# Patient Record
Sex: FEMALE | Race: WHITE | NOT HISPANIC OR LATINO | Employment: FULL TIME | URBAN - METROPOLITAN AREA
[De-identification: names, ages, dates, MRNs, and addresses within clinical notes are randomized per-mention and may not be internally consistent; named-entity substitution may affect disease eponyms.]

---

## 2017-02-10 ENCOUNTER — TRANSCRIBE ORDERS (OUTPATIENT)
Dept: ADMINISTRATIVE | Facility: HOSPITAL | Age: 57
End: 2017-02-10

## 2017-02-10 ENCOUNTER — LAB REQUISITION (OUTPATIENT)
Dept: LAB | Facility: HOSPITAL | Age: 57
End: 2017-02-10
Payer: COMMERCIAL

## 2017-02-10 DIAGNOSIS — K57.92 DIVERTICULITIS OF INTESTINE WITHOUT PERFORATION OR ABSCESS WITHOUT BLEEDING: ICD-10-CM

## 2017-02-10 DIAGNOSIS — IMO0002 DIVERTICULITIS OF GASTROINTESTINAL TRACT: Primary | ICD-10-CM

## 2017-02-10 LAB
ALBUMIN SERPL BCP-MCNC: 3.9 G/DL (ref 3.5–5)
ALP SERPL-CCNC: 80 U/L (ref 46–116)
ALT SERPL W P-5'-P-CCNC: 86 U/L (ref 12–78)
AMYLASE SERPL-CCNC: 89 IU/L (ref 25–115)
ANION GAP SERPL CALCULATED.3IONS-SCNC: 11 MMOL/L (ref 4–13)
AST SERPL W P-5'-P-CCNC: 36 U/L (ref 5–45)
BASOPHILS # BLD AUTO: 0.02 THOUSANDS/ΜL (ref 0–0.1)
BASOPHILS NFR BLD AUTO: 0 % (ref 0–1)
BILIRUB SERPL-MCNC: 0.34 MG/DL (ref 0.2–1)
BUN SERPL-MCNC: 19 MG/DL (ref 5–25)
CALCIUM SERPL-MCNC: 9 MG/DL (ref 8.3–10.1)
CHLORIDE SERPL-SCNC: 105 MMOL/L (ref 100–108)
CO2 SERPL-SCNC: 26 MMOL/L (ref 21–32)
CREAT SERPL-MCNC: 0.76 MG/DL (ref 0.6–1.3)
EOSINOPHIL # BLD AUTO: 0.22 THOUSAND/ΜL (ref 0–0.61)
EOSINOPHIL NFR BLD AUTO: 3 % (ref 0–6)
ERYTHROCYTE [DISTWIDTH] IN BLOOD BY AUTOMATED COUNT: 14 % (ref 11.6–15.1)
GFR SERPL CREATININE-BSD FRML MDRD: >60 ML/MIN/1.73SQ M
GLUCOSE SERPL-MCNC: 100 MG/DL (ref 65–140)
HCT VFR BLD AUTO: 40.7 % (ref 34.8–46.1)
HGB BLD-MCNC: 13.8 G/DL (ref 11.5–15.4)
LYMPHOCYTES # BLD AUTO: 1.82 THOUSANDS/ΜL (ref 0.6–4.47)
LYMPHOCYTES NFR BLD AUTO: 25 % (ref 14–44)
MCH RBC QN AUTO: 29.5 PG (ref 26.8–34.3)
MCHC RBC AUTO-ENTMCNC: 33.9 G/DL (ref 31.4–37.4)
MCV RBC AUTO: 87 FL (ref 82–98)
MONOCYTES # BLD AUTO: 0.52 THOUSAND/ΜL (ref 0.17–1.22)
MONOCYTES NFR BLD AUTO: 7 % (ref 4–12)
NEUTROPHILS # BLD AUTO: 4.8 THOUSANDS/ΜL (ref 1.85–7.62)
NEUTS SEG NFR BLD AUTO: 65 % (ref 43–75)
NRBC BLD AUTO-RTO: 0 /100 WBCS
PLATELET # BLD AUTO: 216 THOUSANDS/UL (ref 149–390)
PMV BLD AUTO: 11.5 FL (ref 8.9–12.7)
POTASSIUM SERPL-SCNC: 3.4 MMOL/L (ref 3.5–5.3)
PROT SERPL-MCNC: 7.8 G/DL (ref 6.4–8.2)
RBC # BLD AUTO: 4.68 MILLION/UL (ref 3.81–5.12)
SODIUM SERPL-SCNC: 142 MMOL/L (ref 136–145)
WBC # BLD AUTO: 7.4 THOUSAND/UL (ref 4.31–10.16)

## 2017-02-10 PROCEDURE — 80053 COMPREHEN METABOLIC PANEL: CPT | Performed by: INTERNAL MEDICINE

## 2017-02-10 PROCEDURE — 85025 COMPLETE CBC W/AUTO DIFF WBC: CPT | Performed by: INTERNAL MEDICINE

## 2017-02-10 PROCEDURE — 82150 ASSAY OF AMYLASE: CPT | Performed by: INTERNAL MEDICINE

## 2017-02-14 ENCOUNTER — HOSPITAL ENCOUNTER (OUTPATIENT)
Dept: RADIOLOGY | Facility: HOSPITAL | Age: 57
Discharge: HOME | End: 2017-02-14
Payer: COMMERCIAL

## 2017-02-14 DIAGNOSIS — IMO0002 DIVERTICULITIS OF GASTROINTESTINAL TRACT: ICD-10-CM

## 2017-02-14 PROCEDURE — 74176 CT ABD & PELVIS W/O CONTRAST: CPT

## 2017-02-23 ENCOUNTER — APPOINTMENT (OUTPATIENT)
Dept: LAB | Facility: HOSPITAL | Age: 57
End: 2017-02-23
Payer: COMMERCIAL

## 2017-02-23 ENCOUNTER — TRANSCRIBE ORDERS (OUTPATIENT)
Dept: LAB | Facility: HOSPITAL | Age: 57
End: 2017-02-23

## 2017-02-23 DIAGNOSIS — R19.7 DIARRHEA, UNSPECIFIED TYPE: ICD-10-CM

## 2017-02-23 DIAGNOSIS — R19.7 DIARRHEA, UNSPECIFIED TYPE: Primary | ICD-10-CM

## 2017-02-23 PROCEDURE — 87899 AGENT NOS ASSAY W/OPTIC: CPT

## 2017-02-23 PROCEDURE — 87015 SPECIMEN INFECT AGNT CONCNTJ: CPT

## 2017-02-23 PROCEDURE — 87177 OVA AND PARASITES SMEARS: CPT

## 2017-02-23 PROCEDURE — 87045 FECES CULTURE AEROBIC BACT: CPT

## 2017-02-23 PROCEDURE — 87046 STOOL CULTR AEROBIC BACT EA: CPT

## 2017-02-23 PROCEDURE — 87493 C DIFF AMPLIFIED PROBE: CPT

## 2017-02-23 PROCEDURE — 87209 SMEAR COMPLEX STAIN: CPT

## 2017-02-24 LAB — C DIFF TOX GENS STL QL NAA+PROBE: NORMAL

## 2017-02-25 LAB
BACTERIA STL CULT: NORMAL
BACTERIA STL CULT: NORMAL
O+P STL CONC: NORMAL

## 2017-03-07 RX ORDER — ACETAMINOPHEN 500 MG
500 TABLET ORAL EVERY 6 HOURS PRN
COMMUNITY

## 2017-03-07 RX ORDER — ESOMEPRAZOLE MAGNESIUM 40 MG/1
40 CAPSULE, DELAYED RELEASE ORAL DAILY
COMMUNITY

## 2017-03-07 RX ORDER — MULTIVITAMIN WITH IRON
1 TABLET ORAL DAILY
COMMUNITY

## 2017-03-07 RX ORDER — UBIDECARENONE/VIT E/VIT E MIX 100-20-15
1 CAPSULE ORAL DAILY
COMMUNITY

## 2017-03-07 RX ORDER — ASPIRIN 81 MG/1
81 TABLET, CHEWABLE ORAL DAILY
COMMUNITY

## 2017-03-07 RX ORDER — FOLIC ACID/MULTIVIT,IRON,MINER .4-18-35
1 TABLET,CHEWABLE ORAL DAILY
COMMUNITY

## 2017-03-07 RX ORDER — CLONAZEPAM 0.5 MG/1
0.5 TABLET ORAL
COMMUNITY
End: 2018-03-26

## 2017-03-07 RX ORDER — ECHINACEA PURPUREA EXTRACT 125 MG
1 TABLET ORAL AS NEEDED
COMMUNITY
End: 2018-03-28 | Stop reason: SDUPTHER

## 2017-03-07 RX ORDER — CHOLECALCIFEROL (VITAMIN D3) 125 MCG
1 CAPSULE ORAL DAILY
COMMUNITY

## 2017-03-07 RX ORDER — PROMETHAZINE HYDROCHLORIDE 25 MG/1
25 TABLET ORAL EVERY 8 HOURS PRN
COMMUNITY

## 2017-03-07 RX ORDER — ALBUTEROL SULFATE 90 UG/1
2 AEROSOL, METERED RESPIRATORY (INHALATION) EVERY 6 HOURS PRN
COMMUNITY

## 2017-03-07 RX ORDER — DICYCLOMINE HCL 20 MG
20 TABLET ORAL AS NEEDED
COMMUNITY

## 2017-03-08 ENCOUNTER — ANESTHESIA (OUTPATIENT)
Dept: GASTROENTEROLOGY | Facility: HOSPITAL | Age: 57
End: 2017-03-08
Payer: COMMERCIAL

## 2017-03-08 ENCOUNTER — HOSPITAL ENCOUNTER (OUTPATIENT)
Facility: HOSPITAL | Age: 57
Setting detail: OUTPATIENT SURGERY
Discharge: HOME | End: 2017-03-08
Attending: INTERNAL MEDICINE | Admitting: INTERNAL MEDICINE
Payer: COMMERCIAL

## 2017-03-08 ENCOUNTER — ANESTHESIA EVENT (OUTPATIENT)
Dept: GASTROENTEROLOGY | Facility: HOSPITAL | Age: 57
End: 2017-03-08
Payer: COMMERCIAL

## 2017-03-08 VITALS
OXYGEN SATURATION: 99 % | TEMPERATURE: 97.8 F | RESPIRATION RATE: 18 BRPM | HEIGHT: 62 IN | HEART RATE: 73 BPM | WEIGHT: 198 LBS | SYSTOLIC BLOOD PRESSURE: 112 MMHG | DIASTOLIC BLOOD PRESSURE: 68 MMHG | BODY MASS INDEX: 36.44 KG/M2

## 2017-03-08 DIAGNOSIS — R10.9 ABDOMINAL PAIN IN FEMALE: ICD-10-CM

## 2017-03-08 PROCEDURE — 88305 TISSUE EXAM BY PATHOLOGIST: CPT | Performed by: INTERNAL MEDICINE

## 2017-03-08 RX ORDER — PROPOFOL 10 MG/ML
INJECTION, EMULSION INTRAVENOUS AS NEEDED
Status: DISCONTINUED | OUTPATIENT
Start: 2017-03-08 | End: 2017-03-08 | Stop reason: SURG

## 2017-03-08 RX ORDER — SODIUM CHLORIDE 9 MG/ML
125 INJECTION, SOLUTION INTRAVENOUS CONTINUOUS
Status: DISCONTINUED | OUTPATIENT
Start: 2017-03-08 | End: 2017-03-08 | Stop reason: HOSPADM

## 2017-03-08 RX ORDER — PROPOFOL 10 MG/ML
INJECTION, EMULSION INTRAVENOUS CONTINUOUS PRN
Status: DISCONTINUED | OUTPATIENT
Start: 2017-03-08 | End: 2017-03-08 | Stop reason: SURG

## 2017-03-08 RX ADMIN — SODIUM CHLORIDE 125 ML/HR: 0.9 INJECTION, SOLUTION INTRAVENOUS at 15:14

## 2017-03-08 RX ADMIN — PROPOFOL 100 MG: 10 INJECTION, EMULSION INTRAVENOUS at 15:45

## 2017-03-08 RX ADMIN — PROPOFOL 150 MCG/KG/MIN: 10 INJECTION, EMULSION INTRAVENOUS at 15:46

## 2017-03-08 RX ADMIN — PROPOFOL 100 MG: 10 INJECTION, EMULSION INTRAVENOUS at 15:48

## 2017-03-10 ENCOUNTER — HOSPITAL ENCOUNTER (EMERGENCY)
Facility: HOSPITAL | Age: 57
Discharge: HOME | End: 2017-03-10
Attending: EMERGENCY MEDICINE | Admitting: EMERGENCY MEDICINE
Payer: COMMERCIAL

## 2017-03-10 ENCOUNTER — APPOINTMENT (EMERGENCY)
Dept: RADIOLOGY | Facility: HOSPITAL | Age: 57
End: 2017-03-10
Payer: COMMERCIAL

## 2017-03-10 VITALS
SYSTOLIC BLOOD PRESSURE: 134 MMHG | TEMPERATURE: 98 F | HEART RATE: 74 BPM | WEIGHT: 198 LBS | OXYGEN SATURATION: 97 % | RESPIRATION RATE: 16 BRPM | BODY MASS INDEX: 36.21 KG/M2 | DIASTOLIC BLOOD PRESSURE: 72 MMHG

## 2017-03-10 DIAGNOSIS — K58.9 IBS (IRRITABLE BOWEL SYNDROME): ICD-10-CM

## 2017-03-10 DIAGNOSIS — R10.12 LEFT UPPER QUADRANT PAIN: Primary | ICD-10-CM

## 2017-03-10 LAB
ALBUMIN SERPL BCP-MCNC: 3.6 G/DL (ref 3.5–5)
ALP SERPL-CCNC: 77 U/L (ref 46–116)
ALT SERPL W P-5'-P-CCNC: 81 U/L (ref 12–78)
ANION GAP SERPL CALCULATED.3IONS-SCNC: 10 MMOL/L (ref 4–13)
AST SERPL W P-5'-P-CCNC: 35 U/L (ref 5–45)
ATRIAL RATE: 78 BPM
BASOPHILS # BLD AUTO: 0.01 THOUSANDS/ΜL (ref 0–0.1)
BASOPHILS NFR BLD AUTO: 0 % (ref 0–1)
BILIRUB SERPL-MCNC: 0.35 MG/DL (ref 0.2–1)
BUN SERPL-MCNC: 14 MG/DL (ref 5–25)
CALCIUM SERPL-MCNC: 8.7 MG/DL (ref 8.3–10.1)
CHLORIDE SERPL-SCNC: 111 MMOL/L (ref 100–108)
CO2 SERPL-SCNC: 22 MMOL/L (ref 21–32)
CREAT SERPL-MCNC: 0.73 MG/DL (ref 0.6–1.3)
EOSINOPHIL # BLD AUTO: 0.18 THOUSAND/ΜL (ref 0–0.61)
EOSINOPHIL NFR BLD AUTO: 4 % (ref 0–6)
ERYTHROCYTE [DISTWIDTH] IN BLOOD BY AUTOMATED COUNT: 14 % (ref 11.6–15.1)
GFR SERPL CREATININE-BSD FRML MDRD: >60 ML/MIN/1.73SQ M
GLUCOSE SERPL-MCNC: 116 MG/DL (ref 65–140)
HCT VFR BLD AUTO: 37.9 % (ref 34.8–46.1)
HGB BLD-MCNC: 12.9 G/DL (ref 11.5–15.4)
LIPASE SERPL-CCNC: 141 U/L (ref 73–393)
LYMPHOCYTES # BLD AUTO: 1.38 THOUSANDS/ΜL (ref 0.6–4.47)
LYMPHOCYTES NFR BLD AUTO: 28 % (ref 14–44)
MCH RBC QN AUTO: 30.3 PG (ref 26.8–34.3)
MCHC RBC AUTO-ENTMCNC: 34 G/DL (ref 31.4–37.4)
MCV RBC AUTO: 89 FL (ref 82–98)
MONOCYTES # BLD AUTO: 0.33 THOUSAND/ΜL (ref 0.17–1.22)
MONOCYTES NFR BLD AUTO: 7 % (ref 4–12)
NEUTROPHILS # BLD AUTO: 3.04 THOUSANDS/ΜL (ref 1.85–7.62)
NEUTS SEG NFR BLD AUTO: 61 % (ref 43–75)
NRBC BLD AUTO-RTO: 0 /100 WBCS
P AXIS: 67 DEGREES
PLATELET # BLD AUTO: 168 THOUSANDS/UL (ref 149–390)
PMV BLD AUTO: 11.3 FL (ref 8.9–12.7)
POTASSIUM SERPL-SCNC: 3.1 MMOL/L (ref 3.5–5.3)
PR INTERVAL: 144 MS
PROT SERPL-MCNC: 7.5 G/DL (ref 6.4–8.2)
QRS AXIS: 30 DEGREES
QRSD INTERVAL: 86 MS
QT INTERVAL: 364 MS
QTC INTERVAL: 414 MS
RBC # BLD AUTO: 4.26 MILLION/UL (ref 3.81–5.12)
SODIUM SERPL-SCNC: 143 MMOL/L (ref 136–145)
T WAVE AXIS: 49 DEGREES
VENTRICULAR RATE: 78 BPM
WBC # BLD AUTO: 4.95 THOUSAND/UL (ref 4.31–10.16)

## 2017-03-10 PROCEDURE — 71020 HB CHEST X-RAY 2VW FRONTAL&LATL: CPT

## 2017-03-10 PROCEDURE — 99285 EMERGENCY DEPT VISIT HI MDM: CPT

## 2017-03-10 PROCEDURE — 83690 ASSAY OF LIPASE: CPT | Performed by: EMERGENCY MEDICINE

## 2017-03-10 PROCEDURE — 85025 COMPLETE CBC W/AUTO DIFF WBC: CPT | Performed by: EMERGENCY MEDICINE

## 2017-03-10 PROCEDURE — 74177 CT ABD & PELVIS W/CONTRAST: CPT

## 2017-03-10 PROCEDURE — 93005 ELECTROCARDIOGRAM TRACING: CPT | Performed by: EMERGENCY MEDICINE

## 2017-03-10 PROCEDURE — 36415 COLL VENOUS BLD VENIPUNCTURE: CPT | Performed by: EMERGENCY MEDICINE

## 2017-03-10 PROCEDURE — 80053 COMPREHEN METABOLIC PANEL: CPT | Performed by: EMERGENCY MEDICINE

## 2017-03-10 RX ORDER — MAGNESIUM HYDROXIDE/ALUMINUM HYDROXICE/SIMETHICONE 120; 1200; 1200 MG/30ML; MG/30ML; MG/30ML
30 SUSPENSION ORAL ONCE
Status: COMPLETED | OUTPATIENT
Start: 2017-03-10 | End: 2017-03-10

## 2017-03-10 RX ORDER — DICYCLOMINE HCL 20 MG
20 TABLET ORAL ONCE
Status: COMPLETED | OUTPATIENT
Start: 2017-03-10 | End: 2017-03-10

## 2017-03-10 RX ORDER — FAMOTIDINE 20 MG/1
20 TABLET, FILM COATED ORAL ONCE
Status: COMPLETED | OUTPATIENT
Start: 2017-03-10 | End: 2017-03-10

## 2017-03-10 RX ADMIN — DICYCLOMINE HYDROCHLORIDE 20 MG: 20 TABLET ORAL at 08:53

## 2017-03-10 RX ADMIN — IOHEXOL 100 ML: 350 INJECTION, SOLUTION INTRAVENOUS at 09:28

## 2017-03-10 RX ADMIN — ALUMINUM HYDROXIDE, MAGNESIUM HYDROXIDE, AND SIMETHICONE 30 ML: 200; 200; 20 SUSPENSION ORAL at 08:53

## 2017-03-10 RX ADMIN — FAMOTIDINE 20 MG: 20 TABLET ORAL at 08:53

## 2017-03-15 ENCOUNTER — APPOINTMENT (OUTPATIENT)
Dept: LAB | Facility: HOSPITAL | Age: 57
End: 2017-03-15
Payer: COMMERCIAL

## 2017-03-15 ENCOUNTER — TRANSCRIBE ORDERS (OUTPATIENT)
Dept: LAB | Facility: HOSPITAL | Age: 57
End: 2017-03-15

## 2017-03-15 DIAGNOSIS — E87.6 HYPOPOTASSEMIA: ICD-10-CM

## 2017-03-15 DIAGNOSIS — E87.6 HYPOPOTASSEMIA: Primary | ICD-10-CM

## 2017-03-15 LAB
ANION GAP SERPL CALCULATED.3IONS-SCNC: 8 MMOL/L (ref 4–13)
BUN SERPL-MCNC: 13 MG/DL (ref 5–25)
CALCIUM SERPL-MCNC: 9.2 MG/DL (ref 8.3–10.1)
CHLORIDE SERPL-SCNC: 109 MMOL/L (ref 100–108)
CO2 SERPL-SCNC: 26 MMOL/L (ref 21–32)
CREAT SERPL-MCNC: 0.78 MG/DL (ref 0.6–1.3)
GFR SERPL CREATININE-BSD FRML MDRD: >60 ML/MIN/1.73SQ M
GLUCOSE P FAST SERPL-MCNC: 106 MG/DL (ref 65–99)
POTASSIUM SERPL-SCNC: 3.9 MMOL/L (ref 3.5–5.3)
SODIUM SERPL-SCNC: 143 MMOL/L (ref 136–145)

## 2017-03-15 PROCEDURE — 80048 BASIC METABOLIC PNL TOTAL CA: CPT

## 2017-03-15 PROCEDURE — 36415 COLL VENOUS BLD VENIPUNCTURE: CPT

## 2017-04-03 ENCOUNTER — TRANSCRIBE ORDERS (OUTPATIENT)
Dept: RADIOLOGY | Facility: HOSPITAL | Age: 57
End: 2017-04-03

## 2017-04-03 ENCOUNTER — HOSPITAL ENCOUNTER (OUTPATIENT)
Dept: RADIOLOGY | Facility: HOSPITAL | Age: 57
Discharge: HOME | End: 2017-04-03
Payer: COMMERCIAL

## 2017-04-03 DIAGNOSIS — R07.1 CHEST PAIN ON BREATHING: ICD-10-CM

## 2017-04-03 DIAGNOSIS — R07.1 CHEST PAIN ON BREATHING: Primary | ICD-10-CM

## 2017-04-03 PROCEDURE — 71100 X-RAY EXAM RIBS UNI 2 VIEWS: CPT

## 2017-07-16 ENCOUNTER — APPOINTMENT (OUTPATIENT)
Dept: LAB | Age: 57
End: 2017-07-16
Payer: COMMERCIAL

## 2017-07-16 ENCOUNTER — TRANSCRIBE ORDERS (OUTPATIENT)
Dept: ADMINISTRATIVE | Age: 57
End: 2017-07-16

## 2017-07-16 DIAGNOSIS — Z00.8 HEALTH EXAMINATION IN POPULATION SURVEY: Primary | ICD-10-CM

## 2017-07-16 DIAGNOSIS — Z00.8 HEALTH EXAMINATION IN POPULATION SURVEY: ICD-10-CM

## 2017-07-16 LAB
CHOLEST SERPL-MCNC: 241 MG/DL (ref 50–200)
EST. AVERAGE GLUCOSE BLD GHB EST-MCNC: 137 MG/DL
HBA1C MFR BLD: 6.4 % (ref 4.2–6.3)
HDLC SERPL-MCNC: 61 MG/DL (ref 40–60)
LDLC SERPL CALC-MCNC: 147 MG/DL (ref 0–100)
TRIGL SERPL-MCNC: 166 MG/DL

## 2017-07-16 PROCEDURE — 36415 COLL VENOUS BLD VENIPUNCTURE: CPT

## 2017-07-16 PROCEDURE — 80061 LIPID PANEL: CPT

## 2017-07-16 PROCEDURE — 83036 HEMOGLOBIN GLYCOSYLATED A1C: CPT

## 2017-12-23 ENCOUNTER — APPOINTMENT (OUTPATIENT)
Dept: LAB | Facility: HOSPITAL | Age: 57
End: 2017-12-23
Payer: COMMERCIAL

## 2017-12-23 ENCOUNTER — TRANSCRIBE ORDERS (OUTPATIENT)
Dept: LAB | Facility: HOSPITAL | Age: 57
End: 2017-12-23

## 2017-12-23 DIAGNOSIS — E78.5 HYPERLIPIDEMIA, UNSPECIFIED HYPERLIPIDEMIA TYPE: ICD-10-CM

## 2017-12-23 DIAGNOSIS — E11.9 TYPE 2 DIABETES MELLITUS WITHOUT COMPLICATION, UNSPECIFIED LONG TERM INSULIN USE STATUS: ICD-10-CM

## 2017-12-23 DIAGNOSIS — E11.9 TYPE 2 DIABETES MELLITUS WITHOUT COMPLICATION, UNSPECIFIED LONG TERM INSULIN USE STATUS: Primary | ICD-10-CM

## 2017-12-23 LAB
ALBUMIN SERPL BCP-MCNC: 3.9 G/DL (ref 3.5–5)
ALP SERPL-CCNC: 81 U/L (ref 46–116)
ALT SERPL W P-5'-P-CCNC: 49 U/L (ref 12–78)
ANION GAP SERPL CALCULATED.3IONS-SCNC: 7 MMOL/L (ref 4–13)
AST SERPL W P-5'-P-CCNC: 26 U/L (ref 5–45)
BILIRUB SERPL-MCNC: 0.42 MG/DL (ref 0.2–1)
BUN SERPL-MCNC: 17 MG/DL (ref 5–25)
CALCIUM SERPL-MCNC: 8.9 MG/DL (ref 8.3–10.1)
CHLORIDE SERPL-SCNC: 109 MMOL/L (ref 100–108)
CO2 SERPL-SCNC: 26 MMOL/L (ref 21–32)
CREAT SERPL-MCNC: 0.78 MG/DL (ref 0.6–1.3)
EST. AVERAGE GLUCOSE BLD GHB EST-MCNC: 123 MG/DL
GFR SERPL CREATININE-BSD FRML MDRD: 85 ML/MIN/1.73SQ M
GLUCOSE P FAST SERPL-MCNC: 111 MG/DL (ref 65–99)
HBA1C MFR BLD: 5.9 % (ref 4.2–6.3)
LDLC SERPL DIRECT ASSAY-MCNC: 137 MG/DL (ref 0–100)
POTASSIUM SERPL-SCNC: 3.5 MMOL/L (ref 3.5–5.3)
PROT SERPL-MCNC: 8.1 G/DL (ref 6.4–8.2)
SODIUM SERPL-SCNC: 142 MMOL/L (ref 136–145)

## 2017-12-23 PROCEDURE — 83036 HEMOGLOBIN GLYCOSYLATED A1C: CPT

## 2017-12-23 PROCEDURE — 80053 COMPREHEN METABOLIC PANEL: CPT

## 2017-12-23 PROCEDURE — 36415 COLL VENOUS BLD VENIPUNCTURE: CPT

## 2017-12-23 PROCEDURE — 83721 ASSAY OF BLOOD LIPOPROTEIN: CPT

## 2018-01-12 NOTE — PROCEDURES
Procedures by Bambi Mendez MD  at 2016  6:37 PM      Author:  Bambi Mendez MD Service:  (none) Author Type:  Physician     Filed:  2016  6:38 PM Date of Service:  2016  6:37 PM Status:  Signed     :  Bambi Mendez MD (Physician)            [address] [place] [address]  [place]  Phone [phone]  Fax [phone]                                            Patient Name: Shavonne Alcazar       Date performed:  2016 Medical Record #: [id number]   Report date: 16 File #: [id number]   Referring physician: Paige Carreno ACCT#: -   : 1960 Study type: Routine, awake and drowsy   Age: 48 y Technologist: Berenice GIBBS EEG  T    Location: Outpatient ICD diagnosis: -           ELECTROENCEPHALOGRAM      Patient History:  The patient is a 48year old female with a history of migraine, sarcoidosis, and partial complex epilepsy  The latter was diagnosed in May 2011;  she has remained clinically well-controlled on her current medication regimen  The patient's brother and uncle both have a history of seizures  Medications:  Topiramate, Nexium, Verapamil, Asmanex Twist Inhaler, Centrum Advanced, Folic Acid, Ambien, ASA, Normal Saline Nasal Mist, Vit D 3, Coenzyme Q-10, Ventolin, prn Phenergan, Vicodin, Tylenol  Description of Procedure: This EEG was performed with simultaneous video recording  Electrodes were applied using the International 10-20 System, with additional electrodes used including EOG, ECG and T1/T2  The EEG was recorded from 7:57:31 AM  until 09:14:29 AM for  a total of 33 1 minutes  The recording was technically satisfactory for interpretation  Skull Defect (if any):  None  Findings:     State: This sleep-deprived EEG was obtained during wakefulness, drowsiness, and un-sedated sleep  Activity:    The background during wakefulness consisted of moderate amplitude, well-modulated 8 - 9 Hz alpha activity located symmetrically over the posterior head regions with good reactivity and attenuation to eye opening  Photic stimulation  produced a symmetric occipital driving response to a broad range of flash frequencies, and was non-activating for abnormalities  Drowsiness and light sleep were reflected by slowing and attenuation of background rhythms, accompanied by well-formed and  symmetrically placed sleep transients  Portions of delta sleep were recorded as well  Abnormal findings: The main feature of the tracing was the fairly frequent paroxysmal occurence of moderate-to-high amplitude, dysrhythmic, sharply-contoured theta-frequency activity arising from both left and right fronto-temporal  derivations independently  Several discrete sharp transients were suspected in these regions as well, though there were no sustained ictal rhythmic discharges  Hyperventilation and drowsiness markedly accentuated the above findings  Other findings: The single lead ECG revealed a normal sinus rhythm ranging from 66 - 72 bpm without ectopy  Interpretation: This is an abnormal sleep-deprived EEG due to bifrontotemporal independent sharp dysrhythmic activity, accentuated by hyperventilation and drowsiness  Similar to that seen on this patients previous EEG tracings, these findings suggest  underlying focal cortical hyper-irritability  In addition to enhanced epileptogenic potential and localization-related (partial-onset) epilepsy, similar findings may be seen in patients with chronic migraine  Sayra CUNNINGHAM  [facility]               Received for:Provider  EPIC   May 23 2016  6:38PM [facility] Standard Time

## 2018-02-14 ENCOUNTER — TRANSCRIBE ORDERS (OUTPATIENT)
Dept: ADMINISTRATIVE | Facility: HOSPITAL | Age: 58
End: 2018-02-14

## 2018-02-14 DIAGNOSIS — G40.219 PARTIAL SYMPTOMATIC EPILEPSY WITH COMPLEX PARTIAL SEIZURES, INTRACTABLE, WITHOUT STATUS EPILEPTICUS (HCC): Primary | ICD-10-CM

## 2018-03-26 ENCOUNTER — APPOINTMENT (EMERGENCY)
Dept: RADIOLOGY | Facility: HOSPITAL | Age: 58
End: 2018-03-26
Payer: COMMERCIAL

## 2018-03-26 ENCOUNTER — HOSPITAL ENCOUNTER (OUTPATIENT)
Facility: HOSPITAL | Age: 58
Setting detail: OBSERVATION
Discharge: HOME | End: 2018-03-27
Attending: EMERGENCY MEDICINE | Admitting: INTERNAL MEDICINE
Payer: COMMERCIAL

## 2018-03-26 DIAGNOSIS — R07.9 CHEST PAIN: Primary | ICD-10-CM

## 2018-03-26 DIAGNOSIS — M79.602 LEFT ARM PAIN: ICD-10-CM

## 2018-03-26 PROBLEM — M19.012 ARTHROPATHY OF LEFT SHOULDER: Status: ACTIVE | Noted: 2018-03-26

## 2018-03-26 PROBLEM — I10 HYPERTENSION, ESSENTIAL: Status: ACTIVE | Noted: 2018-03-26

## 2018-03-26 LAB
ALBUMIN SERPL BCP-MCNC: 3.8 G/DL (ref 3.5–5)
ALP SERPL-CCNC: 74 U/L (ref 46–116)
ALT SERPL W P-5'-P-CCNC: 46 U/L (ref 12–78)
ANION GAP SERPL CALCULATED.3IONS-SCNC: 7 MMOL/L (ref 4–13)
APTT PPP: 30 SECONDS (ref 23–35)
AST SERPL W P-5'-P-CCNC: 22 U/L (ref 5–45)
BASOPHILS # BLD AUTO: 0.02 THOUSANDS/ΜL (ref 0–0.1)
BASOPHILS NFR BLD AUTO: 0 % (ref 0–1)
BILIRUB SERPL-MCNC: 0.19 MG/DL (ref 0.2–1)
BUN SERPL-MCNC: 22 MG/DL (ref 5–25)
CALCIUM SERPL-MCNC: 8.7 MG/DL (ref 8.3–10.1)
CHLORIDE SERPL-SCNC: 107 MMOL/L (ref 100–108)
CO2 SERPL-SCNC: 25 MMOL/L (ref 21–32)
CREAT SERPL-MCNC: 0.76 MG/DL (ref 0.6–1.3)
DEPRECATED D DIMER PPP: 782 NG/ML (FEU) (ref 0–424)
EOSINOPHIL # BLD AUTO: 0.19 THOUSAND/ΜL (ref 0–0.61)
EOSINOPHIL NFR BLD AUTO: 2 % (ref 0–6)
ERYTHROCYTE [DISTWIDTH] IN BLOOD BY AUTOMATED COUNT: 13.4 % (ref 11.6–15.1)
GFR SERPL CREATININE-BSD FRML MDRD: 88 ML/MIN/1.73SQ M
GLUCOSE SERPL-MCNC: 113 MG/DL (ref 65–140)
HCT VFR BLD AUTO: 38 % (ref 34.8–46.1)
HGB BLD-MCNC: 13.1 G/DL (ref 11.5–15.4)
INR PPP: 0.92 (ref 0.86–1.16)
LYMPHOCYTES # BLD AUTO: 1.89 THOUSANDS/ΜL (ref 0.6–4.47)
LYMPHOCYTES NFR BLD AUTO: 21 % (ref 14–44)
MCH RBC QN AUTO: 30.4 PG (ref 26.8–34.3)
MCHC RBC AUTO-ENTMCNC: 34.5 G/DL (ref 31.4–37.4)
MCV RBC AUTO: 88 FL (ref 82–98)
MONOCYTES # BLD AUTO: 0.53 THOUSAND/ΜL (ref 0.17–1.22)
MONOCYTES NFR BLD AUTO: 6 % (ref 4–12)
NEUTROPHILS # BLD AUTO: 6.49 THOUSANDS/ΜL (ref 1.85–7.62)
NEUTS SEG NFR BLD AUTO: 71 % (ref 43–75)
NRBC BLD AUTO-RTO: 0 /100 WBCS
PLATELET # BLD AUTO: 209 THOUSANDS/UL (ref 149–390)
PMV BLD AUTO: 10.6 FL (ref 8.9–12.7)
POTASSIUM SERPL-SCNC: 3.2 MMOL/L (ref 3.5–5.3)
PROT SERPL-MCNC: 7.9 G/DL (ref 6.4–8.2)
PROTHROMBIN TIME: 12.4 SECONDS (ref 12.1–14.4)
RBC # BLD AUTO: 4.31 MILLION/UL (ref 3.81–5.12)
SODIUM SERPL-SCNC: 139 MMOL/L (ref 136–145)
TROPONIN I SERPL-MCNC: <0.02 NG/ML
TROPONIN I SERPL-MCNC: <0.02 NG/ML
WBC # BLD AUTO: 9.14 THOUSAND/UL (ref 4.31–10.16)

## 2018-03-26 PROCEDURE — 36415 COLL VENOUS BLD VENIPUNCTURE: CPT

## 2018-03-26 PROCEDURE — 85379 FIBRIN DEGRADATION QUANT: CPT | Performed by: EMERGENCY MEDICINE

## 2018-03-26 PROCEDURE — 85730 THROMBOPLASTIN TIME PARTIAL: CPT | Performed by: EMERGENCY MEDICINE

## 2018-03-26 PROCEDURE — 93005 ELECTROCARDIOGRAM TRACING: CPT

## 2018-03-26 PROCEDURE — 85610 PROTHROMBIN TIME: CPT | Performed by: EMERGENCY MEDICINE

## 2018-03-26 PROCEDURE — 96376 TX/PRO/DX INJ SAME DRUG ADON: CPT

## 2018-03-26 PROCEDURE — 71046 X-RAY EXAM CHEST 2 VIEWS: CPT

## 2018-03-26 PROCEDURE — 80053 COMPREHEN METABOLIC PANEL: CPT | Performed by: EMERGENCY MEDICINE

## 2018-03-26 PROCEDURE — 96375 TX/PRO/DX INJ NEW DRUG ADDON: CPT

## 2018-03-26 PROCEDURE — 96374 THER/PROPH/DIAG INJ IV PUSH: CPT

## 2018-03-26 PROCEDURE — 85025 COMPLETE CBC W/AUTO DIFF WBC: CPT | Performed by: EMERGENCY MEDICINE

## 2018-03-26 PROCEDURE — 84484 ASSAY OF TROPONIN QUANT: CPT | Performed by: EMERGENCY MEDICINE

## 2018-03-26 PROCEDURE — 71275 CT ANGIOGRAPHY CHEST: CPT

## 2018-03-26 RX ORDER — ZOLMITRIPTAN 5 MG/1
5 TABLET, FILM COATED ORAL ONCE AS NEEDED
COMMUNITY
End: 2018-04-14

## 2018-03-26 RX ORDER — ASPIRIN 81 MG/1
324 TABLET, CHEWABLE ORAL ONCE
Status: COMPLETED | OUTPATIENT
Start: 2018-03-26 | End: 2018-03-26

## 2018-03-26 RX ORDER — ONDANSETRON 2 MG/ML
4 INJECTION INTRAMUSCULAR; INTRAVENOUS ONCE
Status: COMPLETED | OUTPATIENT
Start: 2018-03-26 | End: 2018-03-26

## 2018-03-26 RX ORDER — MAGNESIUM HYDROXIDE/ALUMINUM HYDROXICE/SIMETHICONE 120; 1200; 1200 MG/30ML; MG/30ML; MG/30ML
30 SUSPENSION ORAL ONCE
Status: COMPLETED | OUTPATIENT
Start: 2018-03-26 | End: 2018-03-26

## 2018-03-26 RX ORDER — MORPHINE SULFATE 4 MG/ML
4 INJECTION, SOLUTION INTRAMUSCULAR; INTRAVENOUS ONCE
Status: COMPLETED | OUTPATIENT
Start: 2018-03-26 | End: 2018-03-26

## 2018-03-26 RX ORDER — KETOROLAC TROMETHAMINE 30 MG/ML
15 INJECTION, SOLUTION INTRAMUSCULAR; INTRAVENOUS ONCE
Status: COMPLETED | OUTPATIENT
Start: 2018-03-26 | End: 2018-03-26

## 2018-03-26 RX ADMIN — MORPHINE SULFATE 4 MG: 4 INJECTION INTRAVENOUS at 17:02

## 2018-03-26 RX ADMIN — ASPIRIN 81 MG 324 MG: 81 TABLET ORAL at 17:02

## 2018-03-26 RX ADMIN — LIDOCAINE HYDROCHLORIDE 15 ML: 20 SOLUTION ORAL; TOPICAL at 20:37

## 2018-03-26 RX ADMIN — KETOROLAC TROMETHAMINE 15 MG: 30 INJECTION, SOLUTION INTRAMUSCULAR at 17:02

## 2018-03-26 RX ADMIN — ALUMINUM HYDROXIDE, MAGNESIUM HYDROXIDE, AND SIMETHICONE 30 ML: 200; 200; 20 SUSPENSION ORAL at 20:37

## 2018-03-26 RX ADMIN — IOHEXOL 70 ML: 350 INJECTION, SOLUTION INTRAVENOUS at 18:13

## 2018-03-26 RX ADMIN — ONDANSETRON 4 MG: 2 INJECTION INTRAMUSCULAR; INTRAVENOUS at 19:43

## 2018-03-26 RX ADMIN — MORPHINE SULFATE 4 MG: 4 INJECTION INTRAVENOUS at 18:39

## 2018-03-27 VITALS
BODY MASS INDEX: 36.58 KG/M2 | HEART RATE: 82 BPM | OXYGEN SATURATION: 97 % | TEMPERATURE: 98.9 F | RESPIRATION RATE: 18 BRPM | SYSTOLIC BLOOD PRESSURE: 112 MMHG | WEIGHT: 200 LBS | DIASTOLIC BLOOD PRESSURE: 58 MMHG

## 2018-03-27 PROBLEM — K44.9 HIATAL HERNIA: Status: ACTIVE | Noted: 2018-03-27

## 2018-03-27 PROBLEM — M79.602 LEFT ARM PAIN: Status: ACTIVE | Noted: 2018-03-26

## 2018-03-27 PROBLEM — E87.6 HYPOKALEMIA: Status: ACTIVE | Noted: 2018-03-27

## 2018-03-27 LAB
ANION GAP SERPL CALCULATED.3IONS-SCNC: 11 MMOL/L (ref 4–13)
ATRIAL RATE: 136 BPM
ATRIAL RATE: 76 BPM
ATRIAL RATE: 76 BPM
ATRIAL RATE: 78 BPM
BUN SERPL-MCNC: 23 MG/DL (ref 5–25)
CALCIUM SERPL-MCNC: 8.2 MG/DL (ref 8.3–10.1)
CHLORIDE SERPL-SCNC: 108 MMOL/L (ref 100–108)
CHOLEST SERPL-MCNC: 218 MG/DL (ref 50–200)
CO2 SERPL-SCNC: 25 MMOL/L (ref 21–32)
CREAT SERPL-MCNC: 0.8 MG/DL (ref 0.6–1.3)
ERYTHROCYTE [DISTWIDTH] IN BLOOD BY AUTOMATED COUNT: 13.6 % (ref 11.6–15.1)
GFR SERPL CREATININE-BSD FRML MDRD: 83 ML/MIN/1.73SQ M
GLUCOSE SERPL-MCNC: 108 MG/DL (ref 65–140)
HCT VFR BLD AUTO: 36.4 % (ref 34.8–46.1)
HDLC SERPL-MCNC: 54 MG/DL (ref 40–60)
HGB BLD-MCNC: 12.3 G/DL (ref 11.5–15.4)
LDLC SERPL CALC-MCNC: 135 MG/DL (ref 0–100)
MAGNESIUM SERPL-MCNC: 1.9 MG/DL (ref 1.6–2.6)
MCH RBC QN AUTO: 30.1 PG (ref 26.8–34.3)
MCHC RBC AUTO-ENTMCNC: 33.8 G/DL (ref 31.4–37.4)
MCV RBC AUTO: 89 FL (ref 82–98)
P AXIS: 36 DEGREES
P AXIS: 45 DEGREES
P AXIS: 47 DEGREES
P AXIS: 62 DEGREES
PHOSPHATE SERPL-MCNC: 3.8 MG/DL (ref 2.7–4.5)
PLATELET # BLD AUTO: 188 THOUSANDS/UL (ref 149–390)
PMV BLD AUTO: 10.5 FL (ref 8.9–12.7)
POTASSIUM SERPL-SCNC: 3.3 MMOL/L (ref 3.5–5.3)
PR INTERVAL: 124 MS
PR INTERVAL: 126 MS
PR INTERVAL: 126 MS
QRS AXIS: 14 DEGREES
QRS AXIS: 15 DEGREES
QRS AXIS: 16 DEGREES
QRS AXIS: 28 DEGREES
QRSD INTERVAL: 78 MS
QRSD INTERVAL: 84 MS
QRSD INTERVAL: 84 MS
QRSD INTERVAL: 88 MS
QT INTERVAL: 360 MS
QT INTERVAL: 366 MS
QT INTERVAL: 370 MS
QT INTERVAL: 378 MS
QTC INTERVAL: 405 MS
QTC INTERVAL: 408 MS
QTC INTERVAL: 411 MS
QTC INTERVAL: 421 MS
RBC # BLD AUTO: 4.09 MILLION/UL (ref 3.81–5.12)
SODIUM SERPL-SCNC: 144 MMOL/L (ref 136–145)
T WAVE AXIS: 47 DEGREES
T WAVE AXIS: 52 DEGREES
T WAVE AXIS: 56 DEGREES
T WAVE AXIS: 57 DEGREES
TRIGL SERPL-MCNC: 146 MG/DL
TROPONIN I SERPL-MCNC: <0.02 NG/ML
TSH SERPL DL<=0.05 MIU/L-ACNC: 2.03 UIU/ML (ref 0.36–3.74)
VENTRICULAR RATE: 70 BPM
VENTRICULAR RATE: 76 BPM
VENTRICULAR RATE: 76 BPM
VENTRICULAR RATE: 78 BPM
WBC # BLD AUTO: 6.98 THOUSAND/UL (ref 4.31–10.16)

## 2018-03-27 PROCEDURE — 36415 COLL VENOUS BLD VENIPUNCTURE: CPT | Performed by: INTERNAL MEDICINE

## 2018-03-27 PROCEDURE — 93005 ELECTROCARDIOGRAM TRACING: CPT

## 2018-03-27 PROCEDURE — 80061 LIPID PANEL: CPT | Performed by: INTERNAL MEDICINE

## 2018-03-27 PROCEDURE — 84484 ASSAY OF TROPONIN QUANT: CPT | Performed by: INTERNAL MEDICINE

## 2018-03-27 PROCEDURE — 83735 ASSAY OF MAGNESIUM: CPT | Performed by: INTERNAL MEDICINE

## 2018-03-27 PROCEDURE — 99285 EMERGENCY DEPT VISIT HI MDM: CPT

## 2018-03-27 PROCEDURE — 84100 ASSAY OF PHOSPHORUS: CPT | Performed by: INTERNAL MEDICINE

## 2018-03-27 PROCEDURE — 84443 ASSAY THYROID STIM HORMONE: CPT | Performed by: INTERNAL MEDICINE

## 2018-03-27 PROCEDURE — 80048 BASIC METABOLIC PNL TOTAL CA: CPT | Performed by: INTERNAL MEDICINE

## 2018-03-27 PROCEDURE — 85027 COMPLETE CBC AUTOMATED: CPT | Performed by: INTERNAL MEDICINE

## 2018-03-27 PROCEDURE — 99235 HOSP IP/OBS SAME DATE MOD 70: CPT | Performed by: INTERNAL MEDICINE

## 2018-03-27 RX ORDER — ZOLPIDEM TARTRATE 5 MG/1
5 TABLET ORAL
Status: DISCONTINUED | OUTPATIENT
Start: 2018-03-27 | End: 2018-03-27 | Stop reason: HOSPADM

## 2018-03-27 RX ORDER — TOPIRAMATE 100 MG/1
200 TABLET, FILM COATED ORAL 2 TIMES DAILY
Status: DISCONTINUED | OUTPATIENT
Start: 2018-03-27 | End: 2018-03-27 | Stop reason: HOSPADM

## 2018-03-27 RX ORDER — ONDANSETRON 2 MG/ML
4 INJECTION INTRAMUSCULAR; INTRAVENOUS EVERY 6 HOURS PRN
Status: DISCONTINUED | OUTPATIENT
Start: 2018-03-27 | End: 2018-03-27 | Stop reason: HOSPADM

## 2018-03-27 RX ORDER — DOCUSATE SODIUM 100 MG/1
100 CAPSULE, LIQUID FILLED ORAL 2 TIMES DAILY PRN
Status: DISCONTINUED | OUTPATIENT
Start: 2018-03-27 | End: 2018-03-27 | Stop reason: HOSPADM

## 2018-03-27 RX ORDER — ASPIRIN 81 MG/1
81 TABLET, CHEWABLE ORAL DAILY
Status: DISCONTINUED | OUTPATIENT
Start: 2018-03-27 | End: 2018-03-27 | Stop reason: HOSPADM

## 2018-03-27 RX ORDER — SUMATRIPTAN 50 MG/1
25 TABLET, FILM COATED ORAL ONCE AS NEEDED
Status: DISCONTINUED | OUTPATIENT
Start: 2018-03-27 | End: 2018-03-27 | Stop reason: HOSPADM

## 2018-03-27 RX ORDER — POTASSIUM CHLORIDE 20 MEQ/1
40 TABLET, EXTENDED RELEASE ORAL ONCE
Status: COMPLETED | OUTPATIENT
Start: 2018-03-27 | End: 2018-03-27

## 2018-03-27 RX ORDER — CHOLECALCIFEROL (VITAMIN D3) 125 MCG
100 CAPSULE ORAL DAILY
Status: DISCONTINUED | OUTPATIENT
Start: 2018-03-27 | End: 2018-03-27 | Stop reason: HOSPADM

## 2018-03-27 RX ORDER — PROMETHAZINE HYDROCHLORIDE 25 MG/1
25 TABLET ORAL EVERY 8 HOURS PRN
Status: DISCONTINUED | OUTPATIENT
Start: 2018-03-27 | End: 2018-03-27 | Stop reason: HOSPADM

## 2018-03-27 RX ORDER — DICYCLOMINE HCL 20 MG
20 TABLET ORAL EVERY 6 HOURS
Status: DISCONTINUED | OUTPATIENT
Start: 2018-03-27 | End: 2018-03-27 | Stop reason: HOSPADM

## 2018-03-27 RX ORDER — PANTOPRAZOLE SODIUM 40 MG/1
40 TABLET, DELAYED RELEASE ORAL
Status: DISCONTINUED | OUTPATIENT
Start: 2018-03-27 | End: 2018-03-27 | Stop reason: HOSPADM

## 2018-03-27 RX ORDER — FLUTICASONE PROPIONATE 220 UG/1
2 AEROSOL, METERED RESPIRATORY (INHALATION)
Status: DISCONTINUED | OUTPATIENT
Start: 2018-03-27 | End: 2018-03-27 | Stop reason: HOSPADM

## 2018-03-27 RX ORDER — MELATONIN
2000 DAILY
Status: DISCONTINUED | OUTPATIENT
Start: 2018-03-27 | End: 2018-03-27 | Stop reason: HOSPADM

## 2018-03-27 RX ORDER — CHOLECALCIFEROL (VITAMIN D3) 10 MCG
1 TABLET ORAL DAILY
Status: DISCONTINUED | OUTPATIENT
Start: 2018-03-27 | End: 2018-03-27 | Stop reason: HOSPADM

## 2018-03-27 RX ORDER — MAGNESIUM HYDROXIDE/ALUMINUM HYDROXICE/SIMETHICONE 120; 1200; 1200 MG/30ML; MG/30ML; MG/30ML
15 SUSPENSION ORAL EVERY 6 HOURS PRN
Status: DISCONTINUED | OUTPATIENT
Start: 2018-03-27 | End: 2018-03-27 | Stop reason: HOSPADM

## 2018-03-27 RX ORDER — ACETAMINOPHEN 325 MG/1
650 TABLET ORAL EVERY 6 HOURS PRN
Status: DISCONTINUED | OUTPATIENT
Start: 2018-03-27 | End: 2018-03-27 | Stop reason: HOSPADM

## 2018-03-27 RX ORDER — ACETAMINOPHEN 325 MG/1
500 TABLET ORAL EVERY 6 HOURS PRN
Status: DISCONTINUED | OUTPATIENT
Start: 2018-03-27 | End: 2018-03-27 | Stop reason: HOSPADM

## 2018-03-27 RX ORDER — ALBUTEROL SULFATE 90 UG/1
2 AEROSOL, METERED RESPIRATORY (INHALATION) EVERY 6 HOURS PRN
Status: DISCONTINUED | OUTPATIENT
Start: 2018-03-27 | End: 2018-03-27 | Stop reason: HOSPADM

## 2018-03-27 RX ORDER — ECHINACEA PURPUREA EXTRACT 125 MG
1 TABLET ORAL AS NEEDED
Status: DISCONTINUED | OUTPATIENT
Start: 2018-03-27 | End: 2018-03-27 | Stop reason: HOSPADM

## 2018-03-27 RX ORDER — TRAMADOL HYDROCHLORIDE 50 MG/1
50 TABLET ORAL EVERY 6 HOURS PRN
Qty: 20 TABLET | Refills: 0 | Status: SHIPPED | OUTPATIENT
Start: 2018-03-27 | End: 2018-04-06

## 2018-03-27 RX ADMIN — POTASSIUM CHLORIDE 40 MEQ: 1500 TABLET, EXTENDED RELEASE ORAL at 09:09

## 2018-03-27 RX ADMIN — ONDANSETRON 4 MG: 2 INJECTION INTRAMUSCULAR; INTRAVENOUS at 01:10

## 2018-03-27 RX ADMIN — DICYCLOMINE HYDROCHLORIDE 20 MG: 20 TABLET ORAL at 08:45

## 2018-03-27 RX ADMIN — ACETAMINOPHEN 650 MG: 325 TABLET, FILM COATED ORAL at 07:12

## 2018-03-27 RX ADMIN — TOPIRAMATE 200 MG: 100 TABLET, FILM COATED ORAL at 08:46

## 2018-03-27 RX ADMIN — METOPROLOL TARTRATE 25 MG: 25 TABLET ORAL at 08:46

## 2018-03-27 RX ADMIN — FLUTICASONE PROPIONATE 2 PUFF: 220 AEROSOL, METERED RESPIRATORY (INHALATION) at 02:40

## 2018-03-27 RX ADMIN — Medication 100 MG: at 08:47

## 2018-03-27 RX ADMIN — Medication 1 TABLET: at 08:47

## 2018-03-27 RX ADMIN — PANTOPRAZOLE SODIUM 40 MG: 40 TABLET, DELAYED RELEASE ORAL at 06:38

## 2018-03-27 RX ADMIN — VITAMIN D, TAB 1000IU (100/BT) 2000 UNITS: 25 TAB at 08:47

## 2018-03-27 RX ADMIN — DICYCLOMINE HYDROCHLORIDE 20 MG: 20 TABLET ORAL at 02:41

## 2018-03-27 RX ADMIN — NEPHROCAP 1 CAPSULE: 1 CAP ORAL at 08:47

## 2018-03-27 RX ADMIN — ACETAMINOPHEN 650 MG: 325 TABLET, FILM COATED ORAL at 01:09

## 2018-03-27 RX ADMIN — ASPIRIN 81 MG 81 MG: 81 TABLET ORAL at 08:46

## 2018-03-27 RX ADMIN — ENOXAPARIN SODIUM 40 MG: 40 INJECTION SUBCUTANEOUS at 08:41

## 2018-03-28 ENCOUNTER — OFFICE VISIT (OUTPATIENT)
Dept: OBGYN CLINIC | Facility: OTHER | Age: 58
End: 2018-03-28
Payer: COMMERCIAL

## 2018-03-28 ENCOUNTER — APPOINTMENT (OUTPATIENT)
Dept: RADIOLOGY | Facility: OTHER | Age: 58
End: 2018-03-28
Payer: COMMERCIAL

## 2018-03-28 VITALS
HEART RATE: 73 BPM | BODY MASS INDEX: 36.44 KG/M2 | SYSTOLIC BLOOD PRESSURE: 125 MMHG | WEIGHT: 198 LBS | DIASTOLIC BLOOD PRESSURE: 80 MMHG | HEIGHT: 62 IN

## 2018-03-28 DIAGNOSIS — M25.512 ACUTE PAIN OF LEFT SHOULDER: Primary | ICD-10-CM

## 2018-03-28 DIAGNOSIS — M25.512 ACUTE PAIN OF LEFT SHOULDER: ICD-10-CM

## 2018-03-28 LAB
ATRIAL RATE: 71 BPM
ATRIAL RATE: 91 BPM
P AXIS: 67 DEGREES
P AXIS: 86 DEGREES
PR INTERVAL: 148 MS
PR INTERVAL: 156 MS
QRS AXIS: 34 DEGREES
QRS AXIS: 84 DEGREES
QRSD INTERVAL: 86 MS
QRSD INTERVAL: 86 MS
QT INTERVAL: 374 MS
QT INTERVAL: 388 MS
QTC INTERVAL: 421 MS
QTC INTERVAL: 460 MS
T WAVE AXIS: 61 DEGREES
T WAVE AXIS: 81 DEGREES
VENTRICULAR RATE: 71 BPM
VENTRICULAR RATE: 91 BPM

## 2018-03-28 PROCEDURE — 99203 OFFICE O/P NEW LOW 30 MIN: CPT | Performed by: ORTHOPAEDIC SURGERY

## 2018-03-28 PROCEDURE — 73030 X-RAY EXAM OF SHOULDER: CPT

## 2018-03-28 RX ORDER — ECHINACEA PURPUREA EXTRACT 125 MG
2 TABLET ORAL 2 TIMES DAILY
COMMUNITY

## 2018-03-28 RX ORDER — MULTIVITAMIN/IRON/FOLIC ACID 18MG-0.4MG
TABLET ORAL
COMMUNITY
End: 2018-03-28 | Stop reason: SDUPTHER

## 2018-03-28 NOTE — PROGRESS NOTES
Orthopaedic Surgery - Office Note  Love Ramirez (27 y o  female)   : 1960   MRN: [id number]  Encounter Date: 3/28/2018    Chief Complaint   Patient presents with    Left Shoulder - Pain       Assessment / Plan  L shoulder strain     · Begin outpatient PT for shoulder ROM and strengthening exercises with modalities   Return in about 6 weeks (around 2018) for Recheck  History of Present Illness  Love Ramirez is a 65 y o  female who presents today with L shoulder and arm pain started on 3/23/18  Pt denies numbness and tingling but c/o a sharp shooting pain that starts at her shoulder and goes down into her forarm  Pt has been taking tylenol and NSAIDs prn without relief  Pt denies neck pain  Pt denies any trauma to this shoulder  Review of Systems  Pertinent items are noted in HPI  All other systems were reviewed and are negative  Physical Exam  /80   Pulse 73   Ht 5' 2" (1 575 m)   Wt 89 8 kg (198 lb)   BMI 36 21 kg/m²   Cons: Appears well  No apparent distress  Psych: Alert  Oriented x3  Mood and affect normal   Eyes: PERRLA, EOMI  Resp: Normal effort  No audible wheezing or stridor  CV: Palpable pulse  No discernable arrhythmia  No LE edema  Lymph:  No palpable cervical, axillary, or inguinal lymphadenopathy  Skin: Warm  No palpable masses  No visible lesions  Neuro: Normal muscle tone  Normal and symmetric DTR's  Left Shoulder Exam  Alignment / Posture:  Normal shoulder posture  mild scapular protraction  Inspection:  No swelling  No deformity  Palpation:  moderate tenderness at subacromial bursa and deltoid  ROM:  Shoulder   Shoulder ER 70  Shoulder IR T8  Strength:  Supraspinatus 4/5  Infraspinatus 5-/5  Subscapularis 5/5  Stability:  No objective shoulder instability  Tests: (+) Neer  (-) Irl Rumps  (-) Spurling  Neurovascular:  Sensation intact in Ax/R/M/U nerve distributions  2+ radial pulse   Brisk capillary refill in all fingertips  Studies Reviewed  XR of left shoulder - no fractures or degenerative changes     Procedures  No procedures today  Medical, Surgical, Family, and Social History  The patient's medical history, family history, and social history, were reviewed and updated as appropriate  Past Medical History:   Diagnosis Date    Asthma     Diverticulitis     EEG abnormality without seizure     Gastroparesis     GERD (gastroesophageal reflux disease)     Headache, migraine     Hiatal hernia     IBS (irritable bowel syndrome)     Sarcoidosis        Past Surgical History:   Procedure Laterality Date    CHOLECYSTECTOMY      VA COLONOSCOPY FLX DX W/COLLJ SPEC WHEN PFRMD N/A 3/8/2017    Procedure: EGD AND COLONOSCOPY;  Surgeon: Travis Han MD;  [facility]; Service: Gastroenterology    REPLACEMENT TOTAL KNEE Right     SIGMOIDECTOMY         Family History   Problem Relation Age of Onset    Diabetes Mother     Heart disease Mother     Cancer Father     Liver cancer Father     Brain cancer Father        Social History     Occupational History    Not on file       Social History Main Topics    Smoking status: Never Smoker    Smokeless tobacco: Never Used    Alcohol use Yes      Comment: socially    Drug use: No    Sexual activity: Not on file       Allergies   Allergen Reactions    Other Shortness Of Breath     Nuts, coconut    Talwin [Pentazocine] Shortness Of Breath and Other (See Comments)     dizziness         Current Outpatient Prescriptions:     acetaminophen (TYLENOL) 500 mg tablet, Take 500 mg by mouth every 6 (six) hours as needed for mild pain, Disp: , Rfl:     albuterol (PROVENTIL HFA,VENTOLIN HFA) 90 mcg/act inhaler, Inhale 2 puffs every 6 (six) hours as needed for wheezing, Disp: , Rfl:     aspirin 81 mg chewable tablet, Chew 81 mg daily, Disp: , Rfl:     B Complex-C (B-COMPLEX WITH VITAMIN C) tablet, Take 1 tablet by mouth daily, Disp: , Rfl:     Cholecalciferol (VITAMIN D3) 2000 units TABS, Take 1 tablet by mouth daily, Disp: , Rfl:     Co-Enzyme Q10 100 MG CAPS, Take 1 capsule by mouth daily, Disp: , Rfl:     dicyclomine (BENTYL) 20 mg tablet, Take 20 mg by mouth every 6 (six) hours, Disp: , Rfl:     Hyoscyamine Sulfate (HYOSCYAMINE PO), Take 0 25 mg by mouth, Disp: , Rfl:     mometasone (ASMANEX) 220 MCG/INH inhaler, Inhale 2 puffs daily, Disp: , Rfl:     multivitamin-iron-minerals-folic acid (CENTRUM) chewable tablet, Chew 1 tablet daily, Disp: , Rfl:     promethazine (PHENERGAN) 25 mg tablet, Take 25 mg by mouth every 8 (eight) hours as needed for nausea or vomiting, Disp: , Rfl:     sodium chloride (CVS SALINE NOSE SPRAY) 0 65 % nasal spray, 2 sprays into each nostril 2 (two) times a day, Disp: , Rfl:     topiramate (TOPAMAX) 200 MG tablet, Take 200 mg by mouth 2 (two) times a day, Disp: , Rfl:     traMADol (ULTRAM) 50 mg tablet, Take 1 tablet (50 mg total) by mouth every 6 (six) hours as needed for moderate pain for up to 10 days, Disp: 20 tablet, Rfl: 0    verapamil (CALAN-SR) 120 mg CR tablet, Take 120 mg by mouth daily at bedtime, Disp: , Rfl:     ZOLMitriptan (ZOMIG) 5 MG tablet, Take 5 mg by mouth once as needed for migraine, Disp: , Rfl:     Zolpidem Tartrate (AMBIEN PO), Take 0 5 mg by mouth, Disp: , Rfl:     esomeprazole (NexIUM) 40 MG capsule, Take 40 mg by mouth daily, Disp: , Rfl:     HYOSCYAMINE SULFATE PO, Place 1 tablet under the tongue, Disp: , Rfl:       Sharonda Gunn    Scribe Attestation    I,:   Sharonda Gunn am acting as a scribe while in the presence of the attending physician :        I,:   Jonny Pruett MD personally performed the services described in this documentation    as scribed in my presence :

## 2018-04-09 ENCOUNTER — HOSPITAL ENCOUNTER (OUTPATIENT)
Dept: NEUROLOGY | Facility: AMBULATORY SURGERY CENTER | Age: 58
Discharge: HOME | End: 2018-04-09
Payer: COMMERCIAL

## 2018-04-09 DIAGNOSIS — G40.219 PARTIAL SYMPTOMATIC EPILEPSY WITH COMPLEX PARTIAL SEIZURES, INTRACTABLE, WITHOUT STATUS EPILEPTICUS (HCC): ICD-10-CM

## 2018-04-09 PROCEDURE — 95819 EEG AWAKE AND ASLEEP: CPT

## 2018-04-14 ENCOUNTER — HOSPITAL ENCOUNTER (OUTPATIENT)
Facility: HOSPITAL | Age: 58
Setting detail: OBSERVATION
Discharge: HOME | End: 2018-04-16
Attending: EMERGENCY MEDICINE | Admitting: HOSPITALIST
Payer: COMMERCIAL

## 2018-04-14 DIAGNOSIS — G43.909 MIGRAINE: Primary | ICD-10-CM

## 2018-04-14 PROBLEM — G43.919 INTRACTABLE MIGRAINE: Status: ACTIVE | Noted: 2018-04-14

## 2018-04-14 LAB
ANION GAP SERPL CALCULATED.3IONS-SCNC: 8 MMOL/L (ref 4–13)
BASOPHILS # BLD AUTO: 0.02 THOUSANDS/ΜL (ref 0–0.1)
BASOPHILS NFR BLD AUTO: 0 % (ref 0–1)
BUN SERPL-MCNC: 17 MG/DL (ref 5–25)
CALCIUM SERPL-MCNC: 8.1 MG/DL
CHLORIDE SERPL-SCNC: 111 MMOL/L (ref 100–108)
CO2 SERPL-SCNC: 23 MMOL/L (ref 21–32)
CREAT SERPL-MCNC: 0.83 MG/DL (ref 0.6–1.3)
EOSINOPHIL # BLD AUTO: 0.22 THOUSAND/ΜL (ref 0–0.61)
EOSINOPHIL NFR BLD AUTO: 3 % (ref 0–6)
ERYTHROCYTE [DISTWIDTH] IN BLOOD BY AUTOMATED COUNT: 13 % (ref 11.6–15.1)
GFR SERPL CREATININE-BSD FRML MDRD: 79 ML/MIN/1.73SQ M
GLUCOSE P FAST SERPL-MCNC: 106 MG/DL (ref 65–99)
GLUCOSE SERPL-MCNC: 106 MG/DL (ref 65–140)
HCT VFR BLD AUTO: 38.2 % (ref 34.8–46.1)
HGB BLD-MCNC: 12.6 G/DL (ref 11.5–15.4)
LYMPHOCYTES # BLD AUTO: 1.62 THOUSANDS/ΜL (ref 0.6–4.47)
LYMPHOCYTES NFR BLD AUTO: 21 % (ref 14–44)
MCH RBC QN AUTO: 30.2 PG (ref 26.8–34.3)
MCHC RBC AUTO-ENTMCNC: 33 G/DL (ref 31.4–37.4)
MCV RBC AUTO: 92 FL (ref 82–98)
MONOCYTES # BLD AUTO: 0.37 THOUSAND/ΜL (ref 0.17–1.22)
MONOCYTES NFR BLD AUTO: 5 % (ref 4–12)
NEUTROPHILS # BLD AUTO: 5.35 THOUSANDS/ΜL (ref 1.85–7.62)
NEUTS SEG NFR BLD AUTO: 71 % (ref 43–75)
NRBC BLD AUTO-RTO: 0 /100 WBCS
PLATELET # BLD AUTO: 189 THOUSANDS/UL (ref 149–390)
PMV BLD AUTO: 10.8 FL (ref 8.9–12.7)
POTASSIUM SERPL-SCNC: 3.4 MMOL/L (ref 3.5–5.3)
RBC # BLD AUTO: 4.17 MILLION/UL (ref 3.81–5.12)
SODIUM SERPL-SCNC: 142 MMOL/L (ref 136–145)
WBC # BLD AUTO: 7.59 THOUSAND/UL (ref 4.31–10.16)

## 2018-04-14 PROCEDURE — 80048 BASIC METABOLIC PNL TOTAL CA: CPT | Performed by: STUDENT IN AN ORGANIZED HEALTH CARE EDUCATION/TRAINING PROGRAM

## 2018-04-14 PROCEDURE — 85025 COMPLETE CBC W/AUTO DIFF WBC: CPT | Performed by: STUDENT IN AN ORGANIZED HEALTH CARE EDUCATION/TRAINING PROGRAM

## 2018-04-14 PROCEDURE — 36415 COLL VENOUS BLD VENIPUNCTURE: CPT | Performed by: STUDENT IN AN ORGANIZED HEALTH CARE EDUCATION/TRAINING PROGRAM

## 2018-04-14 PROCEDURE — 99284 EMERGENCY DEPT VISIT MOD MDM: CPT

## 2018-04-14 PROCEDURE — 96374 THER/PROPH/DIAG INJ IV PUSH: CPT

## 2018-04-14 PROCEDURE — 99220 PR INITIAL OBSERVATION CARE/DAY 70 MINUTES: CPT | Performed by: PHYSICIAN ASSISTANT

## 2018-04-14 PROCEDURE — 96375 TX/PRO/DX INJ NEW DRUG ADDON: CPT

## 2018-04-14 PROCEDURE — 96361 HYDRATE IV INFUSION ADD-ON: CPT

## 2018-04-14 RX ORDER — POTASSIUM CHLORIDE 20 MEQ/1
40 TABLET, EXTENDED RELEASE ORAL ONCE
Status: COMPLETED | OUTPATIENT
Start: 2018-04-14 | End: 2018-04-14

## 2018-04-14 RX ORDER — KETOROLAC TROMETHAMINE 30 MG/ML
15 INJECTION, SOLUTION INTRAMUSCULAR; INTRAVENOUS ONCE
Status: COMPLETED | OUTPATIENT
Start: 2018-04-14 | End: 2018-04-14

## 2018-04-14 RX ORDER — ALBUTEROL SULFATE 90 UG/1
2 AEROSOL, METERED RESPIRATORY (INHALATION) EVERY 6 HOURS PRN
Status: DISCONTINUED | OUTPATIENT
Start: 2018-04-14 | End: 2018-04-16 | Stop reason: HOSPADM

## 2018-04-14 RX ORDER — DIPHENHYDRAMINE HYDROCHLORIDE 50 MG/ML
25 INJECTION INTRAMUSCULAR; INTRAVENOUS EVERY 8 HOURS SCHEDULED
Status: DISCONTINUED | OUTPATIENT
Start: 2018-04-14 | End: 2018-04-16 | Stop reason: HOSPADM

## 2018-04-14 RX ORDER — ASPIRIN 81 MG/1
81 TABLET, CHEWABLE ORAL DAILY
Status: DISCONTINUED | OUTPATIENT
Start: 2018-04-15 | End: 2018-04-16 | Stop reason: HOSPADM

## 2018-04-14 RX ORDER — POLYETHYLENE GLYCOL 3350 17 G/17G
17 POWDER, FOR SOLUTION ORAL DAILY PRN
Status: DISCONTINUED | OUTPATIENT
Start: 2018-04-14 | End: 2018-04-16 | Stop reason: HOSPADM

## 2018-04-14 RX ORDER — TOPIRAMATE 100 MG/1
200 TABLET, FILM COATED ORAL 2 TIMES DAILY
Status: DISCONTINUED | OUTPATIENT
Start: 2018-04-14 | End: 2018-04-16 | Stop reason: HOSPADM

## 2018-04-14 RX ORDER — METOCLOPRAMIDE HYDROCHLORIDE 5 MG/ML
10 INJECTION INTRAMUSCULAR; INTRAVENOUS ONCE
Status: COMPLETED | OUTPATIENT
Start: 2018-04-14 | End: 2018-04-14

## 2018-04-14 RX ORDER — ASCORBIC ACID 500 MG
500 TABLET ORAL DAILY
COMMUNITY

## 2018-04-14 RX ORDER — CHOLECALCIFEROL (VITAMIN D3) 125 MCG
100 CAPSULE ORAL DAILY
Status: DISCONTINUED | OUTPATIENT
Start: 2018-04-15 | End: 2018-04-16 | Stop reason: HOSPADM

## 2018-04-14 RX ORDER — SODIUM CHLORIDE 9 MG/ML
100 INJECTION, SOLUTION INTRAVENOUS CONTINUOUS
Status: DISCONTINUED | OUTPATIENT
Start: 2018-04-14 | End: 2018-04-16 | Stop reason: HOSPADM

## 2018-04-14 RX ORDER — MAGNESIUM SULFATE HEPTAHYDRATE 40 MG/ML
2 INJECTION, SOLUTION INTRAVENOUS
Status: DISCONTINUED | OUTPATIENT
Start: 2018-04-15 | End: 2018-04-16 | Stop reason: HOSPADM

## 2018-04-14 RX ORDER — PROMETHAZINE HYDROCHLORIDE 25 MG/ML
25 INJECTION, SOLUTION INTRAMUSCULAR; INTRAVENOUS EVERY 8 HOURS SCHEDULED
Status: DISCONTINUED | OUTPATIENT
Start: 2018-04-14 | End: 2018-04-14

## 2018-04-14 RX ORDER — CALCIUM CARBONATE 200(500)MG
1000 TABLET,CHEWABLE ORAL DAILY PRN
Status: DISCONTINUED | OUTPATIENT
Start: 2018-04-14 | End: 2018-04-16 | Stop reason: HOSPADM

## 2018-04-14 RX ORDER — ECHINACEA PURPUREA EXTRACT 125 MG
2 TABLET ORAL 2 TIMES DAILY
Status: DISCONTINUED | OUTPATIENT
Start: 2018-04-14 | End: 2018-04-16 | Stop reason: HOSPADM

## 2018-04-14 RX ORDER — METHYLPREDNISOLONE SODIUM SUCCINATE 40 MG/ML
40 INJECTION, POWDER, LYOPHILIZED, FOR SOLUTION INTRAMUSCULAR; INTRAVENOUS ONCE
Status: COMPLETED | OUTPATIENT
Start: 2018-04-14 | End: 2018-04-14

## 2018-04-14 RX ORDER — KETOROLAC TROMETHAMINE 30 MG/ML
30 INJECTION, SOLUTION INTRAMUSCULAR; INTRAVENOUS EVERY 12 HOURS SCHEDULED
Status: DISCONTINUED | OUTPATIENT
Start: 2018-04-14 | End: 2018-04-16 | Stop reason: HOSPADM

## 2018-04-14 RX ORDER — ZOLPIDEM TARTRATE 5 MG/1
5 TABLET ORAL
Status: DISCONTINUED | OUTPATIENT
Start: 2018-04-14 | End: 2018-04-16 | Stop reason: HOSPADM

## 2018-04-14 RX ORDER — PROMETHAZINE HYDROCHLORIDE 25 MG/ML
25 INJECTION, SOLUTION INTRAMUSCULAR; INTRAVENOUS EVERY 8 HOURS
Status: DISCONTINUED | OUTPATIENT
Start: 2018-04-14 | End: 2018-04-15

## 2018-04-14 RX ORDER — PANTOPRAZOLE SODIUM 40 MG/1
40 TABLET, DELAYED RELEASE ORAL
Status: DISCONTINUED | OUTPATIENT
Start: 2018-04-15 | End: 2018-04-16 | Stop reason: HOSPADM

## 2018-04-14 RX ORDER — ZOLPIDEM TARTRATE 6.25 MG/1
6.25 TABLET, FILM COATED, EXTENDED RELEASE ORAL
COMMUNITY

## 2018-04-14 RX ORDER — DIPHENHYDRAMINE HYDROCHLORIDE 50 MG/ML
50 INJECTION INTRAMUSCULAR; INTRAVENOUS ONCE
Status: COMPLETED | OUTPATIENT
Start: 2018-04-14 | End: 2018-04-14

## 2018-04-14 RX ORDER — DICYCLOMINE HCL 20 MG
20 TABLET ORAL 3 TIMES DAILY PRN
Status: DISCONTINUED | OUTPATIENT
Start: 2018-04-15 | End: 2018-04-16 | Stop reason: HOSPADM

## 2018-04-14 RX ORDER — ASCORBIC ACID 500 MG
500 TABLET ORAL DAILY
Status: DISCONTINUED | OUTPATIENT
Start: 2018-04-15 | End: 2018-04-16 | Stop reason: HOSPADM

## 2018-04-14 RX ORDER — FLUTICASONE PROPIONATE 110 UG/1
1 AEROSOL, METERED RESPIRATORY (INHALATION)
Status: DISCONTINUED | OUTPATIENT
Start: 2018-04-14 | End: 2018-04-16 | Stop reason: HOSPADM

## 2018-04-14 RX ORDER — MELATONIN
2000 DAILY
Status: DISCONTINUED | OUTPATIENT
Start: 2018-04-15 | End: 2018-04-16 | Stop reason: HOSPADM

## 2018-04-14 RX ADMIN — KETOROLAC TROMETHAMINE 15 MG: 30 INJECTION, SOLUTION INTRAMUSCULAR at 14:06

## 2018-04-14 RX ADMIN — HYDROMORPHONE HYDROCHLORIDE 0.5 MG: 1 INJECTION, SOLUTION INTRAMUSCULAR; INTRAVENOUS; SUBCUTANEOUS at 16:53

## 2018-04-14 RX ADMIN — Medication 2 SPRAY: at 22:07

## 2018-04-14 RX ADMIN — METHYLPREDNISOLONE SODIUM SUCCINATE 40 MG: 40 INJECTION, POWDER, FOR SOLUTION INTRAMUSCULAR; INTRAVENOUS at 16:53

## 2018-04-14 RX ADMIN — DIPHENHYDRAMINE HYDROCHLORIDE 25 MG: 50 INJECTION, SOLUTION INTRAMUSCULAR; INTRAVENOUS at 22:07

## 2018-04-14 RX ADMIN — POTASSIUM CHLORIDE 40 MEQ: 1500 TABLET, EXTENDED RELEASE ORAL at 20:50

## 2018-04-14 RX ADMIN — DIPHENHYDRAMINE HYDROCHLORIDE 50 MG: 50 INJECTION, SOLUTION INTRAMUSCULAR; INTRAVENOUS at 14:06

## 2018-04-14 RX ADMIN — FLUTICASONE PROPIONATE 1 PUFF: 110 AEROSOL, METERED RESPIRATORY (INHALATION) at 22:07

## 2018-04-14 RX ADMIN — KETOROLAC TROMETHAMINE 30 MG: 30 INJECTION, SOLUTION INTRAMUSCULAR at 20:50

## 2018-04-14 RX ADMIN — PROMETHAZINE HYDROCHLORIDE 25 MG: 25 INJECTION INTRAMUSCULAR; INTRAVENOUS at 22:19

## 2018-04-14 RX ADMIN — SODIUM CHLORIDE 100 ML/HR: 0.9 INJECTION, SOLUTION INTRAVENOUS at 20:51

## 2018-04-14 RX ADMIN — METOCLOPRAMIDE 10 MG: 5 INJECTION, SOLUTION INTRAMUSCULAR; INTRAVENOUS at 14:06

## 2018-04-14 RX ADMIN — SODIUM CHLORIDE 1000 ML: 0.9 INJECTION, SOLUTION INTRAVENOUS at 14:06

## 2018-04-14 RX ADMIN — TOPIRAMATE 200 MG: 100 TABLET, FILM COATED ORAL at 22:07

## 2018-04-14 NOTE — ED NOTES
Spoke with Dr Razia Krishnan about this pt and her on going symptoms without relief  I had noted that I noticed this pt has not had any lab work, EKG or scans- wasn't sure if any other work up was needed since pt reported falling out of her shower/tub yesterday, Shaye LOC or hitting head and is not on thinners        Refugia Fossa, RN  04/14/18 5582

## 2018-04-14 NOTE — ED NOTES
Pt stated during bedside reporting that she has not had any relief from her HA  I informed her that I would speak with the Dr      I have since informed Dr Arlene Young of the pts c/o of no relief        India Lynn RN  04/14/18 3193

## 2018-04-14 NOTE — ASSESSMENT & PLAN NOTE
· Migraine cocktail  · Will re-assess tomorrow   If no improvement, will consider neuro eval  · Await basic labs  · Continue prophylactic meds Topamax and Verapamil  · Follows with Dr Milan Castillo of neurology

## 2018-04-14 NOTE — LETTER
[address] [place] [address]  [address] [address]  Dept: [phone]    April 16, 2018     Patient: Katherine Chicas   YOB: 1960   Date of Visit: 4/14/2018       To Whom it May Concern:    Grace Fisher is under my professional care  She was seen in the hospital from 4/14/2018   to 04/16/18  She may return to work on April 23 with no restrictions  Please excuse her from work from 4/14-4/22  If you have any questions or concerns, please don't hesitate to call           Sincerely,          Naheed Aponte PA-C   [facility]  [phone]

## 2018-04-14 NOTE — ED PROVIDER NOTES
History  Chief Complaint   Patient presents with    Headache - Recurrent or Known Dx Migraines     Patient reports having hx of migraines, reports that this is the worst migraine she has ever had  Reports nausea and dizziness  Denies sensitivity to sound or light  This is a 15-year-old female with a past medical history of migraines who presents to the emergency department this afternoon with a headache that has been bothering her the past five days  Patient states that she had a sleep-deprived EEG done on Monday where she slept for 3 hr night before and woke up with a mild headache that got worse after the EEG  Patient states that she was not able to fall sleep after the EEG state at the wrist the day  The headache continued for the next four days and she has tried multiple home remedies as well as tramadol, Phenergan, Tylenol, and NSAIDs with no relief  Patient takes Topamax b i d  to help prevent her migraines as well as verapamil and she has not missed any doses  Patient denies any new medications, supplements, or herbal remedies  Patient endorses having photophobia and some mild nausea  Patient states that she was in the shower yesterday when she lost her balance because of the pain and fell out of the shower, landing on a pile of towels and not striking her head or losing consciousness  Further review of systems is as below  Prior to Admission Medications   Prescriptions Last Dose Informant Patient Reported? Taking?    B Complex-C (B-COMPLEX WITH VITAMIN C) tablet   Yes Yes   Sig: Take 1 tablet by mouth daily   Cholecalciferol (VITAMIN D3) 2000 units TABS   Yes Yes   Sig: Take 1 tablet by mouth daily   Co-Enzyme Q10 100 MG CAPS   Yes Yes   Sig: Take 1 capsule by mouth daily   HYOSCYAMINE SULFATE PO   Yes Yes   Sig: Place 1 tablet under the tongue every 6 (six) hours as needed     Mometasone Furoate 110 MCG/INH AEPB   Yes Yes   Sig: Inhale 1 puff daily     acetaminophen (TYLENOL) 500 mg tablet   Yes Yes   Sig: Take 500 mg by mouth every 6 (six) hours as needed for mild pain   albuterol (PROVENTIL HFA,VENTOLIN HFA) 90 mcg/act inhaler   Yes Yes   Sig: Inhale 2 puffs every 6 (six) hours as needed for wheezing   ascorbic acid (VITAMIN C) 500 mg tablet   Yes Yes   Sig: Take 500 mg by mouth daily   aspirin 81 mg chewable tablet   Yes Yes   Sig: Chew 81 mg daily   dicyclomine (BENTYL) 20 mg tablet   Yes Yes   Sig: Take 20 mg by mouth every 6 (six) hours   esomeprazole (NexIUM) 40 MG capsule   Yes Yes   Sig: Take 40 mg by mouth daily   multivitamin-iron-minerals-folic acid (CENTRUM) chewable tablet   Yes Yes   Sig: Chew 1 tablet daily   promethazine (PHENERGAN) 25 mg tablet   Yes Yes   Sig: Take 25 mg by mouth every 8 (eight) hours as needed for nausea or vomiting   sodium chloride (CVS SALINE NOSE SPRAY) 0 65 % nasal spray   Yes Yes   Si sprays into each nostril 2 (two) times a day   topiramate (TOPAMAX) 200 MG tablet   Yes Yes   Sig: Take 200 mg by mouth 2 (two) times a day   verapamil (CALAN-SR) 120 mg CR tablet   Yes Yes   Sig: Take 120 mg by mouth daily at bedtime   zolpidem (AMBIEN CR) 6 25 MG CR tablet   Yes Yes   Sig: Take 6 25 mg by mouth daily at bedtime as needed for sleep      Facility-Administered Medications: None       Past Medical History:   Diagnosis Date    Asthma     Diverticulitis     EEG abnormality without seizure     Gastroparesis     GERD (gastroesophageal reflux disease)     Headache, migraine     Hiatal hernia     IBS (irritable bowel syndrome)     Sarcoidosis        Past Surgical History:   Procedure Laterality Date    CHOLECYSTECTOMY      UT COLONOSCOPY FLX DX W/COLLJ SPEC WHEN PFRMD N/A 3/8/2017    Procedure: EGD AND COLONOSCOPY;  Surgeon: Sb Pires MD;  [facility];   Service: Gastroenterology    REPLACEMENT TOTAL KNEE Right     SIGMOIDECTOMY         Family History   Problem Relation Age of Onset    Diabetes Mother     Heart disease Mother  Cancer Father     Liver cancer Father     Brain cancer Father      I have reviewed and agree with the history as documented  Social History   Substance Use Topics    Smoking status: Never Smoker    Smokeless tobacco: Never Used    Alcohol use Yes      Comment: socially        Review of Systems   Constitutional: Negative for chills, fatigue and fever  HENT: Negative for congestion, rhinorrhea, sinus pressure and sore throat  Eyes: Negative for visual disturbance  Respiratory: Negative for cough and shortness of breath  Cardiovascular: Negative for chest pain  Gastrointestinal: Positive for nausea  Negative for abdominal pain, constipation, diarrhea and vomiting  Genitourinary: Negative for dysuria, frequency, hematuria and urgency  Musculoskeletal: Negative for arthralgias and myalgias  Skin: Negative for color change and rash  Neurological: Positive for headaches  Negative for dizziness, syncope, weakness, light-headedness and numbness  Physical Exam  ED Triage Vitals [04/14/18 1241]   Temperature Pulse Respirations Blood Pressure SpO2   97 8 °F (36 6 °C) 91 18 150/77 97 %      Temp Source Heart Rate Source Patient Position - Orthostatic VS BP Location FiO2 (%)   Oral Monitor Sitting Right arm --      Pain Score       Worst Possible Pain           Orthostatic Vital Signs  Vitals:    04/14/18 2317 04/15/18 0727 04/15/18 1539 04/15/18 1600   BP: 103/54 100/55 (!) 89/50 100/60   Pulse: 79 68 87    Patient Position - Orthostatic VS: Lying Lying Lying Lying       Physical Exam   Constitutional: She is oriented to person, place, and time  She appears well-developed and well-nourished  No distress  HENT:   Head: Normocephalic and atraumatic  Eyes: Conjunctivae are normal  Pupils are equal, round, and reactive to light  Neck: No JVD present  Cardiovascular: Normal rate, regular rhythm and normal heart sounds  Exam reveals no gallop and no friction rub      No murmur heard   Pulmonary/Chest: Effort normal and breath sounds normal  No stridor  No respiratory distress  She has no wheezes  She has no rales  Abdominal: Soft  Bowel sounds are normal  She exhibits no distension  There is no tenderness  There is no guarding  Musculoskeletal: Normal range of motion  Neurological: She is alert and oriented to person, place, and time  No cranial nerve deficit  Skin: Skin is warm and dry  She is not diaphoretic  No erythema  No pallor  Psychiatric: She has a normal mood and affect  Her behavior is normal    Nursing note and vitals reviewed        ED Medications  Medications   albuterol (PROVENTIL HFA,VENTOLIN HFA) inhaler 2 puff (not administered)   ascorbic acid (VITAMIN C) tablet 500 mg (500 mg Oral Given 4/15/18 0931)   aspirin chewable tablet 81 mg (81 mg Oral Given 4/15/18 0931)   cholecalciferol (VITAMIN D3) tablet 2,000 Units (2,000 Units Oral Given 4/15/18 0931)   co-enzyme Q-10 capsule 100 mg (100 mg Oral Given 4/15/18 0931)   dicyclomine (BENTYL) tablet 20 mg (not administered)   pantoprazole (PROTONIX) EC tablet 40 mg (40 mg Oral Given 4/15/18 0616)   fluticasone (FLOVENT HFA) 110 MCG/ACT inhaler 1 puff (1 puff Inhalation Given 4/15/18 2115)   multivitamin-minerals (CENTRUM) tablet 1 tablet (1 tablet Oral Given 4/15/18 0929)   sodium chloride (OCEAN) 0 65 % nasal spray 2 spray (2 sprays Each Nare Given 4/15/18 1734)   topiramate (TOPAMAX) tablet 200 mg (200 mg Oral Given 4/15/18 1734)   zolpidem (AMBIEN) tablet 5 mg (5 mg Oral Given 4/15/18 0033)   polyethylene glycol (MIRALAX) packet 17 g (not administered)   calcium carbonate (TUMS) chewable tablet 1,000 mg (not administered)   ketorolac (TORADOL) injection 30 mg (30 mg Intravenous Given 4/15/18 2113)   diphenhydrAMINE (BENADRYL) injection 25 mg (25 mg Intravenous Given 4/15/18 2113)   magnesium sulfate 2 g/50 mL IVPB (premix) 2 g (2 g Intravenous New Bag 4/15/18 0941)   sodium chloride 0 9 % infusion (100 mL/hr Intravenous New Bag 4/15/18 1732)   verapamil (CALAN-SR) CR tablet 120 mg (120 mg Oral Not Given 4/15/18 0930)   cyclobenzaprine (FLEXERIL) tablet 10 mg (10 mg Oral Given 4/15/18 1224)   dihydroergotamine (DHE) injection 1 mg (1 mg Intravenous Given 4/15/18 1756)   metoclopramide (REGLAN) injection 10 mg (10 mg Intravenous Given 4/15/18 1735)   sodium chloride 0 9 % bolus 1,000 mL (0 mL Intravenous Stopped 4/14/18 1654)   metoclopramide (REGLAN) injection 10 mg (10 mg Intravenous Given 4/14/18 1406)   ketorolac (TORADOL) injection 15 mg (15 mg Intravenous Given 4/14/18 1406)   diphenhydrAMINE (BENADRYL) injection 50 mg (50 mg Intravenous Given 4/14/18 1406)   methylPREDNISolone sodium succinate (Solu-MEDROL) injection 40 mg (40 mg Intravenous Given 4/14/18 1653)   HYDROmorphone (DILAUDID) injection 0 5 mg (0 5 mg Intravenous Given 4/14/18 1653)   potassium chloride (K-DUR,KLOR-CON) CR tablet 40 mEq (40 mEq Oral Given 4/14/18 2050)       Diagnostic Studies  Results Reviewed     Procedure Component Value Units Date/Time    Basic metabolic panel [[id number]]  (Abnormal) Collected:  04/14/18 1759    Lab Status:  Final result Specimen:  Blood from Arm, Left Updated:  04/14/18 1825     Sodium 142 mmol/L      Potassium 3 4 (L) mmol/L      Chloride 111 (H) mmol/L      CO2 23 mmol/L      Anion Gap 8 mmol/L      BUN 17 mg/dL      Creatinine 0 83 mg/dL      Glucose 106 mg/dL      Glucose, Fasting 106 (H) mg/dL      Calcium 8 1 mg/dL      eGFR 79 ml/min/1 73sq m     Narrative:         National Kidney Disease Education Program recommendations are as follows:  GFR calculation is accurate only with a steady state creatinine  Chronic Kidney disease less than 60 ml/min/1 73 sq  meters  Kidney failure less than 15 ml/min/1 73 sq  meters      CBC and differential [[id number]]  (Normal) Collected:  04/14/18 1759    Lab Status:  Final result Specimen:  Blood from Arm, Left Updated:  04/14/18 1821     WBC 7 59 Thousand/uL      RBC 4 17 Million/uL      Hemoglobin 12 6 g/dL      Hematocrit 38 2 %      MCV 92 fL      MCH 30 2 pg      MCHC 33 0 g/dL      RDW 13 0 %      MPV 10 8 fL      Platelets 337 Thousands/uL      nRBC 0 /100 WBCs      Neutrophils Relative 71 %      Lymphocytes Relative 21 %      Monocytes Relative 5 %      Eosinophils Relative 3 %      Basophils Relative 0 %      Neutrophils Absolute 5 35 Thousands/µL      Lymphocytes Absolute 1 62 Thousands/µL      Monocytes Absolute 0 37 Thousand/µL      Eosinophils Absolute 0 22 Thousand/µL      Basophils Absolute 0 02 Thousands/µL                  No orders to display         Procedures  Procedures      Phone Consults  ED Phone Contact    ED Course  ED Course                                MDM  Number of Diagnoses or Management Options  Migraine:   Diagnosis management comments: Patient given a migraine cocktail of reglan, benadryl, toradol, and NS with little relief  Patient then given solumedrol 40mg IV and 0 5mg dilaudid, which she noted some relief of symptoms but still intolerable pain in her head  Patient was admitted to [facility] for intractable migraine as an observation  CritCare Time    Disposition  Final diagnoses:   Migraine     Time reflects when diagnosis was documented in both MDM as applicable and the Disposition within this note     Time User Action Codes Description Comment    4/14/2018  5:37 PM Adriane Marcos Add [G43 909] Migraine       ED Disposition     ED Disposition Condition Comment    Admit  Case was discussed with SUSAN and the patient's admission status was agreed to be Admission Status: observation status to the service of Dr Mary Rhoades           Follow-up Information    None       Current Discharge Medication List      CONTINUE these medications which have NOT CHANGED    Details   acetaminophen (TYLENOL) 500 mg tablet Take 500 mg by mouth every 6 (six) hours as needed for mild pain      albuterol (PROVENTIL HFA,VENTOLIN HFA) 90 mcg/act inhaler Inhale 2 puffs every 6 (six) hours as needed for wheezing      ascorbic acid (VITAMIN C) 500 mg tablet Take 500 mg by mouth daily      aspirin 81 mg chewable tablet Chew 81 mg daily      B Complex-C (B-COMPLEX WITH VITAMIN C) tablet Take 1 tablet by mouth daily      Cholecalciferol (VITAMIN D3) 2000 units TABS Take 1 tablet by mouth daily      Co-Enzyme Q10 100 MG CAPS Take 1 capsule by mouth daily      dicyclomine (BENTYL) 20 mg tablet Take 20 mg by mouth every 6 (six) hours      esomeprazole (NexIUM) 40 MG capsule Take 40 mg by mouth daily      HYOSCYAMINE SULFATE PO Place 1 tablet under the tongue every 6 (six) hours as needed        Mometasone Furoate 110 MCG/INH AEPB Inhale 1 puff daily        multivitamin-iron-minerals-folic acid (CENTRUM) chewable tablet Chew 1 tablet daily      promethazine (PHENERGAN) 25 mg tablet Take 25 mg by mouth every 8 (eight) hours as needed for nausea or vomiting      sodium chloride (CVS SALINE NOSE SPRAY) 0 65 % nasal spray 2 sprays into each nostril 2 (two) times a day      topiramate (TOPAMAX) 200 MG tablet Take 200 mg by mouth 2 (two) times a day      verapamil (CALAN-SR) 120 mg CR tablet Take 120 mg by mouth daily at bedtime      zolpidem (AMBIEN CR) 6 25 MG CR tablet Take 6 25 mg by mouth daily at bedtime as needed for sleep           No discharge procedures on file  ED Provider  Attending physically available and evaluated Ralph Pereira I managed the patient along with the ED Attending      Electronically Signed by         Bob Yung MD  04/15/18 6848

## 2018-04-14 NOTE — H&P
H&P- Gema Ricks 1960, 65 y o  female MRN: [id number]    Unit/Bed#: ED 06 Encounter: [id number]    Primary Care Provider: Dariel Silva MD   Date and time admitted to hospital: 4/14/2018  1:07 PM    * Intractable migraine   Assessment & Plan    · Migraine cocktail  · Will re-assess tomorrow  If no improvement, will consider neuro eval  · Await basic labs  · Continue prophylactic meds Topamax and Verapamil  · Follows with Dr Ridge Mcclain of neurology          VTE Prophylaxis: None  Low risk  Ambulate patient  Code Status: Full code  POLST: There is no POLST form on file for this patient (pre-hospital)  Discussion with family: None    Anticipated Length of Stay:  Patient will be admitted on an Observation basis with an anticipated length of stay of  < 2 midnights  Justification for Hospital Stay: Intractable migraine    Total Time for Visit, including Counseling / Coordination of Care: 45 minutes  Greater than 50% of this total time spent on direct patient counseling and coordination of care  Chief Complaint:   Headache    History of Present Illness:    Gema Ricks is a 65 y o  female with a history of migraines who presents with 5 day history of migraine  Patient had sleep deprived EEG on Monday  She states she gets these every two years per her neurologist, Dr Ridge Mcclain, due to abnormal EEG in the past  Patient is unsure what prompted first EEG but notes from Dr Ridge Mcclain document a history of partial complex epilepsy  Patient denies ever having seizures  She had a mild headache prior to EEG, which she states was not a migraine, just a regular headache  She did not sleep Sunday night for EEG and then after EEG on Monday she went home and felt a headache coming on  By Tuesday, she woke up with a very severe migraine  Location of the migraine has been moving and currently is in right temporal area  Described as a tight, squeezing sensation  Very severe, 10/10  She had to miss work  +Photophobia and nausea  No vomiting  She is having dizzy spells which she describes as feeling "off-balance" which occurs with certain head movements  She actually fell out of the shower because she was off-balance  No syncope, LOC, or head trauma  This off-balance sensation has happened to her before with migraines  She has tried multiple medications/remedies at home including Phenergan, Tramadol, Advil, Tylenol, Gatorade, and caffeine without relief  She did have some relief with migraine cocktail in the ED but headache is now returning and almost a 10/10 again  She takes Verapamil and Topamax for prophylactic migraine treatment  Review of Systems:    Review of Systems   Constitutional: Negative  HENT:        +Few episodes of mild epistaxis this week   Eyes: Positive for photophobia  Negative for visual disturbance  Respiratory: Negative  Cardiovascular: Negative  Has some chest pain with deep breaths   Gastrointestinal: Negative  Endocrine: Negative  Genitourinary: Negative  Musculoskeletal: Negative  Skin: Negative  Allergic/Immunologic: Negative  Neurological: Positive for dizziness and headaches  Negative for syncope, speech difficulty and weakness  Hematological: Negative  Psychiatric/Behavioral: Negative  Past Medical and Surgical History:     Past Medical History:   Diagnosis Date    Asthma     Diverticulitis     EEG abnormality without seizure     Gastroparesis     GERD (gastroesophageal reflux disease)     Headache, migraine     Hiatal hernia     IBS (irritable bowel syndrome)     Sarcoidosis        Past Surgical History:   Procedure Laterality Date    CHOLECYSTECTOMY      TX COLONOSCOPY FLX DX W/COLLJ SPEC WHEN PFRMD N/A 3/8/2017    Procedure: EGD AND COLONOSCOPY;  Surgeon: Jesus Spivey MD;  Location: [facility];   Service: Gastroenterology    REPLACEMENT TOTAL KNEE Right     SIGMOIDECTOMY         Meds/Allergies:    Prior to Admission medications    Medication Sig Start Date End Date Taking?  Authorizing Provider   acetaminophen (TYLENOL) 500 mg tablet Take 500 mg by mouth every 6 (six) hours as needed for mild pain   Yes Historical Provider, MD   albuterol (PROVENTIL HFA,VENTOLIN HFA) 90 mcg/act inhaler Inhale 2 puffs every 6 (six) hours as needed for wheezing   Yes Historical Provider, MD   ascorbic acid (VITAMIN C) 500 mg tablet Take 500 mg by mouth daily   Yes Historical Provider, MD   aspirin 81 mg chewable tablet Chew 81 mg daily   Yes Historical Provider, MD   B Complex-C (B-COMPLEX WITH VITAMIN C) tablet Take 1 tablet by mouth daily   Yes Historical Provider, MD   Cholecalciferol (VITAMIN D3) 2000 units TABS Take 1 tablet by mouth daily   Yes Historical Provider, MD   Co-Enzyme Q10 100 MG CAPS Take 1 capsule by mouth daily   Yes Historical Provider, MD   dicyclomine (BENTYL) 20 mg tablet Take 20 mg by mouth every 6 (six) hours   Yes Historical Provider, MD   esomeprazole (NexIUM) 40 MG capsule Take 40 mg by mouth daily   Yes Historical Provider, MD   HYOSCYAMINE SULFATE PO Place 1 tablet under the tongue every 6 (six) hours as needed     Yes Historical Provider, MD   mometasone (ASMANEX) 220 MCG/INH inhaler Inhale 2 puffs daily   Yes Historical Provider, MD   multivitamin-iron-minerals-folic acid (CENTRUM) chewable tablet Chew 1 tablet daily   Yes Historical Provider, MD   promethazine (PHENERGAN) 25 mg tablet Take 25 mg by mouth every 8 (eight) hours as needed for nausea or vomiting   Yes Historical Provider, MD   sodium chloride ([employer] SALINE NOSE SPRAY) 0 65 % nasal spray 2 sprays into each nostril 2 (two) times a day   Yes Historical Provider, MD   topiramate (TOPAMAX) 200 MG tablet Take 200 mg by mouth 2 (two) times a day   Yes Historical Provider, MD   verapamil (CALAN-SR) 120 mg CR tablet Take 120 mg by mouth daily at bedtime   Yes Historical Provider, MD   Hyoscyamine Sulfate (HYOSCYAMINE PO) Take 0 25 mg by mouth  4/14/18 Yes Historical Provider, MD   ZOLMitriptan (ZOMIG) 5 MG tablet Take 5 mg by mouth once as needed for migraine  4/14/18  Historical Provider, MD   Zolpidem Tartrate (AMBIEN PO) Take 0 5 mg by mouth  4/14/18  Historical Provider, MD     I have reviewed home medications with patient personally  Allergies: Allergies   Allergen Reactions    Other Shortness Of Breath     Nuts, coconut    Talwin [Pentazocine] Shortness Of Breath and Other (See Comments)     dizziness       Social History:     Marital Status: /Civil Union   Occupation: Works at [employer] here at [employer]  Patient Pre-hospital Living Situation: Lives at home  Patient Pre-hospital Level of Mobility: Ambulates independently  Patient Pre-hospital Diet Restrictions: None  Substance Use History:   History   Alcohol Use    Yes     Comment: socially     History   Smoking Status    Never Smoker   Smokeless Tobacco    Never Used     History   Drug Use No       Family History:    non-contributory    Physical Exam:     Vitals:   Blood Pressure: 114/55 (04/14/18 1730)  Pulse: 72 (04/14/18 1730)  Temperature: 97 8 °F (36 6 °C) (04/14/18 1241)  Temp Source: Oral (04/14/18 1241)  Respirations: 20 (04/14/18 1707)  Weight - Scale: 90 3 kg (199 lb) (04/14/18 1241)  SpO2: 94 % (04/14/18 1730)    Physical Exam   Constitutional: She is oriented to person, place, and time  She appears well-developed and well-nourished  No distress  HENT:   Head: Normocephalic and atraumatic  Eyes: EOM are normal  Pupils are equal, round, and reactive to light  No scleral icterus  Neck: Normal range of motion  Neck supple  Cardiovascular: Normal rate, regular rhythm and normal heart sounds  Pulmonary/Chest: Effort normal and breath sounds normal    +Point tenderness to palpation in sternal area   Abdominal: Soft  Bowel sounds are normal  She exhibits no distension  There is no tenderness  There is no rebound  Musculoskeletal: She exhibits no edema  Neurological: She is alert and oriented to person, place, and time  No cranial nerve deficit  She exhibits normal muscle tone  Coordination normal    Strength 5/5 in all extremities  Normal finger-to-nose, heel-to-shin, rapid alternating movement  No pronator drift  Skin: Skin is warm and dry  She is not diaphoretic  Psychiatric: She has a normal mood and affect  Her behavior is normal  Thought content normal        Additional Data:     Lab Results: I have personally reviewed pertinent reports  Results from last 7 days  Lab Units 04/14/18  1759   WBC Thousand/uL 7 59   HEMOGLOBIN g/dL 12 6   HEMATOCRIT % 38 2   PLATELETS Thousands/uL 189   NEUTROS PCT % 71   LYMPHS PCT % 21   MONOS PCT % 5   EOS PCT % 3           Invalid input(s): LABALBU        Imaging: I have personally reviewed pertinent reports  No orders to display       EKG, Pathology, and Other Studies Reviewed on Admission:   · EKG: None    Allscripts / Epic Records Reviewed: Yes     ** Please Note: This note has been constructed using a voice recognition system   **

## 2018-04-14 NOTE — ED ATTENDING ATTESTATION
Abdoul Manuel DO, saw and evaluated the patient  I have discussed the patient with the resident/non-physician practitioner and agree with the resident's/non-physician practitioner's findings, Plan of Care, and MDM as documented in the resident's/non-physician practitioner's note, except where noted  All available labs and Radiology studies were reviewed  At this point I agree with the current assessment done in the Emergency Department  I have conducted an independent evaluation of this patient a history and physical is as follows: Pt with history of migraines, presents with headache that won't go away  Recent sleep-deprived EEG for monitoring  Has not had any new neuro symptoms or any other warning signs  Currently takes topomax daily  Exam is totally non-focal  Will plan to give IV medication, migraine cocktail      Critical Care Time  CritCare Time    Procedures

## 2018-04-15 LAB
ANION GAP SERPL CALCULATED.3IONS-SCNC: 5 MMOL/L (ref 4–13)
BUN SERPL-MCNC: 17 MG/DL (ref 5–25)
CALCIUM SERPL-MCNC: 8.1 MG/DL
CHLORIDE SERPL-SCNC: 113 MMOL/L (ref 100–108)
CO2 SERPL-SCNC: 23 MMOL/L (ref 21–32)
CREAT SERPL-MCNC: 0.65 MG/DL (ref 0.6–1.3)
ERYTHROCYTE [DISTWIDTH] IN BLOOD BY AUTOMATED COUNT: 13 % (ref 11.6–15.1)
GFR SERPL CREATININE-BSD FRML MDRD: 100 ML/MIN/1.73SQ M
GLUCOSE SERPL-MCNC: 122 MG/DL (ref 65–140)
HCT VFR BLD AUTO: 37.4 % (ref 34.8–46.1)
HGB BLD-MCNC: 12.2 G/DL (ref 11.5–15.4)
MCH RBC QN AUTO: 30 PG (ref 26.8–34.3)
MCHC RBC AUTO-ENTMCNC: 32.6 G/DL (ref 31.4–37.4)
MCV RBC AUTO: 92 FL (ref 82–98)
PLATELET # BLD AUTO: 189 THOUSANDS/UL (ref 149–390)
PMV BLD AUTO: 10.7 FL (ref 8.9–12.7)
POTASSIUM SERPL-SCNC: 4.2 MMOL/L (ref 3.5–5.3)
RBC # BLD AUTO: 4.07 MILLION/UL (ref 3.81–5.12)
SODIUM SERPL-SCNC: 141 MMOL/L (ref 136–145)
WBC # BLD AUTO: 8.28 THOUSAND/UL (ref 4.31–10.16)

## 2018-04-15 PROCEDURE — 80048 BASIC METABOLIC PNL TOTAL CA: CPT | Performed by: PHYSICIAN ASSISTANT

## 2018-04-15 PROCEDURE — 85027 COMPLETE CBC AUTOMATED: CPT | Performed by: PHYSICIAN ASSISTANT

## 2018-04-15 PROCEDURE — 99225 PR SBSQ OBSERVATION CARE/DAY 25 MINUTES: CPT | Performed by: PHYSICIAN ASSISTANT

## 2018-04-15 RX ORDER — DIHYDROERGOTAMINE MESYLATE 1 MG/ML
1 INJECTION, SOLUTION INTRAMUSCULAR; INTRAVENOUS; SUBCUTANEOUS EVERY 8 HOURS
Status: DISCONTINUED | OUTPATIENT
Start: 2018-04-15 | End: 2018-04-16 | Stop reason: HOSPADM

## 2018-04-15 RX ORDER — METOCLOPRAMIDE HYDROCHLORIDE 5 MG/ML
10 INJECTION INTRAMUSCULAR; INTRAVENOUS EVERY 8 HOURS
Status: DISCONTINUED | OUTPATIENT
Start: 2018-04-15 | End: 2018-04-16 | Stop reason: HOSPADM

## 2018-04-15 RX ORDER — CYCLOBENZAPRINE HCL 10 MG
10 TABLET ORAL EVERY MORNING
Status: DISCONTINUED | OUTPATIENT
Start: 2018-04-15 | End: 2018-04-16 | Stop reason: HOSPADM

## 2018-04-15 RX ADMIN — TOPIRAMATE 200 MG: 100 TABLET, FILM COATED ORAL at 09:31

## 2018-04-15 RX ADMIN — VITAMIN D, TAB 1000IU (100/BT) 2000 UNITS: 25 TAB at 09:31

## 2018-04-15 RX ADMIN — TOPIRAMATE 200 MG: 100 TABLET, FILM COATED ORAL at 17:34

## 2018-04-15 RX ADMIN — PROMETHAZINE HYDROCHLORIDE 25 MG: 25 INJECTION INTRAMUSCULAR; INTRAVENOUS at 13:39

## 2018-04-15 RX ADMIN — SODIUM CHLORIDE 100 ML/HR: 0.9 INJECTION, SOLUTION INTRAVENOUS at 17:32

## 2018-04-15 RX ADMIN — Medication 2 SPRAY: at 17:34

## 2018-04-15 RX ADMIN — KETOROLAC TROMETHAMINE 30 MG: 30 INJECTION, SOLUTION INTRAMUSCULAR at 09:31

## 2018-04-15 RX ADMIN — METOCLOPRAMIDE 10 MG: 5 INJECTION, SOLUTION INTRAMUSCULAR; INTRAVENOUS at 17:35

## 2018-04-15 RX ADMIN — Medication 1 TABLET: at 09:29

## 2018-04-15 RX ADMIN — PROMETHAZINE HYDROCHLORIDE 25 MG: 25 INJECTION INTRAMUSCULAR; INTRAVENOUS at 06:16

## 2018-04-15 RX ADMIN — FLUTICASONE PROPIONATE 1 PUFF: 110 AEROSOL, METERED RESPIRATORY (INHALATION) at 21:15

## 2018-04-15 RX ADMIN — ASPIRIN 81 MG 81 MG: 81 TABLET ORAL at 09:31

## 2018-04-15 RX ADMIN — DIPHENHYDRAMINE HYDROCHLORIDE 25 MG: 50 INJECTION, SOLUTION INTRAMUSCULAR; INTRAVENOUS at 06:16

## 2018-04-15 RX ADMIN — Medication 100 MG: at 09:31

## 2018-04-15 RX ADMIN — ZOLPIDEM TARTRATE 5 MG: 5 TABLET ORAL at 00:33

## 2018-04-15 RX ADMIN — MAGNESIUM SULFATE HEPTAHYDRATE 2 G: 40 INJECTION, SOLUTION INTRAVENOUS at 09:41

## 2018-04-15 RX ADMIN — DIHYDROERGOTAMINE MESYLATE 1 MG: 1 INJECTION INTRAMUSCULAR; INTRAVENOUS; SUBCUTANEOUS at 17:56

## 2018-04-15 RX ADMIN — DIPHENHYDRAMINE HYDROCHLORIDE 25 MG: 50 INJECTION, SOLUTION INTRAMUSCULAR; INTRAVENOUS at 13:09

## 2018-04-15 RX ADMIN — DIPHENHYDRAMINE HYDROCHLORIDE 25 MG: 50 INJECTION, SOLUTION INTRAMUSCULAR; INTRAVENOUS at 21:13

## 2018-04-15 RX ADMIN — OXYCODONE HYDROCHLORIDE AND ACETAMINOPHEN 500 MG: 500 TABLET ORAL at 09:31

## 2018-04-15 RX ADMIN — PANTOPRAZOLE SODIUM 40 MG: 40 TABLET, DELAYED RELEASE ORAL at 06:16

## 2018-04-15 RX ADMIN — KETOROLAC TROMETHAMINE 30 MG: 30 INJECTION, SOLUTION INTRAMUSCULAR at 21:13

## 2018-04-15 RX ADMIN — CYCLOBENZAPRINE HYDROCHLORIDE 10 MG: 10 TABLET, FILM COATED ORAL at 12:24

## 2018-04-15 NOTE — ASSESSMENT & PLAN NOTE
· Improving with migraine cocktail- will continue   Anticipate discharge home tomorrow  · Continue prophylactic meds Topamax and Verapamil  · Follows with Dr Shala Marin of neurology

## 2018-04-15 NOTE — PROGRESS NOTES
Progress Note - Jimmie Wisdom 1960, 65 y o  female MRN: [id number]    Unit/Bed#: CW3 344-01 Encounter: [id number]    Primary Care Provider: Alec Orozco MD   Date and time admitted to hospital: 2018  1:07 PM    * Intractable migraine   Assessment & Plan    · Improving with migraine cocktail- will continue  Anticipate discharge home tomorrow  · Continue prophylactic meds Topamax and Verapamil  · Follows with Dr Cipriano Roldan of neurology          VTE Pharmacologic Prophylaxis:   Pharmacologic: None-low risk  Mechanical VTE Prophylaxis in Place: No    Patient Centered Rounds:      Discussions with Specialists or Other Care Team Provider:      Education and Discussions with Family / Patient: Patient    Time Spent for Care: 20 minutes  More than 50% of total time spent on counseling and coordination of care as described above  Current Length of Stay: 0 day(s)    Current Patient Status: Observation   Certification Statement: Continue observation status for intractable migraine  Discussed with utilization review    Discharge Plan: Discharge tomorrow    Code Status: Level 1 - Full Code      Subjective:   Patient is feeling better today although she still has headache  Headache is 5/10  No dizzy spells  Has been ambulating okay  Worried about going home too soon and having to come back due to pain  Objective:     Vitals:   Temp (24hrs), Av 7 °F (36 5 °C), Min:97 4 °F (36 3 °C), Max:98 °F (36 7 °C)    HR:  [68-91] 68  Resp:  [16-20] 16  BP: ()/(54-77) 100/55  SpO2:  [93 %-99 %] 99 %  Body mass index is 36 4 kg/m²  Input and Output Summary (last 24 hours): Intake/Output Summary (Last 24 hours) at 04/15/18 1027  Last data filed at 04/15/18 0900   Gross per 24 hour   Intake             1400 ml   Output                0 ml   Net             1400 ml       Physical Exam:     Physical Exam   Constitutional: She is oriented to person, place, and time   She appears well-developed and well-nourished  No distress  HENT:   Head: Normocephalic and atraumatic  Eyes: No scleral icterus  Neck: Normal range of motion  Neck supple  Cardiovascular: Normal rate, regular rhythm and normal heart sounds  Pulmonary/Chest: Effort normal and breath sounds normal  No respiratory distress  She has no wheezes  She has no rales  Musculoskeletal: She exhibits no edema  Neurological: She is alert and oriented to person, place, and time  Skin: Skin is warm and dry  She is not diaphoretic  Psychiatric: She has a normal mood and affect  Her behavior is normal  Thought content normal        Additional Data:     Labs:      Results from last 7 days  Lab Units 04/15/18  0513 04/14/18  1759   WBC Thousand/uL 8 28 7 59   HEMOGLOBIN g/dL 12 2 12 6   HEMATOCRIT % 37 4 38 2   PLATELETS Thousands/uL 189 189   NEUTROS PCT %  --  71   LYMPHS PCT %  --  21   MONOS PCT %  --  5   EOS PCT %  --  3       Results from last 7 days  Lab Units 04/15/18  0513   SODIUM mmol/L 141   POTASSIUM mmol/L 4 2   CHLORIDE mmol/L 113*   CO2 mmol/L 23   BUN mg/dL 17   CREATININE mg/dL 0 65   CALCIUM mg/dL 8 1   GLUCOSE RANDOM mg/dL 122           * I Have Reviewed All Lab Data Listed Above  * Additional Pertinent Lab Tests Reviewed:  All Labs Within Last 24 Hours Reviewed    Imaging:    Imaging Reports Reviewed Today Include:  None  Imaging Personally Reviewed by Myself Includes:  None    Recent Cultures (last 7 days):           Last 24 Hours Medication List:     Current Facility-Administered Medications:  albuterol 2 puff Inhalation Q6H PRN Blima Drop, PA-C    ascorbic acid 500 mg Oral Daily Blima Drop, PA-C    aspirin 81 mg Oral Daily Blima Drop, PA-C    calcium carbonate 1,000 mg Oral Daily PRN Blima Drop, PA-C    cholecalciferol 2,000 Units Oral Daily Blima Drop, PA-C    co-enzyme Q-10 100 mg Oral Daily Blima Drop, PA-C    dicyclomine 20 mg Oral TID PRN Blima Drop, PA-C diphenhydrAMINE 25 mg Intravenous [facility] Karen Spencer PA-C    fluticasone 1 puff Inhalation BID Karen Spencer PA-C    ketorolac 30 mg Intravenous Q12H [address] Karen Spencer PA-C    magnesium sulfate 2 g Intravenous Q24H [address] Karen Spencer PA-C    multivitamin-minerals 1 tablet Oral Daily Karen Spencer PA-C    pantoprazole 40 mg Oral Early Morning Karen Spencer PA-C    polyethylene glycol 17 g Oral Daily PRN Karen Spencer PA-C    promethazine 25 mg Intravenous Q8H Torrie Khan PA-C    sodium chloride 2 spray Each Nare BID Karen Spencer PA-C    sodium chloride 100 mL/hr Intravenous Continuous Karen Spencer PA-C Last Rate: 100 mL/hr (04/14/18 2051)   topiramate 200 mg Oral BID Karen Spencer PA-C    verapamil 120 mg Oral QAM Karen Spencer PA-C    zolpidem 5 mg Oral HS PRN Karen Spencer PA-C         Today, Patient Was Seen By: Karen Spencer PA-C    ** Please Note: Dragon 360 Dictation voice to text software may have been used in the creation of this document   **

## 2018-04-15 NOTE — CASE MANAGEMENT
Initial Clinical Review    Admission: Date/Time/Statement: 04/14/2018 @ 1739 -- OBS    Orders Placed This Encounter   Procedures    Place in Observation (expected length of stay for this patient is less than two midnights)     Standing Status:   Standing     Number of Occurrences:   1     Order Specific Question:   Admitting Physician     Answer:   Jim Starks     Order Specific Question:   Level of Care     Answer:   Med Surg [16]       ED: Date/Time/Mode of Arrival:   ED Arrival Information     Expected Arrival Acuity Means of Arrival Escorted By Service Admission Type    - 4/14/2018 12:38 Urgent Walk-In Self General Medicine Urgent    Arrival Complaint    Migraine/Dizziness          Chief Complaint:   Chief Complaint   Patient presents with    Headache - Recurrent or Known Dx Migraines     Patient reports having hx of migraines, reports that this is the worst migraine she has ever had  Reports nausea and dizziness  Denies sensitivity to sound or light  History of Illness: 65 y o  female with a history of migraines who presents with 5 day history of migraine  Patient had sleep deprived EEG on Monday  She states she gets these every two years per her neurologist, Dr Chelsea Briseno, due to abnormal EEG in the past  Patient is unsure what prompted first EEG but notes from Dr Chelsea Briseno document a history of partial complex epilepsy  Patient denies ever having seizures      She had a mild headache prior to EEG, which she states was not a migraine, just a regular headache  She did not sleep Sunday night for EEG and then after EEG on Monday she went home and felt a headache coming on  By Tuesday, she woke up with a very severe migraine  Location of the migraine has been moving and currently is in right temporal area  Described as a tight, squeezing sensation  Very severe, 10/10  She had to miss work  +Photophobia and nausea  No vomiting   She is having dizzy spells which she describes as feeling "off-balance" which occurs with certain head movements  She actually fell out of the shower because she was off-balance  This off-balance sensation has happened to her before with migraines  She has tried multiple medications/remedies at home including Phenergan, Tramadol, Advil, Tylenol, Gatorade, and caffeine without relief  She did have some relief with migraine cocktail in the ED but headache is now returning and almost a 10/10 again  She takes Verapamil and Topamax for prophylactic migraine treatment    ED Vital Signs:   ED Triage Vitals [04/14/18 1241]   Temperature Pulse Respirations Blood Pressure SpO2   97 8 °F (36 6 °C) 91 18 150/77 97 %      Temp Source Heart Rate Source Patient Position - Orthostatic VS BP Location FiO2 (%)   Oral Monitor Sitting Right arm --      Pain Score       Worst Possible Pain        Wt Readings from Last 1 Encounters:   04/14/18 90 3 kg (199 lb)       Vital Signs (abnormal):  WNL    Abnormal Labs/Diagnostic Test Results: K 3 4,     ED Treatment:   Medication Administration from 04/14/2018 1238 to 04/14/2018 1943       Date/Time Order Dose Route Action Action by Comments     04/14/2018 1406 sodium chloride 0 9 % bolus 1,000 mL 1,000 mL Intravenous New Bag Eli Pineda RN      04/14/2018 1406 metoclopramide (REGLAN) injection 10 mg 10 mg Intravenous Given Eli Pineda RN      04/14/2018 1406 ketorolac (TORADOL) injection 15 mg 15 mg Intravenous Given Eli Pineda RN      04/14/2018 1406 diphenhydrAMINE (BENADRYL) injection 50 mg 50 mg Intravenous Given Eli Pineda RN      04/14/2018 1653 methylPREDNISolone sodium succinate (Solu-MEDROL) injection 40 mg 40 mg Intravenous Given Chelsea Padilla RN      04/14/2018 1653 HYDROmorphone (DILAUDID) injection 0 5 mg 0 5 mg Intravenous Given Chelsea Padilla RN           Past Medical/Surgical History:   Past Medical History:   Diagnosis Date    Asthma     Diverticulitis     EEG abnormality without seizure  Gastroparesis     GERD (gastroesophageal reflux disease)     Headache, migraine     Hiatal hernia     IBS (irritable bowel syndrome)     Sarcoidosis        Admitting Diagnosis: Migraine [G43 909]    Age/Sex: 65 y o  female    Assessment/Plan:   * Intractable migraine   Assessment & Plan     · Migraine cocktail  · Will re-assess tomorrow  If no improvement, will consider neuro eval  · Await basic labs  · Continue prophylactic meds Topamax and Verapamil  · Follows with Dr Mary Kay Laura of neurology             VTE Prophylaxis: None  Low risk  Ambulate patient  Code Status: Full code  POLST: There is no POLST form on file for this patient (pre-hospital)  Discussion with family: None     Anticipated Length of Stay:  Patient will be admitted on an Observation basis with an anticipated length of stay of  < 2 midnights     Justification for Hospital Stay: Intractable migraine      Admission Orders:  M/S unit  Aqua K pad prn to neck  Ambulate pt q shift  Up with assistance as needed  Regular diet      Scheduled Meds:   Current Facility-Administered Medications:  albuterol 2 puff Inhalation Q6H PRN Jeyson Tejada, PA-C    ascorbic acid 500 mg Oral Daily Jeyson Tejada PA-C    aspirin 81 mg Oral Daily Jeyson Tejada PA-C    calcium carbonate 1,000 mg Oral Daily PRN Jeyson Tejada, PA-C    cholecalciferol 2,000 Units Oral Daily Jeyson Tejada, PA-C    co-enzyme Q-10 100 mg Oral Daily Isisana Terrell PA-C    dicyclomine 20 mg Oral TID PRN Isisana Terrell, PA-C    diphenhydrAMINE 25 mg Intravenous [facility] Jeyson Tejada, PA-C    fluticasone 1 puff Inhalation BID DENEEN Storm-C    ketorolac 30 mg Intravenous Q12H [facility] Jeyson Tejada, PA-C    magnesium sulfate 2 g Intravenous Q24H [facility] Jeyson Tejada PA-C    multivitamin-minerals 1 tablet Oral Daily Jeyson Tejada PA-C    pantoprazole 40 mg Oral Early Morning Jeyson Tejada PA-C    polyethylene glycol 17 g Oral Daily PRN Uzair Erazo PA-C    promethazine 25 mg Intravenous Q8H Torrie Khan PA-C    sodium chloride 2 spray Each Nare BID Uzair Erazo PA-C    sodium chloride 100 mL/hr Intravenous Continuous Uzair Erazo PA-C Last Rate: 100 mL/hr (04/14/18 2051)   topiramate 200 mg Oral BID Uzair Erazo PA-C    verapamil 120 mg Oral QAM Uzair Erazo PA-C    zolpidem 5 mg Oral HS PRN Uzair Erazo PA-C      Continuous Infusions:   sodium chloride 100 mL/hr Last Rate: 100 mL/hr (04/14/18 2051)     PRN Meds:   albuterol    calcium carbonate    dicyclomine    polyethylene glycol    zolpidem

## 2018-04-16 VITALS
WEIGHT: 199 LBS | OXYGEN SATURATION: 98 % | DIASTOLIC BLOOD PRESSURE: 58 MMHG | RESPIRATION RATE: 18 BRPM | BODY MASS INDEX: 36.62 KG/M2 | HEART RATE: 88 BPM | HEIGHT: 62 IN | SYSTOLIC BLOOD PRESSURE: 123 MMHG | TEMPERATURE: 97.7 F

## 2018-04-16 PROCEDURE — 99217 PR OBSERVATION CARE DISCHARGE MANAGEMENT: CPT | Performed by: PHYSICIAN ASSISTANT

## 2018-04-16 RX ADMIN — MAGNESIUM SULFATE HEPTAHYDRATE 2 G: 40 INJECTION, SOLUTION INTRAVENOUS at 10:37

## 2018-04-16 RX ADMIN — VERAPAMIL HYDROCHLORIDE 120 MG: 120 TABLET, FILM COATED, EXTENDED RELEASE ORAL at 08:36

## 2018-04-16 RX ADMIN — KETOROLAC TROMETHAMINE 30 MG: 30 INJECTION, SOLUTION INTRAMUSCULAR at 08:35

## 2018-04-16 RX ADMIN — Medication 100 MG: at 08:35

## 2018-04-16 RX ADMIN — SODIUM CHLORIDE 100 ML/HR: 0.9 INJECTION, SOLUTION INTRAVENOUS at 03:34

## 2018-04-16 RX ADMIN — Medication 2 SPRAY: at 08:36

## 2018-04-16 RX ADMIN — DIHYDROERGOTAMINE MESYLATE 1 MG: 1 INJECTION INTRAMUSCULAR; INTRAVENOUS; SUBCUTANEOUS at 00:29

## 2018-04-16 RX ADMIN — METOCLOPRAMIDE 10 MG: 5 INJECTION, SOLUTION INTRAMUSCULAR; INTRAVENOUS at 00:02

## 2018-04-16 RX ADMIN — Medication 1 TABLET: at 08:35

## 2018-04-16 RX ADMIN — METOCLOPRAMIDE 10 MG: 5 INJECTION, SOLUTION INTRAMUSCULAR; INTRAVENOUS at 08:35

## 2018-04-16 RX ADMIN — VITAMIN D, TAB 1000IU (100/BT) 2000 UNITS: 25 TAB at 08:35

## 2018-04-16 RX ADMIN — PANTOPRAZOLE SODIUM 40 MG: 40 TABLET, DELAYED RELEASE ORAL at 06:10

## 2018-04-16 RX ADMIN — FLUTICASONE PROPIONATE 1 PUFF: 110 AEROSOL, METERED RESPIRATORY (INHALATION) at 08:36

## 2018-04-16 RX ADMIN — TOPIRAMATE 200 MG: 100 TABLET, FILM COATED ORAL at 08:37

## 2018-04-16 RX ADMIN — OXYCODONE HYDROCHLORIDE AND ACETAMINOPHEN 500 MG: 500 TABLET ORAL at 08:35

## 2018-04-16 RX ADMIN — CYCLOBENZAPRINE HYDROCHLORIDE 10 MG: 10 TABLET, FILM COATED ORAL at 08:35

## 2018-04-16 RX ADMIN — ASPIRIN 81 MG 81 MG: 81 TABLET ORAL at 08:35

## 2018-04-16 RX ADMIN — DIHYDROERGOTAMINE MESYLATE 1 MG: 1 INJECTION INTRAMUSCULAR; INTRAVENOUS; SUBCUTANEOUS at 09:18

## 2018-04-16 RX ADMIN — DIPHENHYDRAMINE HYDROCHLORIDE 25 MG: 50 INJECTION, SOLUTION INTRAMUSCULAR; INTRAVENOUS at 06:11

## 2018-04-16 NOTE — ASSESSMENT & PLAN NOTE
· Improved with migraine cocktail     · Likely 2/2 sleep deprivation for EEG study outpatient   · Continue prophylactic meds Topamax and Verapamil  · Follows with Dr Shyanne Umaña of neurology

## 2018-04-16 NOTE — SOCIAL WORK
CM met with patient and explained cm role  Pt alert and oriented  Pt reports she lives with home w/ w/2 perico  Pt denies DME, reports prev  VNA w/SL, prev  Rehab w/SL  Pt reports good support at home with family and friends, reports being independent PTA, she does not drive, and has transport home w/ for d/c  Pts pharmacy is [facility] [address] [facility] [place]  Pt denies hx/admission for drugs/etoh and psych/mental health  CM informed and explained meds to beds program and DASH, pt acknowledged understanding  CM reviewed d/c planning process including the following: identifying help at home, patient preference for d/c planning needs, Discharge Lounge, [facility] Meds to Bed program, availability of treatment team to discuss questions or concerns patient and/or family may have regarding understanding medications and recognizing signs and symptoms once discharged  CM also encouraged patient to follow up with all recommended appointments after discharge  Patient advised of importance for patient and family to participate in managing patients medical well being

## 2018-04-16 NOTE — DISCHARGE SUMMARY
Discharge- Deandra Finely 1960, 65 y o  female MRN: [id number]  Unit/Bed#: CW3 344-01 Encounter: [id number] DOS: 4/16/18  Primary Care Provider: Angeles Mckenzie MD   Date and time admitted to hospital: 4/14/2018  1:07 PM    * Intractable migraine   Assessment & Plan    · Improved with migraine cocktail  · Likely 2/2 sleep deprivation for EEG study outpatient   · Continue prophylactic meds Topamax and Verapamil  · Follows with Dr Cora De La Cruz of neurology          Discharging Physician / Practitioner: Anahy Yancey PA-C  PCP: Angeles Mckenzie MD  Admission Date:   Admission Orders     Ordered        04/14/18 1738  Place in Observation (expected length of stay for this patient is less than two midnights)  Once             Discharge Date: 04/16/18    Resolved Problems  Date Reviewed: 4/16/2018    None        Consultations During Hospital Stay:  · None     Procedures Performed:   · none    Significant Findings / Test Results:   · Intractable migraine    Incidental Findings:   ·  none    Test Results Pending at Discharge (will require follow up): · None     Outpatient Tests Requested:  · Follow up with neurologist     Complications:  none    Reason for Admission: intractable migraine     Hospital Course:     Deandra Murry is a 65 y o  female patient with past medical history significant for chronic migraines, hypertension, epilepsy who originally presented to the hospital on 4/14/2018 due to  Intractable migraine  Patient was admitted under migraine protocol pathway and headache improved prior to discharge  Pain improved with DHE, IV ketorolac, magnesium, Benadryl, Reglan and Solu-Medrol  Patient medically stable for discharge home with close outpatient follow-up with her neurologist and primary care provider  Patient expressed understanding and agrees with plan  Please see above list of diagnoses and related plan for additional information       Condition at Discharge: stable     Discharge Day Visit / Exam:     Subjective:  Patient seen and examined at bedside  Headache 5/10 and feels at baseline  Ready for d/c home  Vitals: Blood Pressure: 123/58 (04/16/18 0641)  Pulse: 88 (04/16/18 0641)  Temperature: 97 7 °F (36 5 °C) (04/16/18 0641)  Temp Source: Oral (04/16/18 0641)  Respirations: 18 (04/16/18 0641)  Height: 5' 2" (157 5 cm) (04/14/18 1952)  Weight - Scale: 90 3 kg (199 lb) (04/14/18 1952)  SpO2: 98 % (04/16/18 0641)    Exam:   Physical Exam   Constitutional: She is oriented to person, place, and time  She appears well-developed and well-nourished  No distress  HENT:   Head: Normocephalic and atraumatic  Mouth/Throat: Oropharynx is clear and moist    Eyes: EOM are normal  No scleral icterus  Neck: Normal range of motion  Neck supple  Cardiovascular: Normal rate, regular rhythm and normal heart sounds  No murmur heard  Pulmonary/Chest: Effort normal and breath sounds normal    Abdominal: Soft  Bowel sounds are normal    Musculoskeletal: Normal range of motion  She exhibits no edema  Neurological: She is alert and oriented to person, place, and time  Skin: Skin is warm and dry  Psychiatric: She has a normal mood and affect  Nursing note and vitals reviewed  Discharge instructions/Information to patient and family:   See after visit summary for information provided to patient and family  Provisions for Follow-Up Care:  See after visit summary for information related to follow-up care and any pertinent home health orders  Disposition:     Home    For Discharges to [facility]F:   · Not Applicable to this Patient - Not Applicable to this Patient    Planned Readmission: none      Discharge Statement:  I spent 35 minutes discharging the patient  This time was spent on the day of discharge  I had direct contact with the patient on the day of discharge   Greater than 50% of the total time was spent examining patient, answering all patient questions, arranging and discussing plan of care with patient as well as directly providing post-discharge instructions  Additional time then spent on discharge activities  Discharge Medications:  See after visit summary for reconciled discharge medications provided to patient and family        ** Please Note: This note has been constructed using a voice recognition system **

## 2018-04-16 NOTE — PLAN OF CARE
DISCHARGE PLANNING     Discharge to home or other facility with appropriate resources Adequate for Discharge        INFECTION - ADULT     Absence or prevention of progression during hospitalization Adequate for Discharge        Knowledge Deficit     Patient/family/caregiver demonstrates understanding of disease process, treatment plan, medications, and discharge instructions Adequate for Discharge        PAIN - ADULT     Verbalizes/displays adequate comfort level or baseline comfort level Adequate for Discharge        SAFETY ADULT     Patient will remain free of falls Adequate for Discharge     Maintain or return to baseline ADL function Adequate for Discharge     Maintain or return mobility status to optimal level Adequate for Discharge

## 2018-04-16 NOTE — DISCHARGE INSTRUCTIONS
Migraine Headache   WHAT YOU SHOULD KNOW:   A migraine is a severe headache  The pain can be so severe that it interferes with your daily activities  A migraine can last a few hours up to several days  The exact cause of migraines is not known  It may be caused by changes in your body chemicals and extra sensitive nerves in your brain  AFTER YOU LEAVE:   Medicines:  Take medicine as soon as you feel a migraine begin  · Pain medicine: You may need medicine to take away or decrease pain  You may need a doctor's order for this medicine  Do not wait until the pain is severe before you take your medicine  · Migraine medicines: These are used to help prevent a migraine or stop it once it starts  · Antinausea medicine: This medicine may be given to calm your stomach and to help prevent vomiting  They can also help relieve pain  · Take your medicine as directed  Call your healthcare provider if you think your medicine is not helping or if you have side effects  Tell him if you are allergic to any medicine  Keep a list of the medicines, vitamins, and herbs you take  Include the amounts, and when and why you take them  Bring the list or the pill bottles to follow-up visits  Carry your medicine list with you in case of an emergency  Manage your symptoms:   · Rest:  Rest in a dark, quiet room  This will help decrease your pain  · Ice:  Ice helps decrease pain  Use an ice pack or put crushed ice in a plastic bag  Cover the ice pack with a towel and place it on your head where it hurts for 15 to 20 minutes every hour  · Heat:  Heat helps decrease pain and muscle spasms  Use a small towel dampened with warm water or a heating pad, or sit in a warm bath  Apply heat on the area for 20 to 30 minutes every 2 hours  You may alternate heat and ice  Keep a headache diary:  Write down when your migraines start and stop  Include your symptoms and what you were doing when a migraine began   Record what you ate or drank for 24 hours before the migraine started  Describe the pain and where it hurts  Keep track of what you did to treat your migraine and whether it worked  Follow up with your primary healthcare provider or neurologist as directed:  Bring your headache diary with you when you see your primary healthcare provider  Write down your questions so you remember to ask them during your visits  Prevent another migraine:   · Do not smoke: If you smoke, it is never too late to quit  Tobacco smoke can trigger a migraine  It can also cause heart disease, lung disease, cancer, and other health problems  Quitting smoking will improve your health and the health of those around you  If you smoke, ask for information about how to stop  · Do not drink alcohol:  Alcohol can trigger a migraine  It can also interfere with the medicines used to treat your migraine  · Get regular exercise:  Exercise may help prevent migraines  Talk to your primary healthcare provider about the best exercise plan for you  · Manage stress:  Stress may trigger a migraine  Learn new ways to relax, such as deep breathing  · Stick to a sleep schedule:  Go to bed and get up at the same time each day  · Eat regular meals:  Include healthy foods such as include fruit, vegetables, whole-grain breads, low-fat dairy products, beans, lean meat, and fish  Avoid trigger foods like chocolate, hard cheese, and red wine  Foods that contain gluten, nitrates, MSG, or artificial sweeteners may also trigger migraines  Caffeine, which is often used to treat migraines, can also trigger them  Contact your primary healthcare provider or neurologist if:   · You have a fever  · Your migraines interfere with your daily activities  · Your medicines or treatments stop working  · You have questions about your condition or care    Seek care immediately or call 911 if:   · You have a headache that seems different or much worse than your usual migraine headache  · You have a severe headache with a fever or a stiff neck  · You have new problems with speech, vision, balance, or movement  · You feel like you are going to faint, you become confused, or you have a seizure  © 2014 [address] is for End User's use only and may not be sold, redistributed or otherwise used for commercial purposes  All illustrations and images included in CareNotes® are the copyrighted property of A D [employer] M [employer]  or Kris Morris  The above information is an  only  It is not intended as medical advice for individual conditions or treatments  Talk to your doctor, nurse or pharmacist before following any medical regimen to see if it is safe and effective for you

## 2018-04-16 NOTE — CASE MANAGEMENT
Shira Christopher RN Registered Nurse Signed   Case Management Date of Service: 4/15/2018  8:40 AM         []Hide copied text  Initial Clinical Review     Admission: Date/Time/Statement: 04/14/2018 @ 1739 -- OBS           Orders Placed This Encounter   Procedures    Place in Observation (expected length of stay for this patient is less than two midnights)       Standing Status:   Standing       Number of Occurrences:   1       Order Specific Question:   Admitting Physician       Answer:   Pratibha Rich [[id number]]       Order Specific Question:   Level of Care       Answer:   Med Surg [16]         ED: Date/Time/Mode of Arrival:             ED Arrival Information      Expected Arrival Acuity Means of Arrival Escorted By Service Admission Type     - 4/14/2018 12:38 Urgent Walk-In Self General Medicine Urgent     Arrival Complaint     Migraine/Dizziness             Chief Complaint:        Chief Complaint   Patient presents with    Headache - Recurrent or Known Dx Migraines       Patient reports having hx of migraines, reports that this is the worst migraine she has ever had  Reports nausea and dizziness  Denies sensitivity to sound or light           History of Illness: 65 y o  female with a history of migraines who presents with 5 day history of migraine  Patient had sleep deprived EEG on Monday  She states she gets these every two years per her neurologist, Dr Maria Del Carmen Gaviria, due to abnormal EEG in the past  Patient is unsure what prompted first EEG but notes from Dr Maria Del Carmen Gaviria document a history of partial complex epilepsy  Patient denies ever having seizures      She had a mild headache prior to EEG, which she states was not a migraine, just a regular headache  She did not sleep Sunday night for EEG and then after EEG on Monday she went home and felt a headache coming on  By Tuesday, she woke up with a very severe migraine  Location of the migraine has been moving and currently is in right temporal area   Described as a tight, squeezing sensation  Very severe, 10/10  She had to miss work  +Photophobia and nausea  No vomiting  She is having dizzy spells which she describes as feeling "off-balance" which occurs with certain head movements  She actually fell out of the shower because she was off-balance  This off-balance sensation has happened to her before with migraines  She has tried multiple medications/remedies at home including Phenergan, Tramadol, Advil, Tylenol, Gatorade, and caffeine without relief  She did have some relief with migraine cocktail in the ED but headache is now returning and almost a 10/10 again  She takes Verapamil and Topamax for prophylactic migraine treatment     ED Vital Signs:            ED Triage Vitals [04/14/18 1241]   Temperature Pulse Respirations Blood Pressure SpO2   97 8 °F (36 6 °C) 91 18 150/77 97 %       Temp Source Heart Rate Source Patient Position - Orthostatic VS BP Location FiO2 (%)   Oral Monitor Sitting Right arm --       Pain Score           Worst Possible Pain                Wt Readings from Last 1 Encounters:   04/14/18 90 3 kg (199 lb)         Vital Signs (abnormal):  WNL     Abnormal Labs/Diagnostic Test Results: K 3 4,      ED Treatment:              Medication Administration from 04/14/2018 1238 to 04/14/2018 1943        Date/Time Order Dose Route Action Action by Comments       04/14/2018 1406 sodium chloride 0 9 % bolus 1,000 mL 1,000 mL Intravenous New Bag Sanjay Mendes RN         04/14/2018 1406 metoclopramide (REGLAN) injection 10 mg 10 mg Intravenous Given Sanjay Mendes RN         04/14/2018 1406 ketorolac (TORADOL) injection 15 mg 15 mg Intravenous Given Sanjay Mendes RN         04/14/2018 1406 diphenhydrAMINE (BENADRYL) injection 50 mg 50 mg Intravenous Given Sanjay Mendes RN         04/14/2018 1653 methylPREDNISolone sodium succinate (Solu-MEDROL) injection 40 mg 40 mg Intravenous Given Sonam Lopez RN         04/14/2018 1653 HYDROmorphone (DILAUDID) injection 0 5 mg 0 5 mg Intravenous Given Amadeo Green RN               Past Medical/Surgical History:        Past Medical History:   Diagnosis Date    Asthma      Diverticulitis      EEG abnormality without seizure      Gastroparesis      GERD (gastroesophageal reflux disease)      Headache, migraine      Hiatal hernia      IBS (irritable bowel syndrome)      Sarcoidosis           Admitting Diagnosis: Migraine [G43 909]     Age/Sex: 65 y o  female     Assessment/Plan:        * Intractable migraine   Assessment & Plan     · Migraine cocktail  · Will re-assess tomorrow  If no improvement, will consider neuro eval  · Await basic labs  · Continue prophylactic meds Topamax and Verapamil  · Follows with Dr Ridge Mcclain of neurology             VTE Prophylaxis: None  Low risk   Ambulate patient  Code Status: Full code  POLST: There is no POLST form on file for this patient (pre-hospital)  Discussion with family: None     Anticipated Length of Stay: Harpreet Mie will be admitted on an Observation basis with an anticipated length of stay of  < 2 midnights    Justification for Hospital Stay: Intractable migraine        Admission Orders:  M/S unit  Aqua K pad prn to neck  Ambulate pt q shift  Up with assistance as needed  Regular diet        Scheduled Meds:   Current Facility-Administered Medications:  albuterol 2 puff Inhalation Q6H PRN DENEEN Lopez-HAMIDA     ascorbic acid 500 mg Oral Daily DENEEN Lopez-HAMIDA     aspirin 81 mg Oral Daily DENEEN Lopez-HAMIDA     calcium carbonate 1,000 mg Oral Daily PRN DENEEN Lopez-HAMIDA     cholecalciferol 2,000 Units Oral Daily DENEEN Lopez-C     co-enzyme Q-10 100 mg Oral Daily DENEEN Lopez-HAMIDA     dicyclomine 20 mg Oral TID PRN DENEEN Lopez-HAMIDA     diphenhydrAMINE 25 mg Intravenous Q8H [address], PA-C     fluticasone 1 puff Inhalation BID DENEEN Lopez-HAMIDA     ketorolac 30 mg Intravenous Q12H [facility] Shana Arenas PA-C     magnesium sulfate 2 g Intravenous Q24H [address] [place] DENEEN Arenas-HAMIDA     multivitamin-minerals 1 tablet Oral Daily Midge DENEEN Arenas-HAMIDA     pantoprazole 40 mg Oral Early Morning Midge Dagoberto PA-C     polyethylene glycol 17 g Oral Daily PRN [place]idge DENEEN Arenas-C     promethazine 25 mg Intravenous Q8H Torrie Khan PA-C     sodium chloride 2 spray Each Nare BID Midge DENEEN Arenas-HAMIDA     sodium chloride 100 mL/hr Intravenous Continuous Midge DENEEN Arenas-C Last Rate: 100 mL/hr (04/14/18 2051)   topiramate 200 mg Oral BID Shana Arenas PA-C     verapamil 120 mg Oral QAM Midge RIAN Arenas     zolpidem 5 mg Oral HS PRN Midge DENEEN Arenas-C        Continuous Infusions:   sodium chloride 100 mL/hr Last Rate: 100 mL/hr (04/14/18 2051)      PRN Meds:   albuterol    calcium carbonate    dicyclomine    polyethylene glycol    zolpidem              Patient still in house as observation   Thank you,  [facility] in the [facility] by Kris Morris for 2017  Network Utilization Review Department  Phone: [phone]; Fax [phone]  ATTENTION: The Network Utilization Review Department is now centralized for our 7 Facilities  Make a note that we have a new phone and fax numbers for our Department  Please call with any questions or concerns to [phone] and carefully follow the prompts so that you are directed to the right person  All voicemails are confidential  Fax any determinations, approvals, denials, and requests for initial or continue stay review clinical to [phone]  Due to HIGH CALL volume, it would be easier if you could please send faxed requests to expedite your requests and in part, help us provide discharge notifications faster

## 2018-04-16 NOTE — SOCIAL WORK
5:20 PM  MCG Guide Used for Initial Round: RRg  Optimal GLOS: [facility] Day: 2  DC Readiness:Discharge readiness is indicated by patient meeting Recovery Milestones, including ALL of the following:   Symptoms absent or controlled   No etiology or treatment identified requiring inpatient treatment   Ambulatory[L]   Oral hydration, medications, and diet   Discharge plans and education understood    Identified Barriers: pain

## 2018-04-26 ENCOUNTER — EVALUATION (OUTPATIENT)
Dept: PHYSICAL THERAPY | Facility: REHABILITATION | Age: 58
End: 2018-04-26
Payer: COMMERCIAL

## 2018-04-26 DIAGNOSIS — M25.512 ACUTE PAIN OF LEFT SHOULDER: Primary | ICD-10-CM

## 2018-04-26 PROCEDURE — 97110 THERAPEUTIC EXERCISES: CPT | Performed by: PHYSICAL THERAPIST

## 2018-04-26 PROCEDURE — G8990 OTHER PT/OT CURRENT STATUS: HCPCS | Performed by: PHYSICAL THERAPIST

## 2018-04-26 PROCEDURE — G8991 OTHER PT/OT GOAL STATUS: HCPCS | Performed by: PHYSICAL THERAPIST

## 2018-04-26 PROCEDURE — 97162 PT EVAL MOD COMPLEX 30 MIN: CPT | Performed by: PHYSICAL THERAPIST

## 2018-04-26 NOTE — PROGRESS NOTES
PT Evaluation     Today's date: 2018  Patient name: Shasta Vanegas  : 1960  MRN: [id number]  Referring provider: Tc Quinones PA-C  Dx:   Encounter Diagnosis     ICD-10-CM    1  Acute pain of left shoulder M25 [id number] Ambulatory referral to Physical Therapy                  Assessment  Impairments: abnormal or restricted ROM, impaired physical strength, lacks appropriate home exercise program and pain with function    Assessment details: Patient presents with pain, decreased shoulder ROM, decreased shoulder strength, and decreased function secondary to L shoulder strain  Patient would benefit from skilled PT intervention to address these issues and to maximize function  Thank you for the referral   Understanding of Dx/Px/POC: good   Prognosis: good    Goals  Short Term:  Pt will report decreased levels of pain by at least 2 subjective ratings in 4 weeks  Pt will demonstrate improved ROM by at least 10 degrees in 4 weeks  Pt will demonstrate improved strength by 1/2 grade MMT in 4 weeks  Long Term:   Pt will be independent in their HEP in 8 weeks  Pt will demonstrate improved FOTO, > 64  Pt will be independent with all ADL's    Plan  Planned modality interventions: cryotherapy  Planned therapy interventions: manual therapy, joint mobilization, neuromuscular re-education, patient education, therapeutic exercise and home exercise program  Frequency: 2x week  Duration in weeks: 6  Treatment plan discussed with: patient        Subjective Evaluation    History of Present Illness  Mechanism of injury: Pt is a 65 y o female with a  c/o ongoing L shoulder pain since the end of March of insidious onset  Pt was taking tylenol and Advil for pain  Pt went to the ED 3/28/18 with c/o L UE pain, SOB, and chest pain  Pt had cardiac testing, which was all negative  Pt was referred to Dr Anderson Locus  Pt had radiographs, which were negative, as per pt  Pt was diagnosed with a shoulder strain   Pt is now referred for therapy  Pt is R hand dominant  Pt reports pain/difficulty with lifting things, carrying things, moving heavier things around house, reaching behind back, sleep (intermittent disturbance)  Pain  At best pain ratin  At worst pain rating: 10  Location: L shoulder  Relieving factors: rest          Objective     Special Questions      Additional Special Questions  Patient denies loss of bowel/bladder control, unexplained weight loss, history of cancer  Patient denies diplopia, dysarthria, dysphagia, dizziness, drop attacks, nausea, numbness  Pt does have history of migraines and notes at times she has diplopia, dizziness, and nausea when she has a migraine  Cervical screen negative for reproduction of symptoms  AROM wfl without pain  (-)compression, (-)spurlings  (-)VBI    Tenderness     Additional Tenderness Details  Mild TTP diffusely t/o L shoulder  Neurological Testing     Reflexes   Left   Biceps (C5/C6): normal (2+)  Brachioradialis (C6): normal (2+)  Triceps (C7): normal (2+)    Right   Biceps (C5/C6): normal (2+)  Brachioradialis (C6): normal (2+)  Triceps (C7): normal (2+)    Additional Neurological Details  Denies N/T    Active Range of Motion   Left Shoulder   Flexion: 127 degrees   Abduction: 138 degrees   External rotation 0°: 64 degrees   External rotation BTH: WFL  Internal rotation BTB: WFL    Passive Range of Motion   Left Shoulder   Flexion: 150 degrees   Abduction: 140 degrees   External rotation 90°: WFL  Internal rotation 90°: WFL    Strength/Myotome Testing     Left Shoulder     Planes of Motion   Flexion: 5   Abduction: 4+   External rotation at 0°: 4   Internal rotation at 0°: 5     Isolated Muscles   Biceps: 5   Triceps: 5     Additional Strength Details  Wrist flex/ext=5/5  Tests     Additional Tests Details  (+)Neers, (-)laguerre, (-)cross arm, (-)ER lag, (-)IR lag, (-)drop arm, (-)Obriens, (-)empty can        General Comments     Shoulder Comments   Pt denies crepitus or instability  Flowsheet Rows      Most Recent Value   PT/OT G-Codes   Current Score  51   Projected Score  64   FOTO information reviewed  Yes   Assessment Type  Evaluation   G code set  Other PT/OT Primary   Other PT Primary Current Status ()  CK   Other PT Primary Goal Status ()  CJ          Precautions: OA, asthma, IBS, BMI>30, R TKA, migraines    Daily Treatment Diary     Manual              Stretching L shoulder flex/abd; post/inf GH JM's  Exercise Diary              UBE             pulleys             Wall climbs             Cane flexion             Cane scaption             scap punches             scap abcs             S/L ER             TB LPD             TB rows             TB IR/ER             Scaption             Towel circles against wall cw/ccw                                                                                                            Modalities              CP PRN                                         Pt was instructed in HEP

## 2018-05-09 ENCOUNTER — OFFICE VISIT (OUTPATIENT)
Dept: OBGYN CLINIC | Facility: OTHER | Age: 58
End: 2018-05-09
Payer: COMMERCIAL

## 2018-05-09 VITALS
WEIGHT: 196 LBS | HEART RATE: 80 BPM | BODY MASS INDEX: 36.07 KG/M2 | HEIGHT: 62 IN | SYSTOLIC BLOOD PRESSURE: 128 MMHG | DIASTOLIC BLOOD PRESSURE: 82 MMHG

## 2018-05-09 DIAGNOSIS — M19.012 ARTHROPATHY OF LEFT SHOULDER: Primary | ICD-10-CM

## 2018-05-09 PROCEDURE — 99213 OFFICE O/P EST LOW 20 MIN: CPT | Performed by: ORTHOPAEDIC SURGERY

## 2018-05-09 RX ORDER — INDOMETHACIN 25 MG/1
CAPSULE ORAL
Refills: 1 | COMMUNITY
Start: 2018-04-09

## 2018-05-09 NOTE — PROGRESS NOTES
Orthopaedic Surgery - Office Note  Gema Ricks (56 y o  female)   : 1960   MRN: [id number]  Encounter Date: 2018    Chief Complaint   Patient presents with    Left Shoulder - Follow-up       Assessment / Plan    Left shoulder strain    -start PT for 6-8 weeks   -continue with OTC pain medication as needed  Return if symptoms worsen or fail to improve  History of Present Illness  Clotilde Mendoza is a 65 y o  female who presents for  Follow-up of left shoulder today  She was to start physical therapy since her last visit  She states she was hospitalized for migraines so she has not yet started physical therapy  She states she had her physical therapy eval and will be beginning physical therapy tomorrow  She states that her  shoulder was feeling better but today she is having some mild pain in the posterior shoulder  The pain does radiate down to her elbow  She has no numbness or tingling  She occasionally takes Advil and Tylenol as needed for pain control  She has not had any injections in her shoulder  States her pain comes and goes and is worse with activity  Review of Systems  Pertinent items are noted in HPI  All other systems were reviewed and are negative  A comprehensive review of systems was negative  Physical Exam  /82   Pulse 80   Ht 5' 2" (1 575 m)   Wt 88 9 kg (196 lb)   BMI 35 85 kg/m²   Cons: Appears well  No apparent distress  Psych: Alert  Oriented x3  Mood and affect normal   Eyes: PERRLA, EOMI  Resp: Normal effort  No audible wheezing or stridor  CV: Palpable pulse  No discernable arrhythmia  No LE edema  Lymph:  No palpable cervical, axillary, or inguinal lymphadenopathy  Skin: Warm  No palpable masses  No visible lesions  Neuro: Normal muscle tone  Normal and symmetric DTR's  Left Shoulder Exam  Alignment / Posture:  Normal shoulder posture  Inspection:  No swelling  No ecchymosis  No deformity    Palpation:  No tenderness  ROM:  Shoulder   Shoulder ER 70  Shoulder IR T8  Strength:  5/5 supraspinatus, infraspinatus, and subscapularis  Stability:  No objective shoulder instability  Tests: (+) Neer  (-) Yvette Lyme  (-) Belly press  (-) Speed  Neurovascular:  Sensation intact in Ax/R/M/U nerve distributions  2+ radial pulse  Studies Reviewed  No studies to review    Procedures  No procedures today  Medical, Surgical, Family, and Social History  The patient's medical history, family history, and social history, were reviewed and updated as appropriate  Past Medical History:   Diagnosis Date    Asthma     Diverticulitis     EEG abnormality without seizure     Gastroparesis     GERD (gastroesophageal reflux disease)     Headache, migraine     Hiatal hernia     IBS (irritable bowel syndrome)     Sarcoidosis        Past Surgical History:   Procedure Laterality Date    CHOLECYSTECTOMY      [place] COLONOSCOPY FLX DX W/COLLJ SPEC WHEN PFRMD N/A 3/8/2017    Procedure: EGD AND COLONOSCOPY;  Surgeon: Jesus Spivey MD;  Location: [facility]; Service: Gastroenterology    REPLACEMENT TOTAL KNEE Right     SIGMOIDECTOMY         Family History   Problem Relation Age of Onset    Diabetes Mother     Heart disease Mother     Cancer Father     Liver cancer Father     Brain cancer Father        Social History     Occupational History    Not on file       Social History Main Topics    Smoking status: Never Smoker    Smokeless tobacco: Never Used    Alcohol use Yes      Comment: socially    Drug use: No    Sexual activity: Not on file       Allergies   Allergen Reactions    Other Shortness Of Breath     Nuts, coconut    Talwin [Pentazocine] Shortness Of Breath and Other (See Comments)     dizziness         Current Outpatient Prescriptions:     acetaminophen (TYLENOL) 500 mg tablet, Take 500 mg by mouth every 6 (six) hours as needed for mild pain, Disp: , Rfl:     albuterol (PROVENTIL HFA,VENTOLIN HFA) 90 mcg/act inhaler, Inhale 2 puffs every 6 (six) hours as needed for wheezing, Disp: , Rfl:     ascorbic acid (VITAMIN C) 500 mg tablet, Take 500 mg by mouth daily, Disp: , Rfl:     aspirin 81 mg chewable tablet, Chew 81 mg daily, Disp: , Rfl:     B Complex-C (B-COMPLEX WITH VITAMIN C) tablet, Take 1 tablet by mouth daily, Disp: , Rfl:     Cholecalciferol (VITAMIN D3) 2000 units TABS, Take 1 tablet by mouth daily, Disp: , Rfl:     Co-Enzyme Q10 100 MG CAPS, Take 1 capsule by mouth daily, Disp: , Rfl:     dicyclomine (BENTYL) 20 mg tablet, Take 20 mg by mouth as needed  , Disp: , Rfl:     esomeprazole (NexIUM) 40 MG capsule, Take 40 mg by mouth daily, Disp: , Rfl:     HYOSCYAMINE SULFATE PO, Place 1 tablet under the tongue every 6 (six) hours as needed  , Disp: , Rfl:     Mometasone Furoate 110 MCG/INH AEPB, Inhale 1 puff daily  , Disp: , Rfl:     multivitamin-iron-minerals-folic acid (CENTRUM) chewable tablet, Chew 1 tablet daily, Disp: , Rfl:     promethazine (PHENERGAN) 25 mg tablet, Take 25 mg by mouth every 8 (eight) hours as needed for nausea or vomiting, Disp: , Rfl:     sodium chloride (CVS SALINE NOSE SPRAY) 0 65 % nasal spray, 2 sprays into each nostril 2 (two) times a day, Disp: , Rfl:     topiramate (TOPAMAX) 200 MG tablet, Take 200 mg by mouth 2 (two) times a day, Disp: , Rfl:     verapamil (CALAN-SR) 120 mg CR tablet, Take 120 mg by mouth daily at bedtime, Disp: , Rfl:     zolpidem (AMBIEN CR) 6 25 MG CR tablet, Take 6 25 mg by mouth daily at bedtime as needed for sleep, Disp: , Rfl:     indomethacin (INDOCIN) 25 mg capsule, TAKE ONE CAPSULE AT ONSET OF MIGRAINE, Disp: , Rfl: 1      Yazmin Kaiser PA-C    Scribe Attestation    I,:    am acting as a scribe while in the presence of the attending physician :        I,:    personally performed the services described in this documentation    as scribed in my presence :

## 2018-05-10 ENCOUNTER — OFFICE VISIT (OUTPATIENT)
Dept: PHYSICAL THERAPY | Facility: REHABILITATION | Age: 58
End: 2018-05-10
Payer: COMMERCIAL

## 2018-05-10 DIAGNOSIS — M25.512 ACUTE PAIN OF LEFT SHOULDER: Primary | ICD-10-CM

## 2018-05-10 PROCEDURE — 97112 NEUROMUSCULAR REEDUCATION: CPT | Performed by: PHYSICAL THERAPIST

## 2018-05-10 PROCEDURE — 97140 MANUAL THERAPY 1/> REGIONS: CPT | Performed by: PHYSICAL THERAPIST

## 2018-05-10 PROCEDURE — 97110 THERAPEUTIC EXERCISES: CPT | Performed by: PHYSICAL THERAPIST

## 2018-05-10 NOTE — PROGRESS NOTES
Daily Note     Today's date: 5/10/2018  Patient name: Kaykay Rayo  : 1960  MRN: [id number]  Referring provider: Alexis Reed PA-C  Dx:   Encounter Diagnosis     ICD-10-CM    1  Acute pain of left shoulder M25 512            1on1 [phone], -510  Subjective: Pt reports she was feeling less pain in her shoulder and then it started to bother her again yesterday for unknown reason  Pt saw MD yesterday and was advised to continue PT since she only came for her eval   Pt states HEP is going OK, but is not performing as often as instructed  Objective: See treatment diary below  Manual   5/10                     Stretching L shoulder flex/abd; [facility]   8'                                                                                                                           Exercise Diary   5/10                     UBE 90 rpm  3'/3'                     Pulleys flex/scap  10", 3'/3'                     Wall climbs  10"x10                     Cane flexion  10"x10                     Cane scaption  10"x5                     scap punches  5"x10                     scap abcs  x1                     S/L ER  x15                     TB LPD  otb 15                     TB rows  otb 15                     TB IR/ER  otb 15 ea                     Scaption                       Towel circles against wall cw/ccw                                                                                                                                                                                                     Modalities   5/10                     CP PRN  10'                                                                           Assessment: Tolerated treatment well  Improved ROM noted this session  Multiple VC's required for correct technique with TE's  Patient would benefit from continued PT      Plan: Continue per plan of care

## 2018-05-14 ENCOUNTER — OFFICE VISIT (OUTPATIENT)
Dept: PHYSICAL THERAPY | Facility: REHABILITATION | Age: 58
End: 2018-05-14
Payer: COMMERCIAL

## 2018-05-14 DIAGNOSIS — M25.512 ACUTE PAIN OF LEFT SHOULDER: Primary | ICD-10-CM

## 2018-05-14 PROCEDURE — 97140 MANUAL THERAPY 1/> REGIONS: CPT | Performed by: PHYSICAL THERAPIST

## 2018-05-14 PROCEDURE — 97110 THERAPEUTIC EXERCISES: CPT | Performed by: PHYSICAL THERAPIST

## 2018-05-14 PROCEDURE — 97112 NEUROMUSCULAR REEDUCATION: CPT | Performed by: PHYSICAL THERAPIST

## 2018-05-14 NOTE — PROGRESS NOTES
Daily Note     Today's date: 2018  Patient name: Brain Riedel  : 1960  MRN: [id number]  Referring provider: Catie Boggs PA-C  Dx:   Encounter Diagnosis     ICD-10-CM    1  Acute pain of left shoulder M25 512               1on1 with PT/PTA from 400-500 and performed remaining TE's as part of Fitness program      Subjective: Pt reports mild soreness after last visit  Objective: See treatment diary below  Manual   5/10  5/14                   Stretching L shoulder flex/abd; post/inf [facility]   8'  8'                                                                                                                         Exercise Diary   5/10  5/14                   UBE 90 rpm  3'/3'  3'/3'                   Pulleys flex/scap  10", 3'/3'  10", 3'/3'                   Wall climbs  10"x10  10"x10                   Cane flexion  10"x10  10"x10                   Cane scaption  10"x5                     scap punches  5"x10  5"x10                   scap abcs  x1  x1                   S/L ER  x15 x15                   TB LPD  otb 15  otb x15                   TB rows  otb 15  otb 15                   TB IR/ER  otb 15 ea  otb 15ea                   Scaption                       Towel circles against wall cw/ccw                                                                                                                                                                                                     Modalities   5/10  5/14                   CP PRN  10'  10'                                                                         Assessment: Tolerated treatment well  Pt continues to require VC's for correct technique with TE'sPatient would benefit from continued PT      Plan: Continue per plan of care

## 2018-05-17 ENCOUNTER — OFFICE VISIT (OUTPATIENT)
Dept: PHYSICAL THERAPY | Facility: REHABILITATION | Age: 58
End: 2018-05-17
Payer: COMMERCIAL

## 2018-05-17 DIAGNOSIS — M25.512 ACUTE PAIN OF LEFT SHOULDER: Primary | ICD-10-CM

## 2018-05-17 PROCEDURE — 97140 MANUAL THERAPY 1/> REGIONS: CPT

## 2018-05-17 PROCEDURE — 97110 THERAPEUTIC EXERCISES: CPT

## 2018-05-17 PROCEDURE — 97112 NEUROMUSCULAR REEDUCATION: CPT

## 2018-05-17 NOTE — PROGRESS NOTES
Daily Note     Today's date: 2018  Patient name: John Alvarez  : 1960  MRN: [id number]  Referring provider: Racheal Estrada PA-C  Dx:   Encounter Diagnosis     ICD-10-CM    1  Acute pain of left shoulder M25 512                   Subjective: Pt reported intermittent soreness in shoulder due to weather  Objective: See treatment diary below  Manual   5/10  5/14  5/17                 Stretching L shoulder flex/abd; post/inf [facility]   8'  8'  8'                                                                                                                       Exercise Diary   5/10  5/14  5/17                 UBE 90 rpm  3'/3'  3'/3'  3'/3'                 Pulleys flex/scap  10", 3'/3'  10", 3'/3'  10";3'/3'                 Wall climbs  10"x10  10"x10  10"x10                 Cane flexion  10"x10  10"x10  10"x10                 Cane scaption  10"x5    10"x5                 scap punches  5"x10  5"x10  5"x10                 scap abcs  x1  x1  x1                 S/L ER  x15 x15  x15                 TB LPD  otb 15  otb x15  gtb x15                 TB rows  otb 15  otb 15  gtb x15                 TB IR/ER  otb 15 ea  otb 15ea  otb x15 ea                  Scaption                       Towel circles against wall cw/ccw                                                                                                                                                                                                     Modalities   5/10  5/14  5/17                 CP PRN  10'  10'  10'                                                                        Assessment: Tolerated treatment well  Patient demonstrated fatigue post treatment and exhibited good technique with therapeutic exercises  VC's needed for correct technique throughout session  Able to tolerance increased resistance with TB exercises  Relief with cp  Plan: Continue per plan of care

## 2018-05-21 ENCOUNTER — OFFICE VISIT (OUTPATIENT)
Dept: PHYSICAL THERAPY | Facility: REHABILITATION | Age: 58
End: 2018-05-21
Payer: COMMERCIAL

## 2018-05-21 DIAGNOSIS — M25.512 ACUTE PAIN OF LEFT SHOULDER: Primary | ICD-10-CM

## 2018-05-21 PROCEDURE — G8990 OTHER PT/OT CURRENT STATUS: HCPCS

## 2018-05-21 PROCEDURE — 97140 MANUAL THERAPY 1/> REGIONS: CPT

## 2018-05-21 PROCEDURE — 97112 NEUROMUSCULAR REEDUCATION: CPT

## 2018-05-21 PROCEDURE — G8991 OTHER PT/OT GOAL STATUS: HCPCS

## 2018-05-21 PROCEDURE — 97110 THERAPEUTIC EXERCISES: CPT

## 2018-05-21 NOTE — PROGRESS NOTES
Daily Note     Today's date: 2018  Patient name: Jimmie Wisdom  : 1960  MRN: [id number]  Referring provider: Hardy Agudelo PA-C  Dx:   Encounter Diagnosis     ICD-10-CM    1  Acute pain of left shoulder M25 512                   Subjective: Pt reported soreness today  Objective: See treatment diary below  FOTO 54  Manual   5/10  5/14  5/17  5/21               Stretching L shoulder flex/abd; post/inf [facility]   8'  8'  8'  10'                                                                                                                     Exercise Diary   5/10  5/14  5/17  5/21               UBE 90 rpm  3'/3'  3'/3'  3'/3'  3'/3'               Pulleys flex/scap  10", 3'/3'  10", 3'/3'  10";3'/3'  10"x3'/3'               Wall climbs  10"x10  10"x10  10"x10  10"x10               Cane flexion  10"x10  10"x10  10"x10  10"x10               Cane scaption  10"x5    10"x5  10"x10               scap punches  5"x10  5"x10  5"x10  5"x10               scap abcs  x1  x1  x1  x1               S/L ER  x15 x15  x15  x15               TB LPD  otb 15  otb x15  gtb x15  gtb x15               TB rows  otb 15  otb 15  gtb x15  gtb x15               TB IR/ER  otb 15 ea  otb 15ea  otb x15 ea   otb x15 ea                Scaption                       Towel circles against wall cw/ccw        10 ea                                                                                                                                                                                              Modalities   5/10  5/14  5/17  5/21               CP PRN  10'  10'  10'  10'                                                                      Assessment: Tolerated treatment well  Patient demonstrated fatigue post treatment and exhibited good technique with therapeutic exercises  VC"s needed for correct technique throughout session  Relief with cp  Plan: Continue per plan of care

## 2018-05-23 ENCOUNTER — OFFICE VISIT (OUTPATIENT)
Dept: PHYSICAL THERAPY | Facility: REHABILITATION | Age: 58
End: 2018-05-23
Payer: COMMERCIAL

## 2018-05-23 DIAGNOSIS — M25.512 ACUTE PAIN OF LEFT SHOULDER: Primary | ICD-10-CM

## 2018-05-23 PROCEDURE — 97140 MANUAL THERAPY 1/> REGIONS: CPT

## 2018-05-23 PROCEDURE — 97112 NEUROMUSCULAR REEDUCATION: CPT

## 2018-05-23 PROCEDURE — 97110 THERAPEUTIC EXERCISES: CPT

## 2018-05-23 NOTE — PROGRESS NOTES
Daily Note     Today's date: 2018  Patient name: Chanel Montero  : 1960  MRN: [id number]  Referring provider: Mercedes Lemus PA-C  Dx:   Encounter Diagnosis     ICD-10-CM    1  Acute pain of left shoulder M25 512                   Subjective: Pt reported intermittent discomfort today  Objective: See treatment diary below  Manual   5/10  5/14  5/17  5/21  5/23             Stretching L shoulder flex/abd; post/inf [facility]   8'  8'  8'  10'  10'                                                                                                                   Exercise Diary   5/10  5/14  5/17  5/21  5/23             UBE 90 rpm  3'/3'  3'/3'  3'/3'  3'/3'  3'/3'             Pulleys flex/scap  10", 3'/3'  10", 3'/3'  10";3'/3'  10"x3'/3'  10" x3'/3'             Wall climbs  10"x10  10"x10  10"x10  10"x10  10"x10             Cane flexion  10"x10  10"x10  10"x10  10"x10  10"x10             Cane scaption  10"x5    10"x5  10"x10  10"x10             scap punches  5"x10  5"x10  5"x10  5"x10  5"x10             scap abcs  x1  x1  x1  x1  x1             S/L ER  x15 x15  x15  x15  15             TB LPD  otb 15  otb x15  gtb x15  gtb x15  gtb x20             TB rows  otb 15  otb 15  gtb x15  gtb x15  gtb x20             TB IR/ER  otb 15 ea  otb 15ea  otb x15 ea   otb x15 ea   otb x15 ea              Scaption                       Towel circles against wall cw/ccw        10 ea   15 ea                                                                                                                                                                                            Modalities   5/10  5/14  5/17  5/21  5/23             CP PRN  10'  10'  10'  10'  10'                                                                    Assessment: Tolerated treatment well  Patient demonstrated fatigue post treatment and exhibited good technique with therapeutic exercises  VC's needed for correct technique throughout session  Relief with cp  Plan: Continue per plan of care

## 2018-06-06 ENCOUNTER — OFFICE VISIT (OUTPATIENT)
Dept: PHYSICAL THERAPY | Facility: REHABILITATION | Age: 58
End: 2018-06-06
Payer: COMMERCIAL

## 2018-06-06 DIAGNOSIS — M25.512 ACUTE PAIN OF LEFT SHOULDER: Primary | ICD-10-CM

## 2018-06-06 PROCEDURE — 97112 NEUROMUSCULAR REEDUCATION: CPT

## 2018-06-06 PROCEDURE — 97110 THERAPEUTIC EXERCISES: CPT

## 2018-06-06 PROCEDURE — 97140 MANUAL THERAPY 1/> REGIONS: CPT

## 2018-06-06 NOTE — PROGRESS NOTES
Daily Note     Today's date: 2018  Patient name: Ella Vasquez  : 1960  MRN: [id number]  Referring provider: Jhoana Paniagua PA-C  Dx:   Encounter Diagnosis     ICD-10-CM    1  Acute pain of left shoulder M25 512                   Subjective: Pt reported soreness in shoulder due to getting allergy injections yesterday  Objective: See treatment diary below  Manual   5/10  5/14  5/17  5/21  5/23  6           Stretching L shoulder flex/abd; post/inf [facility]  [place]'  8'  8'  10'  10'  10'                                                                                                                 Exercise Diary   5/10  5/14  5/17  5/21  5/23  66           UBE 90 rpm  3'/3'  3'/3'  3'/3'  3'/3'  3'/3'  3'/3'           Pulleys flex/scap  10", 3'/3'  10", 3'/3'  10";3'/3'  10"x3'/3'  10" x3'/3'  10", 3'/3'           Wall climbs  10"x10  10"x10  10"x10  10"x10  10"x10  10"x10           Cane flexion  10"x10  10"x10  10"x10  10"x10  10"x10  10"x10           Cane scaption  10"x5    10"x5  10"x10  10"x10  10"x10           scap punches  5"x10  5"x10  5"x10  5"x10  5"x10  5"x10           scap abcs  x1  x1  x1  x1  x1  x1           S/L ER  x15 x15  x15  x15  15  15           TB LPD  otb 15  otb x15  gtb x15  gtb x15  gtb x20  gtb x20           TB rows  otb 15  otb 15  gtb x15  gtb x15  gtb x20  gtb x20           TB IR/ER  otb 15 ea  otb 15ea  otb x15 ea   otb x15 ea   otb x15 ea   otb x20 ea            Scaption                       Towel circles against wall cw/ccw        10 ea   15 ea   15 ea                                                                                                                                                                                          Modalities   5/10  5/14  5/17  5/21  5/23  6/6           CP PRN  10'  10'  10'  10'  10'  10'                                                                 Assessment: Tolerated treatment well   Patient demonstrated fatigue post treatment and exhibited good technique with therapeutic exercises  VC's needed for correct technique throughout session  Relief with cp  Plan: Continue per plan of care

## 2018-06-13 ENCOUNTER — EVALUATION (OUTPATIENT)
Dept: PHYSICAL THERAPY | Facility: REHABILITATION | Age: 58
End: 2018-06-13
Payer: COMMERCIAL

## 2018-06-13 DIAGNOSIS — M25.512 ACUTE PAIN OF LEFT SHOULDER: Primary | ICD-10-CM

## 2018-06-13 PROCEDURE — 97110 THERAPEUTIC EXERCISES: CPT | Performed by: PHYSICAL THERAPIST

## 2018-06-13 PROCEDURE — 97140 MANUAL THERAPY 1/> REGIONS: CPT | Performed by: PHYSICAL THERAPIST

## 2018-06-13 PROCEDURE — G8990 OTHER PT/OT CURRENT STATUS: HCPCS | Performed by: PHYSICAL THERAPIST

## 2018-06-13 PROCEDURE — G8991 OTHER PT/OT GOAL STATUS: HCPCS | Performed by: PHYSICAL THERAPIST

## 2018-06-13 PROCEDURE — 97112 NEUROMUSCULAR REEDUCATION: CPT | Performed by: PHYSICAL THERAPIST

## 2018-06-13 NOTE — PROGRESS NOTES
PT Re-Evaluation     Today's date: 2018  Patient name: Lizz Ugarte  : 1960  MRN: [id number]  Referring provider: Germán Xiong PA-C  Dx:   Encounter Diagnosis     ICD-10-CM    1  Acute pain of left shoulder M25 512               1on1 from 400-445 and performed remaining TE's as part of IEP  Assessment  Impairments: abnormal or restricted ROM, activity intolerance, impaired physical strength and pain with function    Assessment details: Pt has demonstrated improvement in shoulder ROM, strength, and function with a decrease in pain since starting PT  Pt continues to present with intermittent pain, decreased shoulder ROM, decreased shoulder strength, and decreased function  Pt would benefit from continued skilled PT intervention to address these issues and to maximize function  Understanding of Dx/Px/POC: good   Prognosis: good    Goals  Short Term:  Pt will report decreased levels of pain by at least 2 subjective ratings in 4 weeks-met  Pt will demonstrate improved ROM by at least 10 degrees in 4 weeks-partially met  Pt will demonstrate improved strength by 1/2 grade MMT in 4 weeks-met  Long Term:   Pt will be independent in their HEP in 8 weeks-partially met  Pt will demonstrate improved FOTO, > 64-partially met  Pt will be independent with all ADL's-partially met    Plan  Planned modality interventions: cryotherapy  Planned therapy interventions: manual therapy, joint mobilization, neuromuscular re-education, patient education, therapeutic exercise, home exercise program and functional ROM exercises  Frequency: 2x week  Duration in weeks: 4  Treatment plan discussed with: patient        Subjective Evaluation    History of Present Illness  Mechanism of injury: Pt reports 80% improvement since starting therapy  Pt reports pain/difficulty with lifting things overhead with L UE, carrying things with L UE, reaching behind back  Pt notes improvement with sleep, laying on L shoulder  Pain levels have decreased  Pain  At best pain ratin  At worst pain ratin  Location: L shoulder          Objective     Active Range of Motion   Left Shoulder   Flexion: 142 degrees   Abduction: 138 degrees   External rotation 0°: 70 degrees   External rotation BTH: WFL  Internal rotation BTB: WFL and with pain    Passive Range of Motion   Left Shoulder   Flexion: 160 degrees   Abduction: 165 degrees   External rotation 90°: WFL  Internal rotation 90°: 55 degrees     Strength/Myotome Testing     Left Shoulder     Planes of Motion   Flexion: 5   Abduction: 5 (mild pain)   External rotation at 0°: 4+   Internal rotation at 0°: 5           Precautions: OA, asthma, IBS, BMI>30, R TKA, migraines    Daily Treatment Diary     Manual   5/10  5/14  5/17  5/21  5/23  6/6  6/13         Stretching L shoulder flex/abd; post/inf [facility] JM's  Els[facility] [place]ras[facility]'  8'  8'  10'  10'  10'  8'                                                                                                               Exercise Diary   5/10  5/14  5/17  5/21  5/23  6/6  6/13         UBE 90 rpm  3'/3'  3'/3'  3'/3'  3'/3'  3'/3'  3'/3'  3'/3'         Pulleys flex/scap  10", 3'/3'  10", 3'/3'  10";3'/3'  10"x3'/3'  10" x3'/3'  10", 3'/3'  10", 3'/3'         Wall climbs  10"x10  10"x10  10"x10  10"x10  10"x10  10"x10  10"x10         Cane flexion  10"x10  10"x10  10"x10  10"x10  10"x10  10"x10  10"x10         Cane scaption  10"x5    10"x5  10"x10  10"x10  10"x10  10"x10         scap punches  5"x10  5"x10  5"x10  5"x10  5"x10  5"x10  5"x15         scap abcs  x1  x1  x1  x1  x1  x1  x1         S/L ER  x15 x15  x15  x15  15  15 2#x15         TB LPD  otb 15  otb x15  gtb x15  gtb x15  gtb x20  gtb x20  gtb x20         TB rows  otb 15  otb 15  gtb x15  gtb x15  gtb x20  gtb x20  gtb x20         TB IR/ER  otb 15 ea  otb 15ea  otb x15 ea   otb x15 ea   otb x15 ea   otb x20 ea   gtb x20         Scaption              15         Towel circles against wall cw/ccw        10 ea   15 [employer] Wisdom          wall slides w/towel              5"x10                                                                                                                                                               Modalities   5/10  5/14  5/17  5/21  5/23  6/6  6/13         CP PRN  10'  10'  10'  10'  10'  10'  10'                                                           Updated measurements and functional status taken this session

## 2018-06-21 ENCOUNTER — OFFICE VISIT (OUTPATIENT)
Dept: PHYSICAL THERAPY | Facility: REHABILITATION | Age: 58
End: 2018-06-21
Payer: COMMERCIAL

## 2018-06-21 DIAGNOSIS — M25.512 ACUTE PAIN OF LEFT SHOULDER: Primary | ICD-10-CM

## 2018-06-21 PROCEDURE — 97110 THERAPEUTIC EXERCISES: CPT | Performed by: PHYSICAL THERAPIST

## 2018-06-21 PROCEDURE — 97140 MANUAL THERAPY 1/> REGIONS: CPT | Performed by: PHYSICAL THERAPIST

## 2018-06-21 PROCEDURE — 97112 NEUROMUSCULAR REEDUCATION: CPT | Performed by: PHYSICAL THERAPIST

## 2018-06-21 NOTE — PROGRESS NOTES
Daily Note     Today's date: 2018  Patient name: Shasta Vanegas  : 1960  MRN: [id number]  Referring provider: Tc Quinones PA-C  Dx:   Encounter Diagnosis     ICD-10-CM    1  Acute pain of left shoulder M25 512               1on1 entire session  Subjective: Pt reports minimal soreness upon arrival today         Objective: See treatment diary below  Manual   5/10  5/14  5/17  5/21  5/23  6/6  6/13  6/21       Stretching L shoulder flex/abd; post/inf GH JM's  Arnav Sandy'  8'  8'  10'  10'  10'  8'  8'                                                                                                             Exercise Diary   5/10  5/14  5/17  5/21  5/23  6/6  6/13  6/21       UBE 90 rpm  3'/3'  3'/3'  3'/3'  3'/3'  3'/3'  3'/3'  3'/3'  3'/3'       Pulleys flex/scap  10", 3'/3'  10", 3'/3'  10";3'/3'  10"x3'/3'  10" x3'/3'  10", 3'/3'  10", 3'/3'  10", 3'/3'       Wall climbs  10"x10  10"x10  10"x10  10"x10  10"x10  10"x10  10"x10  10"x10       Cane flexion  10"x10  10"x10  10"x10  10"x10  10"x10  10"x10  10"x10  10"x10       Cane scaption  10"x5    10"x5  10"x10  10"x10  10"x10  10"x10  10"x10       scap punches  5"x10  5"x10  5"x10  5"x10  5"x10  5"x10  5"x15  5"x15       scap abcs  x1  x1  x1  x1  x1  x1  x1  x1       S/L ER  x15 x15  x15  x15  15  15 2#x15  2#x15       TB LPD  otb 15  otb x15  gtb x15  gtb x15  gtb x20  gtb x20  gtb x20  gtb x20       TB rows  otb 15  otb 15  gtb x15  gtb x15  gtb x20  gtb x20  gtb x20  gtb x20       TB IR/ER  otb 15 ea  otb 15ea  otb x15 ea   otb x15 ea   otb x15 ea   otb x20 ea   gtb x20  gtb x20       Scaption              15         Towel circles against wall cw/ccw        10 ea   15 ea   15 ea   43VL  30NU        wall slides w/towel              5"x10  5"x10                                                                                                                                                             Modalities   5/10  5/14  5/17  5/21  5/23  6/6  6/13 6/21       CP PRN  10'  10'  10'  10'  10'  10'  10'  10'                                     Assessment: Tolerated treatment well  Pt continues to require VC's and TC's for correct technique with TE performance  Improved mobility with manuals this session  Patient would benefit from continued PT      Plan: Continue per plan of care  Progress treatment as tolerated

## 2018-06-27 ENCOUNTER — OFFICE VISIT (OUTPATIENT)
Dept: PHYSICAL THERAPY | Facility: REHABILITATION | Age: 58
End: 2018-06-27
Payer: COMMERCIAL

## 2018-06-27 DIAGNOSIS — M25.512 ACUTE PAIN OF LEFT SHOULDER: Primary | ICD-10-CM

## 2018-06-27 PROCEDURE — 97110 THERAPEUTIC EXERCISES: CPT

## 2018-06-27 PROCEDURE — 97112 NEUROMUSCULAR REEDUCATION: CPT

## 2018-06-27 PROCEDURE — 97140 MANUAL THERAPY 1/> REGIONS: CPT

## 2018-06-27 NOTE — PROGRESS NOTES
Daily Note     Today's date: 2018  Patient name: Hina Stout  : 1960  MRN: [id number]  Referring provider: Vanessa Rizzo PA-C  Dx:   Encounter Diagnosis     ICD-10-CM    1  Acute pain of left shoulder M25 512                   Subjective: Pt reported intermittent discomfort but overall starting to see improvement in symptoms         Objective: See treatment diary below  Manual   5/10  5/14  5/17  5/21  5/23  6/6  6/13  6/21  6/27     Stretching L shoulder flex/abd; post/inf GH JM's  Nicki Boot'  8'  8'  10'  10'  10'  8'  8'  TK                                                                                                           Exercise Diary   5/10  5/14  5/17  5/21  5/23  6/6  6/13  6/21  6/27     UBE 90 rpm  3'/3'  3'/3'  3'/3'  3'/3'  3'/3'  3'/3'  3'/3'  3'/3'  3'/3'     Pulleys flex/scap  10", 3'/3'  10", 3'/3'  10";3'/3'  10"x3'/3'  10" x3'/3'  10", 3'/3'  10", 3'/3'  10", 3'/3'  10"x3' ea      Wall climbs  10"x10  10"x10  10"x10  10"x10  10"x10  10"x10  10"x10  10"x10  10"x10     Cane flexion  10"x10  10"x10  10"x10  10"x10  10"x10  10"x10  10"x10  10"x10  10"x10     Cane scaption  10"x5    10"x5  10"x10  10"x10  10"x10  10"x10  10"x10  10"x10     scap punches  5"x10  5"x10  5"x10  5"x10  5"x10  5"x10  5"x15  5"x15  5"x15     scap abcs  x1  x1  x1  x1  x1  x1  x1  x1  x1     S/L ER  x15 x15  x15  x15  15  15 2#x15  2#x15  2#x15     TB LPD  otb 15  otb x15  gtb x15  gtb x15  gtb x20  gtb x20  gtb x20  gtb x20 gtb x20     TB rows  otb 15  otb 15  gtb x15  gtb x15  gtb x20  gtb x20  gtb x20  gtb x20 gtb x20     TB IR/ER  otb 15 ea  otb 15ea  otb x15 ea   otb x15 ea   otb x15 ea   otb x20 ea   gtb x20  gtb x20 gtbx20     Scaption              15         Towel circles against wall cw/ccw        10 ea   15 ea   15 ea   83PC  70QF  15 ea       wall slides w/towel              5"x10  5"x10  5"x10                                                                                                   Reason for Call:  Medication or medication refill:    Do you use a [facility]?  Name of the pharmacy and phone number for the current request:     [facility] [address] [place], [place] - [address] & [facility] [place] - [place], [place] [address] [facility] [address] - [place] [address]  [facility]  [facility] [address] [facility]    Name of the medication requested:harshalmentin  Other request: pt isnt improving on present med was told to call if that happened    Can we leave a detailed message on this number? YES    Phone number patient can be reached at: Home number on file [phone] (home)    Best Time: any    Call taken on 3/15/2018 at 8:57 AM by Viviana Ramsay                                                           Modalities   5/10  5/14  5/17  5/21  5/23  6/6  6/13  6/21  6/27     CP PRN  10'  10'  10'  10'  10'  10'  10'  10'  10'                                    Assessment: Tolerated treatment well  Patient demonstrated fatigue post treatment and exhibited good technique with therapeutic exercises  Constant VC's needed throughout session for correct technique  Relief with cp  Plan: Continue per plan of care

## 2018-07-05 ENCOUNTER — OFFICE VISIT (OUTPATIENT)
Dept: PHYSICAL THERAPY | Facility: REHABILITATION | Age: 58
End: 2018-07-05
Payer: COMMERCIAL

## 2018-07-05 DIAGNOSIS — M25.512 ACUTE PAIN OF LEFT SHOULDER: Primary | ICD-10-CM

## 2018-07-05 PROCEDURE — 97112 NEUROMUSCULAR REEDUCATION: CPT

## 2018-07-05 PROCEDURE — 97140 MANUAL THERAPY 1/> REGIONS: CPT

## 2018-07-05 PROCEDURE — 97110 THERAPEUTIC EXERCISES: CPT

## 2018-07-05 NOTE — PROGRESS NOTES
Daily Note     Today's date: 2018  Patient name: Kaykay Rayo  : 1960  MRN: [id number]  Referring provider: Alexis Reed PA-C  Dx:   Encounter Diagnosis     ICD-10-CM    1  Acute pain of left shoulder M25 512                   Subjective: Pt reported intermittent discomfort today  Objective: See treatment diary below  Manual   5/10  5/14  5/17  5/21  5/23  6/6  6/13  6/21  6/27  7/   Stretching L shoulder flex/abd; post/inf GH JM's  Terrall Rodriguez'  8'  8'  10'  10'  10'  8'  8'  TK  TK                                                                                                         Exercise Diary   5/10  5/14  5/17  5/21  5/23  6/6  6/13  6/21  6/27  7/   UBE 90 rpm  3'/3'  3'/3'  3'/3'  3'/3'  3'/3'  3'/3'  3'/3'  3'/3'  3'/3'  3'/3'   Pulleys flex/scap  10", 3'/3'  10", 3'/3'  10";3'/3'  10"x3'/3'  10" x3'/3'  10", 3'/3'  10", 3'/3'  10", 3'/3'  10"x3' ea   10" x3' ea     Wall climbs  10"x10  10"x10  10"x10  10"x10  10"x10  10"x10  10"x10  10"x10  10"x10  10"x10   Cane flexion  10"x10  10"x10  10"x10  10"x10  10"x10  10"x10  10"x10  10"x10  10"x10  10"x10   Cane scaption  10"x5    10"x5  10"x10  10"x10  10"x10  10"x10  10"x10  10"x10  10"x10   scap punches  5"x10  5"x10  5"x10  5"x10  5"x10  5"x10  5"x15  5"x15  5"x15  5"x15   scap abcs  x1  x1  x1  x1  x1  x1  x1  x1  x1  x1   S/L ER  x15 x15  x15  x15  15  15 2#x15  2#x15  2#x15  2#x15   TB LPD  otb 15  otb x15  gtb x15  gtb x15  gtb x20  gtb x20  gtb x20  gtb x20 gtb x20  gtbx20   TB rows  otb 15  otb 15  gtb x15  gtb x15  gtb x20  gtb x20  gtb x20  gtb x20 gtb x20  gtbx20   TB IR/ER  otb 15 ea  otb 15ea  otb x15 ea   otb x15 ea   otb x15 ea   otb x20 ea   gtb x20  gtb x20 gtbx20  gtb x20   Scaption              15         Towel circles against wall cw/ccw        10 ea   15 ea   15 ea   25PW  65YV  15 ea   15 ea     wall slides w/towel              5"x10  5"x10  5"x10  10"x10                                                                                                                                                         Modalities   5/10  5/14  5/17  5/21  5/23  6/6  6/13  6/21  6/27  7/5   CP PRN  10'  10'  10'  10'  10'  10'  10'  10'  10'  10'                                  Assessment: Tolerated treatment well  Patient demonstrated fatigue post treatment and exhibited good technique with therapeutic exercises  VC's needed throughout session for correct technique  Cracking noted with PROM  Relief with cp  Plan: Continue per plan of care   1 on 1 with PTA for 45 mins; performed rest of session under fitness program

## 2018-07-11 ENCOUNTER — EVALUATION (OUTPATIENT)
Dept: PHYSICAL THERAPY | Facility: REHABILITATION | Age: 58
End: 2018-07-11
Payer: COMMERCIAL

## 2018-07-11 DIAGNOSIS — M25.512 ACUTE PAIN OF LEFT SHOULDER: Primary | ICD-10-CM

## 2018-07-11 PROCEDURE — 97140 MANUAL THERAPY 1/> REGIONS: CPT | Performed by: PHYSICAL THERAPIST

## 2018-07-11 PROCEDURE — 97110 THERAPEUTIC EXERCISES: CPT | Performed by: PHYSICAL THERAPIST

## 2018-07-11 PROCEDURE — G8990 OTHER PT/OT CURRENT STATUS: HCPCS

## 2018-07-11 PROCEDURE — G8991 OTHER PT/OT GOAL STATUS: HCPCS

## 2018-07-11 NOTE — PROGRESS NOTES
PT Discharge    Today's date: 2018  Patient name: Laverne Medrano  : 1960  MRN: [id number]  Referring provider: Danita Rojo PA-C  Dx:   Encounter Diagnosis     ICD-10-CM    1  Acute pain of left shoulder M25 512                   Assessment  Impairments: abnormal or restricted ROM    Assessment details: Pt presents with increased UE ROM, increased UE strength, decreased pain, and increased tolerance to functional ability  Pt would benefit from continuing exercises independently at home and will be d/c at this time  Thank you for this referral    Understanding of Dx/Px/POC: good   Prognosis: good    Goals  Short Term:  Pt will report decreased levels of pain by at least 2 subjective ratings in 4 weeks-met  Pt will demonstrate improved ROM by at least 10 degrees in 4 weeks- met  Pt will demonstrate improved strength by 1/2 grade MMT in 4 weeks-met  Long Term:   Pt will be independent in their HEP in 8 weeks- met  Pt will demonstrate improved FOTO, > 64- met  Pt will be independent with all ADL's-partially met    Plan  Treatment plan discussed with: patient  Plan details: Pt to be d/c from PT  Subjective Evaluation    History of Present Illness  Mechanism of injury: Pt presents with having no pain over the last few days and increased ability to reach behind her back  Pt states she does not use her L arm to carry items due to being R hand dominant, but would be able to if required  Pt states improvement with sleeping on L arm  Pt 's self reported improvement is 90%     Pain  At best pain ratin  At worst pain ratin    Hand dominance: right          Objective     Active Range of Motion   Left Shoulder   Flexion: 147 degrees   Abduction: 140 degrees   External rotation 0°: [facility]  Internal rotation BTB: [facility]    Passive Range of Motion   Left Shoulder   Flexion: 165 degrees   Abduction: 167 degrees   External rotation 90°: WFL  Internal rotation 90°: 58 degrees     Strength/Myotome Testing Left Shoulder     Planes of Motion   External rotation at 0°: 5   Internal rotation at 0°: 5           Precautions: OA, asthma, IBS, BMI>30, R TKA, migraines    Daily Treatment Diary   Manual  7/11            Stretching L shoulder flex/abd; post/inf GH JM's  perf                                                                                                                  Exercise Diary  7/11             UBE 90 rpm 3'/3'             Pulleys flex/scap np            Wall climbs np            Cane flexion np            Cane scaption np            scap punches np            scap abcs np            S/L ER np            TB LPD np            TB rows np            TB IR/ER np            Scaption np            Towel circles against wall cw/ccw np             wall slides w/towel  np                                                                                                                                                                       Modalities  7/11            CP PRN np                                          Updated measurements and functional status taken this session

## 2018-07-18 ENCOUNTER — APPOINTMENT (OUTPATIENT)
Dept: PHYSICAL THERAPY | Facility: REHABILITATION | Age: 58
End: 2018-07-18
Payer: COMMERCIAL

## 2018-07-25 ENCOUNTER — APPOINTMENT (OUTPATIENT)
Dept: PHYSICAL THERAPY | Facility: REHABILITATION | Age: 58
End: 2018-07-25
Payer: COMMERCIAL

## 2018-08-03 ENCOUNTER — APPOINTMENT (OUTPATIENT)
Dept: LAB | Facility: CLINIC | Age: 58
End: 2018-08-03

## 2018-08-03 ENCOUNTER — TRANSCRIBE ORDERS (OUTPATIENT)
Dept: RADIOLOGY | Facility: CLINIC | Age: 58
End: 2018-08-03

## 2018-08-03 DIAGNOSIS — Z00.8 HEALTH EXAMINATION IN POPULATION SURVEY: Primary | ICD-10-CM

## 2018-08-03 DIAGNOSIS — Z00.8 HEALTH EXAMINATION IN POPULATION SURVEY: ICD-10-CM

## 2018-08-03 LAB
CHOLEST SERPL-MCNC: 223 MG/DL (ref 50–200)
EST. AVERAGE GLUCOSE BLD GHB EST-MCNC: 128 MG/DL
HBA1C MFR BLD: 6.1 % (ref 4.2–6.3)
HDLC SERPL-MCNC: 59 MG/DL (ref 40–60)
LDLC SERPL CALC-MCNC: 140 MG/DL (ref 0–100)
NONHDLC SERPL-MCNC: 164 MG/DL
TRIGL SERPL-MCNC: 118 MG/DL

## 2018-08-03 PROCEDURE — 36415 COLL VENOUS BLD VENIPUNCTURE: CPT

## 2018-08-03 PROCEDURE — 83036 HEMOGLOBIN GLYCOSYLATED A1C: CPT

## 2018-08-03 PROCEDURE — 80061 LIPID PANEL: CPT

## 2018-11-04 ENCOUNTER — TRANSCRIBE ORDERS (OUTPATIENT)
Dept: LAB | Facility: HOSPITAL | Age: 58
End: 2018-11-04

## 2018-11-04 ENCOUNTER — APPOINTMENT (OUTPATIENT)
Dept: LAB | Facility: HOSPITAL | Age: 58
End: 2018-11-04
Payer: COMMERCIAL

## 2018-11-04 DIAGNOSIS — E87.6 HYPOPOTASSEMIA: ICD-10-CM

## 2018-11-04 DIAGNOSIS — E87.6 HYPOPOTASSEMIA: Primary | ICD-10-CM

## 2018-11-04 LAB
ANION GAP SERPL CALCULATED.3IONS-SCNC: 6 MMOL/L (ref 4–13)
BUN SERPL-MCNC: 15 MG/DL (ref 5–25)
CALCIUM SERPL-MCNC: 9.1 MG/DL (ref 8.3–10.1)
CHLORIDE SERPL-SCNC: 106 MMOL/L (ref 100–108)
CO2 SERPL-SCNC: 25 MMOL/L (ref 21–32)
CREAT SERPL-MCNC: 0.87 MG/DL (ref 0.6–1.3)
GFR SERPL CREATININE-BSD FRML MDRD: 74 ML/MIN/1.73SQ M
GLUCOSE P FAST SERPL-MCNC: 133 MG/DL (ref 65–99)
POTASSIUM SERPL-SCNC: 3.8 MMOL/L (ref 3.5–5.3)
SODIUM SERPL-SCNC: 137 MMOL/L (ref 136–145)

## 2018-11-04 PROCEDURE — 80048 BASIC METABOLIC PNL TOTAL CA: CPT

## 2018-11-04 PROCEDURE — 36415 COLL VENOUS BLD VENIPUNCTURE: CPT

## 2018-11-14 ENCOUNTER — TRANSCRIBE ORDERS (OUTPATIENT)
Dept: ADMINISTRATIVE | Facility: HOSPITAL | Age: 58
End: 2018-11-14

## 2018-11-14 DIAGNOSIS — Z12.39 SCREENING BREAST EXAMINATION: Primary | ICD-10-CM

## 2018-12-17 ENCOUNTER — HOSPITAL ENCOUNTER (OUTPATIENT)
Dept: MAMMOGRAPHY | Facility: CLINIC | Age: 58
Discharge: HOME | End: 2018-12-17
Payer: COMMERCIAL

## 2018-12-17 VITALS — HEIGHT: 62 IN | WEIGHT: 204 LBS | BODY MASS INDEX: 37.54 KG/M2

## 2018-12-17 DIAGNOSIS — Z12.39 SCREENING BREAST EXAMINATION: ICD-10-CM

## 2018-12-17 PROCEDURE — 77063 BREAST TOMOSYNTHESIS BI: CPT

## 2018-12-17 PROCEDURE — 77067 SCR MAMMO BI INCL CAD: CPT

## 2019-02-19 ENCOUNTER — TRANSCRIBE ORDERS (OUTPATIENT)
Dept: LAB | Facility: HOSPITAL | Age: 59
End: 2019-02-19

## 2019-02-19 ENCOUNTER — APPOINTMENT (OUTPATIENT)
Dept: LAB | Facility: HOSPITAL | Age: 59
End: 2019-02-19
Payer: COMMERCIAL

## 2019-02-19 DIAGNOSIS — K57.92 DIVERTICULITIS: ICD-10-CM

## 2019-02-19 DIAGNOSIS — G43.909 MIGRAINE WITHOUT STATUS MIGRAINOSUS, NOT INTRACTABLE, UNSPECIFIED MIGRAINE TYPE: ICD-10-CM

## 2019-02-19 DIAGNOSIS — J45.909 ASTHMATIC BRONCHITIS WITHOUT COMPLICATION, UNSPECIFIED ASTHMA SEVERITY, UNSPECIFIED WHETHER PERSISTENT: ICD-10-CM

## 2019-02-19 DIAGNOSIS — E13.8 DIABETES MELLITUS OF OTHER TYPE WITH COMPLICATION, UNSPECIFIED WHETHER LONG TERM INSULIN USE: ICD-10-CM

## 2019-02-19 DIAGNOSIS — K21.9 GASTROESOPHAGEAL REFLUX DISEASE WITHOUT ESOPHAGITIS: ICD-10-CM

## 2019-02-19 DIAGNOSIS — K57.92 DIVERTICULITIS: Primary | ICD-10-CM

## 2019-02-19 LAB
ALBUMIN SERPL BCP-MCNC: 4 G/DL (ref 3.5–5)
ALP SERPL-CCNC: 80 U/L (ref 46–116)
ALT SERPL W P-5'-P-CCNC: 64 U/L (ref 12–78)
ANION GAP SERPL CALCULATED.3IONS-SCNC: 7 MMOL/L (ref 4–13)
AST SERPL W P-5'-P-CCNC: 35 U/L (ref 5–45)
BILIRUB SERPL-MCNC: 0.23 MG/DL (ref 0.2–1)
BUN SERPL-MCNC: 21 MG/DL (ref 5–25)
CALCIUM SERPL-MCNC: 9 MG/DL (ref 8.3–10.1)
CHLORIDE SERPL-SCNC: 109 MMOL/L (ref 100–108)
CO2 SERPL-SCNC: 24 MMOL/L (ref 21–32)
CREAT SERPL-MCNC: 0.82 MG/DL (ref 0.6–1.3)
ERYTHROCYTE [DISTWIDTH] IN BLOOD BY AUTOMATED COUNT: 13.4 % (ref 11.6–15.1)
GFR SERPL CREATININE-BSD FRML MDRD: 80 ML/MIN/1.73SQ M
GLUCOSE P FAST SERPL-MCNC: 108 MG/DL (ref 65–99)
HCT VFR BLD AUTO: 40.6 % (ref 34.8–46.1)
HGB BLD-MCNC: 13.2 G/DL (ref 11.5–15.4)
MCH RBC QN AUTO: 29.8 PG (ref 26.8–34.3)
MCHC RBC AUTO-ENTMCNC: 32.5 G/DL (ref 31.4–37.4)
MCV RBC AUTO: 92 FL (ref 82–98)
PLATELET # BLD AUTO: 210 THOUSANDS/UL (ref 149–390)
PMV BLD AUTO: 11 FL (ref 8.9–12.7)
POTASSIUM SERPL-SCNC: 3.8 MMOL/L (ref 3.5–5.3)
PROT SERPL-MCNC: 8.1 G/DL (ref 6.4–8.2)
RBC # BLD AUTO: 4.43 MILLION/UL (ref 3.81–5.12)
SODIUM SERPL-SCNC: 140 MMOL/L (ref 136–145)
WBC # BLD AUTO: 9.54 THOUSAND/UL (ref 4.31–10.16)

## 2019-02-19 PROCEDURE — 85027 COMPLETE CBC AUTOMATED: CPT

## 2019-02-19 PROCEDURE — 80053 COMPREHEN METABOLIC PANEL: CPT

## 2019-02-19 PROCEDURE — 36415 COLL VENOUS BLD VENIPUNCTURE: CPT

## 2019-03-09 ENCOUNTER — APPOINTMENT (OUTPATIENT)
Dept: LAB | Facility: HOSPITAL | Age: 59
End: 2019-03-09
Payer: COMMERCIAL

## 2019-03-09 DIAGNOSIS — G43.909 MIGRAINE WITHOUT STATUS MIGRAINOSUS, NOT INTRACTABLE, UNSPECIFIED MIGRAINE TYPE: ICD-10-CM

## 2019-03-09 DIAGNOSIS — J45.909 ASTHMATIC BRONCHITIS WITHOUT COMPLICATION, UNSPECIFIED ASTHMA SEVERITY, UNSPECIFIED WHETHER PERSISTENT: ICD-10-CM

## 2019-03-09 DIAGNOSIS — E13.8 DIABETES MELLITUS OF OTHER TYPE WITH COMPLICATION, UNSPECIFIED WHETHER LONG TERM INSULIN USE: ICD-10-CM

## 2019-03-09 DIAGNOSIS — K21.9 GASTROESOPHAGEAL REFLUX DISEASE WITHOUT ESOPHAGITIS: ICD-10-CM

## 2019-03-09 DIAGNOSIS — K57.92 DIVERTICULITIS: ICD-10-CM

## 2019-03-09 LAB
ALBUMIN SERPL BCP-MCNC: 3.8 G/DL (ref 3.5–5)
ALP SERPL-CCNC: 78 U/L (ref 46–116)
ALT SERPL W P-5'-P-CCNC: 71 U/L (ref 12–78)
ANION GAP SERPL CALCULATED.3IONS-SCNC: 7 MMOL/L (ref 4–13)
AST SERPL W P-5'-P-CCNC: 46 U/L (ref 5–45)
BILIRUB SERPL-MCNC: 0.46 MG/DL (ref 0.2–1)
BUN SERPL-MCNC: 15 MG/DL (ref 5–25)
CALCIUM SERPL-MCNC: 9.2 MG/DL (ref 8.3–10.1)
CHLORIDE SERPL-SCNC: 106 MMOL/L (ref 100–108)
CHOLEST SERPL-MCNC: 226 MG/DL (ref 50–200)
CO2 SERPL-SCNC: 26 MMOL/L (ref 21–32)
CREAT SERPL-MCNC: 0.77 MG/DL (ref 0.6–1.3)
ERYTHROCYTE [DISTWIDTH] IN BLOOD BY AUTOMATED COUNT: 13.3 % (ref 11.6–15.1)
EST. AVERAGE GLUCOSE BLD GHB EST-MCNC: 137 MG/DL
GFR SERPL CREATININE-BSD FRML MDRD: 86 ML/MIN/1.73SQ M
GLUCOSE P FAST SERPL-MCNC: 119 MG/DL (ref 65–99)
HBA1C MFR BLD: 6.4 % (ref 4.2–6.3)
HCT VFR BLD AUTO: 42.5 % (ref 34.8–46.1)
HDLC SERPL-MCNC: 57 MG/DL (ref 40–60)
HGB BLD-MCNC: 14.1 G/DL (ref 11.5–15.4)
LDLC SERPL CALC-MCNC: 135 MG/DL (ref 0–100)
MCH RBC QN AUTO: 30.3 PG (ref 26.8–34.3)
MCHC RBC AUTO-ENTMCNC: 33.2 G/DL (ref 31.4–37.4)
MCV RBC AUTO: 91 FL (ref 82–98)
NONHDLC SERPL-MCNC: 169 MG/DL
PLATELET # BLD AUTO: 211 THOUSANDS/UL (ref 149–390)
PMV BLD AUTO: 11.3 FL (ref 8.9–12.7)
POTASSIUM SERPL-SCNC: 3.9 MMOL/L (ref 3.5–5.3)
PROT SERPL-MCNC: 7.9 G/DL (ref 6.4–8.2)
RBC # BLD AUTO: 4.66 MILLION/UL (ref 3.81–5.12)
SODIUM SERPL-SCNC: 139 MMOL/L (ref 136–145)
TRIGL SERPL-MCNC: 168 MG/DL
WBC # BLD AUTO: 7.82 THOUSAND/UL (ref 4.31–10.16)

## 2019-03-09 PROCEDURE — 36415 COLL VENOUS BLD VENIPUNCTURE: CPT

## 2019-03-09 PROCEDURE — 83036 HEMOGLOBIN GLYCOSYLATED A1C: CPT

## 2019-03-09 PROCEDURE — 80061 LIPID PANEL: CPT

## 2019-03-09 PROCEDURE — 80053 COMPREHEN METABOLIC PANEL: CPT

## 2019-03-09 PROCEDURE — 85027 COMPLETE CBC AUTOMATED: CPT

## 2019-04-14 ENCOUNTER — HOSPITAL ENCOUNTER (OUTPATIENT)
Facility: HOSPITAL | Age: 59
Setting detail: OBSERVATION
Discharge: HOME | End: 2019-04-15
Attending: EMERGENCY MEDICINE | Admitting: EMERGENCY MEDICINE
Payer: COMMERCIAL

## 2019-04-14 DIAGNOSIS — R07.89 CHEST PRESSURE: Primary | ICD-10-CM

## 2019-04-14 DIAGNOSIS — G43.909 MIGRAINE: ICD-10-CM

## 2019-04-14 DIAGNOSIS — R11.0 NAUSEA: ICD-10-CM

## 2019-04-14 LAB
BASOPHILS # BLD AUTO: 0.04 THOUSANDS/ΜL (ref 0–0.1)
BASOPHILS NFR BLD AUTO: 1 % (ref 0–1)
EOSINOPHIL # BLD AUTO: 0.34 THOUSAND/ΜL (ref 0–0.61)
EOSINOPHIL NFR BLD AUTO: 4 % (ref 0–6)
ERYTHROCYTE [DISTWIDTH] IN BLOOD BY AUTOMATED COUNT: 13.2 % (ref 11.6–15.1)
HCT VFR BLD AUTO: 40.1 % (ref 34.8–46.1)
HGB BLD-MCNC: 13.3 G/DL (ref 11.5–15.4)
IMM GRANULOCYTES # BLD AUTO: 0.02 THOUSAND/UL (ref 0–0.2)
IMM GRANULOCYTES NFR BLD AUTO: 0 % (ref 0–2)
LYMPHOCYTES # BLD AUTO: 2.53 THOUSANDS/ΜL (ref 0.6–4.47)
LYMPHOCYTES NFR BLD AUTO: 30 % (ref 14–44)
MCH RBC QN AUTO: 29.8 PG (ref 26.8–34.3)
MCHC RBC AUTO-ENTMCNC: 33.2 G/DL (ref 31.4–37.4)
MCV RBC AUTO: 90 FL (ref 82–98)
MONOCYTES # BLD AUTO: 0.64 THOUSAND/ΜL (ref 0.17–1.22)
MONOCYTES NFR BLD AUTO: 8 % (ref 4–12)
NEUTROPHILS # BLD AUTO: 4.82 THOUSANDS/ΜL (ref 1.85–7.62)
NEUTS SEG NFR BLD AUTO: 57 % (ref 43–75)
NRBC BLD AUTO-RTO: 0 /100 WBCS
PLATELET # BLD AUTO: 188 THOUSANDS/UL (ref 149–390)
PMV BLD AUTO: 10.6 FL (ref 8.9–12.7)
RBC # BLD AUTO: 4.47 MILLION/UL (ref 3.81–5.12)
WBC # BLD AUTO: 8.39 THOUSAND/UL (ref 4.31–10.16)

## 2019-04-14 PROCEDURE — 36415 COLL VENOUS BLD VENIPUNCTURE: CPT

## 2019-04-14 PROCEDURE — 99285 EMERGENCY DEPT VISIT HI MDM: CPT

## 2019-04-14 PROCEDURE — 93005 ELECTROCARDIOGRAM TRACING: CPT

## 2019-04-14 PROCEDURE — 80053 COMPREHEN METABOLIC PANEL: CPT | Performed by: EMERGENCY MEDICINE

## 2019-04-14 PROCEDURE — 85025 COMPLETE CBC W/AUTO DIFF WBC: CPT | Performed by: EMERGENCY MEDICINE

## 2019-04-14 PROCEDURE — 84484 ASSAY OF TROPONIN QUANT: CPT | Performed by: EMERGENCY MEDICINE

## 2019-04-14 RX ORDER — RIZATRIPTAN BENZOATE 10 MG/1
10 TABLET ORAL ONCE AS NEEDED
COMMUNITY

## 2019-04-15 ENCOUNTER — APPOINTMENT (EMERGENCY)
Dept: RADIOLOGY | Facility: HOSPITAL | Age: 59
End: 2019-04-15
Payer: COMMERCIAL

## 2019-04-15 VITALS
DIASTOLIC BLOOD PRESSURE: 61 MMHG | RESPIRATION RATE: 18 BRPM | TEMPERATURE: 97.7 F | OXYGEN SATURATION: 96 % | SYSTOLIC BLOOD PRESSURE: 127 MMHG | HEART RATE: 74 BPM | BODY MASS INDEX: 37.49 KG/M2 | WEIGHT: 205 LBS

## 2019-04-15 PROBLEM — R07.89 CHEST PRESSURE: Status: ACTIVE | Noted: 2019-04-15

## 2019-04-15 LAB
ALBUMIN SERPL BCP-MCNC: 3.5 G/DL (ref 3.5–5)
ALBUMIN SERPL BCP-MCNC: 3.8 G/DL (ref 3.5–5)
ALP SERPL-CCNC: 73 U/L (ref 46–116)
ALP SERPL-CCNC: 83 U/L (ref 46–116)
ALT SERPL W P-5'-P-CCNC: 53 U/L (ref 12–78)
ALT SERPL W P-5'-P-CCNC: 56 U/L (ref 12–78)
ANION GAP SERPL CALCULATED.3IONS-SCNC: 7 MMOL/L (ref 4–13)
ANION GAP SERPL CALCULATED.3IONS-SCNC: 8 MMOL/L (ref 4–13)
AST SERPL W P-5'-P-CCNC: 28 U/L (ref 5–45)
AST SERPL W P-5'-P-CCNC: 31 U/L (ref 5–45)
ATRIAL RATE: 65 BPM
ATRIAL RATE: 71 BPM
ATRIAL RATE: 71 BPM
BILIRUB DIRECT SERPL-MCNC: 0.08 MG/DL (ref 0–0.2)
BILIRUB SERPL-MCNC: 0.23 MG/DL (ref 0.2–1)
BILIRUB SERPL-MCNC: 0.28 MG/DL (ref 0.2–1)
BUN SERPL-MCNC: 19 MG/DL (ref 5–25)
BUN SERPL-MCNC: 19 MG/DL (ref 5–25)
CALCIUM SERPL-MCNC: 8.6 MG/DL (ref 8.3–10.1)
CALCIUM SERPL-MCNC: 9 MG/DL (ref 8.3–10.1)
CHLORIDE SERPL-SCNC: 111 MMOL/L (ref 100–108)
CHLORIDE SERPL-SCNC: 114 MMOL/L (ref 100–108)
CHOLEST SERPL-MCNC: 204 MG/DL (ref 50–200)
CO2 SERPL-SCNC: 21 MMOL/L (ref 21–32)
CO2 SERPL-SCNC: 23 MMOL/L (ref 21–32)
CREAT SERPL-MCNC: 0.77 MG/DL (ref 0.6–1.3)
CREAT SERPL-MCNC: 0.91 MG/DL (ref 0.6–1.3)
ERYTHROCYTE [DISTWIDTH] IN BLOOD BY AUTOMATED COUNT: 13.2 % (ref 11.6–15.1)
GFR SERPL CREATININE-BSD FRML MDRD: 70 ML/MIN/1.73SQ M
GFR SERPL CREATININE-BSD FRML MDRD: 86 ML/MIN/1.73SQ M
GLUCOSE P FAST SERPL-MCNC: 120 MG/DL (ref 65–99)
GLUCOSE SERPL-MCNC: 120 MG/DL (ref 65–140)
GLUCOSE SERPL-MCNC: 130 MG/DL (ref 65–140)
HCT VFR BLD AUTO: 38.1 % (ref 34.8–46.1)
HDLC SERPL-MCNC: 53 MG/DL (ref 40–60)
HGB BLD-MCNC: 12.5 G/DL (ref 11.5–15.4)
LDLC SERPL CALC-MCNC: 124 MG/DL (ref 0–100)
MAGNESIUM SERPL-MCNC: 1.9 MG/DL (ref 1.6–2.6)
MCH RBC QN AUTO: 29.8 PG (ref 26.8–34.3)
MCHC RBC AUTO-ENTMCNC: 32.8 G/DL (ref 31.4–37.4)
MCV RBC AUTO: 91 FL (ref 82–98)
NONHDLC SERPL-MCNC: 151 MG/DL
P AXIS: 45 DEGREES
P AXIS: 54 DEGREES
P AXIS: 70 DEGREES
PHOSPHATE SERPL-MCNC: 4.1 MG/DL (ref 2.7–4.5)
PLATELET # BLD AUTO: 171 THOUSANDS/UL (ref 149–390)
PMV BLD AUTO: 10.9 FL (ref 8.9–12.7)
POTASSIUM SERPL-SCNC: 3.3 MMOL/L (ref 3.5–5.3)
POTASSIUM SERPL-SCNC: 3.5 MMOL/L (ref 3.5–5.3)
PR INTERVAL: 128 MS
PR INTERVAL: 134 MS
PR INTERVAL: 136 MS
PROT SERPL-MCNC: 7 G/DL (ref 6.4–8.2)
PROT SERPL-MCNC: 7.4 G/DL (ref 6.4–8.2)
QRS AXIS: 19 DEGREES
QRS AXIS: 24 DEGREES
QRS AXIS: 31 DEGREES
QRSD INTERVAL: 84 MS
QRSD INTERVAL: 88 MS
QRSD INTERVAL: 90 MS
QT INTERVAL: 374 MS
QT INTERVAL: 404 MS
QT INTERVAL: 412 MS
QTC INTERVAL: 406 MS
QTC INTERVAL: 428 MS
QTC INTERVAL: 439 MS
RBC # BLD AUTO: 4.2 MILLION/UL (ref 3.81–5.12)
SODIUM SERPL-SCNC: 141 MMOL/L (ref 136–145)
SODIUM SERPL-SCNC: 143 MMOL/L (ref 136–145)
T WAVE AXIS: 34 DEGREES
T WAVE AXIS: 37 DEGREES
T WAVE AXIS: 59 DEGREES
TRIGL SERPL-MCNC: 133 MG/DL
TROPONIN I SERPL-MCNC: <0.02 NG/ML
VENTRICULAR RATE: 65 BPM
VENTRICULAR RATE: 71 BPM
VENTRICULAR RATE: 71 BPM
WBC # BLD AUTO: 7.25 THOUSAND/UL (ref 4.31–10.16)

## 2019-04-15 PROCEDURE — 70450 CT HEAD/BRAIN W/O DYE: CPT

## 2019-04-15 PROCEDURE — 83735 ASSAY OF MAGNESIUM: CPT | Performed by: INTERNAL MEDICINE

## 2019-04-15 PROCEDURE — 99284 EMERGENCY DEPT VISIT MOD MDM: CPT | Performed by: EMERGENCY MEDICINE

## 2019-04-15 PROCEDURE — 96374 THER/PROPH/DIAG INJ IV PUSH: CPT

## 2019-04-15 PROCEDURE — 99217 PR OBSERVATION CARE DISCHARGE MANAGEMENT: CPT | Performed by: HOSPITALIST

## 2019-04-15 PROCEDURE — 93010 ELECTROCARDIOGRAM REPORT: CPT | Performed by: INTERNAL MEDICINE

## 2019-04-15 PROCEDURE — 71046 X-RAY EXAM CHEST 2 VIEWS: CPT

## 2019-04-15 PROCEDURE — 93005 ELECTROCARDIOGRAM TRACING: CPT

## 2019-04-15 PROCEDURE — 80076 HEPATIC FUNCTION PANEL: CPT | Performed by: HOSPITALIST

## 2019-04-15 PROCEDURE — 80048 BASIC METABOLIC PNL TOTAL CA: CPT | Performed by: INTERNAL MEDICINE

## 2019-04-15 PROCEDURE — 85027 COMPLETE CBC AUTOMATED: CPT | Performed by: INTERNAL MEDICINE

## 2019-04-15 PROCEDURE — 84100 ASSAY OF PHOSPHORUS: CPT | Performed by: INTERNAL MEDICINE

## 2019-04-15 PROCEDURE — 84484 ASSAY OF TROPONIN QUANT: CPT | Performed by: INTERNAL MEDICINE

## 2019-04-15 PROCEDURE — 99220 PR INITIAL OBSERVATION CARE/DAY 70 MINUTES: CPT | Performed by: INTERNAL MEDICINE

## 2019-04-15 PROCEDURE — 80061 LIPID PANEL: CPT | Performed by: INTERNAL MEDICINE

## 2019-04-15 RX ORDER — OLANZAPINE 5 MG/1
5 TABLET, ORALLY DISINTEGRATING ORAL ONCE
Status: COMPLETED | OUTPATIENT
Start: 2019-04-15 | End: 2019-04-15

## 2019-04-15 RX ORDER — ACETAMINOPHEN 325 MG/1
500 TABLET ORAL EVERY 6 HOURS PRN
Status: DISCONTINUED | OUTPATIENT
Start: 2019-04-15 | End: 2019-04-15

## 2019-04-15 RX ORDER — HYOSCYAMINE SULFATE 0.12 MG/ML
0.12 LIQUID ORAL EVERY 4 HOURS PRN
Status: DISCONTINUED | OUTPATIENT
Start: 2019-04-15 | End: 2019-04-15

## 2019-04-15 RX ORDER — PROMETHAZINE HYDROCHLORIDE 25 MG/1
25 TABLET ORAL EVERY 8 HOURS PRN
Status: DISCONTINUED | OUTPATIENT
Start: 2019-04-15 | End: 2019-04-15

## 2019-04-15 RX ORDER — MAGNESIUM HYDROXIDE/ALUMINUM HYDROXICE/SIMETHICONE 120; 1200; 1200 MG/30ML; MG/30ML; MG/30ML
15 SUSPENSION ORAL ONCE
Status: COMPLETED | OUTPATIENT
Start: 2019-04-15 | End: 2019-04-15

## 2019-04-15 RX ORDER — MAGNESIUM HYDROXIDE/ALUMINUM HYDROXICE/SIMETHICONE 120; 1200; 1200 MG/30ML; MG/30ML; MG/30ML
15 SUSPENSION ORAL EVERY 6 HOURS PRN
Status: DISCONTINUED | OUTPATIENT
Start: 2019-04-15 | End: 2019-04-15 | Stop reason: HOSPADM

## 2019-04-15 RX ORDER — DIPHENHYDRAMINE HYDROCHLORIDE 50 MG/ML
25 INJECTION INTRAMUSCULAR; INTRAVENOUS EVERY 6 HOURS PRN
Status: DISCONTINUED | OUTPATIENT
Start: 2019-04-15 | End: 2019-04-15 | Stop reason: HOSPADM

## 2019-04-15 RX ORDER — TOPIRAMATE 100 MG/1
200 TABLET, FILM COATED ORAL 2 TIMES DAILY
Status: DISCONTINUED | OUTPATIENT
Start: 2019-04-15 | End: 2019-04-15 | Stop reason: HOSPADM

## 2019-04-15 RX ORDER — DOCUSATE SODIUM 100 MG/1
100 CAPSULE, LIQUID FILLED ORAL 2 TIMES DAILY PRN
Status: DISCONTINUED | OUTPATIENT
Start: 2019-04-15 | End: 2019-04-15 | Stop reason: HOSPADM

## 2019-04-15 RX ORDER — MELATONIN
2000 DAILY
Status: DISCONTINUED | OUTPATIENT
Start: 2019-04-15 | End: 2019-04-15 | Stop reason: HOSPADM

## 2019-04-15 RX ORDER — CHOLECALCIFEROL (VITAMIN D3) 10 MCG
1 TABLET ORAL DAILY
Status: DISCONTINUED | OUTPATIENT
Start: 2019-04-15 | End: 2019-04-15 | Stop reason: HOSPADM

## 2019-04-15 RX ORDER — HYOSCYAMINE SULFATE 0.125 MG
125 TABLET ORAL EVERY 4 HOURS PRN
Status: DISCONTINUED | OUTPATIENT
Start: 2019-04-15 | End: 2019-04-15 | Stop reason: HOSPADM

## 2019-04-15 RX ORDER — ASCORBIC ACID 500 MG
500 TABLET ORAL DAILY
Status: DISCONTINUED | OUTPATIENT
Start: 2019-04-15 | End: 2019-04-15 | Stop reason: HOSPADM

## 2019-04-15 RX ORDER — PANTOPRAZOLE SODIUM 40 MG/1
40 INJECTION, POWDER, FOR SOLUTION INTRAVENOUS
Status: DISCONTINUED | OUTPATIENT
Start: 2019-04-15 | End: 2019-04-15 | Stop reason: SDUPTHER

## 2019-04-15 RX ORDER — ALBUTEROL SULFATE 90 UG/1
2 AEROSOL, METERED RESPIRATORY (INHALATION) EVERY 6 HOURS PRN
Status: DISCONTINUED | OUTPATIENT
Start: 2019-04-15 | End: 2019-04-15 | Stop reason: HOSPADM

## 2019-04-15 RX ORDER — METOCLOPRAMIDE HYDROCHLORIDE 5 MG/ML
10 INJECTION INTRAMUSCULAR; INTRAVENOUS ONCE
Status: COMPLETED | OUTPATIENT
Start: 2019-04-15 | End: 2019-04-15

## 2019-04-15 RX ORDER — PROMETHAZINE HYDROCHLORIDE 25 MG/1
25 TABLET ORAL EVERY 8 HOURS PRN
Status: DISCONTINUED | OUTPATIENT
Start: 2019-04-15 | End: 2019-04-15 | Stop reason: HOSPADM

## 2019-04-15 RX ORDER — ASPIRIN 81 MG/1
81 TABLET, CHEWABLE ORAL DAILY
Status: DISCONTINUED | OUTPATIENT
Start: 2019-04-15 | End: 2019-04-15 | Stop reason: HOSPADM

## 2019-04-15 RX ORDER — NITROGLYCERIN 0.4 MG/1
0.4 TABLET SUBLINGUAL ONCE
Status: COMPLETED | OUTPATIENT
Start: 2019-04-15 | End: 2019-04-15

## 2019-04-15 RX ORDER — ACETAMINOPHEN 325 MG/1
500 TABLET ORAL EVERY 6 HOURS PRN
Status: DISCONTINUED | OUTPATIENT
Start: 2019-04-15 | End: 2019-04-15 | Stop reason: HOSPADM

## 2019-04-15 RX ORDER — ZOLPIDEM TARTRATE 5 MG/1
5 TABLET ORAL
Status: DISCONTINUED | OUTPATIENT
Start: 2019-04-15 | End: 2019-04-15 | Stop reason: HOSPADM

## 2019-04-15 RX ORDER — FLUTICASONE PROPIONATE 110 UG/1
1 AEROSOL, METERED RESPIRATORY (INHALATION) EVERY 12 HOURS SCHEDULED
Status: DISCONTINUED | OUTPATIENT
Start: 2019-04-15 | End: 2019-04-15 | Stop reason: HOSPADM

## 2019-04-15 RX ORDER — ONDANSETRON 2 MG/ML
4 INJECTION INTRAMUSCULAR; INTRAVENOUS EVERY 6 HOURS PRN
Status: DISCONTINUED | OUTPATIENT
Start: 2019-04-15 | End: 2019-04-15 | Stop reason: HOSPADM

## 2019-04-15 RX ORDER — DICYCLOMINE HCL 20 MG
20 TABLET ORAL
Status: DISCONTINUED | OUTPATIENT
Start: 2019-04-15 | End: 2019-04-15 | Stop reason: HOSPADM

## 2019-04-15 RX ORDER — PANTOPRAZOLE SODIUM 40 MG/1
40 TABLET, DELAYED RELEASE ORAL
Status: DISCONTINUED | OUTPATIENT
Start: 2019-04-15 | End: 2019-04-15 | Stop reason: HOSPADM

## 2019-04-15 RX ORDER — CHOLECALCIFEROL (VITAMIN D3) 125 MCG
100 CAPSULE ORAL DAILY
Status: DISCONTINUED | OUTPATIENT
Start: 2019-04-15 | End: 2019-04-15 | Stop reason: HOSPADM

## 2019-04-15 RX ORDER — SUMATRIPTAN 25 MG/1
25 TABLET, FILM COATED ORAL ONCE
Status: DISCONTINUED | OUTPATIENT
Start: 2019-04-15 | End: 2019-04-15 | Stop reason: HOSPADM

## 2019-04-15 RX ORDER — ECHINACEA PURPUREA EXTRACT 125 MG
2 TABLET ORAL 2 TIMES DAILY
Status: DISCONTINUED | OUTPATIENT
Start: 2019-04-15 | End: 2019-04-15 | Stop reason: HOSPADM

## 2019-04-15 RX ORDER — INDOMETHACIN 25 MG/1
25 CAPSULE ORAL 3 TIMES DAILY PRN
Status: DISCONTINUED | OUTPATIENT
Start: 2019-04-15 | End: 2019-04-15 | Stop reason: HOSPADM

## 2019-04-15 RX ORDER — KETOROLAC TROMETHAMINE 30 MG/ML
15 INJECTION, SOLUTION INTRAMUSCULAR; INTRAVENOUS EVERY 6 HOURS PRN
Status: DISCONTINUED | OUTPATIENT
Start: 2019-04-15 | End: 2019-04-15 | Stop reason: HOSPADM

## 2019-04-15 RX ADMIN — DICYCLOMINE HYDROCHLORIDE 20 MG: 20 TABLET ORAL at 06:17

## 2019-04-15 RX ADMIN — LIDOCAINE HYDROCHLORIDE 15 ML: 20 SOLUTION ORAL; TOPICAL at 02:34

## 2019-04-15 RX ADMIN — Medication 100 MG: at 08:12

## 2019-04-15 RX ADMIN — OXYCODONE HYDROCHLORIDE AND ACETAMINOPHEN 500 MG: 500 TABLET ORAL at 08:13

## 2019-04-15 RX ADMIN — OLANZAPINE 5 MG: 5 TABLET, ORALLY DISINTEGRATING ORAL at 02:36

## 2019-04-15 RX ADMIN — Medication 1 CAPSULE: at 08:12

## 2019-04-15 RX ADMIN — METOCLOPRAMIDE 10 MG: 5 INJECTION, SOLUTION INTRAMUSCULAR; INTRAVENOUS at 01:16

## 2019-04-15 RX ADMIN — FLUTICASONE PROPIONATE 1 PUFF: 110 AEROSOL, METERED RESPIRATORY (INHALATION) at 08:14

## 2019-04-15 RX ADMIN — ENOXAPARIN SODIUM 40 MG: 40 INJECTION SUBCUTANEOUS at 08:13

## 2019-04-15 RX ADMIN — NITROGLYCERIN 0.4 MG: 0.4 TABLET SUBLINGUAL at 01:08

## 2019-04-15 RX ADMIN — VITAMIN D, TAB 1000IU (100/BT) 2000 UNITS: 25 TAB at 08:12

## 2019-04-15 RX ADMIN — INDOMETHACIN 25 MG: 25 CAPSULE ORAL at 11:17

## 2019-04-15 RX ADMIN — Medication 1 TABLET: at 08:12

## 2019-04-15 RX ADMIN — PANTOPRAZOLE SODIUM 40 MG: 40 TABLET, DELAYED RELEASE ORAL at 06:17

## 2019-04-15 RX ADMIN — ASPIRIN 81 MG 81 MG: 81 TABLET ORAL at 08:12

## 2019-04-15 RX ADMIN — TOPIRAMATE 200 MG: 100 TABLET, FILM COATED ORAL at 08:12

## 2019-04-15 RX ADMIN — ALUMINUM HYDROXIDE, MAGNESIUM HYDROXIDE, AND SIMETHICONE 15 ML: 200; 200; 20 SUSPENSION ORAL at 02:34

## 2019-04-15 RX ADMIN — Medication 2 SPRAY: at 08:13

## 2019-04-15 RX ADMIN — DICYCLOMINE HYDROCHLORIDE 20 MG: 20 TABLET ORAL at 11:15

## 2019-04-23 ENCOUNTER — TRANSCRIBE ORDERS (OUTPATIENT)
Dept: ADMINISTRATIVE | Facility: HOSPITAL | Age: 59
End: 2019-04-23

## 2019-04-23 DIAGNOSIS — I20.9 ANGINAL SYNDROME (HCC): Primary | ICD-10-CM

## 2019-05-16 ENCOUNTER — HOSPITAL ENCOUNTER (OUTPATIENT)
Dept: NON INVASIVE DIAGNOSTICS | Facility: CLINIC | Age: 59
Discharge: HOME | End: 2019-05-16
Payer: COMMERCIAL

## 2019-05-16 DIAGNOSIS — I20.9 ANGINAL SYNDROME (HCC): ICD-10-CM

## 2019-05-16 PROCEDURE — 93016 CV STRESS TEST SUPVJ ONLY: CPT | Performed by: INTERNAL MEDICINE

## 2019-05-16 PROCEDURE — 93018 CV STRESS TEST I&R ONLY: CPT | Performed by: INTERNAL MEDICINE

## 2019-05-16 PROCEDURE — 93017 CV STRESS TEST TRACING ONLY: CPT

## 2019-05-17 LAB
CHEST PAIN STATEMENT: NORMAL
MAX DIASTOLIC BP: 88 MMHG
MAX HEART RATE: 151 BPM
MAX PREDICTED HEART RATE: 163 BPM
MAX. SYSTOLIC BP: 192 MMHG
PROTOCOL NAME: NORMAL
REASON FOR TERMINATION: NORMAL
TARGET HR FORMULA: NORMAL
TEST INDICATION: NORMAL
TIME IN EXERCISE PHASE: NORMAL

## 2019-07-06 ENCOUNTER — TRANSCRIBE ORDERS (OUTPATIENT)
Dept: LAB | Facility: CLINIC | Age: 59
End: 2019-07-06

## 2019-07-06 ENCOUNTER — APPOINTMENT (OUTPATIENT)
Dept: LAB | Facility: CLINIC | Age: 59
End: 2019-07-06
Payer: COMMERCIAL

## 2019-07-06 DIAGNOSIS — E13.9 DIABETES MELLITUS OF OTHER TYPE WITHOUT COMPLICATION, UNSPECIFIED WHETHER LONG TERM INSULIN USE (HCC): ICD-10-CM

## 2019-07-06 DIAGNOSIS — J45.909 ASTHMATIC BRONCHITIS WITHOUT COMPLICATION, UNSPECIFIED ASTHMA SEVERITY, UNSPECIFIED WHETHER PERSISTENT: ICD-10-CM

## 2019-07-06 DIAGNOSIS — G43.809 OTHER MIGRAINE WITHOUT STATUS MIGRAINOSUS, NOT INTRACTABLE: ICD-10-CM

## 2019-07-06 DIAGNOSIS — E78.5 HYPERLIPIDEMIA, UNSPECIFIED HYPERLIPIDEMIA TYPE: Primary | ICD-10-CM

## 2019-07-06 DIAGNOSIS — E78.5 HYPERLIPIDEMIA, UNSPECIFIED HYPERLIPIDEMIA TYPE: ICD-10-CM

## 2019-07-06 LAB
ALBUMIN SERPL BCP-MCNC: 3.6 G/DL (ref 3.5–5)
ALP SERPL-CCNC: 73 U/L (ref 46–116)
ALT SERPL W P-5'-P-CCNC: 55 U/L (ref 12–78)
ANION GAP SERPL CALCULATED.3IONS-SCNC: 4 MMOL/L (ref 4–13)
AST SERPL W P-5'-P-CCNC: 26 U/L (ref 5–45)
BILIRUB SERPL-MCNC: 0.23 MG/DL (ref 0.2–1)
BUN SERPL-MCNC: 21 MG/DL (ref 5–25)
CALCIUM SERPL-MCNC: 9.1 MG/DL (ref 8.3–10.1)
CHLORIDE SERPL-SCNC: 111 MMOL/L (ref 100–108)
CO2 SERPL-SCNC: 25 MMOL/L (ref 21–32)
CREAT SERPL-MCNC: 0.77 MG/DL (ref 0.6–1.3)
EST. AVERAGE GLUCOSE BLD GHB EST-MCNC: 151 MG/DL
GFR SERPL CREATININE-BSD FRML MDRD: 85 ML/MIN/1.73SQ M
GLUCOSE P FAST SERPL-MCNC: 119 MG/DL (ref 65–99)
HBA1C MFR BLD: 6.9 % (ref 4.2–6.3)
LDLC SERPL DIRECT ASSAY-MCNC: 132 MG/DL (ref 0–100)
POTASSIUM SERPL-SCNC: 3.7 MMOL/L (ref 3.5–5.3)
PROT SERPL-MCNC: 7.4 G/DL (ref 6.4–8.2)
SODIUM SERPL-SCNC: 140 MMOL/L (ref 136–145)

## 2019-07-06 PROCEDURE — 80053 COMPREHEN METABOLIC PANEL: CPT

## 2019-07-06 PROCEDURE — 36415 COLL VENOUS BLD VENIPUNCTURE: CPT

## 2019-07-06 PROCEDURE — 83036 HEMOGLOBIN GLYCOSYLATED A1C: CPT

## 2019-07-06 PROCEDURE — 83721 ASSAY OF BLOOD LIPOPROTEIN: CPT

## 2019-11-03 ENCOUNTER — APPOINTMENT (OUTPATIENT)
Dept: LAB | Facility: HOSPITAL | Age: 59
End: 2019-11-03
Payer: COMMERCIAL

## 2019-11-03 ENCOUNTER — TRANSCRIBE ORDERS (OUTPATIENT)
Dept: LAB | Facility: HOSPITAL | Age: 59
End: 2019-11-03

## 2019-11-03 DIAGNOSIS — E11.9 DIABETES MELLITUS WITHOUT COMPLICATION (HCC): Primary | ICD-10-CM

## 2019-11-03 DIAGNOSIS — E87.6 HYPOPOTASSEMIA: ICD-10-CM

## 2019-11-03 DIAGNOSIS — E78.5 HYPERLIPIDEMIA, UNSPECIFIED HYPERLIPIDEMIA TYPE: ICD-10-CM

## 2019-11-03 DIAGNOSIS — E11.9 DIABETES MELLITUS WITHOUT COMPLICATION (HCC): ICD-10-CM

## 2019-11-03 LAB
ALBUMIN SERPL BCP-MCNC: 3.9 G/DL (ref 3.5–5)
ALP SERPL-CCNC: 83 U/L (ref 46–116)
ALT SERPL W P-5'-P-CCNC: 78 U/L (ref 12–78)
ANION GAP SERPL CALCULATED.3IONS-SCNC: 8 MMOL/L (ref 4–13)
AST SERPL W P-5'-P-CCNC: 41 U/L (ref 5–45)
BILIRUB SERPL-MCNC: 0.32 MG/DL (ref 0.2–1)
BUN SERPL-MCNC: 17 MG/DL (ref 5–25)
CALCIUM SERPL-MCNC: 9 MG/DL (ref 8.3–10.1)
CHLORIDE SERPL-SCNC: 109 MMOL/L (ref 100–108)
CO2 SERPL-SCNC: 24 MMOL/L (ref 21–32)
CREAT SERPL-MCNC: 0.78 MG/DL (ref 0.6–1.3)
EST. AVERAGE GLUCOSE BLD GHB EST-MCNC: 146 MG/DL
GFR SERPL CREATININE-BSD FRML MDRD: 84 ML/MIN/1.73SQ M
GLUCOSE P FAST SERPL-MCNC: 132 MG/DL (ref 65–99)
HBA1C MFR BLD: 6.7 % (ref 4.2–6.3)
LDLC SERPL DIRECT ASSAY-MCNC: 145 MG/DL (ref 0–100)
POTASSIUM SERPL-SCNC: 3.3 MMOL/L (ref 3.5–5.3)
PROT SERPL-MCNC: 7.9 G/DL (ref 6.4–8.2)
SODIUM SERPL-SCNC: 141 MMOL/L (ref 136–145)

## 2019-11-03 PROCEDURE — 36415 COLL VENOUS BLD VENIPUNCTURE: CPT

## 2019-11-03 PROCEDURE — 83721 ASSAY OF BLOOD LIPOPROTEIN: CPT

## 2019-11-03 PROCEDURE — 83036 HEMOGLOBIN GLYCOSYLATED A1C: CPT

## 2019-11-03 PROCEDURE — 80053 COMPREHEN METABOLIC PANEL: CPT

## 2019-12-01 ENCOUNTER — APPOINTMENT (OUTPATIENT)
Dept: LAB | Facility: HOSPITAL | Age: 59
End: 2019-12-01
Payer: COMMERCIAL

## 2019-12-01 DIAGNOSIS — E87.6 HYPOPOTASSEMIA: ICD-10-CM

## 2019-12-01 LAB
ANION GAP SERPL CALCULATED.3IONS-SCNC: 5 MMOL/L (ref 4–13)
BUN SERPL-MCNC: 20 MG/DL (ref 5–25)
CALCIUM SERPL-MCNC: 9.3 MG/DL (ref 8.3–10.1)
CHLORIDE SERPL-SCNC: 113 MMOL/L (ref 100–108)
CO2 SERPL-SCNC: 25 MMOL/L (ref 21–32)
CREAT SERPL-MCNC: 0.93 MG/DL (ref 0.6–1.3)
GFR SERPL CREATININE-BSD FRML MDRD: 68 ML/MIN/1.73SQ M
GLUCOSE P FAST SERPL-MCNC: 153 MG/DL (ref 65–99)
POTASSIUM SERPL-SCNC: 3.5 MMOL/L (ref 3.5–5.3)
SODIUM SERPL-SCNC: 143 MMOL/L (ref 136–145)

## 2019-12-01 PROCEDURE — 80048 BASIC METABOLIC PNL TOTAL CA: CPT

## 2019-12-01 PROCEDURE — 36415 COLL VENOUS BLD VENIPUNCTURE: CPT

## 2020-01-28 ENCOUNTER — TRANSCRIBE ORDERS (OUTPATIENT)
Dept: ADMINISTRATIVE | Facility: HOSPITAL | Age: 60
End: 2020-01-28

## 2020-01-28 DIAGNOSIS — R51.9 INTRACTABLE HEADACHE, UNSPECIFIED CHRONICITY PATTERN, UNSPECIFIED HEADACHE TYPE: Primary | ICD-10-CM

## 2020-01-29 ENCOUNTER — HOSPITAL ENCOUNTER (OUTPATIENT)
Dept: RADIOLOGY | Facility: HOSPITAL | Age: 60
Discharge: HOME | End: 2020-01-29
Payer: COMMERCIAL

## 2020-01-29 DIAGNOSIS — R51.9 INTRACTABLE HEADACHE, UNSPECIFIED CHRONICITY PATTERN, UNSPECIFIED HEADACHE TYPE: ICD-10-CM

## 2020-01-29 PROCEDURE — 70450 CT HEAD/BRAIN W/O DYE: CPT

## 2020-02-11 ENCOUNTER — APPOINTMENT (OUTPATIENT)
Dept: LAB | Facility: HOSPITAL | Age: 60
End: 2020-02-11
Payer: COMMERCIAL

## 2020-02-11 ENCOUNTER — TRANSCRIBE ORDERS (OUTPATIENT)
Dept: LAB | Facility: HOSPITAL | Age: 60
End: 2020-02-11

## 2020-02-11 DIAGNOSIS — R51.9 FACIAL PAIN: Primary | ICD-10-CM

## 2020-02-11 DIAGNOSIS — E78.00 PURE HYPERCHOLESTEROLEMIA: ICD-10-CM

## 2020-02-11 DIAGNOSIS — Z79.899 ENCOUNTER FOR LONG-TERM (CURRENT) USE OF OTHER MEDICATIONS: ICD-10-CM

## 2020-02-11 DIAGNOSIS — E08.00 DIABETES MELLITUS DUE TO UNDERLYING CONDITION WITH HYPEROSMOLARITY WITHOUT COMA, WITHOUT LONG-TERM CURRENT USE OF INSULIN (HCC): ICD-10-CM

## 2020-02-11 DIAGNOSIS — R51.9 FACIAL PAIN: ICD-10-CM

## 2020-02-11 LAB
ALBUMIN SERPL BCP-MCNC: 3.9 G/DL (ref 3.5–5)
ALP SERPL-CCNC: 68 U/L (ref 46–116)
ALT SERPL W P-5'-P-CCNC: 55 U/L (ref 12–78)
ANION GAP SERPL CALCULATED.3IONS-SCNC: 5 MMOL/L (ref 4–13)
AST SERPL W P-5'-P-CCNC: 36 U/L (ref 5–45)
BASOPHILS # BLD AUTO: 0.03 THOUSANDS/ΜL (ref 0–0.1)
BASOPHILS NFR BLD AUTO: 0 % (ref 0–1)
BILIRUB SERPL-MCNC: 0.41 MG/DL (ref 0.2–1)
BUN SERPL-MCNC: 21 MG/DL (ref 5–25)
CALCIUM SERPL-MCNC: 9.4 MG/DL (ref 8.3–10.1)
CHLORIDE SERPL-SCNC: 107 MMOL/L (ref 100–108)
CO2 SERPL-SCNC: 26 MMOL/L (ref 21–32)
CREAT SERPL-MCNC: 0.8 MG/DL (ref 0.6–1.3)
CRP SERPL QL: 4.1 MG/L
EOSINOPHIL # BLD AUTO: 0.29 THOUSAND/ΜL (ref 0–0.61)
EOSINOPHIL NFR BLD AUTO: 4 % (ref 0–6)
ERYTHROCYTE [DISTWIDTH] IN BLOOD BY AUTOMATED COUNT: 13.2 % (ref 11.6–15.1)
ERYTHROCYTE [SEDIMENTATION RATE] IN BLOOD: 23 MM/HOUR (ref 0–20)
EST. AVERAGE GLUCOSE BLD GHB EST-MCNC: 160 MG/DL
GFR SERPL CREATININE-BSD FRML MDRD: 82 ML/MIN/1.73SQ M
GLUCOSE P FAST SERPL-MCNC: 142 MG/DL (ref 65–99)
HBA1C MFR BLD: 7.2 %
HCT VFR BLD AUTO: 40.4 % (ref 34.8–46.1)
HGB BLD-MCNC: 13.3 G/DL (ref 11.5–15.4)
IMM GRANULOCYTES # BLD AUTO: 0.03 THOUSAND/UL (ref 0–0.2)
IMM GRANULOCYTES NFR BLD AUTO: 0 % (ref 0–2)
LDLC SERPL DIRECT ASSAY-MCNC: 182 MG/DL (ref 0–100)
LYMPHOCYTES # BLD AUTO: 1.73 THOUSANDS/ΜL (ref 0.6–4.47)
LYMPHOCYTES NFR BLD AUTO: 26 % (ref 14–44)
MCH RBC QN AUTO: 30 PG (ref 26.8–34.3)
MCHC RBC AUTO-ENTMCNC: 32.9 G/DL (ref 31.4–37.4)
MCV RBC AUTO: 91 FL (ref 82–98)
MONOCYTES # BLD AUTO: 0.46 THOUSAND/ΜL (ref 0.17–1.22)
MONOCYTES NFR BLD AUTO: 7 % (ref 4–12)
NEUTROPHILS # BLD AUTO: 4.14 THOUSANDS/ΜL (ref 1.85–7.62)
NEUTS SEG NFR BLD AUTO: 63 % (ref 43–75)
NRBC BLD AUTO-RTO: 0 /100 WBCS
PLATELET # BLD AUTO: 194 THOUSANDS/UL (ref 149–390)
PMV BLD AUTO: 10.8 FL (ref 8.9–12.7)
POTASSIUM SERPL-SCNC: 3.3 MMOL/L (ref 3.5–5.3)
PROT SERPL-MCNC: 7.8 G/DL (ref 6.4–8.2)
RBC # BLD AUTO: 4.44 MILLION/UL (ref 3.81–5.12)
SODIUM SERPL-SCNC: 138 MMOL/L (ref 136–145)
WBC # BLD AUTO: 6.68 THOUSAND/UL (ref 4.31–10.16)

## 2020-02-11 PROCEDURE — 80053 COMPREHEN METABOLIC PANEL: CPT

## 2020-02-11 PROCEDURE — 85652 RBC SED RATE AUTOMATED: CPT

## 2020-02-11 PROCEDURE — 86140 C-REACTIVE PROTEIN: CPT

## 2020-02-11 PROCEDURE — 85025 COMPLETE CBC W/AUTO DIFF WBC: CPT

## 2020-02-11 PROCEDURE — 83036 HEMOGLOBIN GLYCOSYLATED A1C: CPT

## 2020-02-11 PROCEDURE — 83721 ASSAY OF BLOOD LIPOPROTEIN: CPT

## 2020-02-11 PROCEDURE — 36415 COLL VENOUS BLD VENIPUNCTURE: CPT

## 2020-02-14 ENCOUNTER — EVALUATION (OUTPATIENT)
Dept: PHYSICAL THERAPY | Facility: CLINIC | Age: 60
End: 2020-02-14
Payer: COMMERCIAL

## 2020-02-14 VITALS — DIASTOLIC BLOOD PRESSURE: 76 MMHG | HEART RATE: 86 BPM | SYSTOLIC BLOOD PRESSURE: 125 MMHG

## 2020-02-14 DIAGNOSIS — M53.0 CERVICOCRANIAL SYNDROME: Primary | ICD-10-CM

## 2020-02-14 PROCEDURE — 97110 THERAPEUTIC EXERCISES: CPT | Performed by: PHYSICAL THERAPIST

## 2020-02-14 PROCEDURE — 97161 PT EVAL LOW COMPLEX 20 MIN: CPT | Performed by: PHYSICAL THERAPIST

## 2020-02-14 NOTE — PROGRESS NOTES
PT Evaluation     Today's date: 2020  Patient name: Travis Bailey  : 1960  MRN: [id number]  Referring provider: Purnima Yusuf MD  Dx:   Encounter Diagnosis     ICD-10-CM    1  Intractable persistent migraine aura without cerebral infarction and with status migrainosus G43 511                   Assessment  Assessment details: Pt is a 64yo female who presents to PT with decreased cervical mobility, balance and stair confidence, headache, poor posture, and hypertonic cervical musculature b/l  Pt symptoms most consistent with cervicogenic headache  Pt would benefit from PT 2x/week for 4 weeks to improve posture, improve balance confidence, and improve cervical mobility  Pt in agreement with POC  Prescibed HEP, pt demo good understanding    Impairments: abnormal or restricted ROM, lacks appropriate home exercise program and poor posture   Other impairment: headache, dizziness,   Understanding of Dx/Px/POC: good   Prognosis: good    Goals  STG:  Pt will report decreased intensity of h/a to 3/10 or less within 2 weeks  Pt cervical ROM will improve 25% side bend and rotation to allow for proper head movement without compensatory torso movement within 2 weeks  Pt will report feeling more balanced within 2 weeks  Pt will be independent with HEP within 2 weeeks  Pt will report decreased frequency of h/a to 4 or less per week    LTG:  Pt will report decreased frequency of h/a to 2 or less per week within 4 weeks  Pt cervical ROM will be WNL throughout to allow for pts daily work activities within 4 weeks   Pt will report no neck pain within 4 weeks   Pt will be independent with progressive HEP within 4 weeks    Plan  Patient would benefit from: skilled physical therapy  Planned therapy interventions: massage, manual therapy, joint mobilization, balance, canalith repositioning, home exercise program, graded exercise, graded activity, therapeutic exercise, therapeutic activities, stretching, strengthening, therapeutic training and neuromuscular re-education  Frequency: 2x week  Duration in visits: 8  Duration in weeks: 4  Treatment plan discussed with: patient        Subjective Evaluation    History of Present Illness  Mechanism of injury: Pt is a 62yo female who presents to PT complaining of headaches with neck pain that have started since 2020 with complaints of blurred/double vision, feeling off-balanced, difficulty with stairs, nausea, and transient dizziness  Since  pt has been out of work due to difficulty reading, and other complaints  Pt also reports having R sided facial numbness currently with reporting having bells palsy years ago with unspecified laterality              Recurrent probem    Quality of life: fair    Pain  Current pain ratin  At best pain ratin  At worst pain rating: 10  Location: Right side of head, b/l forehead to neck    Social Support  Steps to enter house: yes  Stairs in house: yes   Lives in: multiple-level home    Employment status: not working (works as  for [employer] and has been out since [employer])  Hand dominance: right      Diagnostic Tests  CT scan: normal  Treatments  Previous treatment: physical therapy  Current treatment: physical therapy  Patient Goals  Patient goals for therapy: return to work and improved balance  Patient goal: reduce headache        Objective      Dysequilibrium: Yes  Lightheadedness: Yes  Vertigo: Yes  Rocking or Swaying: No         Oscillopsia: No-   Diplopia: Yes  Motion sickness: No  Floating, Swimming, Disconnected: Yes    Exacerbation Factors:  Bending over: Yes  Turning Head: Yes  Rolling in bed: Yes  Walking: No  Looking up: No  Supine to/from sitting: No  Optokinetic movement: No  Walking in busy environment: No     Concurrent Complaints:  Tinnitus:No  Aural Fullness:No  Known hearing loss:No  Nausea, Vomiting: Yes-no vomitting  Altered Vision: Yes  Poor Concentration: Yes  Memory Loss: Yes  Peripheral Neuropathy:Yes- feet and hands pt numbness in right side of face-- pt reports history of bells palsy but does not remember what side      Pain Assessment      Headache Frequency: non stop  Duration:non stop  Intensity: coming around head from back of neck         Best:5        Worst:10        Average: 5  Location:  Exacerbating Factors:  Relieving Factors:   Cervical  3     Cervical Range of Motion     Flexion WNL    Extension WNL     Left Right   Lateral Flexion 50% limited 50% limited   Rotation 25% limited 25% limited       Ligament Laxity Testing:    Alar Ligament: negatvie  Transverse Ligament: unable to assess due to guarding    Modified VBI: negative b/l    Cervical Palpation: TTP hypertonicity  B/l trap, scallenes, scm  Cervical Posture: forward head rounded shoulders    Computerized Posturography:         PHYSICAL FINDINGS:  Oculomotor ROM : WNL  Resting nystagmus: No  Gaze holding nystagmus No   Smooth pursuit Normal    Vertical Saccades:Normal but slowed  Horizontal Saccades:Normal but slowed  Convergence: Normal 5cm    Cover/Uncover/Crosscover Test: Normal    Head thrust (room light):  Unable to test due to pt guarding    Dynamic Visual Acuity: Unable to test due to pt guarding  Dynamic Head: 20/  Static Head: 20/      MCTSIB  30 eyes open firm surface   30 eyes closed form surface  30 eyes open foam surface  30 eyes closed foam surface    DGI:    DHI:   0-30 mild , 30-60  Positional testing  Natalya-Hallpike:   Roll Test:                         Precautions: EEG found seizure activity       Manual                                                                                   Exercise Diary  2/14            UT stretch 30"x4            TB HABD 2x10 peach            Cervical retraction    2x10            SCM stretch 30"x4 Modalities

## 2020-02-17 ENCOUNTER — OFFICE VISIT (OUTPATIENT)
Dept: PHYSICAL THERAPY | Facility: CLINIC | Age: 60
End: 2020-02-17
Payer: COMMERCIAL

## 2020-02-17 DIAGNOSIS — G43.511 INTRACTABLE PERSISTENT MIGRAINE AURA WITHOUT CEREBRAL INFARCTION AND WITH STATUS MIGRAINOSUS: Primary | ICD-10-CM

## 2020-02-17 PROCEDURE — 97140 MANUAL THERAPY 1/> REGIONS: CPT | Performed by: PHYSICAL THERAPIST

## 2020-02-17 PROCEDURE — 97110 THERAPEUTIC EXERCISES: CPT | Performed by: PHYSICAL THERAPIST

## 2020-02-17 NOTE — PROGRESS NOTES
Daily Note     Today's date: 2020  Patient name: Home Valente  : 1960  MRN: [id number]  Referring provider: Moi Valdez MD  Dx:   Encounter Diagnosis     ICD-10-CM    1  Intractable persistent migraine aura without cerebral infarction and with status migrainosus G43 511                   Subjective: Has 7/10 head pain today  States that he has been very bad last today like over 10/10 but took some tylenol which helped   wanted to take her to ER but they don't do anything so she didn't want to go  Also reports that ice helps and heat makes it worse  Objective: See treatment diary below      Assessment: Tolerated treatment well, no change in HA sxs with any of TE  Provided additional TE for Patient would benefit from continued PT       Plan: Progress treatment as tolerated         Short Term Goal Expiration Date:(3/14/20)  Long Term Goal Expiration Date: (3/31/20)  POC Expiration Date: (3/31/20)         Precautions: EEG found seizure activity        Manual         SOR 2 min        Upper thoracic mobs 3 min        Bilateral UT STM/cervical paraspinals 5 min                            Exercise Diary         UBE  3 min fw, 3 min bw       UT S  30" x 3      LS  30" x 3      Pec S  30" x 3      TB  Rows 2 x 10  ER 2 x 10  Ext 2 x 10     Peach TB      Chin tucks  Supine 10x      Thoracic ext over chair with towel roll  10x                                                                                                                   Modalities

## 2020-02-21 ENCOUNTER — OFFICE VISIT (OUTPATIENT)
Dept: PHYSICAL THERAPY | Facility: CLINIC | Age: 60
End: 2020-02-21
Payer: COMMERCIAL

## 2020-02-21 DIAGNOSIS — G43.511 INTRACTABLE PERSISTENT MIGRAINE AURA WITHOUT CEREBRAL INFARCTION AND WITH STATUS MIGRAINOSUS: Primary | ICD-10-CM

## 2020-02-21 DIAGNOSIS — M53.0 CERVICOCRANIAL SYNDROME: ICD-10-CM

## 2020-02-21 PROCEDURE — 97110 THERAPEUTIC EXERCISES: CPT | Performed by: PHYSICAL THERAPIST

## 2020-02-21 PROCEDURE — 97140 MANUAL THERAPY 1/> REGIONS: CPT | Performed by: PHYSICAL THERAPIST

## 2020-02-21 NOTE — PROGRESS NOTES
Daily Note     Today's date: 2020  Patient name: Abel Carrillo  : 1960  MRN: [id number]  Referring provider: John Hernandez MD  Dx:   Encounter Diagnosis     ICD-10-CM    1  Intractable persistent migraine aura without cerebral infarction and with status migrainosus G43 511    2  Cervicocranial syndrome M53 0                   Subjective: Still having R sided head pain and Neck feels stiff  Recently started with Remeron on Wed and woke up with R side numbness to hands and feet  Objective: See treatment diary below      Assessment: Tolerated treatment well  Demo hypomobile t-spine but responded well to thoracic mobs  Overall no change in head pain with any of the TE or manual therapy  Patient would benefit from continued PT      Plan: Progress treatment as tolerated         Short Term Goal Expiration Date:(3/14/20)  Long Term Goal Expiration Date: (3/31/20)  POC Expiration Date: (3/31/20)        Precautions: EEG found seizure activity         Manual           SOR 2 min   2 min          Upper thoracic mobs 3 min   5 min          Bilateral UT STM/cervical paraspinals 5 min   5 min                                            Exercise Diary            UBE   3 min fw, 3 min bw   3 min fw, 3 min bw        UT S   30" x 3  30" x 3       LS   30" x 3  30" x 3       Pec S   30" x 3  on  foam roll 1 min        TB   Rows 2 x 10  ER 2 x 10  Ext 2 x 10      [place] TB  Rows 2 x 10  ER 2 x 10  Ext 2 x 10      OTB       Chin tucks   Supine 10x  Supine 2 x 10       Thoracic ext over chair with towel roll   10x   2 x 10        SNAG     2 x 10                                                                                                                                                                                       Modalities

## 2020-02-24 ENCOUNTER — OFFICE VISIT (OUTPATIENT)
Dept: PHYSICAL THERAPY | Facility: CLINIC | Age: 60
End: 2020-02-24
Payer: COMMERCIAL

## 2020-02-24 DIAGNOSIS — G43.511 INTRACTABLE PERSISTENT MIGRAINE AURA WITHOUT CEREBRAL INFARCTION AND WITH STATUS MIGRAINOSUS: Primary | ICD-10-CM

## 2020-02-24 DIAGNOSIS — M53.0 CERVICOCRANIAL SYNDROME: ICD-10-CM

## 2020-02-24 PROCEDURE — 97140 MANUAL THERAPY 1/> REGIONS: CPT | Performed by: PHYSICAL THERAPIST

## 2020-02-24 PROCEDURE — 97110 THERAPEUTIC EXERCISES: CPT | Performed by: PHYSICAL THERAPIST

## 2020-02-24 NOTE — PROGRESS NOTES
Daily Note     Today's date: 2020  Patient name: Héctor Padilla  : 1960  MRN: [id number]  Referring provider: Benjamin Calles MD  Dx:   Encounter Diagnosis     ICD-10-CM    1  Intractable persistent migraine aura without cerebral infarction and with status migrainosus G43 511    2  Cervicocranial syndrome M53 0                   Subjective: Has 7/10 head pain and reports it has been going on since yesterday  Head pain just came on yesterday suddenly and also had some dizzy spells so took meclizine which did not help  No dizziness now, just head pain  Neck feels stiff  Objective: See treatment diary below    PHYSICAL FINDINGS:  Oculomotor ROM : WNL  Resting nystagmus: No  Gaze holding nystagmus No   Smooth pursuit Normal    Vertical Saccades: slow  Horizontal Saccades: slow    Head thrust (room light): Normal    Assessment: Reassessed oculomotor screen and vestibular screen today with no significant findings except for slowed saccades  Overall no change in c/o head pressure post manual therapy or TE and migraines do not appear to be cervicogenic  Plan to continue to trial PT for an additional few weeks per POC and then reassess progress  Patient would benefit from continued PT      Plan: Progress treatment as tolerated         Short Term Goal Expiration Date:(3/14/20)  Long Term Goal Expiration Date: (3/31/20)  POC Expiration Date: (3/31/20)        Precautions: EEG found seizure activity         Manual        SOR 2 min   2 min   2 min        Upper thoracic mobs 3 min   5 min   5 min        Bilateral UT STM/cervical paraspinals 5 min   5 min   5 min                                          Exercise Diary         UBE   3 min fw, 3 min bw   3 min fw, 3 min bw   3 min fw/bw ea      UT S   30" x 3  30" x 3  30" x 3     LS   30" x 3  30" x 3  30" x 3     Pec S   30" x 3  on  foam roll 1 min        TB   Rows 2 x 10  ER 2 x 10  Ext 2 x 10      [place] TB  Rows 2 x 10  ER 2 x 10  Ext 2 x 10      OTB  Rows 2 x 10  ER 2 x 10  Ext 2 x 10      OTB     Chin tucks   Supine 10x  Supine 2 x 10  Supine 2 x 10      Thoracic ext over chair with towel roll   10x   2 x 10  2 x 10       SNAG     2 x 10                                                                                                                                                                                       Modalities

## 2020-02-27 ENCOUNTER — OFFICE VISIT (OUTPATIENT)
Dept: PHYSICAL THERAPY | Facility: CLINIC | Age: 60
End: 2020-02-27
Payer: COMMERCIAL

## 2020-02-27 DIAGNOSIS — G43.511 INTRACTABLE PERSISTENT MIGRAINE AURA WITHOUT CEREBRAL INFARCTION AND WITH STATUS MIGRAINOSUS: Primary | ICD-10-CM

## 2020-02-27 DIAGNOSIS — M53.0 CERVICOCRANIAL SYNDROME: ICD-10-CM

## 2020-02-27 PROCEDURE — 97110 THERAPEUTIC EXERCISES: CPT | Performed by: PHYSICAL THERAPIST

## 2020-02-27 PROCEDURE — 97140 MANUAL THERAPY 1/> REGIONS: CPT | Performed by: PHYSICAL THERAPIST

## 2020-02-27 NOTE — PROGRESS NOTES
Daily Note     Today's date: 2020  Patient name: Trey Hillman  : 1960  MRN: [id number]  Referring provider: Lj Allen MD  Dx:   Encounter Diagnosis     ICD-10-CM    1  Intractable persistent migraine aura without cerebral infarction and with status migrainosus G43 511    2  Cervicocranial syndrome M53 0                   Subjective: Has still been getting more dizzy spells  No new change since last visit  Has mild headache today like 3/10  Objective: See treatment diary below      Assessment: Incorporated VOR exercises since pt continues to report dizzy spells  Pt had difficulty keeping her eyes on target during VOR cx and reported that she felt unbalanced  Patient would benefit from continued PT      Plan: Progress treatment as tolerated  Continue with VOR exercises and progress as able        Short Term Goal Expiration Date:(3/14/20)  Long Term Goal Expiration Date: (3/31/20)  POC Expiration Date: (3/31/20)        Precautions: EEG found seizure activity         Manual       SOR 2 min   2 min   2 min        Upper thoracic mobs 3 min   5 min   5 min        Bilateral UT STM/cervical paraspinals 5 min   5 min   5 min   10 min                                        Exercise Diary       UBE   3 min fw, 3 min bw   3 min fw, 3 min bw   3 min fw/bw ea   3 min fw/bw, lvl 1 5   UT S   30" x 3  30" x 3  30" x 3  30" x 2   LS   30" x 3  30" x 3  30" x 3  30"  x2   Pec S   30" x 3  on  foam roll 1 min        TB   Rows 2 x 10  ER 2 x 10  Ext 2 x 10      [place] TB  Rows 2 x 10  ER 2 x 10  Ext 2 x 10      OTB  Rows 2 x 10  ER 2 x 10  Ext 2 x 10      OTB  2 x 10 OTB   Chin tucks   Supine 10x  Supine 2 x 10  Supine 2 x 10      Thoracic ext over chair with towel roll   10x   2 x 10  2 x 10   20x     SNAG     2 x 10          VOR x 1         Standing plain 30" x 2    VOR cx         Standing plain 30" x 2                                                                                                                                                     Modalities

## 2020-03-02 ENCOUNTER — APPOINTMENT (OUTPATIENT)
Dept: PHYSICAL THERAPY | Facility: CLINIC | Age: 60
End: 2020-03-02
Payer: COMMERCIAL

## 2020-03-04 ENCOUNTER — APPOINTMENT (OUTPATIENT)
Dept: PHYSICAL THERAPY | Facility: CLINIC | Age: 60
End: 2020-03-04
Payer: COMMERCIAL

## 2020-03-09 ENCOUNTER — OFFICE VISIT (OUTPATIENT)
Dept: PHYSICAL THERAPY | Facility: CLINIC | Age: 60
End: 2020-03-09
Payer: COMMERCIAL

## 2020-03-09 DIAGNOSIS — G43.511 INTRACTABLE PERSISTENT MIGRAINE AURA WITHOUT CEREBRAL INFARCTION AND WITH STATUS MIGRAINOSUS: Primary | ICD-10-CM

## 2020-03-09 DIAGNOSIS — M53.0 CERVICOCRANIAL SYNDROME: ICD-10-CM

## 2020-03-09 PROCEDURE — 97110 THERAPEUTIC EXERCISES: CPT | Performed by: PHYSICAL THERAPIST

## 2020-03-09 PROCEDURE — 97140 MANUAL THERAPY 1/> REGIONS: CPT | Performed by: PHYSICAL THERAPIST

## 2020-03-09 NOTE — PROGRESS NOTES
Daily Note     Today's date: 3/9/2020  Patient name: Omi Centeno  : 1960  MRN: [id number]  Referring provider: Alice Ty MD  Dx:   Encounter Diagnosis     ICD-10-CM    1  Intractable persistent migraine aura without cerebral infarction and with status migrainosus G43 511    2  Cervicocranial syndrome M53 0        Start Time: 0900  Stop Time: 0945  Total time in clinic (min): 45 minutes    Subjective: Apologized for missing last week due to having stomach bug  HA has not gone away and has stayed the same  Thinks that PT has helped a little bit with neck stiffness but not so much the HA  Thinks HA is not as severe and has not gone away  Objective: See treatment diary below      Assessment: Pt continues to require some min VC to perform TE correctly  Overall pt did not have any changes in HA intensity with MT and TE but reported decreased cervical stiffness  At this time, plan to perform PN NV and likely potential d/c  HA does not appear to be cervicogenic in origin  Patient would benefit from continued PT     Plan: Progress treatment as tolerated  Progress note NV and potential d/c       Short Term Goal Expiration Date:(3/14/20)  Long Term Goal Expiration Date: (3/31/20)  POC Expiration Date: (3/31/20)        Precautions: EEG found seizure activity         Manual  2/17  2/21  2/24 2/27  3/9   SOR 2 min   2 min   2 min     2 min    Upper thoracic mobs 3 min   5 min   5 min    5 min    Bilateral UT STM/cervical paraspinals 5 min   5 min   5 min   10 min   + lateral glides, 8 min                                      Exercise Diary   3/9     2/27   UBE  3 min fw/bw     3 min fw/bw, lvl 1 5   UT S  30" x 2     30" x 2   LS  30" x 2     30"  x2   Pec S          TB rows, ext, ER  2 x 10 OTB     2 x 10 OTB   Chin tucks  2 x 10 supine        Thoracic ext over chair with towel roll       20x     SNAG  10x ea side         VOR x 1      Standing plain 30" x 2    VOR cx         Standing plain 30" x 2                                                                                                                                                   Modalities

## 2020-03-11 ENCOUNTER — EVALUATION (OUTPATIENT)
Dept: PHYSICAL THERAPY | Facility: CLINIC | Age: 60
End: 2020-03-11
Payer: COMMERCIAL

## 2020-03-11 DIAGNOSIS — G43.511 INTRACTABLE PERSISTENT MIGRAINE AURA WITHOUT CEREBRAL INFARCTION AND WITH STATUS MIGRAINOSUS: Primary | ICD-10-CM

## 2020-03-11 DIAGNOSIS — M53.0 CERVICOCRANIAL SYNDROME: ICD-10-CM

## 2020-03-11 PROCEDURE — 97110 THERAPEUTIC EXERCISES: CPT | Performed by: PHYSICAL THERAPIST

## 2020-03-11 NOTE — PROGRESS NOTES
Daily Note     Today's date: 3/11/2020  Patient name: Gabbi Johnson  : 1960  MRN: [id number]  Referring provider: Salomon Libman, MD  Dx:   Encounter Diagnosis     ICD-10-CM    1  Intractable persistent migraine aura without cerebral infarction and with status migrainosus G43 511    2  Cervicocranial syndrome M53 0                   Subjective: Pt has a minor HA like 2-3/10 and very little neck pain like /10 today  States that she is no longer having migraines, more HA but still having them daily  Pt feels like PT was helpful clare in decreasing HA, neck pain, and dizziness  Still having sharp severe migraines but not as much as before  Nausea has gone away  Also feels like balance is better  Ready to be d/c since she is independent with HEP  Objective: See treatment diary below      Assessment: Progress note performed this session and pt had report of significant improvement in HA and neck pain intensity compared to IE  However pt continues to have c/o daily HA but they are more tolerable than before  Pt also demo some improvement in cervical AROM as well  At this time, pt is independent with HEP and will be d/c at this time  Recommended pt to continue with HEP       Goals  STG:  Pt will report decreased intensity of h/a to 3/10 or less within 2 weeks - MET  Pt cervical ROM will improve 25% side bend and rotation to allow for proper head movement without compensatory torso movement within 2 weeks - partially MET  Pt will report feeling more balanced within 2 weeks - MET  Pt will be independent with HEP within 2 weeks - MET  Pt will report decreased frequency of h/a to 4 or less per week - NOT MET    LTG:  Pt will report decreased frequency of h/a to 2 or less per week within 4 weeks - NOT MET  Pt cervical ROM will be WNL throughout to allow for pts daily work activities within 4 weeks - NOT MET  Pt will report no neck pain within 4 weeks - NOT MET  Pt will be independent with progressive HEP within 4 weeks - MET    Plan - discharge  Treatment plan discussed with: patient       Pain Assessment        Headache Frequency: everyday, has not gone away non stop  Duration:non stop  Intensity: coming around head from back of neck         Best: 2, prev 5        Worst: 5-6/10, prev 10        Average: 2-3/10, prev 5  Location:  Exacerbating Factors:  Relieving Factors:   Cervical 1, prev 3      Cervical Range of Motion       Flexion WNL     Extension Limited 25%       Left Right   Lateral Flexion 25% limited, prev 50% 50% limited, prev 50%   Rotation 25% limited 25% limited        Short Term Goal Expiration Date:(3/14/20)  Long Term Goal Expiration Date: (3/31/20)  POC Expiration Date: (3/31/20)        Precautions: EEG found seizure activity         Manual  2/17  2/21  2/24 2/27  3/11   SOR 2 min   2 min   2 min        Upper thoracic mobs 3 min   5 min   5 min     5 min    Bilateral UT STM/cervical paraspinals 5 min   5 min   5 min   10 min                                        Exercise Diary   3/11 2/17 2/21   2/24  2/27   UBE  3 min fw/bw 3 min fw, 3 min bw   3 min fw, 3 min bw   3 min fw/bw ea   3 min fw/bw, lvl 1 5   UT S  30" x 3 30" x 3  30" x 3  30" x 3  30" x 2   LS  30" x 3 30" x 3  30" x 3  30" x 3  30"  x2   Pec S   30" x 3  on 1/2 foam roll 1 min        TB Rows 2 x 10  ER 2 x 10  Ext 2 x 10      OTB Rows 2 x 10  ER 2 x 10  Ext 2 x 10      [place] TB  Rows 2 x 10  ER 2 x 10  Ext 2 x 10      OTB  Rows 2 x 10  ER 2 x 10  Ext 2 x 10      OTB  2 x 10 OTB   Chin tucks  supine 2 x 10  Supine 10x  Supine 2 x 10  Supine 2 x 10      Thoracic ext over chair with towel roll   10x   2 x 10  2 x 10   20x     SNAG  10x   2 x 10          VOR x 1         Standing plain 30" x 2    VOR cx         Standing plain 30" x 2    DNF endurance 10" x 10                                                                                                                                               Modalities

## 2020-03-16 ENCOUNTER — APPOINTMENT (OUTPATIENT)
Dept: PHYSICAL THERAPY | Facility: CLINIC | Age: 60
End: 2020-03-16
Payer: COMMERCIAL

## 2020-03-18 ENCOUNTER — APPOINTMENT (OUTPATIENT)
Dept: PHYSICAL THERAPY | Facility: CLINIC | Age: 60
End: 2020-03-18
Payer: COMMERCIAL

## 2020-03-23 ENCOUNTER — APPOINTMENT (OUTPATIENT)
Dept: PHYSICAL THERAPY | Facility: CLINIC | Age: 60
End: 2020-03-23
Payer: COMMERCIAL

## 2020-03-25 ENCOUNTER — APPOINTMENT (OUTPATIENT)
Dept: PHYSICAL THERAPY | Facility: CLINIC | Age: 60
End: 2020-03-25
Payer: COMMERCIAL

## 2020-03-25 ENCOUNTER — LAB REQUISITION (OUTPATIENT)
Dept: LAB | Facility: HOSPITAL | Age: 60
End: 2020-03-25
Payer: COMMERCIAL

## 2020-03-25 DIAGNOSIS — J45.909 UNSPECIFIED ASTHMA, UNCOMPLICATED: ICD-10-CM

## 2020-03-25 DIAGNOSIS — G43.909 MIGRAINE, UNSPECIFIED, NOT INTRACTABLE, WITHOUT STATUS MIGRAINOSUS: ICD-10-CM

## 2020-03-25 DIAGNOSIS — E87.6 HYPOKALEMIA: ICD-10-CM

## 2020-03-25 LAB
ALBUMIN SERPL BCP-MCNC: 3.8 G/DL (ref 3.5–5)
ALP SERPL-CCNC: 83 U/L (ref 46–116)
ALT SERPL W P-5'-P-CCNC: 83 U/L (ref 12–78)
ANION GAP SERPL CALCULATED.3IONS-SCNC: 6 MMOL/L (ref 4–13)
AST SERPL W P-5'-P-CCNC: 41 U/L (ref 5–45)
BASOPHILS # BLD AUTO: 0.03 THOUSANDS/ΜL (ref 0–0.1)
BASOPHILS NFR BLD AUTO: 0 % (ref 0–1)
BILIRUB SERPL-MCNC: 0.29 MG/DL (ref 0.2–1)
BUN SERPL-MCNC: 21 MG/DL (ref 5–25)
CALCIUM SERPL-MCNC: 9.5 MG/DL (ref 8.3–10.1)
CHLORIDE SERPL-SCNC: 110 MMOL/L (ref 100–108)
CO2 SERPL-SCNC: 25 MMOL/L (ref 21–32)
CREAT SERPL-MCNC: 0.88 MG/DL (ref 0.6–1.3)
EOSINOPHIL # BLD AUTO: 0.25 THOUSAND/ΜL (ref 0–0.61)
EOSINOPHIL NFR BLD AUTO: 3 % (ref 0–6)
ERYTHROCYTE [DISTWIDTH] IN BLOOD BY AUTOMATED COUNT: 13.3 % (ref 11.6–15.1)
GFR SERPL CREATININE-BSD FRML MDRD: 73 ML/MIN/1.73SQ M
GLUCOSE SERPL-MCNC: 119 MG/DL (ref 65–140)
HCT VFR BLD AUTO: 41.7 % (ref 34.8–46.1)
HGB BLD-MCNC: 13.2 G/DL (ref 11.5–15.4)
IMM GRANULOCYTES # BLD AUTO: 0.02 THOUSAND/UL (ref 0–0.2)
IMM GRANULOCYTES NFR BLD AUTO: 0 % (ref 0–2)
LYMPHOCYTES # BLD AUTO: 1.85 THOUSANDS/ΜL (ref 0.6–4.47)
LYMPHOCYTES NFR BLD AUTO: 25 % (ref 14–44)
MCH RBC QN AUTO: 30.1 PG (ref 26.8–34.3)
MCHC RBC AUTO-ENTMCNC: 31.7 G/DL (ref 31.4–37.4)
MCV RBC AUTO: 95 FL (ref 82–98)
MONOCYTES # BLD AUTO: 0.49 THOUSAND/ΜL (ref 0.17–1.22)
MONOCYTES NFR BLD AUTO: 7 % (ref 4–12)
NEUTROPHILS # BLD AUTO: 4.81 THOUSANDS/ΜL (ref 1.85–7.62)
NEUTS SEG NFR BLD AUTO: 65 % (ref 43–75)
NRBC BLD AUTO-RTO: 0 /100 WBCS
PLATELET # BLD AUTO: 193 THOUSANDS/UL (ref 149–390)
PMV BLD AUTO: 11.9 FL (ref 8.9–12.7)
POTASSIUM SERPL-SCNC: 4.1 MMOL/L (ref 3.5–5.3)
PROT SERPL-MCNC: 7.6 G/DL (ref 6.4–8.2)
RBC # BLD AUTO: 4.38 MILLION/UL (ref 3.81–5.12)
SODIUM SERPL-SCNC: 141 MMOL/L (ref 136–145)
WBC # BLD AUTO: 7.45 THOUSAND/UL (ref 4.31–10.16)

## 2020-03-25 PROCEDURE — 85025 COMPLETE CBC W/AUTO DIFF WBC: CPT | Performed by: INTERNAL MEDICINE

## 2020-03-25 PROCEDURE — 80053 COMPREHEN METABOLIC PANEL: CPT | Performed by: INTERNAL MEDICINE

## 2020-05-29 ENCOUNTER — APPOINTMENT (OUTPATIENT)
Dept: LAB | Facility: CLINIC | Age: 60
End: 2020-05-29
Payer: COMMERCIAL

## 2020-05-29 ENCOUNTER — TRANSCRIBE ORDERS (OUTPATIENT)
Dept: ADMINISTRATIVE | Facility: HOSPITAL | Age: 60
End: 2020-05-29

## 2020-05-29 DIAGNOSIS — G40.802 CURSIVE EPILEPSY (HCC): ICD-10-CM

## 2020-05-29 DIAGNOSIS — E13.69 OTHER SPECIFIED DIABETES MELLITUS WITH OTHER SPECIFIED COMPLICATION, UNSPECIFIED WHETHER LONG TERM INSULIN USE (HCC): ICD-10-CM

## 2020-05-29 DIAGNOSIS — E13.69 OTHER SPECIFIED DIABETES MELLITUS WITH OTHER SPECIFIED COMPLICATION, UNSPECIFIED WHETHER LONG TERM INSULIN USE (HCC): Primary | ICD-10-CM

## 2020-05-29 LAB
ALBUMIN SERPL BCP-MCNC: 3.9 G/DL (ref 3.5–5)
ALP SERPL-CCNC: 73 U/L (ref 46–116)
ALT SERPL W P-5'-P-CCNC: 83 U/L (ref 12–78)
ANION GAP SERPL CALCULATED.3IONS-SCNC: 5 MMOL/L (ref 4–13)
AST SERPL W P-5'-P-CCNC: 44 U/L (ref 5–45)
BILIRUB SERPL-MCNC: 0.41 MG/DL (ref 0.2–1)
BUN SERPL-MCNC: 19 MG/DL (ref 5–25)
CALCIUM ALBUM COR SERPL-MCNC: 10.6 MG/DL (ref 8.3–10.1)
CALCIUM SERPL-MCNC: 10.5 MG/DL (ref 8.3–10.1)
CHLORIDE SERPL-SCNC: 108 MMOL/L (ref 100–108)
CO2 SERPL-SCNC: 26 MMOL/L (ref 21–32)
CREAT SERPL-MCNC: 0.85 MG/DL (ref 0.6–1.3)
EST. AVERAGE GLUCOSE BLD GHB EST-MCNC: 140 MG/DL
GFR SERPL CREATININE-BSD FRML MDRD: 76 ML/MIN/1.73SQ M
GLUCOSE P FAST SERPL-MCNC: 124 MG/DL (ref 65–99)
HBA1C MFR BLD: 6.5 %
POTASSIUM SERPL-SCNC: 4.2 MMOL/L (ref 3.5–5.3)
PROT SERPL-MCNC: 8.1 G/DL (ref 6.4–8.2)
SODIUM SERPL-SCNC: 139 MMOL/L (ref 136–145)

## 2020-05-29 PROCEDURE — 83036 HEMOGLOBIN GLYCOSYLATED A1C: CPT

## 2020-05-29 PROCEDURE — 36415 COLL VENOUS BLD VENIPUNCTURE: CPT

## 2020-05-29 PROCEDURE — 80053 COMPREHEN METABOLIC PANEL: CPT

## 2020-07-25 ENCOUNTER — TRANSCRIBE ORDERS (OUTPATIENT)
Dept: LAB | Facility: CLINIC | Age: 60
End: 2020-07-25

## 2020-07-25 ENCOUNTER — APPOINTMENT (OUTPATIENT)
Dept: LAB | Facility: CLINIC | Age: 60
End: 2020-07-25
Payer: COMMERCIAL

## 2020-07-25 DIAGNOSIS — E83.52 HYPERCALCEMIA: Primary | ICD-10-CM

## 2020-07-25 DIAGNOSIS — E83.52 HYPERCALCEMIA: ICD-10-CM

## 2020-07-25 LAB
ANION GAP SERPL CALCULATED.3IONS-SCNC: 6 MMOL/L (ref 4–13)
BUN SERPL-MCNC: 21 MG/DL (ref 5–25)
CALCIUM SERPL-MCNC: 9.3 MG/DL (ref 8.3–10.1)
CHLORIDE SERPL-SCNC: 110 MMOL/L (ref 100–108)
CO2 SERPL-SCNC: 26 MMOL/L (ref 21–32)
CREAT SERPL-MCNC: 0.83 MG/DL (ref 0.6–1.3)
GFR SERPL CREATININE-BSD FRML MDRD: 77 ML/MIN/1.73SQ M
GLUCOSE P FAST SERPL-MCNC: 143 MG/DL (ref 65–99)
POTASSIUM SERPL-SCNC: 3.8 MMOL/L (ref 3.5–5.3)
SODIUM SERPL-SCNC: 142 MMOL/L (ref 136–145)

## 2020-07-25 PROCEDURE — 36415 COLL VENOUS BLD VENIPUNCTURE: CPT

## 2020-07-25 PROCEDURE — 80048 BASIC METABOLIC PNL TOTAL CA: CPT

## 2020-11-09 ENCOUNTER — TRANSCRIBE ORDERS (OUTPATIENT)
Dept: ADMINISTRATIVE | Facility: HOSPITAL | Age: 60
End: 2020-11-09

## 2020-11-09 DIAGNOSIS — G40.209 COMPLEX PARTIAL SEIZURES WITH CONSCIOUSNESS IMPAIRED (HCC): Primary | ICD-10-CM

## 2020-12-12 ENCOUNTER — LAB (OUTPATIENT)
Dept: LAB | Facility: HOSPITAL | Age: 60
End: 2020-12-12
Payer: COMMERCIAL

## 2020-12-12 ENCOUNTER — TRANSCRIBE ORDERS (OUTPATIENT)
Dept: LAB | Facility: HOSPITAL | Age: 60
End: 2020-12-12

## 2020-12-12 DIAGNOSIS — I10 ESSENTIAL HYPERTENSION, MALIGNANT: ICD-10-CM

## 2020-12-12 DIAGNOSIS — E78.5 HYPERLIPIDEMIA, UNSPECIFIED HYPERLIPIDEMIA TYPE: ICD-10-CM

## 2020-12-12 DIAGNOSIS — E13.69 OTHER SPECIFIED DIABETES MELLITUS WITH OTHER SPECIFIED COMPLICATION, UNSPECIFIED WHETHER LONG TERM INSULIN USE (HCC): ICD-10-CM

## 2020-12-12 DIAGNOSIS — E13.69 OTHER SPECIFIED DIABETES MELLITUS WITH OTHER SPECIFIED COMPLICATION, UNSPECIFIED WHETHER LONG TERM INSULIN USE (HCC): Primary | ICD-10-CM

## 2020-12-12 LAB
ALBUMIN SERPL BCP-MCNC: 3.9 G/DL (ref 3.5–5)
ALP SERPL-CCNC: 76 U/L (ref 46–116)
ALT SERPL W P-5'-P-CCNC: 78 U/L (ref 12–78)
ANION GAP SERPL CALCULATED.3IONS-SCNC: 10 MMOL/L (ref 4–13)
AST SERPL W P-5'-P-CCNC: 43 U/L (ref 5–45)
BILIRUB SERPL-MCNC: 0.36 MG/DL (ref 0.2–1)
BUN SERPL-MCNC: 23 MG/DL (ref 5–25)
CALCIUM SERPL-MCNC: 9.4 MG/DL (ref 8.3–10.1)
CHLORIDE SERPL-SCNC: 110 MMOL/L (ref 100–108)
CO2 SERPL-SCNC: 24 MMOL/L (ref 21–32)
CREAT SERPL-MCNC: 0.88 MG/DL (ref 0.6–1.3)
EST. AVERAGE GLUCOSE BLD GHB EST-MCNC: 143 MG/DL
GFR SERPL CREATININE-BSD FRML MDRD: 72 ML/MIN/1.73SQ M
GLUCOSE P FAST SERPL-MCNC: 139 MG/DL (ref 65–99)
HBA1C MFR BLD: 6.6 %
LDLC SERPL DIRECT ASSAY-MCNC: 148 MG/DL (ref 0–100)
POTASSIUM SERPL-SCNC: 3.6 MMOL/L (ref 3.5–5.3)
PROT SERPL-MCNC: 7.8 G/DL (ref 6.4–8.2)
SODIUM SERPL-SCNC: 144 MMOL/L (ref 136–145)

## 2020-12-12 PROCEDURE — 83721 ASSAY OF BLOOD LIPOPROTEIN: CPT

## 2020-12-12 PROCEDURE — 36415 COLL VENOUS BLD VENIPUNCTURE: CPT

## 2020-12-12 PROCEDURE — 83036 HEMOGLOBIN GLYCOSYLATED A1C: CPT

## 2020-12-12 PROCEDURE — 80053 COMPREHEN METABOLIC PANEL: CPT

## 2020-12-21 ENCOUNTER — HOSPITAL ENCOUNTER (OUTPATIENT)
Dept: NEUROLOGY | Facility: CLINIC | Age: 60
Discharge: HOME | End: 2020-12-21
Payer: COMMERCIAL

## 2020-12-21 DIAGNOSIS — G40.209 COMPLEX PARTIAL SEIZURES WITH CONSCIOUSNESS IMPAIRED (HCC): ICD-10-CM

## 2020-12-21 PROCEDURE — 95816 EEG AWAKE AND DROWSY: CPT

## 2021-01-06 DIAGNOSIS — R51.0 ORTHOSTATIC HEADACHE: ICD-10-CM

## 2021-01-06 DIAGNOSIS — R43.9 PROBLEMS WITH SMELL AND TASTE: ICD-10-CM

## 2021-01-06 DIAGNOSIS — R07.0 THROAT PAIN: ICD-10-CM

## 2021-01-06 LAB — SARS-COV-2 RNA RESP QL NAA+PROBE: NEGATIVE

## 2021-01-06 PROCEDURE — 87635 SARS-COV-2 COVID-19 AMP PRB: CPT

## 2021-01-13 ENCOUNTER — TRANSCRIBE ORDERS (OUTPATIENT)
Dept: ADMINISTRATIVE | Facility: HOSPITAL | Age: 61
End: 2021-01-13

## 2021-01-13 DIAGNOSIS — G43.909 MIGRAINE WITHOUT STATUS MIGRAINOSUS, NOT INTRACTABLE, UNSPECIFIED MIGRAINE TYPE: Primary | ICD-10-CM

## 2021-01-14 ENCOUNTER — HOSPITAL ENCOUNTER (OUTPATIENT)
Dept: RADIOLOGY | Facility: HOSPITAL | Age: 61
Discharge: HOME | End: 2021-01-14
Payer: COMMERCIAL

## 2021-01-14 DIAGNOSIS — G43.909 MIGRAINE WITHOUT STATUS MIGRAINOSUS, NOT INTRACTABLE, UNSPECIFIED MIGRAINE TYPE: ICD-10-CM

## 2021-01-14 PROCEDURE — G1004 CDSM NDSC: HCPCS

## 2021-01-14 PROCEDURE — 70486 CT MAXILLOFACIAL W/O DYE: CPT

## 2021-01-14 PROCEDURE — 70450 CT HEAD/BRAIN W/O DYE: CPT

## 2021-01-19 ENCOUNTER — LAB REQUISITION (OUTPATIENT)
Dept: LAB | Facility: HOSPITAL | Age: 61
End: 2021-01-19
Payer: COMMERCIAL

## 2021-01-19 DIAGNOSIS — R42 DIZZINESS AND GIDDINESS: ICD-10-CM

## 2021-01-19 DIAGNOSIS — E11.9 TYPE 2 DIABETES MELLITUS WITHOUT COMPLICATIONS (HCC): ICD-10-CM

## 2021-01-19 DIAGNOSIS — G43.909 MIGRAINE, UNSPECIFIED, NOT INTRACTABLE, WITHOUT STATUS MIGRAINOSUS: ICD-10-CM

## 2021-01-19 LAB
ALBUMIN SERPL BCP-MCNC: 4.1 G/DL (ref 3.5–5)
ALP SERPL-CCNC: 84 U/L (ref 46–116)
ALT SERPL W P-5'-P-CCNC: 73 U/L (ref 12–78)
ANION GAP SERPL CALCULATED.3IONS-SCNC: 9 MMOL/L (ref 4–13)
AST SERPL W P-5'-P-CCNC: 36 U/L (ref 5–45)
BASOPHILS # BLD AUTO: 0.03 THOUSANDS/ΜL (ref 0–0.1)
BASOPHILS NFR BLD AUTO: 0 % (ref 0–1)
BILIRUB SERPL-MCNC: 0.34 MG/DL (ref 0.2–1)
BUN SERPL-MCNC: 15 MG/DL (ref 5–25)
CALCIUM SERPL-MCNC: 9.8 MG/DL (ref 8.3–10.1)
CHLORIDE SERPL-SCNC: 109 MMOL/L (ref 100–108)
CO2 SERPL-SCNC: 24 MMOL/L (ref 21–32)
CREAT SERPL-MCNC: 0.86 MG/DL (ref 0.6–1.3)
EOSINOPHIL # BLD AUTO: 0.22 THOUSAND/ΜL (ref 0–0.61)
EOSINOPHIL NFR BLD AUTO: 3 % (ref 0–6)
ERYTHROCYTE [DISTWIDTH] IN BLOOD BY AUTOMATED COUNT: 13.3 % (ref 11.6–15.1)
GFR SERPL CREATININE-BSD FRML MDRD: 74 ML/MIN/1.73SQ M
GLUCOSE SERPL-MCNC: 116 MG/DL (ref 65–140)
HCT VFR BLD AUTO: 43.7 % (ref 34.8–46.1)
HGB BLD-MCNC: 13.6 G/DL (ref 11.5–15.4)
IMM GRANULOCYTES # BLD AUTO: 0.03 THOUSAND/UL (ref 0–0.2)
IMM GRANULOCYTES NFR BLD AUTO: 0 % (ref 0–2)
LYMPHOCYTES # BLD AUTO: 1.61 THOUSANDS/ΜL (ref 0.6–4.47)
LYMPHOCYTES NFR BLD AUTO: 19 % (ref 14–44)
MCH RBC QN AUTO: 29.9 PG (ref 26.8–34.3)
MCHC RBC AUTO-ENTMCNC: 31.1 G/DL (ref 31.4–37.4)
MCV RBC AUTO: 96 FL (ref 82–98)
MONOCYTES # BLD AUTO: 0.53 THOUSAND/ΜL (ref 0.17–1.22)
MONOCYTES NFR BLD AUTO: 6 % (ref 4–12)
NEUTROPHILS # BLD AUTO: 6.01 THOUSANDS/ΜL (ref 1.85–7.62)
NEUTS SEG NFR BLD AUTO: 72 % (ref 43–75)
NRBC BLD AUTO-RTO: 0 /100 WBCS
PLATELET # BLD AUTO: 192 THOUSANDS/UL (ref 149–390)
PMV BLD AUTO: 11.9 FL (ref 8.9–12.7)
POTASSIUM SERPL-SCNC: 3.5 MMOL/L (ref 3.5–5.3)
PROT SERPL-MCNC: 7.5 G/DL (ref 6.4–8.2)
RBC # BLD AUTO: 4.55 MILLION/UL (ref 3.81–5.12)
SARS-COV-2 IGG+IGM SERPL QL IA: NORMAL
SODIUM SERPL-SCNC: 142 MMOL/L (ref 136–145)
WBC # BLD AUTO: 8.43 THOUSAND/UL (ref 4.31–10.16)

## 2021-01-19 PROCEDURE — 87635 SARS-COV-2 COVID-19 AMP PRB: CPT | Performed by: INTERNAL MEDICINE

## 2021-01-19 PROCEDURE — 85025 COMPLETE CBC W/AUTO DIFF WBC: CPT | Performed by: INTERNAL MEDICINE

## 2021-01-19 PROCEDURE — 80053 COMPREHEN METABOLIC PANEL: CPT | Performed by: INTERNAL MEDICINE

## 2021-01-19 PROCEDURE — 86769 SARS-COV-2 COVID-19 ANTIBODY: CPT | Performed by: INTERNAL MEDICINE

## 2021-01-20 LAB — SARS-COV-2 RNA RESP QL NAA+PROBE: NEGATIVE

## 2021-01-28 ENCOUNTER — TELEPHONE (OUTPATIENT)
Dept: INFECTIOUS DISEASES | Facility: CLINIC | Age: 61
End: 2021-01-28

## 2021-01-28 NOTE — TELEPHONE ENCOUNTER
Patient calls today regarding a rash  Patient states this appeared, went away, and appeared in a different spot  Dr Josette Cesar has been trying to treat but has not had any luck  Pt requests an appointment with us/Dr Roma Rocha but was made aware that Dr Roma Rocha does not see outpatient  Patient has a dermatologist, Dr Harshal Strong, who she has not seen for this  Advised patient to get evaluated by her dermatologist first, and then if she would still need to follow up with us we would just request treatment records and office notes, as well as a doctor to doctor referral     Patient thanks me for my time

## 2021-02-14 ENCOUNTER — LAB (OUTPATIENT)
Dept: LAB | Facility: HOSPITAL | Age: 61
End: 2021-02-14
Payer: COMMERCIAL

## 2021-02-14 ENCOUNTER — TRANSCRIBE ORDERS (OUTPATIENT)
Dept: LAB | Facility: HOSPITAL | Age: 61
End: 2021-02-14

## 2021-02-14 DIAGNOSIS — E78.5 HYPERLIPIDEMIA, UNSPECIFIED HYPERLIPIDEMIA TYPE: Primary | ICD-10-CM

## 2021-02-14 DIAGNOSIS — E78.5 HYPERLIPIDEMIA, UNSPECIFIED HYPERLIPIDEMIA TYPE: ICD-10-CM

## 2021-02-14 LAB
ALBUMIN SERPL BCP-MCNC: 3.9 G/DL (ref 3.5–5)
ALP SERPL-CCNC: 78 U/L (ref 46–116)
ALT SERPL W P-5'-P-CCNC: 74 U/L (ref 12–78)
ANION GAP SERPL CALCULATED.3IONS-SCNC: 8 MMOL/L (ref 4–13)
AST SERPL W P-5'-P-CCNC: 47 U/L (ref 5–45)
BILIRUB SERPL-MCNC: 0.41 MG/DL (ref 0.2–1)
BUN SERPL-MCNC: 17 MG/DL (ref 5–25)
CALCIUM SERPL-MCNC: 9.8 MG/DL (ref 8.3–10.1)
CHLORIDE SERPL-SCNC: 109 MMOL/L (ref 100–108)
CO2 SERPL-SCNC: 26 MMOL/L (ref 21–32)
CREAT SERPL-MCNC: 0.92 MG/DL (ref 0.6–1.3)
GFR SERPL CREATININE-BSD FRML MDRD: 68 ML/MIN/1.73SQ M
GLUCOSE P FAST SERPL-MCNC: 150 MG/DL (ref 65–99)
LDLC SERPL DIRECT ASSAY-MCNC: 106 MG/DL (ref 0–100)
POTASSIUM SERPL-SCNC: 3.5 MMOL/L (ref 3.5–5.3)
PROT SERPL-MCNC: 7.8 G/DL (ref 6.4–8.2)
SODIUM SERPL-SCNC: 143 MMOL/L (ref 136–145)

## 2021-02-14 PROCEDURE — 36415 COLL VENOUS BLD VENIPUNCTURE: CPT

## 2021-02-14 PROCEDURE — 80053 COMPREHEN METABOLIC PANEL: CPT

## 2021-02-14 PROCEDURE — 83721 ASSAY OF BLOOD LIPOPROTEIN: CPT

## 2021-03-16 PROCEDURE — 88305 TISSUE EXAM BY PATHOLOGIST: CPT | Performed by: STUDENT IN AN ORGANIZED HEALTH CARE EDUCATION/TRAINING PROGRAM

## 2021-03-16 PROCEDURE — 88313 SPECIAL STAINS GROUP 2: CPT | Performed by: STUDENT IN AN ORGANIZED HEALTH CARE EDUCATION/TRAINING PROGRAM

## 2021-03-17 ENCOUNTER — LAB REQUISITION (OUTPATIENT)
Dept: LAB | Facility: HOSPITAL | Age: 61
End: 2021-03-17
Payer: COMMERCIAL

## 2021-03-17 DIAGNOSIS — R21 RASH AND OTHER NONSPECIFIC SKIN ERUPTION: ICD-10-CM

## 2021-03-30 DIAGNOSIS — Z23 ENCOUNTER FOR IMMUNIZATION: ICD-10-CM

## 2021-06-05 ENCOUNTER — APPOINTMENT (OUTPATIENT)
Dept: LAB | Facility: CLINIC | Age: 61
End: 2021-06-05
Payer: COMMERCIAL

## 2021-06-05 ENCOUNTER — APPOINTMENT (OUTPATIENT)
Dept: LAB | Facility: CLINIC | Age: 61
End: 2021-06-05

## 2021-06-05 ENCOUNTER — TRANSCRIBE ORDERS (OUTPATIENT)
Dept: LAB | Facility: CLINIC | Age: 61
End: 2021-06-05

## 2021-06-05 DIAGNOSIS — Z00.8 HEALTH EXAMINATION IN POPULATION SURVEY: ICD-10-CM

## 2021-06-05 DIAGNOSIS — E13.9 DIABETES MELLITUS OF OTHER TYPE WITHOUT COMPLICATION, UNSPECIFIED WHETHER LONG TERM INSULIN USE (HCC): ICD-10-CM

## 2021-06-05 DIAGNOSIS — Z00.8 HEALTH EXAMINATION IN POPULATION SURVEY: Primary | ICD-10-CM

## 2021-06-05 DIAGNOSIS — E78.5 HYPERLIPIDEMIA, UNSPECIFIED HYPERLIPIDEMIA TYPE: ICD-10-CM

## 2021-06-05 DIAGNOSIS — E13.9 DIABETES MELLITUS OF OTHER TYPE WITHOUT COMPLICATION, UNSPECIFIED WHETHER LONG TERM INSULIN USE (HCC): Primary | ICD-10-CM

## 2021-06-05 LAB
ALBUMIN SERPL BCP-MCNC: 3.7 G/DL (ref 3.5–5)
ALP SERPL-CCNC: 79 U/L (ref 46–116)
ALT SERPL W P-5'-P-CCNC: 65 U/L (ref 12–78)
ANION GAP SERPL CALCULATED.3IONS-SCNC: 6 MMOL/L (ref 4–13)
AST SERPL W P-5'-P-CCNC: 37 U/L (ref 5–45)
BILIRUB SERPL-MCNC: 0.34 MG/DL (ref 0.2–1)
BUN SERPL-MCNC: 22 MG/DL (ref 5–25)
CALCIUM SERPL-MCNC: 9.2 MG/DL (ref 8.3–10.1)
CHLORIDE SERPL-SCNC: 106 MMOL/L (ref 100–108)
CHOLEST SERPL-MCNC: 273 MG/DL (ref 50–200)
CO2 SERPL-SCNC: 28 MMOL/L (ref 21–32)
CREAT SERPL-MCNC: 0.81 MG/DL (ref 0.6–1.3)
ERYTHROCYTE [DISTWIDTH] IN BLOOD BY AUTOMATED COUNT: 12.6 % (ref 11.6–15.1)
EST. AVERAGE GLUCOSE BLD GHB EST-MCNC: 166 MG/DL
GFR SERPL CREATININE-BSD FRML MDRD: 79 ML/MIN/1.73SQ M
GLUCOSE P FAST SERPL-MCNC: 220 MG/DL (ref 65–99)
HBA1C MFR BLD: 7.4 %
HCT VFR BLD AUTO: 40.5 % (ref 34.8–46.1)
HDLC SERPL-MCNC: 48 MG/DL
HGB BLD-MCNC: 13.3 G/DL (ref 11.5–15.4)
LDLC SERPL CALC-MCNC: 184 MG/DL (ref 0–100)
LDLC SERPL DIRECT ASSAY-MCNC: 183 MG/DL (ref 0–100)
MCH RBC QN AUTO: 30.3 PG (ref 26.8–34.3)
MCHC RBC AUTO-ENTMCNC: 32.8 G/DL (ref 31.4–37.4)
MCV RBC AUTO: 92 FL (ref 82–98)
NONHDLC SERPL-MCNC: 225 MG/DL
PLATELET # BLD AUTO: 196 THOUSANDS/UL (ref 149–390)
PMV BLD AUTO: 11.8 FL (ref 8.9–12.7)
POTASSIUM SERPL-SCNC: 3.9 MMOL/L (ref 3.5–5.3)
PROT SERPL-MCNC: 7.7 G/DL (ref 6.4–8.2)
RBC # BLD AUTO: 4.39 MILLION/UL (ref 3.81–5.12)
SODIUM SERPL-SCNC: 140 MMOL/L (ref 136–145)
TRIGL SERPL-MCNC: 204 MG/DL
WBC # BLD AUTO: 8.12 THOUSAND/UL (ref 4.31–10.16)

## 2021-06-05 PROCEDURE — 80061 LIPID PANEL: CPT

## 2021-06-05 PROCEDURE — 80053 COMPREHEN METABOLIC PANEL: CPT

## 2021-06-05 PROCEDURE — 83036 HEMOGLOBIN GLYCOSYLATED A1C: CPT

## 2021-06-05 PROCEDURE — 83721 ASSAY OF BLOOD LIPOPROTEIN: CPT

## 2021-06-05 PROCEDURE — 85027 COMPLETE CBC AUTOMATED: CPT

## 2021-06-05 PROCEDURE — 36415 COLL VENOUS BLD VENIPUNCTURE: CPT

## 2021-06-14 PROCEDURE — 88313 SPECIAL STAINS GROUP 2: CPT | Performed by: STUDENT IN AN ORGANIZED HEALTH CARE EDUCATION/TRAINING PROGRAM

## 2021-06-14 PROCEDURE — 88305 TISSUE EXAM BY PATHOLOGIST: CPT | Performed by: STUDENT IN AN ORGANIZED HEALTH CARE EDUCATION/TRAINING PROGRAM

## 2021-06-15 ENCOUNTER — LAB REQUISITION (OUTPATIENT)
Dept: LAB | Facility: HOSPITAL | Age: 61
End: 2021-06-15
Payer: COMMERCIAL

## 2021-06-15 DIAGNOSIS — R21 RASH AND OTHER NONSPECIFIC SKIN ERUPTION: ICD-10-CM

## 2021-07-03 ENCOUNTER — TRANSCRIBE ORDERS (OUTPATIENT)
Dept: LAB | Facility: CLINIC | Age: 61
End: 2021-07-03

## 2021-07-03 ENCOUNTER — APPOINTMENT (OUTPATIENT)
Dept: LAB | Facility: CLINIC | Age: 61
End: 2021-07-03
Payer: COMMERCIAL

## 2021-07-03 DIAGNOSIS — E78.5 HYPERLIPIDEMIA, UNSPECIFIED HYPERLIPIDEMIA TYPE: ICD-10-CM

## 2021-07-03 DIAGNOSIS — L95.8 NEUTROPHILIC VASCULITIS OF SKIN: ICD-10-CM

## 2021-07-03 DIAGNOSIS — L95.8 OTHER VASCULITIS LIMITED TO THE SKIN: ICD-10-CM

## 2021-07-03 DIAGNOSIS — Z91.018 ALLERGY TO OTHER FOODS: Primary | ICD-10-CM

## 2021-07-03 DIAGNOSIS — Z91.018 ALLERGY TO OTHER FOODS: ICD-10-CM

## 2021-07-03 LAB
ALBUMIN SERPL BCP-MCNC: 4 G/DL (ref 3.5–5)
ALP SERPL-CCNC: 81 U/L (ref 46–116)
ALT SERPL W P-5'-P-CCNC: 81 U/L (ref 12–78)
ANION GAP SERPL CALCULATED.3IONS-SCNC: 9 MMOL/L (ref 4–13)
AST SERPL W P-5'-P-CCNC: 47 U/L (ref 5–45)
BASOPHILS # BLD AUTO: 0.04 THOUSANDS/ΜL (ref 0–0.1)
BASOPHILS NFR BLD AUTO: 1 % (ref 0–1)
BILIRUB SERPL-MCNC: 0.37 MG/DL (ref 0.2–1)
BUN SERPL-MCNC: 18 MG/DL (ref 5–25)
CALCIUM SERPL-MCNC: 9.8 MG/DL (ref 8.3–10.1)
CHLORIDE SERPL-SCNC: 106 MMOL/L (ref 100–108)
CHOLEST SERPL-MCNC: 214 MG/DL (ref 50–200)
CO2 SERPL-SCNC: 24 MMOL/L (ref 21–32)
CREAT SERPL-MCNC: 0.87 MG/DL (ref 0.6–1.3)
CRP SERPL QL: 6.2 MG/L
EOSINOPHIL # BLD AUTO: 0.27 THOUSAND/ΜL (ref 0–0.61)
EOSINOPHIL NFR BLD AUTO: 3 % (ref 0–6)
ERYTHROCYTE [DISTWIDTH] IN BLOOD BY AUTOMATED COUNT: 13.3 % (ref 11.6–15.1)
GFR SERPL CREATININE-BSD FRML MDRD: 73 ML/MIN/1.73SQ M
GLUCOSE P FAST SERPL-MCNC: 185 MG/DL (ref 65–99)
HBV CORE AB SER QL: NORMAL
HBV CORE IGM SER QL: NORMAL
HBV SURFACE AB SER-ACNC: 342.71 MIU/ML
HBV SURFACE AG SER QL: NORMAL
HCT VFR BLD AUTO: 42.5 % (ref 34.8–46.1)
HCV AB SER QL: NORMAL
HGB BLD-MCNC: 14 G/DL (ref 11.5–15.4)
IMM GRANULOCYTES # BLD AUTO: 0.02 THOUSAND/UL (ref 0–0.2)
IMM GRANULOCYTES NFR BLD AUTO: 0 % (ref 0–2)
LYMPHOCYTES # BLD AUTO: 1.66 THOUSANDS/ΜL (ref 0.6–4.47)
LYMPHOCYTES NFR BLD AUTO: 20 % (ref 14–44)
MCH RBC QN AUTO: 30.2 PG (ref 26.8–34.3)
MCHC RBC AUTO-ENTMCNC: 32.9 G/DL (ref 31.4–37.4)
MCV RBC AUTO: 92 FL (ref 82–98)
MONOCYTES # BLD AUTO: 0.49 THOUSAND/ΜL (ref 0.17–1.22)
MONOCYTES NFR BLD AUTO: 6 % (ref 4–12)
NEUTROPHILS # BLD AUTO: 5.98 THOUSANDS/ΜL (ref 1.85–7.62)
NEUTS SEG NFR BLD AUTO: 70 % (ref 43–75)
NRBC BLD AUTO-RTO: 0 /100 WBCS
PLATELET # BLD AUTO: 204 THOUSANDS/UL (ref 149–390)
PMV BLD AUTO: 11.8 FL (ref 8.9–12.7)
POTASSIUM SERPL-SCNC: 3.4 MMOL/L (ref 3.5–5.3)
PROT SERPL-MCNC: 8.1 G/DL (ref 6.4–8.2)
RBC # BLD AUTO: 4.63 MILLION/UL (ref 3.81–5.12)
SODIUM SERPL-SCNC: 139 MMOL/L (ref 136–145)
WBC # BLD AUTO: 8.46 THOUSAND/UL (ref 4.31–10.16)

## 2021-07-03 PROCEDURE — 86003 ALLG SPEC IGE CRUDE XTRC EA: CPT

## 2021-07-03 PROCEDURE — 87340 HEPATITIS B SURFACE AG IA: CPT

## 2021-07-03 PROCEDURE — 83520 IMMUNOASSAY QUANT NOS NONAB: CPT

## 2021-07-03 PROCEDURE — 84165 PROTEIN E-PHORESIS SERUM: CPT | Performed by: PATHOLOGY

## 2021-07-03 PROCEDURE — 86704 HEP B CORE ANTIBODY TOTAL: CPT

## 2021-07-03 PROCEDURE — 86430 RHEUMATOID FACTOR TEST QUAL: CPT

## 2021-07-03 PROCEDURE — 86255 FLUORESCENT ANTIBODY SCREEN: CPT

## 2021-07-03 PROCEDURE — 86038 ANTINUCLEAR ANTIBODIES: CPT

## 2021-07-03 PROCEDURE — 84165 PROTEIN E-PHORESIS SERUM: CPT

## 2021-07-03 PROCEDURE — 86140 C-REACTIVE PROTEIN: CPT

## 2021-07-03 PROCEDURE — 86235 NUCLEAR ANTIGEN ANTIBODY: CPT

## 2021-07-03 PROCEDURE — 82465 ASSAY BLD/SERUM CHOLESTEROL: CPT

## 2021-07-03 PROCEDURE — 86706 HEP B SURFACE ANTIBODY: CPT

## 2021-07-03 PROCEDURE — 86063 ANTISTREPTOLYSIN O SCREEN: CPT

## 2021-07-03 PROCEDURE — 86803 HEPATITIS C AB TEST: CPT

## 2021-07-03 PROCEDURE — 86705 HEP B CORE ANTIBODY IGM: CPT

## 2021-07-03 PROCEDURE — 85025 COMPLETE CBC W/AUTO DIFF WBC: CPT

## 2021-07-03 PROCEDURE — 80053 COMPREHEN METABOLIC PANEL: CPT

## 2021-07-05 LAB
ALLERGEN COMMENT: NORMAL
ALMOND IGE QN: <0.1 KUA/I
BRAZIL NUT IGE QN: <0.1 KUA/I
CASHEW NUT IGE QN: <0.1 KUA/I
HAZELNUT IGE QN: <0.1 KUA/L
PEANUT IGE QN: <0.1 KUA/I
PECAN/HICK NUT IGE QN: <0.1 KUA/I
PISTACHIO IGE QN: <0.1 KUA/I
RYE IGE QN: NEGATIVE
WALNUT IGE QN: <0.1 KUA/I

## 2021-07-06 LAB
ALBUMIN SERPL ELPH-MCNC: 4.38 G/DL (ref 3.5–5)
ALBUMIN SERPL ELPH-MCNC: 56.9 % (ref 52–65)
ALPHA1 GLOB SERPL ELPH-MCNC: 0.28 G/DL (ref 0.1–0.4)
ALPHA1 GLOB SERPL ELPH-MCNC: 3.7 % (ref 2.5–5)
ALPHA2 GLOB SERPL ELPH-MCNC: 1.02 G/DL (ref 0.4–1.2)
ALPHA2 GLOB SERPL ELPH-MCNC: 13.2 % (ref 7–13)
ASO AB TITR SER LA: NORMAL {TITER}
BETA GLOB ABNORMAL SERPL ELPH-MCNC: 0.51 G/DL (ref 0.4–0.8)
BETA1 GLOB SERPL ELPH-MCNC: 6.6 % (ref 5–13)
BETA2 GLOB SERPL ELPH-MCNC: 5.7 % (ref 2–8)
BETA2+GAMMA GLOB SERPL ELPH-MCNC: 0.44 G/DL (ref 0.2–0.5)
ENA RNP AB SER-ACNC: 0.5 AI (ref 0–0.9)
ENA SM AB SER-ACNC: <0.2 AI (ref 0–0.9)
GAMMA GLOB ABNORMAL SERPL ELPH-MCNC: 1.07 G/DL (ref 0.5–1.6)
GAMMA GLOB SERPL ELPH-MCNC: 13.9 % (ref 12–22)
IGG/ALB SER: 1.32 {RATIO} (ref 1.1–1.8)
PROT PATTERN SERPL ELPH-IMP: ABNORMAL
PROT SERPL-MCNC: 7.7 G/DL (ref 6.4–8.2)
RHEUMATOID FACT SER QL LA: NEGATIVE

## 2021-07-07 LAB
C-ANCA TITR SER IF: NORMAL TITER
MYELOPEROXIDASE AB SER IA-ACNC: <9 U/ML (ref 0–9)
MYELOPEROXIDASE AB SER IA-ACNC: <9 U/ML (ref 0–9)
P-ANCA ATYPICAL TITR SER IF: NORMAL TITER
P-ANCA TITR SER IF: NORMAL TITER
PROTEINASE3 AB SER IA-ACNC: <3.5 U/ML (ref 0–3.5)

## 2021-07-09 LAB
CINNAMON IGE QN: <0.1 KU/L
COCONUT IGE QN: <0.1 KU/L
GREEN PEPPER IGE QN: <0.1 KU/L
ONION IGE QN: <0.1 KU/L

## 2021-07-20 ENCOUNTER — APPOINTMENT (OUTPATIENT)
Dept: LAB | Facility: HOSPITAL | Age: 61
End: 2021-07-20
Payer: COMMERCIAL

## 2021-07-20 DIAGNOSIS — L95.8 OTHER VASCULITIS LIMITED TO THE SKIN: ICD-10-CM

## 2021-07-20 PROCEDURE — 87086 URINE CULTURE/COLONY COUNT: CPT

## 2021-07-22 LAB — BACTERIA UR CULT: NORMAL

## 2021-07-31 ENCOUNTER — APPOINTMENT (OUTPATIENT)
Dept: LAB | Facility: HOSPITAL | Age: 61
End: 2021-07-31
Payer: COMMERCIAL

## 2021-07-31 DIAGNOSIS — E11.65 UNCONTROLLED TYPE 2 DIABETES MELLITUS WITH HYPERGLYCEMIA (HCC): ICD-10-CM

## 2021-07-31 LAB
ANION GAP SERPL CALCULATED.3IONS-SCNC: 7 MMOL/L (ref 4–13)
BUN SERPL-MCNC: 15 MG/DL (ref 5–25)
CALCIUM SERPL-MCNC: 9.2 MG/DL (ref 8.3–10.1)
CHLORIDE SERPL-SCNC: 109 MMOL/L (ref 100–108)
CO2 SERPL-SCNC: 23 MMOL/L (ref 21–32)
CREAT SERPL-MCNC: 0.89 MG/DL (ref 0.6–1.3)
GFR SERPL CREATININE-BSD FRML MDRD: 71 ML/MIN/1.73SQ M
GLUCOSE P FAST SERPL-MCNC: 197 MG/DL (ref 65–99)
POTASSIUM SERPL-SCNC: 3.3 MMOL/L (ref 3.5–5.3)
SODIUM SERPL-SCNC: 139 MMOL/L (ref 136–145)

## 2021-07-31 PROCEDURE — 36415 COLL VENOUS BLD VENIPUNCTURE: CPT

## 2021-07-31 PROCEDURE — 80048 BASIC METABOLIC PNL TOTAL CA: CPT

## 2021-08-24 PROCEDURE — U0005 INFEC AGEN DETEC AMPLI PROBE: HCPCS | Performed by: INTERNAL MEDICINE

## 2021-08-24 PROCEDURE — U0003 INFECTIOUS AGENT DETECTION BY NUCLEIC ACID (DNA OR RNA); SEVERE ACUTE RESPIRATORY SYNDROME CORONAVIRUS 2 (SARS-COV-2) (CORONAVIRUS DISEASE [COVID-19]), AMPLIFIED PROBE TECHNIQUE, MAKING USE OF HIGH THROUGHPUT TECHNOLOGIES AS DESCRIBED BY CMS-2020-01-R: HCPCS | Performed by: INTERNAL MEDICINE

## 2021-09-21 ENCOUNTER — HOSPITAL ENCOUNTER (OUTPATIENT)
Dept: RADIOLOGY | Facility: HOSPITAL | Age: 61
Discharge: HOME | End: 2021-09-21
Payer: COMMERCIAL

## 2021-09-21 DIAGNOSIS — R06.02 SHORTNESS OF BREATH: ICD-10-CM

## 2021-09-21 PROCEDURE — 71046 X-RAY EXAM CHEST 2 VIEWS: CPT

## 2021-10-16 ENCOUNTER — APPOINTMENT (OUTPATIENT)
Dept: LAB | Facility: HOSPITAL | Age: 61
End: 2021-10-16
Payer: COMMERCIAL

## 2021-10-16 DIAGNOSIS — I10 ESSENTIAL HYPERTENSION, MALIGNANT: ICD-10-CM

## 2021-10-16 DIAGNOSIS — E11.9 TYPE 2 DIABETES MELLITUS WITHOUT COMPLICATION, WITHOUT LONG-TERM CURRENT USE OF INSULIN (HCC): ICD-10-CM

## 2021-10-16 DIAGNOSIS — R06.02 SHORTNESS OF BREATH: ICD-10-CM

## 2021-10-16 DIAGNOSIS — U07.1 COVID-19: ICD-10-CM

## 2021-10-16 LAB
ALBUMIN SERPL BCP-MCNC: 3.6 G/DL (ref 3.5–5)
ALP SERPL-CCNC: 85 U/L (ref 46–116)
ALT SERPL W P-5'-P-CCNC: 56 U/L (ref 12–78)
ANION GAP SERPL CALCULATED.3IONS-SCNC: 5 MMOL/L (ref 4–13)
AST SERPL W P-5'-P-CCNC: 28 U/L (ref 5–45)
BILIRUB SERPL-MCNC: 0.29 MG/DL (ref 0.2–1)
BUN SERPL-MCNC: 23 MG/DL (ref 5–25)
CALCIUM SERPL-MCNC: 9.2 MG/DL (ref 8.3–10.1)
CHLORIDE SERPL-SCNC: 108 MMOL/L (ref 100–108)
CO2 SERPL-SCNC: 26 MMOL/L (ref 21–32)
CREAT SERPL-MCNC: 0.86 MG/DL (ref 0.6–1.3)
GFR SERPL CREATININE-BSD FRML MDRD: 74 ML/MIN/1.73SQ M
GLUCOSE P FAST SERPL-MCNC: 156 MG/DL (ref 65–99)
POTASSIUM SERPL-SCNC: 3.3 MMOL/L (ref 3.5–5.3)
PROT SERPL-MCNC: 8 G/DL (ref 6.4–8.2)
SODIUM SERPL-SCNC: 139 MMOL/L (ref 136–145)

## 2021-10-16 PROCEDURE — 36415 COLL VENOUS BLD VENIPUNCTURE: CPT

## 2021-10-16 PROCEDURE — 80053 COMPREHEN METABOLIC PANEL: CPT

## 2021-10-24 ENCOUNTER — APPOINTMENT (OUTPATIENT)
Dept: LAB | Facility: HOSPITAL | Age: 61
End: 2021-10-24
Payer: COMMERCIAL

## 2021-10-24 DIAGNOSIS — E87.6 HYPOKALEMIA: ICD-10-CM

## 2021-10-24 LAB
ANION GAP SERPL CALCULATED.3IONS-SCNC: 5 MMOL/L (ref 4–13)
BUN SERPL-MCNC: 18 MG/DL (ref 5–25)
CALCIUM SERPL-MCNC: 9.8 MG/DL (ref 8.3–10.1)
CHLORIDE SERPL-SCNC: 108 MMOL/L (ref 100–108)
CO2 SERPL-SCNC: 26 MMOL/L (ref 21–32)
CREAT SERPL-MCNC: 0.87 MG/DL (ref 0.6–1.3)
GFR SERPL CREATININE-BSD FRML MDRD: 73 ML/MIN/1.73SQ M
GLUCOSE P FAST SERPL-MCNC: 147 MG/DL (ref 65–99)
POTASSIUM SERPL-SCNC: 3.6 MMOL/L (ref 3.5–5.3)
SODIUM SERPL-SCNC: 139 MMOL/L (ref 136–145)

## 2021-10-24 PROCEDURE — 36415 COLL VENOUS BLD VENIPUNCTURE: CPT

## 2021-10-24 PROCEDURE — 80048 BASIC METABOLIC PNL TOTAL CA: CPT

## 2021-10-25 ENCOUNTER — APPOINTMENT (OUTPATIENT)
Dept: LAB | Facility: HOSPITAL | Age: 61
End: 2021-10-25
Payer: COMMERCIAL

## 2021-12-12 ENCOUNTER — APPOINTMENT (OUTPATIENT)
Dept: LAB | Facility: HOSPITAL | Age: 61
End: 2021-12-12
Payer: COMMERCIAL

## 2021-12-12 DIAGNOSIS — G43.019 INTRACTABLE MIGRAINE WITHOUT AURA AND WITHOUT STATUS MIGRAINOSUS: ICD-10-CM

## 2021-12-12 DIAGNOSIS — J45.20 MILD INTERMITTENT ASTHMATIC BRONCHITIS WITHOUT COMPLICATION: ICD-10-CM

## 2021-12-12 DIAGNOSIS — E11.9 TYPE 2 DIABETES MELLITUS WITHOUT COMPLICATION, WITHOUT LONG-TERM CURRENT USE OF INSULIN (HCC): ICD-10-CM

## 2021-12-12 DIAGNOSIS — Z86.16 POST-COVID SYNDROME RESOLVED: ICD-10-CM

## 2021-12-12 DIAGNOSIS — L95.9 CUTANEOUS VASCULITIS: ICD-10-CM

## 2021-12-12 LAB
ALBUMIN SERPL BCP-MCNC: 4 G/DL (ref 3.5–5)
ALP SERPL-CCNC: 86 U/L (ref 46–116)
ALT SERPL W P-5'-P-CCNC: 47 U/L (ref 12–78)
ANION GAP SERPL CALCULATED.3IONS-SCNC: 6 MMOL/L (ref 4–13)
AST SERPL W P-5'-P-CCNC: 23 U/L (ref 5–45)
BASOPHILS # BLD AUTO: 0.03 THOUSANDS/ΜL (ref 0–0.1)
BASOPHILS NFR BLD AUTO: 0 % (ref 0–1)
BILIRUB SERPL-MCNC: 0.46 MG/DL (ref 0.2–1)
BUN SERPL-MCNC: 25 MG/DL (ref 5–25)
C3 SERPL-MCNC: 164 MG/DL (ref 90–180)
C4 SERPL-MCNC: 44 MG/DL (ref 10–40)
CALCIUM SERPL-MCNC: 9.4 MG/DL (ref 8.3–10.1)
CHLORIDE SERPL-SCNC: 108 MMOL/L (ref 100–108)
CO2 SERPL-SCNC: 24 MMOL/L (ref 21–32)
CREAT SERPL-MCNC: 0.92 MG/DL (ref 0.6–1.3)
EOSINOPHIL # BLD AUTO: 0.23 THOUSAND/ΜL (ref 0–0.61)
EOSINOPHIL NFR BLD AUTO: 3 % (ref 0–6)
ERYTHROCYTE [DISTWIDTH] IN BLOOD BY AUTOMATED COUNT: 13.6 % (ref 11.6–15.1)
ERYTHROCYTE [SEDIMENTATION RATE] IN BLOOD: 37 MM/HOUR (ref 0–29)
EST. AVERAGE GLUCOSE BLD GHB EST-MCNC: 143 MG/DL
GFR SERPL CREATININE-BSD FRML MDRD: 68 ML/MIN/1.73SQ M
GLUCOSE P FAST SERPL-MCNC: 129 MG/DL (ref 65–99)
HBA1C MFR BLD: 6.6 %
HCT VFR BLD AUTO: 46.3 % (ref 34.8–46.1)
HGB BLD-MCNC: 14.8 G/DL (ref 11.5–15.4)
IGA SERPL-MCNC: 206 MG/DL (ref 70–400)
IGG SERPL-MCNC: 1090 MG/DL (ref 700–1600)
IGM SERPL-MCNC: 206 MG/DL (ref 40–230)
IMM GRANULOCYTES # BLD AUTO: 0.01 THOUSAND/UL (ref 0–0.2)
IMM GRANULOCYTES NFR BLD AUTO: 0 % (ref 0–2)
LYMPHOCYTES # BLD AUTO: 1.51 THOUSANDS/ΜL (ref 0.6–4.47)
LYMPHOCYTES NFR BLD AUTO: 22 % (ref 14–44)
MCH RBC QN AUTO: 28.1 PG (ref 26.8–34.3)
MCHC RBC AUTO-ENTMCNC: 32 G/DL (ref 31.4–37.4)
MCV RBC AUTO: 88 FL (ref 82–98)
MONOCYTES # BLD AUTO: 0.37 THOUSAND/ΜL (ref 0.17–1.22)
MONOCYTES NFR BLD AUTO: 5 % (ref 4–12)
NEUTROPHILS # BLD AUTO: 4.75 THOUSANDS/ΜL (ref 1.85–7.62)
NEUTS SEG NFR BLD AUTO: 70 % (ref 43–75)
NRBC BLD AUTO-RTO: 0 /100 WBCS
PLATELET # BLD AUTO: 218 THOUSANDS/UL (ref 149–390)
PMV BLD AUTO: 11.1 FL (ref 8.9–12.7)
POTASSIUM SERPL-SCNC: 3.2 MMOL/L (ref 3.5–5.3)
PROT SERPL-MCNC: 8.6 G/DL (ref 6.4–8.2)
RBC # BLD AUTO: 5.27 MILLION/UL (ref 3.81–5.12)
SODIUM SERPL-SCNC: 138 MMOL/L (ref 136–145)
T4 FREE SERPL-MCNC: 0.88 NG/DL (ref 0.76–1.46)
TSH SERPL DL<=0.05 MIU/L-ACNC: 1.57 UIU/ML (ref 0.36–3.74)
WBC # BLD AUTO: 6.9 THOUSAND/UL (ref 4.31–10.16)

## 2021-12-12 PROCEDURE — 86160 COMPLEMENT ANTIGEN: CPT

## 2021-12-12 PROCEDURE — 83036 HEMOGLOBIN GLYCOSYLATED A1C: CPT

## 2021-12-12 PROCEDURE — 82784 ASSAY IGA/IGD/IGG/IGM EACH: CPT

## 2021-12-12 PROCEDURE — 86800 THYROGLOBULIN ANTIBODY: CPT

## 2021-12-12 PROCEDURE — 85025 COMPLETE CBC W/AUTO DIFF WBC: CPT

## 2021-12-12 PROCEDURE — 86376 MICROSOMAL ANTIBODY EACH: CPT

## 2021-12-12 PROCEDURE — 84165 PROTEIN E-PHORESIS SERUM: CPT

## 2021-12-12 PROCEDURE — 36415 COLL VENOUS BLD VENIPUNCTURE: CPT

## 2021-12-12 PROCEDURE — 83883 ASSAY NEPHELOMETRY NOT SPEC: CPT

## 2021-12-12 PROCEDURE — 84165 PROTEIN E-PHORESIS SERUM: CPT | Performed by: PATHOLOGY

## 2021-12-12 PROCEDURE — 86038 ANTINUCLEAR ANTIBODIES: CPT

## 2021-12-12 PROCEDURE — 84443 ASSAY THYROID STIM HORMONE: CPT

## 2021-12-12 PROCEDURE — 86255 FLUORESCENT ANTIBODY SCREEN: CPT

## 2021-12-12 PROCEDURE — 82164 ANGIOTENSIN I ENZYME TEST: CPT

## 2021-12-12 PROCEDURE — 86225 DNA ANTIBODY NATIVE: CPT

## 2021-12-12 PROCEDURE — 86235 NUCLEAR ANTIGEN ANTIBODY: CPT

## 2021-12-12 PROCEDURE — 80053 COMPREHEN METABOLIC PANEL: CPT

## 2021-12-12 PROCEDURE — 86430 RHEUMATOID FACTOR TEST QUAL: CPT

## 2021-12-12 PROCEDURE — 82595 ASSAY OF CRYOGLOBULIN: CPT

## 2021-12-12 PROCEDURE — 86200 CCP ANTIBODY: CPT

## 2021-12-12 PROCEDURE — 86162 COMPLEMENT TOTAL (CH50): CPT

## 2021-12-12 PROCEDURE — 84439 ASSAY OF FREE THYROXINE: CPT

## 2021-12-12 PROCEDURE — 85652 RBC SED RATE AUTOMATED: CPT

## 2021-12-13 LAB
ACE SERPL-CCNC: 31 U/L (ref 14–82)
ALBUMIN SERPL ELPH-MCNC: 4.6 G/DL (ref 3.5–5)
ALBUMIN SERPL ELPH-MCNC: 57.5 % (ref 52–65)
ALPHA1 GLOB SERPL ELPH-MCNC: 0.3 G/DL (ref 0.1–0.4)
ALPHA1 GLOB SERPL ELPH-MCNC: 3.7 % (ref 2.5–5)
ALPHA2 GLOB SERPL ELPH-MCNC: 0.99 G/DL (ref 0.4–1.2)
ALPHA2 GLOB SERPL ELPH-MCNC: 12.4 % (ref 7–13)
BETA GLOB ABNORMAL SERPL ELPH-MCNC: 0.54 G/DL (ref 0.4–0.8)
BETA1 GLOB SERPL ELPH-MCNC: 6.7 % (ref 5–13)
BETA2 GLOB SERPL ELPH-MCNC: 5.7 % (ref 2–8)
BETA2+GAMMA GLOB SERPL ELPH-MCNC: 0.46 G/DL (ref 0.2–0.5)
CENTROMERE B AB SER-ACNC: <0.2 AI (ref 0–0.9)
CH50 SERPL-ACNC: >60 U/ML
DSDNA AB SER-ACNC: 9 IU/ML (ref 0–9)
ENA JO1 AB SER-ACNC: <0.2 AI (ref 0–0.9)
ENA RNP AB SER-ACNC: 0.5 AI (ref 0–0.9)
ENA SCL70 AB SER-ACNC: <0.2 AI (ref 0–0.9)
ENA SM AB SER-ACNC: <0.2 AI (ref 0–0.9)
ENA SS-A AB SER-ACNC: <0.2 AI (ref 0–0.9)
ENA SS-B AB SER-ACNC: <0.2 AI (ref 0–0.9)
GAMMA GLOB ABNORMAL SERPL ELPH-MCNC: 1.12 G/DL (ref 0.5–1.6)
GAMMA GLOB SERPL ELPH-MCNC: 14 % (ref 12–22)
IGG/ALB SER: 1.35 {RATIO} (ref 1.1–1.8)
PROT PATTERN SERPL ELPH-IMP: NORMAL
PROT SERPL-MCNC: 8 G/DL (ref 6.4–8.2)
RHEUMATOID FACT SER QL LA: NEGATIVE
RYE IGE QN: NEGATIVE
THYROPEROXIDASE AB SERPL-ACNC: 14 IU/ML (ref 0–34)

## 2021-12-14 LAB
CCP AB SER IA-ACNC: 1.5
KAPPA LC FREE SER-MCNC: 19.7 MG/L (ref 3.3–19.4)
KAPPA LC FREE/LAMBDA FREE SER: 1.41 {RATIO} (ref 0.26–1.65)
LAMBDA LC FREE SERPL-MCNC: 14 MG/L (ref 5.7–26.3)
THYROGLOB AB SERPL-ACNC: <1 IU/ML (ref 0–0.9)

## 2021-12-15 LAB
C-ANCA TITR SER IF: NORMAL TITER
MYELOPEROXIDASE AB SER IA-ACNC: <9 U/ML (ref 0–9)
P-ANCA ATYPICAL TITR SER IF: NORMAL TITER
P-ANCA TITR SER IF: NORMAL TITER
PROTEINASE3 AB SER IA-ACNC: <3.5 U/ML (ref 0–3.5)

## 2021-12-17 LAB — CRYOGLOB SER QL 1D COLD INC: NORMAL

## 2022-01-21 ENCOUNTER — TELEPHONE (OUTPATIENT)
Dept: DERMATOLOGY | Facility: CLINIC | Age: 62
End: 2022-01-21

## 2022-01-21 ENCOUNTER — OFFICE VISIT (OUTPATIENT)
Dept: DERMATOLOGY | Facility: CLINIC | Age: 62
End: 2022-01-21
Payer: COMMERCIAL

## 2022-01-21 VITALS — BODY MASS INDEX: 34.06 KG/M2 | HEIGHT: 62 IN | WEIGHT: 185.1 LBS | TEMPERATURE: 97.7 F

## 2022-01-21 DIAGNOSIS — R21 RASH: Primary | ICD-10-CM

## 2022-01-21 PROCEDURE — 99204 OFFICE O/P NEW MOD 45 MIN: CPT | Performed by: DERMATOLOGY

## 2022-01-21 NOTE — TELEPHONE ENCOUNTER
yasmine recd; Hi this is [employer]  I have a 3:45 appointment today  It's come January 21st just checking to see if there's any cancellations for anytime after one today I'm at [phone]  Thank you  Returned call; offered her the 1pm slot but she stated her daugher will just be getting out of school and will not make it in time but if anyone else cancel after to let her know     Informed her a Team msg was sent out to let girls know in [place]

## 2022-01-21 NOTE — PROGRESS NOTES
[facility] Note     Patient Name: Suly Hdz  Encounter Date: 01/21/2022     Have you been cared for by a [facility] Dermatologist in the last 3 years and, if so, which one? No    · Have you traveled outside of the [address] [place] [address] in the past 3 months or outside of the [facility] in the last 2 weeks? No     May we call your Preferred Phone number to discuss your specific medical information? Yes     May we leave a detailed message that includes your specific medical information? Yes      Today's Chief Concerns:   Concern #1:  Rash       Past Medical History:  Have you personally ever had or currently have any of the following? · Skin cancer (such as Melanoma, Basal Cell Carcinoma, Squamous Cell Carcinoma? (If Yes, please provide more detail)- No  · Eczema: No  · Psoriasis: No  · HIV/AIDS: No  · Hepatitis B or C: No  · Tuberculosis: No  · Systemic Immunosuppression such as Diabetes, Biologic or Immunotherapy, Chemotherapy, Organ Transplantation, Bone Marrow Transplantation (If YES, please provide more detail): YES, diabetes   · Radiation Treatment (If YES, please provide more detail): No  · Any other major medical conditions/concerns? (If Yes, which types)- No    Social History:     What is/was your primary occupation? Employed - work from home     What are your hobbies/past-times? N/a     Family History:  Have any of your "first degree relatives" (parent, brother, sister, or child) had any of the following       · Skin cancer such as Melanoma or Merkel Cell Carcinoma or Pancreatic Cancer? No  · Eczema, Asthma, Hay Fever or Seasonal Allergies: YES, brother and father have seasonal allergies   · Psoriasis or Psoriatic Arthritis: No  · Do any other medical conditions seem to run in your family? If Yes, what condition and which relatives?   YES, brain cancer, liver cancer and diabetes     Current Medications:   (please update all dermatological medications before printing patient's AVS!)      Current Outpatient Medications:     acetaminophen (TYLENOL) 500 mg tablet, Take 500 mg by mouth every 6 (six) hours as needed for mild pain, Disp: , Rfl:     albuterol (PROVENTIL HFA,VENTOLIN HFA) 90 mcg/act inhaler, Inhale 2 puffs every 6 (six) hours as needed for wheezing, Disp: , Rfl:     ascorbic acid (VITAMIN C) 500 mg tablet, Take 500 mg by mouth daily, Disp: , Rfl:     aspirin 81 mg chewable tablet, Chew 81 mg daily, Disp: , Rfl:     B Complex-C (B-COMPLEX WITH VITAMIN C) tablet, Take 1 tablet by mouth daily, Disp: , Rfl:     Cholecalciferol (VITAMIN D3) 2000 units TABS, Take 1 tablet by mouth daily, Disp: , Rfl:     Co-Enzyme Q10 100 MG CAPS, Take 1 capsule by mouth daily, Disp: , Rfl:     dicyclomine (BENTYL) 20 mg tablet, Take 20 mg by mouth as needed  , Disp: , Rfl:     esomeprazole (NexIUM) 40 MG capsule, Take 40 mg by mouth daily, Disp: , Rfl:     HYOSCYAMINE SULFATE PO, Place 1 tablet under the tongue every 6 (six) hours as needed  , Disp: , Rfl:     indomethacin (INDOCIN) 25 mg capsule, TAKE ONE CAPSULE AT ONSET OF MIGRAINE, Disp: , Rfl: 1    Mometasone Furoate 110 MCG/INH AEPB, Inhale 1 puff daily  , Disp: , Rfl:     multivitamin-iron-minerals-folic acid (CENTRUM) chewable tablet, Chew 1 tablet daily, Disp: , Rfl:     promethazine (PHENERGAN) 25 mg tablet, Take 25 mg by mouth every 8 (eight) hours as needed for nausea or vomiting, Disp: , Rfl:     rizatriptan (MAXALT) 10 MG tablet, Take 10 mg by mouth once as needed for migraine May repeat in 2 hours if needed, Disp: , Rfl:     sodium chloride ([employer] SALINE NOSE SPRAY) 0 65 % nasal spray, 2 sprays into each nostril 2 (two) times a day, Disp: , Rfl:     topiramate (TOPAMAX) 200 MG tablet, Take 200 mg by mouth 2 (two) times a day, Disp: , Rfl:     verapamil (CALAN-SR) 120 mg CR tablet, Take 120 mg by mouth daily at bedtime, Disp: , Rfl:     zolpidem (AMBIEN CR) 6 25 MG CR tablet, Take 6 25 mg by mouth daily at bedtime as needed for sleep, Disp: , Rfl:       Review of Systems:  Have you recently had or currently have any of the following? If YES, what are you doing for the problem? · Fever, chills or unintended weight loss: No, had Covid in august   · Sudden loss or change in your vision: No  · Nausea, vomiting or blood in your stool: No nausea in August with Covid   · Painful or swollen joints: No  · Wheezing or cough: YES, with history of asthma and weather   · Changing mole or non-healing wound: No  · Nosebleeds: YES,   · Excessive sweating: YES, mostly at night off and on  · Easy or prolonged bleeding? No  · Over the last 2 weeks, how often have you been bothered by the following problems? · Taking little interest or pleasure in doing things: 1 - Not at All  · Feeling down, depressed, or hopeless: 1 - Not at All  · Rapid heartbeat with epinephrine:  No    · FEMALES ONLY:    · Are you pregnant or planning to become pregnant? No  · Are you currently or planning to be nursing or breast feeding? No    · Any known allergies? Allergies   Allergen Reactions    Other Shortness Of Breath     Nuts, coconut    Talwin [Pentazocine] Shortness Of Breath and Other (See Comments)     dizziness         Physical Exam:     Was a chaperone (Derm Clinical Assistant) present throughout the entire Physical Exam? Yes     Did the Dermatology Team specifically  the patient on the importance of a Full Skin Exam to be sure that nothing is missed clinically?  Yes}  o Did the patient ultimately request or accept a Full Skin Exam?  Yes  o Did the patient specifically refuse to have the areas "under-the-bra" examined by the Dermatologist? No  o Did the patient specifically refuse to have the areas "under-the-underwear" examined by the Dermatologist? No    CONSTITUTIONAL:   Vitals:    01/21/22 1535   Temp: 97 7 °F (36 5 °C)   TempSrc: Temporal   Weight: 84 kg (185 lb 1 6 oz)   Height: 5' 2" (1 575 m) PSYCH: Normal mood and affect  EYES: Normal conjunctiva  CARDIOVASCULAR: No edema  RESPIRATORY: Normal respirations       Assessment and Plan by Diagnosis:    History of Present Condition:     Duration:  How long has this been an issue for you?    o  Started last year January 2021   Location Affected:  Where on the body is this affecting you?    o  Arms, legs, neck and right flank     Quality:  Is there any bleeding, pain, itch, burning/irritation, or redness associated with the skin lesion? o  burning, itchy, irritation and redness    Severity:  Describe any bleeding, pain, itch, burning/irritation, or redness on a scale of 1 to 10 (with 10 being the worst)  o  0   Timing:  Does this condition seem to be there pretty constantly or do you notice it more at specific times throughout the day? o  comes and goes    Context:  Have you ever noticed that this condition seems to be associated with specific activities you do?    o  no   Modifying Factors:    o Anything that seems to make the condition worse?    -  no  o What have you tried to do to make the condition better?    -  Prednisone, triamcinolone 0 1% cream    Associated Signs and Symptoms:  Does this skin lesion seem to be associated with any of the following:  o  SL AMB DERM SIGNS AND SYMPTOMS: Redness, Itching and Scratching and Skin color changes       1  RASH    Physical Exam:   (Anatomic Location); (Size and Morphological Description); (Differential Diagnosis):  o See photos   Pertinent Positives:   Pertinent Negatives: Additional History of Present Condition:  See above for additional history of condition  Patient has had multiple testing done and skin biopsies to further diagnose possible cause of rash  Patient is using triamcinolone 0 1% cream twice a day as needed with no improvements and has tried prednisone in the past with no improvement      Assessment and Plan:  Based on a thorough discussion of this condition and the management approach to it (including a comprehensive discussion of the known risks, side effects and potential benefits of treatment), the patient (family) agrees to implement the following specific plan:   Discussed with patient that we will need to review biopsy reports and labs in depth to check any possible reasons of outbreaks   Will follow up with patient with more information   Start using prescribe betamethasone 0 1% ointment  Apply topically to affceted areas of the skin once or twice a day as needed for itch   Scanned patient's current medication list under media     Scribe Attestation    I,:  Negin Escobar MA am acting as a scribe while in the presence of the attending physician :       I,:  Adamaris Allen MD personally performed the services described in this documentation    as scribed in my presence :

## 2022-01-21 NOTE — PATIENT INSTRUCTIONS
1  RASH    Assessment and Plan:  Based on a thorough discussion of this condition and the management approach to it (including a comprehensive discussion of the known risks, side effects and potential benefits of treatment), the patient (family) agrees to implement the following specific plan:   Discussed with patient that we will need to review biopsy reports and labs in depth to check any possible reasons of outbreaks   Will follow up with patient with more information   Start using prescribe betamethasone 0 1% ointment  Apply topically to affceted areas of the skin once or twice a day as needed for itch 
Full/Upper/Lower

## 2022-02-06 ENCOUNTER — APPOINTMENT (OUTPATIENT)
Dept: LAB | Facility: HOSPITAL | Age: 62
End: 2022-02-06
Payer: COMMERCIAL

## 2022-02-06 DIAGNOSIS — G43.801 OTHER MIGRAINE WITH STATUS MIGRAINOSUS, NOT INTRACTABLE: ICD-10-CM

## 2022-02-06 LAB
APTT PPP: 34 SECONDS (ref 23–37)
BASOPHILS # BLD AUTO: 0.04 THOUSANDS/ΜL (ref 0–0.1)
BASOPHILS NFR BLD AUTO: 1 % (ref 0–1)
EOSINOPHIL # BLD AUTO: 0.28 THOUSAND/ΜL (ref 0–0.61)
EOSINOPHIL NFR BLD AUTO: 4 % (ref 0–6)
ERYTHROCYTE [DISTWIDTH] IN BLOOD BY AUTOMATED COUNT: 14.2 % (ref 11.6–15.1)
HCT VFR BLD AUTO: 41.9 % (ref 34.8–46.1)
HGB BLD-MCNC: 13.7 G/DL (ref 11.5–15.4)
IMM GRANULOCYTES # BLD AUTO: 0.02 THOUSAND/UL (ref 0–0.2)
IMM GRANULOCYTES NFR BLD AUTO: 0 % (ref 0–2)
INR PPP: 1.03 (ref 0.84–1.19)
LYMPHOCYTES # BLD AUTO: 1.81 THOUSANDS/ΜL (ref 0.6–4.47)
LYMPHOCYTES NFR BLD AUTO: 24 % (ref 14–44)
MCH RBC QN AUTO: 28.7 PG (ref 26.8–34.3)
MCHC RBC AUTO-ENTMCNC: 32.7 G/DL (ref 31.4–37.4)
MCV RBC AUTO: 88 FL (ref 82–98)
MONOCYTES # BLD AUTO: 0.47 THOUSAND/ΜL (ref 0.17–1.22)
MONOCYTES NFR BLD AUTO: 6 % (ref 4–12)
NEUTROPHILS # BLD AUTO: 4.87 THOUSANDS/ΜL (ref 1.85–7.62)
NEUTS SEG NFR BLD AUTO: 65 % (ref 43–75)
NRBC BLD AUTO-RTO: 0 /100 WBCS
PLATELET # BLD AUTO: 218 THOUSANDS/UL (ref 149–390)
PMV BLD AUTO: 10.7 FL (ref 8.9–12.7)
PROTHROMBIN TIME: 13.1 SECONDS (ref 11.6–14.5)
RBC # BLD AUTO: 4.77 MILLION/UL (ref 3.81–5.12)
WBC # BLD AUTO: 7.49 THOUSAND/UL (ref 4.31–10.16)

## 2022-02-06 PROCEDURE — 85610 PROTHROMBIN TIME: CPT

## 2022-02-06 PROCEDURE — 36415 COLL VENOUS BLD VENIPUNCTURE: CPT

## 2022-02-06 PROCEDURE — 85730 THROMBOPLASTIN TIME PARTIAL: CPT

## 2022-02-06 PROCEDURE — 85025 COMPLETE CBC W/AUTO DIFF WBC: CPT

## 2022-02-19 ENCOUNTER — IMMUNIZATIONS (OUTPATIENT)
Dept: FAMILY MEDICINE CLINIC | Facility: HOSPITAL | Age: 62
End: 2022-02-19

## 2022-02-19 PROCEDURE — 0051A COVID-19 PFIZER VACC TRIS-SUCROSE GRAY CAP 0.3 ML: CPT

## 2022-02-19 PROCEDURE — 91305 COVID-19 PFIZER VACC TRIS-SUCROSE GRAY CAP 0.3 ML: CPT

## 2022-04-08 ENCOUNTER — TELEPHONE (OUTPATIENT)
Dept: DERMATOLOGY | Facility: CLINIC | Age: 62
End: 2022-04-08

## 2022-05-08 ENCOUNTER — APPOINTMENT (OUTPATIENT)
Dept: LAB | Facility: HOSPITAL | Age: 62
End: 2022-05-08
Payer: COMMERCIAL

## 2022-05-08 DIAGNOSIS — E11.9 DIABETES MELLITUS WITHOUT COMPLICATION (HCC): ICD-10-CM

## 2022-05-08 DIAGNOSIS — E78.5 HYPERLIPIDEMIA, UNSPECIFIED HYPERLIPIDEMIA TYPE: ICD-10-CM

## 2022-05-08 LAB
ALBUMIN SERPL BCP-MCNC: 3.6 G/DL (ref 3.5–5)
ALP SERPL-CCNC: 81 U/L (ref 46–116)
ALT SERPL W P-5'-P-CCNC: 42 U/L (ref 12–78)
ANION GAP SERPL CALCULATED.3IONS-SCNC: 4 MMOL/L (ref 4–13)
AST SERPL W P-5'-P-CCNC: 17 U/L (ref 5–45)
BILIRUB SERPL-MCNC: 0.26 MG/DL (ref 0.2–1)
BUN SERPL-MCNC: 27 MG/DL (ref 5–25)
CALCIUM SERPL-MCNC: 9.8 MG/DL (ref 8.3–10.1)
CHLORIDE SERPL-SCNC: 108 MMOL/L (ref 100–108)
CO2 SERPL-SCNC: 27 MMOL/L (ref 21–32)
CREAT SERPL-MCNC: 1.07 MG/DL (ref 0.6–1.3)
EST. AVERAGE GLUCOSE BLD GHB EST-MCNC: 148 MG/DL
GFR SERPL CREATININE-BSD FRML MDRD: 56 ML/MIN/1.73SQ M
GLUCOSE P FAST SERPL-MCNC: 157 MG/DL (ref 65–99)
HBA1C MFR BLD: 6.8 %
LDLC SERPL DIRECT ASSAY-MCNC: 190 MG/DL (ref 0–100)
POTASSIUM SERPL-SCNC: 3.8 MMOL/L (ref 3.5–5.3)
PROT SERPL-MCNC: 8 G/DL (ref 6.4–8.2)
SODIUM SERPL-SCNC: 139 MMOL/L (ref 136–145)

## 2022-05-08 PROCEDURE — 80053 COMPREHEN METABOLIC PANEL: CPT

## 2022-05-08 PROCEDURE — 83036 HEMOGLOBIN GLYCOSYLATED A1C: CPT

## 2022-05-08 PROCEDURE — 36415 COLL VENOUS BLD VENIPUNCTURE: CPT

## 2022-05-08 PROCEDURE — 83721 ASSAY OF BLOOD LIPOPROTEIN: CPT

## 2022-05-16 ENCOUNTER — OFFICE VISIT (OUTPATIENT)
Dept: DERMATOLOGY | Facility: CLINIC | Age: 62
End: 2022-05-16
Payer: COMMERCIAL

## 2022-05-16 VITALS — BODY MASS INDEX: 34.81 KG/M2 | HEIGHT: 62 IN | WEIGHT: 189.15 LBS | TEMPERATURE: 97.4 F

## 2022-05-16 DIAGNOSIS — R21 RASH: Primary | ICD-10-CM

## 2022-05-16 PROCEDURE — 99213 OFFICE O/P EST LOW 20 MIN: CPT | Performed by: DERMATOLOGY

## 2022-05-16 NOTE — PROGRESS NOTES
[facility] Follow Up Note    Patient Name: Moiz Neal  Encounter Date: 5/16/2022    Today's Chief Concerns:  Andrés Brown Concern #1:  Rash - follow up       Current Medications:    Current Outpatient Medications:     acetaminophen (TYLENOL) 500 mg tablet, Take 500 mg by mouth every 6 (six) hours as needed for mild pain, Disp: , Rfl:     albuterol (PROVENTIL HFA,VENTOLIN HFA) 90 mcg/act inhaler, Inhale 2 puffs every 6 (six) hours as needed for wheezing, Disp: , Rfl:     ascorbic acid (VITAMIN C) 500 mg tablet, Take 500 mg by mouth daily, Disp: , Rfl:     B Complex-C (B-COMPLEX WITH VITAMIN C) tablet, Take 1 tablet by mouth daily, Disp: , Rfl:     Cholecalciferol (VITAMIN D3) 2000 units TABS, Take 1 tablet by mouth daily, Disp: , Rfl:     Co-Enzyme Q10 100 MG CAPS, Take 1 capsule by mouth daily, Disp: , Rfl:     dicyclomine (BENTYL) 20 mg tablet, Take 20 mg by mouth as needed  , Disp: , Rfl:     esomeprazole (NexIUM) 40 MG capsule, Take 40 mg by mouth daily, Disp: , Rfl:     HYOSCYAMINE SULFATE PO, Place 1 tablet under the tongue every 6 (six) hours as needed  , Disp: , Rfl:     multivitamin-iron-minerals-folic acid (CENTRUM) chewable tablet, Chew 1 tablet daily, Disp: , Rfl:     promethazine (PHENERGAN) 25 mg tablet, Take 25 mg by mouth every 8 (eight) hours as needed for nausea or vomiting, Disp: , Rfl:     topiramate (TOPAMAX) 200 MG tablet, Take 200 mg by mouth 2 (two) times a day, Disp: , Rfl:     verapamil (CALAN-SR) 120 mg CR tablet, Take 120 mg by mouth daily at bedtime, Disp: , Rfl:     aspirin 81 mg chewable tablet, Chew 81 mg daily (Patient not taking: Reported on 1/21/2022 ), Disp: , Rfl:     betamethasone valerate (VALISONE) 0 1 % ointment, Apply topically once or twice a day to affected itchy areas of the skin for up to 2 weeks  Avoid face, eyes, body folds   (Patient not taking: Reported on 5/16/2022), Disp: 90 g, Rfl: 0    indomethacin (INDOCIN) 25 mg capsule, TAKE ONE CAPSULE AT ONSET OF MIGRAINE (Patient not taking: No sig reported), Disp: , Rfl: 1    Mometasone Furoate 110 MCG/INH AEPB, Inhale 1 puff daily   (Patient not taking: No sig reported), Disp: , Rfl:     rizatriptan (MAXALT) 10 MG tablet, Take 10 mg by mouth once as needed for migraine May repeat in 2 hours if needed (Patient not taking: No sig reported), Disp: , Rfl:     sodium chloride (OCEAN) 0 65 % nasal spray, 2 sprays into each nostril 2 (two) times a day (Patient not taking: Reported on 5/16/2022), Disp: , Rfl:     zolpidem (AMBIEN CR) 6 25 MG CR tablet, Take 6 25 mg by mouth daily at bedtime as needed for sleep (Patient not taking: No sig reported), Disp: , Rfl:     CONSTITUTIONAL:   Vitals:    05/16/22 1416   Temp: (!) 97 4 °F (36 3 °C)   TempSrc: Temporal   Weight: 85 8 kg (189 lb 2 5 oz)   Height: 5' 2" (1 575 m)          Specific Alerts:    Have you been seen by a [facility] Dermatologist in the last 3 years? YES    Are you pregnant or planning to become pregnant? No    Are you currently or planning to be nursing or breast feeding? No    Allergies   Allergen Reactions    Other Shortness Of Breath     Nuts, coconut    Talwin [Pentazocine] Shortness Of Breath and Other (See Comments)     dizziness       May we call your Preferred Phone number to discuss your specific medical information? YES    May we leave a detailed message that includes your specific medical information? YES    Have you traveled outside of the [facility] in the past 3 months? No    Do you currently have a pacemaker or defibrillator? No    Do you have any artificial heart valves, joints, plates, screws, rods, stents, pins, etc? YES, right knee replacement 5+ years ago    - If Yes, were any placed within the last 2 years? Do you require any medications prior to a surgical procedure? YES   - If Yes, for which procedure? Dental procedure    - If Yes, what medications to you require?  Cephalexin 500mg (4x)     Are you taking any medications that cause you to bleed more easily ("blood thinners") No    Have you ever experienced a rapid heartbeat with epinephrine? No    Have you ever been treated with "gold" (gold sodium thiomalate) therapy? No    [facility] can help with wrinkles, "laugh lines," facial volume loss, "double chin," "love handles," age spots, and more  Are you interested in learning today about some of the skin enhancement procedures that we offer? (If Yes, please provide more detail) No    Review of Systems:  Have you recently had or currently have any of the following? · Fever or chills: No  · Night Sweats: No  · Headaches: YES, migraines   · Weight Gain: No  · Weight Loss: No  · Blurry Vision: YES  · Nausea: YES  · Vomiting: No  · Diarrhea: YES  · Blood in Stool: No  · Abdominal Pain: No  · Itchy Skin: YES  · Painful Joints: No  · Swollen Joints: No  · Muscle Pain: No  · Irregular Mole: No  · Sun Burn: No  · Dry Skin: No  · Skin Color Changes: No  · Scar or Keloid: No  · Cold Sores/Fever Blisters: No  · Bacterial Infections/MRSA: No  · Anxiety: No  · Depression: No  · Suicidal or Homicidal Thoughts: No      PSYCH: Normal mood and affect  EYES: Normal conjunctiva  ENT: Normal lips and oral mucosa  CARDIOVASCULAR: No edema  RESPIRATORY: Normal respirations  HEME/LYMPH/IMMUNO:  No regional lymphadenopathy except as noted below in ASSESSMENT AND PLAN BY DIAGNOSIS    FULL ORGAN SYSTEM SKIN EXAM (SKIN)   Hair, Scalp, Ears, Face Normal except as noted below in Assessment   Neck Normal except as noted below in Assessment   Right Arm/Hand/Fingers Normal except as noted below in Assessment   Left Arm/Hand/Fingers Normal except as noted below in Assessment   Right Leg, Foot, Toes Normal except as noted below in Assessment   Left Leg, Foot, Toes Normal except as noted below in Assessment       1   RASH    Physical Exam:   (Anatomic Location); (Size and Morphological Description); (Differential Diagnosis):  o Arms and legs  See photos below   Pertinent Positives:   Pertinent Negatives: Additional History of Present Condition:  Present for over a year now and is currently using triamcinolone 0 1% cream  Patient was prescribed betamethasone 0 1% ointment and oral prednisone with no improvements  Assessment and Plan:  Based on a thorough discussion of this condition and the management approach to it (including a comprehensive discussion of the known risks, side effects and potential benefits of treatment), the patient (family) agrees to implement the following specific plan:   Today's presentation looks like a combination of sparse resolving inactive LCV of legs, with superimposed dermatophytosis of LLE (with onychomycosis)  To consider oral terbinafine   Biopsy findings reviewed and discussed   Start using prescribed ciclopirox 0 77% cream  Apply topically twice a day to affected areas of the skin for 4 weeks straight   Will upload patients record onto chart for medical history of blood work history etc    Follow up in one month for check up    Scribe Attestation    I,:  Uzair Giang MA am acting as a scribe while in the presence of the attending physician :       I,:  Ricarda Montiel MD personally performed the services described in this documentation    as scribed in my presence :

## 2022-05-16 NOTE — PATIENT INSTRUCTIONS
1  RASH      Assessment and Plan:  Based on a thorough discussion of this condition and the management approach to it (including a comprehensive discussion of the known risks, side effects and potential benefits of treatment), the patient (family) agrees to implement the following specific plan:  Start using prescribed ciclopirox 0 77% cream  Apply topically twice a day to affected areas of the skin for 4 weeks straight  Will upload patients record onto chart for medical history of blood work history etc   Follow up in one month for check up

## 2022-06-22 ENCOUNTER — OFFICE VISIT (OUTPATIENT)
Dept: DERMATOLOGY | Facility: CLINIC | Age: 62
End: 2022-06-22
Payer: COMMERCIAL

## 2022-06-22 VITALS — BODY MASS INDEX: 34.75 KG/M2 | TEMPERATURE: 97.4 F | WEIGHT: 190 LBS

## 2022-06-22 DIAGNOSIS — R21 RASH: Primary | ICD-10-CM

## 2022-06-22 PROCEDURE — 99213 OFFICE O/P EST LOW 20 MIN: CPT | Performed by: DERMATOLOGY

## 2022-06-22 NOTE — PATIENT INSTRUCTIONS
"    1  RASH              Assessment and Plan:  Based on a thorough discussion of this condition and the management approach to it (including a comprehensive discussion of the known risks, side effects and potential benefits of treatment), the patient (family) agrees to implement the following specific plan:  Reviewed labs with patient today  Only use the ciclopirox 0 77% cream on a as needed bases  Recommend patient to follow up if rash worsens    "

## 2022-06-22 NOTE — PROGRESS NOTES
[facility] Follow Up Note    Patient Name: Gabbi Johnson  Encounter Date: 6/22/2022    Today's Chief Concerns:  Ameena Lane Concern #1:  Follow up rash       Current Medications:    Current Outpatient Medications:     acetaminophen (TYLENOL) 500 mg tablet, Take 500 mg by mouth every 6 (six) hours as needed for mild pain, Disp: , Rfl:     albuterol (PROVENTIL HFA,VENTOLIN HFA) 90 mcg/act inhaler, Inhale 2 puffs every 6 (six) hours as needed for wheezing, Disp: , Rfl:     ascorbic acid (VITAMIN C) 500 mg tablet, Take 500 mg by mouth daily, Disp: , Rfl:     B Complex-C (B-COMPLEX WITH VITAMIN C) tablet, Take 1 tablet by mouth daily, Disp: , Rfl:     Cholecalciferol (VITAMIN D3) 2000 units TABS, Take 1 tablet by mouth daily, Disp: , Rfl:     ciclopirox (LOPROX) 0 77 % cream, Apply topically 2 (two) times a day To affected areas of rash for 4 weeks straight , Disp: 90 g, Rfl: 0    Co-Enzyme Q10 100 MG CAPS, Take 1 capsule by mouth daily, Disp: , Rfl:     dicyclomine (BENTYL) 20 mg tablet, Take 20 mg by mouth as needed  , Disp: , Rfl:     esomeprazole (NexIUM) 40 MG capsule, Take 40 mg by mouth daily, Disp: , Rfl:     HYOSCYAMINE SULFATE PO, Place 1 tablet under the tongue every 6 (six) hours as needed  , Disp: , Rfl:     multivitamin-iron-minerals-folic acid (CENTRUM) chewable tablet, Chew 1 tablet daily, Disp: , Rfl:     sodium chloride (OCEAN) 0 65 % nasal spray, 2 sprays into each nostril 2 (two) times a day, Disp: , Rfl:     topiramate (TOPAMAX) 200 MG tablet, Take 200 mg by mouth 2 (two) times a day, Disp: , Rfl:     verapamil (CALAN-SR) 120 mg CR tablet, Take 120 mg by mouth daily at bedtime, Disp: , Rfl:     aspirin 81 mg chewable tablet, Chew 81 mg daily (Patient not taking: No sig reported), Disp: , Rfl:     betamethasone valerate (VALISONE) 0 1 % ointment, Apply topically once or twice a day to affected itchy areas of the skin for up to 2 weeks  Avoid face, eyes, body folds  (Patient not taking: Reported on 6/22/2022), Disp: 90 g, Rfl: 0    indomethacin (INDOCIN) 25 mg capsule, TAKE ONE CAPSULE AT ONSET OF MIGRAINE (Patient not taking: No sig reported), Disp: , Rfl: 1    Mometasone Furoate 110 MCG/INH AEPB, Inhale 1 puff daily   (Patient not taking: No sig reported), Disp: , Rfl:     promethazine (PHENERGAN) 25 mg tablet, Take 25 mg by mouth every 8 (eight) hours as needed for nausea or vomiting (Patient not taking: Reported on 6/22/2022), Disp: , Rfl:     rizatriptan (MAXALT) 10 MG tablet, Take 10 mg by mouth once as needed for migraine May repeat in 2 hours if needed (Patient not taking: No sig reported), Disp: , Rfl:     zolpidem (AMBIEN CR) 6 25 MG CR tablet, Take 6 25 mg by mouth daily at bedtime as needed for sleep (Patient not taking: No sig reported), Disp: , Rfl:     CONSTITUTIONAL:   Vitals:    06/22/22 1536   Temp: (!) 97 4 °F (36 3 °C)   TempSrc: Temporal   Weight: 86 2 kg (190 lb)             Specific Alerts:    Have you been seen by a [facility] Dermatologist in the last 3 years? YES    Are you pregnant or planning to become pregnant? No    Are you currently or planning to be nursing or breast feeding? No    Allergies   Allergen Reactions    Other Shortness Of Breath     Nuts, coconut    Talwin [Pentazocine] Shortness Of Breath and Other (See Comments)     dizziness       May we call your Preferred Phone number to discuss your specific medical information? YES    May we leave a detailed message that includes your specific medical information? YES    Have you traveled outside of the [facility] in the past 3 months? No    Do you currently have a pacemaker or defibrillator? No    Do you have any artificial heart valves, joints, plates, screws, rods, stents, pins, etc? YES   - If Yes, were any placed within the last 2 years? Right knee replacement     Do you require any medications prior to a surgical procedure? YES   - If Yes, for which procedure? Dental    - If Yes, what medications to you require? Oral antibiotics     Are you taking any medications that cause you to bleed more easily ("blood thinners") No    Have you ever experienced a rapid heartbeat with epinephrine? No    Have you ever been treated with "gold" (gold sodium thiomalate) therapy? No    [facility] can help with wrinkles, "laugh lines," facial volume loss, "double chin," "love handles," age spots, and more  Are you interested in learning today about some of the skin enhancement procedures that we offer? (If Yes, please provide more detail) No    Review of Systems:  Have you recently had or currently have any of the following? · Fever or chills: No  · Night Sweats: No  · Headaches: No  · Weight Gain: No  · Weight Loss: No  · Blurry Vision: YES, only with migraines   · Nausea: YES, only with migraines   · Vomiting: No  · Diarrhea: YES, history of ibs   · Blood in Stool: No  · Abdominal Pain: YES  · Itchy Skin: YES, occassionally   · Painful Joints: YES  · Swollen Joints: YES  · Muscle Pain: YES  · Irregular Mole: No  · Sun Burn: No  · Dry Skin: No  · Skin Color Changes: No  · Scar or Keloid: No  · Cold Sores/Fever Blisters: No  · Bacterial Infections/MRSA: No  · Anxiety: No  · Depression: No  · Suicidal or Homicidal Thoughts: No      PSYCH: Normal mood and affect  EYES: Normal conjunctiva  ENT: Normal lips and oral mucosa  CARDIOVASCULAR: No edema  RESPIRATORY: Normal respirations  HEME/LYMPH/IMMUNO:  No regional lymphadenopathy except as noted below in ASSESSMENT AND PLAN BY DIAGNOSIS      Right Leg, Foot, Toes Normal except as noted below in Assessment   Left Leg, Foot, Toes Normal except as noted below in Assessment             1  RASH    Physical Exam:   (Anatomic Location); (Size and Morphological Description); (Differential Diagnosis):  o Lower legs and arms; Much improved since previous visit   Pertinent Positives:   Pertinent Negatives:           Additional History of Present Condition:  Patient is currently using prescribed ciclopirox 0 77% cream  Patient notes improvements since last visit  Patient notes that the red spots that are still present are painful when they're touched, much improved but still hurts when present  Assessment and Plan:  Based on a thorough discussion of this condition and the management approach to it (including a comprehensive discussion of the known risks, side effects and potential benefits of treatment), the patient (family) agrees to implement the following specific plan:   Reviewed labs with patient today   Only use the ciclopirox 0 77% cream on a as needed bases   Recommend patient to follow up if rash worsens      Scribe Attestation    I,:  Uzair Giang MA am acting as a scribe while in the presence of the attending physician :       I,:  Ricarda Montiel MD personally performed the services described in this documentation    as scribed in my presence :

## 2022-07-30 ENCOUNTER — APPOINTMENT (OUTPATIENT)
Dept: LAB | Facility: HOSPITAL | Age: 62
End: 2022-07-30
Payer: COMMERCIAL

## 2022-07-30 DIAGNOSIS — E11.69 TYPE 2 DIABETES MELLITUS WITH OTHER SPECIFIED COMPLICATION, WITHOUT LONG-TERM CURRENT USE OF INSULIN (HCC): ICD-10-CM

## 2022-07-30 LAB
ALBUMIN SERPL BCP-MCNC: 3.7 G/DL (ref 3.5–5)
ALP SERPL-CCNC: 81 U/L (ref 46–116)
ALT SERPL W P-5'-P-CCNC: 45 U/L (ref 12–78)
ANION GAP SERPL CALCULATED.3IONS-SCNC: 5 MMOL/L (ref 4–13)
AST SERPL W P-5'-P-CCNC: 18 U/L (ref 5–45)
BILIRUB SERPL-MCNC: 0.26 MG/DL (ref 0.2–1)
BUN SERPL-MCNC: 20 MG/DL (ref 5–25)
CALCIUM SERPL-MCNC: 9.3 MG/DL (ref 8.3–10.1)
CHLORIDE SERPL-SCNC: 109 MMOL/L (ref 96–108)
CO2 SERPL-SCNC: 25 MMOL/L (ref 21–32)
CREAT SERPL-MCNC: 1.02 MG/DL (ref 0.6–1.3)
GFR SERPL CREATININE-BSD FRML MDRD: 59 ML/MIN/1.73SQ M
GLUCOSE P FAST SERPL-MCNC: 163 MG/DL (ref 65–99)
LDLC SERPL DIRECT ASSAY-MCNC: 164 MG/DL (ref 0–100)
POTASSIUM SERPL-SCNC: 3.9 MMOL/L (ref 3.5–5.3)
PROT SERPL-MCNC: 7.9 G/DL (ref 6.4–8.4)
SODIUM SERPL-SCNC: 139 MMOL/L (ref 135–147)

## 2022-07-30 PROCEDURE — 83721 ASSAY OF BLOOD LIPOPROTEIN: CPT

## 2022-07-30 PROCEDURE — 80053 COMPREHEN METABOLIC PANEL: CPT

## 2022-07-30 PROCEDURE — 36415 COLL VENOUS BLD VENIPUNCTURE: CPT

## 2022-08-04 ENCOUNTER — APPOINTMENT (EMERGENCY)
Dept: RADIOLOGY | Facility: HOSPITAL | Age: 62
End: 2022-08-04
Payer: COMMERCIAL

## 2022-08-04 ENCOUNTER — HOSPITAL ENCOUNTER (EMERGENCY)
Facility: HOSPITAL | Age: 62
Discharge: HOME | End: 2022-08-04
Attending: EMERGENCY MEDICINE
Payer: COMMERCIAL

## 2022-08-04 ENCOUNTER — TELEPHONE (OUTPATIENT)
Dept: DERMATOLOGY | Age: 62
End: 2022-08-04

## 2022-08-04 VITALS
HEART RATE: 90 BPM | RESPIRATION RATE: 18 BRPM | OXYGEN SATURATION: 96 % | DIASTOLIC BLOOD PRESSURE: 66 MMHG | TEMPERATURE: 98 F | SYSTOLIC BLOOD PRESSURE: 142 MMHG

## 2022-08-04 DIAGNOSIS — R20.0 LEFT ARM NUMBNESS: Primary | ICD-10-CM

## 2022-08-04 LAB
2HR DELTA HS TROPONIN: 2 NG/L
ANION GAP SERPL CALCULATED.3IONS-SCNC: 9 MMOL/L (ref 4–13)
ATRIAL RATE: 102 BPM
BASOPHILS # BLD AUTO: 0.04 THOUSANDS/ΜL (ref 0–0.1)
BASOPHILS NFR BLD AUTO: 1 % (ref 0–1)
BUN SERPL-MCNC: 18 MG/DL (ref 5–25)
CALCIUM SERPL-MCNC: 9.5 MG/DL (ref 8.3–10.1)
CARDIAC TROPONIN I PNL SERPL HS: 2 NG/L
CARDIAC TROPONIN I PNL SERPL HS: 4 NG/L
CHLORIDE SERPL-SCNC: 107 MMOL/L (ref 96–108)
CO2 SERPL-SCNC: 21 MMOL/L (ref 21–32)
CREAT SERPL-MCNC: 1 MG/DL (ref 0.6–1.3)
EOSINOPHIL # BLD AUTO: 0.16 THOUSAND/ΜL (ref 0–0.61)
EOSINOPHIL NFR BLD AUTO: 2 % (ref 0–6)
ERYTHROCYTE [DISTWIDTH] IN BLOOD BY AUTOMATED COUNT: 13.5 % (ref 11.6–15.1)
GFR SERPL CREATININE-BSD FRML MDRD: 60 ML/MIN/1.73SQ M
GLUCOSE SERPL-MCNC: 141 MG/DL (ref 65–140)
HCT VFR BLD AUTO: 44.6 % (ref 34.8–46.1)
HGB BLD-MCNC: 14.1 G/DL (ref 11.5–15.4)
IMM GRANULOCYTES # BLD AUTO: 0.02 THOUSAND/UL (ref 0–0.2)
IMM GRANULOCYTES NFR BLD AUTO: 0 % (ref 0–2)
LYMPHOCYTES # BLD AUTO: 1.75 THOUSANDS/ΜL (ref 0.6–4.47)
LYMPHOCYTES NFR BLD AUTO: 20 % (ref 14–44)
MCH RBC QN AUTO: 28 PG (ref 26.8–34.3)
MCHC RBC AUTO-ENTMCNC: 31.6 G/DL (ref 31.4–37.4)
MCV RBC AUTO: 89 FL (ref 82–98)
MONOCYTES # BLD AUTO: 0.47 THOUSAND/ΜL (ref 0.17–1.22)
MONOCYTES NFR BLD AUTO: 6 % (ref 4–12)
NEUTROPHILS # BLD AUTO: 6.14 THOUSANDS/ΜL (ref 1.85–7.62)
NEUTS SEG NFR BLD AUTO: 71 % (ref 43–75)
NRBC BLD AUTO-RTO: 0 /100 WBCS
P AXIS: 70 DEGREES
PLATELET # BLD AUTO: 213 THOUSANDS/UL (ref 149–390)
PMV BLD AUTO: 11.2 FL (ref 8.9–12.7)
POTASSIUM SERPL-SCNC: 3.4 MMOL/L (ref 3.5–5.3)
PR INTERVAL: 138 MS
QRS AXIS: -14 DEGREES
QRSD INTERVAL: 80 MS
QT INTERVAL: 338 MS
QTC INTERVAL: 440 MS
RBC # BLD AUTO: 5.04 MILLION/UL (ref 3.81–5.12)
SODIUM SERPL-SCNC: 137 MMOL/L (ref 135–147)
T WAVE AXIS: 54 DEGREES
VENTRICULAR RATE: 102 BPM
WBC # BLD AUTO: 8.58 THOUSAND/UL (ref 4.31–10.16)

## 2022-08-04 PROCEDURE — 96375 TX/PRO/DX INJ NEW DRUG ADDON: CPT

## 2022-08-04 PROCEDURE — 85025 COMPLETE CBC W/AUTO DIFF WBC: CPT | Performed by: EMERGENCY MEDICINE

## 2022-08-04 PROCEDURE — 93010 ELECTROCARDIOGRAM REPORT: CPT | Performed by: INTERNAL MEDICINE

## 2022-08-04 PROCEDURE — 80048 BASIC METABOLIC PNL TOTAL CA: CPT | Performed by: EMERGENCY MEDICINE

## 2022-08-04 PROCEDURE — 36415 COLL VENOUS BLD VENIPUNCTURE: CPT | Performed by: EMERGENCY MEDICINE

## 2022-08-04 PROCEDURE — 99285 EMERGENCY DEPT VISIT HI MDM: CPT

## 2022-08-04 PROCEDURE — 93005 ELECTROCARDIOGRAM TRACING: CPT

## 2022-08-04 PROCEDURE — 71046 X-RAY EXAM CHEST 2 VIEWS: CPT

## 2022-08-04 PROCEDURE — 99285 EMERGENCY DEPT VISIT HI MDM: CPT | Performed by: EMERGENCY MEDICINE

## 2022-08-04 PROCEDURE — 96374 THER/PROPH/DIAG INJ IV PUSH: CPT

## 2022-08-04 PROCEDURE — 84484 ASSAY OF TROPONIN QUANT: CPT | Performed by: EMERGENCY MEDICINE

## 2022-08-04 RX ORDER — ONDANSETRON 2 MG/ML
4 INJECTION INTRAMUSCULAR; INTRAVENOUS ONCE
Status: COMPLETED | OUTPATIENT
Start: 2022-08-04 | End: 2022-08-04

## 2022-08-04 RX ORDER — KETOROLAC TROMETHAMINE 30 MG/ML
15 INJECTION, SOLUTION INTRAMUSCULAR; INTRAVENOUS ONCE
Status: COMPLETED | OUTPATIENT
Start: 2022-08-04 | End: 2022-08-04

## 2022-08-04 RX ORDER — ASPIRIN 81 MG/1
324 TABLET, CHEWABLE ORAL ONCE
Status: COMPLETED | OUTPATIENT
Start: 2022-08-04 | End: 2022-08-04

## 2022-08-04 RX ADMIN — ASPIRIN 81 MG CHEWABLE TABLET 324 MG: 81 TABLET CHEWABLE at 13:50

## 2022-08-04 RX ADMIN — KETOROLAC TROMETHAMINE 15 MG: 30 INJECTION, SOLUTION INTRAMUSCULAR; INTRAVENOUS at 15:38

## 2022-08-04 RX ADMIN — ONDANSETRON HYDROCHLORIDE 4 MG: 2 INJECTION, SOLUTION INTRAMUSCULAR; INTRAVENOUS at 13:50

## 2022-08-04 NOTE — TELEPHONE ENCOUNTER
called stating patient was admitted into hospital  Unable to make it to tomorrow appt   Will call back to reschedule

## 2022-08-04 NOTE — DISCHARGE INSTRUCTIONS
You were seen in the ED today for your symptoms  At this time I would follow up with your PCP for further work up of your left arm numbness  Please return to the ED if you have chest pain or shortness of breath, or if you have stroke like symptoms such as facial droop, slurred speech or if you have weakness to one side of your body (arms or legs)

## 2022-08-04 NOTE — ED ATTENDING ATTESTATION
8/4/2022  Kristen HINTON [id number], DO, saw and evaluated the patient  I have discussed the patient with the resident/non-physician practitioner and agree with the resident's/non-physician practitioner's findings, Plan of Care, and MDM as documented in the resident's/non-physician practitioner's note, except where noted  All available labs and Radiology studies were reviewed  I was present for key portions of any procedure(s) performed by the resident/non-physician practitioner and I was immediately available to provide assistance  At this point I agree with the current assessment done in the Emergency Department  I have conducted an independent evaluation of this patient a history and physical is as follows:    Patient is a 70-year-old female, says about 11:30 a m  This morning while working from home she had a feeling of pins and needles sensation down her entire left arm, no difficulty speaking, thinking, or concentrating and no weakness  Then about noon she began having some mild shortness of breath  Initially no chest pain or palpitations  No pain anywhere  After being seen by the ED resident, about 1:00 p m  Today, she says that she has developed some mild chest pressure, and some slight nausea which was not there earlier  The chest pressure is just mildly constant, mild in severity, does not radiate anywhere, no associated diaphoresis, worse with nothing and better with nothing, not pleuritic in nature, not knife-like, ripping, or tearing  She has never had this happen before  No DVT risks  General:  Patient is well-appearing  Head:  Atraumatic  Eyes:  Conjunctiva pink, Extraocular muscle intact, PERRL  ENT:  Mucous membranes are moist  Neck:  Supple  Cardiac:  S1-S2, without murmurs  Lungs:  Clear to auscultation bilaterally  Abdomen:  Soft, nontender, normal bowel sounds, no CVA tenderness, no tympany, no rigidity, no guarding  Extremities:  Normal range of motion, no pedal edema or calf asymmetry  Radial pulses equal symmetric bilaterally  No warmth or redness or swelling to either arm  Neurologic:  Awake, fluent speech, normal comprehension  AAOx3  Cranial nerves 2-12 are intact, strength is 5/5 in the bilateral upper & lower extremities, no slurred speech, no facial droop, no deficit on finger-to-nose testing, no pronator drift  Sensation to light touch is equal and symmetric throughout the whole body,  strengths are equal and symmetric bilaterally, negative Spurling bilaterally  Skin:  Pink warm and dry, no rash  Psychiatric:  Alert, pleasant, cooperative            ED Course  ED Course as of 08/04/22 1803   Thu Aug 04, 2022   1310 ECG interpreted me, sinus rhythm, rate of 102, no ST segment elevation or depression, no ischemic or infarct changes, no acute change from April 15, 2019   1407 Repeat ECG interpreted by me, sinus rhythm, rate of 92, no ST segment elevation or depression, no ischemic or infarct changes, no acute change from earlier ECG today     XR chest 2 views   ED Interpretation   Chest x-ray interpreted me shows no acute cardiopulmonary disease, no change from September 21, 2021          Labs Reviewed   BASIC METABOLIC PANEL - Abnormal       Result Value Ref Range Status    Sodium 137  135 - 147 mmol/L Final    Potassium 3 4 (*) 3 5 - 5 3 mmol/L Final    Chloride 107  96 - 108 mmol/L Final    CO2 21  21 - 32 mmol/L Final    ANION GAP 9  4 - 13 mmol/L Final    BUN 18  5 - 25 mg/dL Final    Creatinine 1 00  0 60 - 1 30 mg/dL Final    Comment: Standardized to IDMS reference method    Glucose 141 (*) 65 - 140 mg/dL Final    Comment: If the patient is fasting, the ADA then defines impaired fasting glucose as > 100 mg/dL and diabetes as > or equal to 123 mg/dL  Specimen collection should occur prior to Sulfasalazine administration due to the potential for falsely depressed results   Specimen collection should occur prior to Sulfapyridine administration due to the potential for falsely elevated results  Calcium 9 5  8 3 - 10 1 mg/dL Final    eGFR 60  ml/min/1 73sq m Final    Narrative:     [place] guidelines for Chronic Kidney Disease (CKD):     Stage 1 with normal or high GFR (GFR > 90 mL/min/1 73 square meters)    Stage 2 Mild CKD (GFR = 60-89 mL/min/1 73 square meters)    Stage 3A Moderate CKD (GFR = 45-59 mL/min/1 73 square meters)    Stage 3B Moderate CKD (GFR = 30-44 mL/min/1 73 square meters)    Stage 4 Severe CKD (GFR = 15-29 mL/min/1 73 square meters)    Stage 5 End Stage CKD (GFR <15 mL/min/1 73 square meters)  Note: GFR calculation is accurate only with a steady state creatinine   HS TROPONIN I 0HR - Normal    hs TnI 0hr 2  "Refer to ACS Flowchart"- see link ng/L Final    Comment:                                              Initial (time 0) result  If >=50 ng/L, Myocardial injury suggested ;  Type of myocardial injury and treatment strategy  to be determined  If 5-49 ng/L, a delta result at 2 hours and or 4 hours will be needed to further evaluate  If <4 ng/L, and chest pain has been >3 hours since onset, patient may qualify for discharge based on the HEART score in the ED  If <5 ng/L and <3hours since onset of chest pain, a delta result at 2 hours will be needed to further evaluate  HS Troponin 99th Percentile URL of a Health Population=12 ng/L with a 95% Confidence Interval of 8-18 ng/L  Second Troponin (time 2 hours)  If calculated delta >= 20 ng/L,  Myocardial injury suggested ; Type of myocardial injury and treatment strategy to be determined  If 5-49 ng/L and the calculated delta is 5-19 ng/L, consult medical service for evaluation  Continue evaluation for ischemia on ecg and other possible etiology and repeat hs troponin at 4 hours  If delta is <5 ng/L at 2 hours, consider discharge based on risk stratification via the HEART score (if in ED), or MARGARET risk score in IP/Observation      HS Troponin 99th Percentile URL of a Health Population=12 ng/L with a 95% Confidence Interval of 8-18 ng/L    HS TROPONIN I 2HR - Normal    hs TnI 2hr 4  "Refer to ACS Flowchart"- see link ng/L Final    Comment:                                              Initial (time 0) result  If >=50 ng/L, Myocardial injury suggested ;  Type of myocardial injury and treatment strategy  to be determined  If 5-49 ng/L, a delta result at 2 hours and or 4 hours will be needed to further evaluate  If <4 ng/L, and chest pain has been >3 hours since onset, patient may qualify for discharge based on the HEART score in the ED  If <5 ng/L and <3hours since onset of chest pain, a delta result at 2 hours will be needed to further evaluate  HS Troponin 99th Percentile URL of a Health Population=12 ng/L with a 95% Confidence Interval of 8-18 ng/L  Second Troponin (time 2 hours)  If calculated delta >= 20 ng/L,  Myocardial injury suggested ; Type of myocardial injury and treatment strategy to be determined  If 5-49 ng/L and the calculated delta is 5-19 ng/L, consult medical service for evaluation  Continue evaluation for ischemia on ecg and other possible etiology and repeat hs troponin at 4 hours  If delta is <5 ng/L at 2 hours, consider discharge based on risk stratification via the HEART score (if in ED), or MARGARET risk score in IP/Observation  HS Troponin 99th Percentile URL of a Health Population=12 ng/L with a 95% Confidence Interval of 8-18 ng/L      Delta 2hr hsTnI 2  <20 ng/L Final   CBC AND DIFFERENTIAL    WBC 8 58  4 31 - 10 16 Thousand/uL Final    RBC 5 04  3 81 - 5 12 Million/uL Final    Hemoglobin 14 1  11 5 - 15 4 g/dL Final    Hematocrit 44 6  34 8 - 46 1 % Final    MCV 89  82 - 98 fL Final    MCH 28 0  26 8 - 34 3 pg Final    MCHC 31 6  31 4 - 37 4 g/dL Final    RDW 13 5  11 6 - 15 1 % Final    MPV 11 2  8 9 - 12 7 fL Final    Platelets 943  219 - 390 Thousands/uL Final    nRBC 0  /100 WBCs Final    Neutrophils Relative 71  43 - 75 % Final    Immat GRANS % 0  0 - 2 % Final    Lymphocytes Relative 20  14 - 44 % Final    Monocytes Relative 6  4 - 12 % Final    Eosinophils Relative 2  0 - 6 % Final    Basophils Relative 1  0 - 1 % Final    Neutrophils Absolute 6 14  1 85 - 7 62 Thousands/µL Final    Immature Grans Absolute 0 02  0 00 - 0 20 Thousand/uL Final    Lymphocytes Absolute 1 75  0 60 - 4 47 Thousands/µL Final    Monocytes Absolute 0 47  0 17 - 1 22 Thousand/µL Final    Eosinophils Absolute 0 16  0 00 - 0 61 Thousand/µL Final    Basophils Absolute 0 04  0 00 - 0 10 Thousands/µL Final       At this point because the patient's symptoms is unclear but unlikely to represent an acute emergency  Her symptoms have resolved, serial cardiac biomarkers unremarkable, unremarkable neuro exam, no signs of acute central pathology  While the cause of the patient's complaints is most likely benign, it is possible that this is the early presentation of a more serious condition  This diagnostic uncertainty was discussed with the patient, as was the importance of follow up care, as well as the need to return to immediately return to the closest emergency department for the signs/symptoms in the discharge instruction sheets, or they were otherwise concerned about their medical condition  The patient stated they were aware of this diagnostic uncertainty, understood the importance of follow up and were comfortable being discharged  Supportive care, importance of follow-up and return precautions were discussed with the patient, who expressed understanding        Critical Care Time  Procedures

## 2022-08-04 NOTE — Clinical Note
Mora Alamo was seen and treated in our emergency department on 8/4/2022  Diagnosis: Left arm numbness    Clotilde  may return to work on return date  She may return on this date: 08/06/2022         If you have any questions or concerns, please don't hesitate to call        Wiley Kaur MD    ______________________________           _______________          _______________  Hospital Representative                              Date                                Time

## 2022-08-04 NOTE — ED PROVIDER NOTES
History  Chief Complaint   Patient presents with    Shortness of Breath    Arm Pain     L arm pain & tingling in fingers; started around 11:30 while she was working at home     81-year-old female past medical history significant for hyperlipidemia, type 2 diabetes, asthma that presents ED today for left arm numbness  Patient describes that at around 11:30 a m  While she was working from home she started feeling a left arm numbness and tingling that she describes as pins and needles  She continued to work and was hoping that it would go away  She then developed shortness of breath some time prior to her arrival and became concerned so she came to ED for evaluation  She still complaining of the left arm numbness and tingling however says that the shortness of breath has improved slightly  She has no cardiac history but did say that she has had test before in the past but she cannot recall which ones  She does not smoke at all  No PE history  No fevers or chills  No nausea or vomiting  She does have a rash that she said that she is being seen by primary care doctor as well as Dermatology for on the left foot which is not new for her  Prior to Admission Medications   Prescriptions Last Dose Informant Patient Reported? Taking?    B Complex-C (B-COMPLEX WITH VITAMIN C) tablet   Yes No   Sig: Take 1 tablet by mouth daily   Cholecalciferol (VITAMIN D3) 2000 units TABS   Yes No   Sig: Take 1 tablet by mouth daily   Co-Enzyme Q10 100 MG CAPS   Yes No   Sig: Take 1 capsule by mouth daily   HYOSCYAMINE SULFATE PO   Yes No   Sig: Place 1 tablet under the tongue every 6 (six) hours as needed     Mometasone Furoate 110 MCG/INH AEPB   Yes No   Sig: Inhale 1 puff daily     Patient not taking: No sig reported   acetaminophen (TYLENOL) 500 mg tablet   Yes No   Sig: Take 500 mg by mouth every 6 (six) hours as needed for mild pain   albuterol (PROVENTIL HFA,VENTOLIN HFA) 90 mcg/act inhaler   Yes No   Sig: Inhale 2 puffs every 6 (six) hours as needed for wheezing   ascorbic acid (VITAMIN C) 500 mg tablet   Yes No   Sig: Take 500 mg by mouth daily   aspirin 81 mg chewable tablet   Yes No   Sig: Chew 81 mg daily   Patient not taking: No sig reported   betamethasone valerate (VALISONE) 0 1 % ointment   No No   Sig: Apply topically once or twice a day to affected itchy areas of the skin for up to 2 weeks  Avoid face, eyes, body folds  Patient not taking: Reported on 2022   ciclopirox (LOPROX) 0 77 % cream   No No   Sig: Apply topically 2 (two) times a day To affected areas of rash for 4 weeks straight     dicyclomine (BENTYL) 20 mg tablet   Yes No   Sig: Take 20 mg by mouth as needed     esomeprazole (NexIUM) 40 MG capsule   Yes No   Sig: Take 40 mg by mouth daily   indomethacin (INDOCIN) 25 mg capsule   Yes No   Sig: TAKE ONE CAPSULE AT ONSET OF MIGRAINE   Patient not taking: No sig reported   multivitamin-iron-minerals-folic acid (CENTRUM) chewable tablet   Yes No   Sig: Chew 1 tablet daily   promethazine (PHENERGAN) 25 mg tablet   Yes No   Sig: Take 25 mg by mouth every 8 (eight) hours as needed for nausea or vomiting   Patient not taking: Reported on 2022   rizatriptan (MAXALT) 10 MG tablet   Yes No   Sig: Take 10 mg by mouth once as needed for migraine May repeat in 2 hours if needed   Patient not taking: No sig reported   sodium chloride (OCEAN) 0 65 % nasal spray   Yes No   Si sprays into each nostril 2 (two) times a day   topiramate (TOPAMAX) 200 MG tablet   Yes No   Sig: Take 200 mg by mouth 2 (two) times a day   verapamil (CALAN-SR) 120 mg CR tablet   Yes No   Sig: Take 120 mg by mouth daily at bedtime   zolpidem (AMBIEN CR) 6 25 MG CR tablet   Yes No   Sig: Take 6 25 mg by mouth daily at bedtime as needed for sleep   Patient not taking: No sig reported      Facility-Administered Medications: None       Past Medical History:   Diagnosis Date    Asthma     Diabetes mellitus ([facility] [address] )     type 2    Diverticulitis     EEG abnormality without seizure     Gastroparesis     GERD (gastroesophageal reflux disease)     Headache, migraine     Hiatal hernia     IBS (irritable bowel syndrome)     Migraines     Sarcoidosis        Past Surgical History:   Procedure Laterality Date    CHOLECYSTECTOMY      [place] COLONOSCOPY FLX DX W/COLLJ SPEC WHEN PFRMD N/A 3/8/2017    Procedure: EGD AND COLONOSCOPY;  Surgeon: Mo Roman MD;  [facility]; Service: Gastroenterology    REPLACEMENT TOTAL KNEE Right     SIGMOIDECTOMY         Family History   Problem Relation Age of Onset    Diabetes Mother     Heart disease Mother     Cancer Father     Liver cancer Father     Brain cancer Father     Arthritis Sister      I have reviewed and agree with the history as documented  E-Cigarette/Vaping    E-Cigarette Use Never User      E-Cigarette/Vaping Substances    Nicotine No     THC No     CBD No     Flavoring No      Social History     Tobacco Use    Smoking status: Never Smoker    Smokeless tobacco: Never Used   Vaping Use    Vaping Use: Never used   Substance Use Topics    Alcohol use: Yes     Comment: socially    Drug use: No        Review of Systems   Constitutional: Negative for chills and fever  HENT: Negative for hearing loss  Eyes: Negative for visual disturbance  Respiratory: Positive for shortness of breath  Cardiovascular: Negative for chest pain  Gastrointestinal: Negative for abdominal pain, constipation, diarrhea, nausea and vomiting  Genitourinary: Negative for difficulty urinating  Musculoskeletal: Negative for myalgias  Skin: Positive for rash  Negative for color change  Neurological: Positive for numbness  Negative for dizziness and headaches  Psychiatric/Behavioral: Negative for agitation  All other systems reviewed and are negative        Physical Exam  ED Triage Vitals   Temperature Pulse Respirations Blood Pressure SpO2   08/04/22 1252 08/04/22 1252 08/04/22 1252 08/04/22 1252 08/04/22 1252   98 °F (36 7 °C) (!) 108 18 170/75 99 %      Temp Source Heart Rate Source Patient Position - Orthostatic VS BP Location FiO2 (%)   08/04/22 1252 08/04/22 1252 08/04/22 1252 08/04/22 1252 --   Oral Monitor Lying Right arm       Pain Score       08/04/22 1538       8             Orthostatic Vital Signs  Vitals:    08/04/22 1400 08/04/22 1430 08/04/22 1500 08/04/22 1600   BP: 138/71 138/80 149/81 142/66   Pulse: 94 92 90 90   Patient Position - Orthostatic VS: Lying Lying Lying Lying       Physical Exam  Vitals and nursing note reviewed  Constitutional:       General: She is not in acute distress  Appearance: Normal appearance  She is well-developed  She is not ill-appearing  HENT:      Head: Normocephalic and atraumatic  Right Ear: External ear normal       Left Ear: External ear normal       Nose: Nose normal       Mouth/Throat:      Mouth: Mucous membranes are moist       Pharynx: Oropharynx is clear  No oropharyngeal exudate  Eyes:      General:         Right eye: No discharge  Left eye: No discharge  Extraocular Movements: Extraocular movements intact  Conjunctiva/sclera: Conjunctivae normal       Pupils: Pupils are equal, round, and reactive to light  Cardiovascular:      Rate and Rhythm: Normal rate and regular rhythm  Heart sounds: Normal heart sounds  No murmur heard  No friction rub  No gallop  Pulmonary:      Effort: Pulmonary effort is normal  No respiratory distress  Breath sounds: Normal breath sounds  No stridor  No wheezing  Abdominal:      General: Bowel sounds are normal  There is no distension  Palpations: Abdomen is soft  Tenderness: There is no abdominal tenderness  Musculoskeletal:         General: No swelling  Normal range of motion  Cervical back: Normal range of motion and neck supple  No rigidity  Comments: 5/5 strength in the upper and lower extremities    Sensation intact bilaterally in the upper arms as well as lower legs  Patient says that she can feel sensation in both arms however she has numbness and tingling  Skin:     General: Skin is warm and dry  Capillary Refill: Capillary refill takes less than 2 seconds  Neurological:      General: No focal deficit present  Mental Status: She is alert and oriented to person, place, and time  Mental status is at baseline  Sensory: No sensory deficit  Motor: No weakness  Comments: Cranial nerves 2-12 intact    Gait normal    Psychiatric:         Mood and Affect: Mood normal          Behavior: Behavior normal          ED Medications  Medications   ondansetron (ZOFRAN) injection 4 mg (4 mg Intravenous Given 8/4/22 1350)   aspirin chewable tablet 324 mg (324 mg Oral Given 8/4/22 1350)   ketorolac (TORADOL) injection 15 mg (15 mg Intravenous Given 8/4/22 1538)       Diagnostic Studies  Results Reviewed     Procedure Component Value Units Date/Time    HS Troponin I 2hr [[id number]]  (Normal) Collected: 08/04/22 1514    Lab Status: Final result Specimen: Blood from Arm, Right Updated: 08/04/22 1550     hs TnI 2hr 4 ng/L      Delta 2hr hsTnI 2 ng/L     HS Troponin I 4hr [[id number]]     Lab Status: No result Specimen: Blood     HS Troponin 0hr (reflex protocol) [[id number]]  (Normal) Collected: 08/04/22 1317    Lab Status: Final result Specimen: Blood from Arm, Left Updated: 08/04/22 1359     hs TnI 0hr 2 ng/L     Basic metabolic panel [[id number]]  (Abnormal) Collected: 08/04/22 1317    Lab Status: Final result Specimen: Blood from Arm, Left Updated: 08/04/22 1345     Sodium 137 mmol/L      Potassium 3 4 mmol/L      Chloride 107 mmol/L      CO2 21 mmol/L      ANION GAP 9 mmol/L      BUN 18 mg/dL      Creatinine 1 00 mg/dL      Glucose 141 mg/dL      Calcium 9 5 mg/dL      eGFR 60 ml/min/1 73sq m     Narrative:      [place] guidelines for Chronic Kidney Disease (CKD):     Stage 1 with normal or high GFR (GFR > 90 mL/min/1 73 square meters)    Stage 2 Mild CKD (GFR = 60-89 mL/min/1 73 square meters)    Stage 3A Moderate CKD (GFR = 45-59 mL/min/1 73 square meters)    Stage 3B Moderate CKD (GFR = 30-44 mL/min/1 73 square meters)    Stage 4 Severe CKD (GFR = 15-29 mL/min/1 73 square meters)    Stage 5 End Stage CKD (GFR <15 mL/min/1 73 square meters)  Note: GFR calculation is accurate only with a steady state creatinine    CBC and differential [[id number]] Collected: 08/04/22 1317    Lab Status: Final result Specimen: Blood from Arm, Left Updated: 08/04/22 1333     WBC 8 58 Thousand/uL      RBC 5 04 Million/uL      Hemoglobin 14 1 g/dL      Hematocrit 44 6 %      MCV 89 fL      MCH 28 0 pg      MCHC 31 6 g/dL      RDW 13 5 %      MPV 11 2 fL      Platelets 927 Thousands/uL      nRBC 0 /100 WBCs      Neutrophils Relative 71 %      Immat GRANS % 0 %      Lymphocytes Relative 20 %      Monocytes Relative 6 %      Eosinophils Relative 2 %      Basophils Relative 1 %      Neutrophils Absolute 6 14 Thousands/µL      Immature Grans Absolute 0 02 Thousand/uL      Lymphocytes Absolute 1 75 Thousands/µL      Monocytes Absolute 0 47 Thousand/µL      Eosinophils Absolute 0 16 Thousand/µL      Basophils Absolute 0 04 Thousands/µL                  XR chest 2 views   ED Interpretation by Louis Almonte DO (08/04 1356)   Chest x-ray interpreted me shows no acute cardiopulmonary disease, no change from September 21, 2021            Procedures  Procedures      ED Course  ED Course as of 08/04/22 1625   Thu Aug 04, 2022   1307 Procedure Note: EKG  Date/Time: 08/04/22 1:07 PM   Interpreted by: Maria L Manuel   Indications / Diagnosis: Left arm numbness  ECG reviewed by me, the ED Provider: yes   The EKG demonstrates:  Rhythm:  sinus Tach  Intervals: normal intervals  Axis: normal axis  QRS/Blocks: normal QRS  ST Changes: No acute ST Changes, no STD/JEANNIE        1336 CBC and differential  Without actionable derangements     [facility] [address] Procedure Note: EKG  Date/Time: 08/04/22 2:18 PM   Interpreted by: Mc Jeong   Indications / Diagnosis: repeat EKG for CP  ECG reviewed by me, the ED Provider: yes   The EKG demonstrates:  Rhythm: normal sinus  Intervals: normal intervals  Axis: normal axis  QRS/Blocks: normal QRS  ST Changes: No acute ST Changes, no STD/JEANNIE                                            MDM  Number of Diagnoses or Management Options  Diagnosis management comments: 58-year-old female presents ED today for left arm numbness  Upon initial evaluation patient was only complaining of left arm numbness however upon my attending's evaluation the patient started to complain of chest pressure and nausea  Initial plan was to work out ACS and so will continue with ACS workup  Will get a BMP, CBC, troponin, 12 lead EKG  Will also get an EKG 1 hour after initial presentation to evaluate  Will treat with IV Zofran and 324 mg of aspirin  Disposition  Final diagnoses:   Left arm numbness     Time reflects when diagnosis was documented in both MDM as applicable and the Disposition within this note     Time User Action Codes Description Comment    8/4/2022  3:58 PM Mc Jeong Add [R20 0] Left arm numbness       ED Disposition     ED Disposition   Discharge    Condition   Stable    Date/Time   Thu Aug 4, 2022  3:58 PM    Comment   Justino Berg discharge to home/self care                 Follow-up Information     Follow up With Specialties Details Why Contact Info Additional Information    Ambrose Harrington MD Internal Medicine Schedule an appointment as soon as possible for a visit in 2 days to discuss further work up for your left arm numbness [address] [facility] [address] [place] [address] Emergency Department Emergency Medicine Go to  If symptoms worsen, As needed [address] Emergency Department, [address], [place], [address] [facility], [address]          Discharge Medication List as of 8/4/2022  4:01 PM      CONTINUE these medications which have NOT CHANGED    Details   acetaminophen (TYLENOL) 500 mg tablet Take 500 mg by mouth every 6 (six) hours as needed for mild pain, Until Discontinued, Historical Med      albuterol (PROVENTIL HFA,VENTOLIN HFA) 90 mcg/act inhaler Inhale 2 puffs every 6 (six) hours as needed for wheezing, Until Discontinued, Historical Med      ascorbic acid (VITAMIN C) 500 mg tablet Take 500 mg by mouth daily, Historical Med      aspirin 81 mg chewable tablet Chew 81 mg daily, Until Discontinued, Historical Med      B Complex-C (B-COMPLEX WITH VITAMIN C) tablet Take 1 tablet by mouth daily, Until Discontinued, Historical Med      betamethasone valerate (VALISONE) 0 1 % ointment Apply topically once or twice a day to affected itchy areas of the skin for up to 2 weeks  Avoid face, eyes, body folds  , Normal      Cholecalciferol (VITAMIN D3) 2000 units TABS Take 1 tablet by mouth daily, Until Discontinued, Historical Med      ciclopirox (LOPROX) 0 77 % cream Apply topically 2 (two) times a day To affected areas of rash for 4 weeks straight , Starting Mon 5/16/2022, Normal      Co-Enzyme Q10 100 MG CAPS Take 1 capsule by mouth daily, Until Discontinued, Historical Med      dicyclomine (BENTYL) 20 mg tablet Take 20 mg by mouth as needed  , Historical Med      esomeprazole (NexIUM) 40 MG capsule Take 40 mg by mouth daily, Until Discontinued, Historical Med      HYOSCYAMINE SULFATE PO Place 1 tablet under the tongue every 6 (six) hours as needed  , Historical Med      indomethacin (INDOCIN) 25 mg capsule TAKE ONE CAPSULE AT ONSET OF MIGRAINE, Historical Med      Mometasone Furoate 110 MCG/INH AEPB Inhale 1 puff daily  , Historical Med      multivitamin-iron-minerals-folic acid (CENTRUM) chewable tablet Chew 1 tablet daily, Until Discontinued, Historical Med      promethazine (PHENERGAN) 25 mg tablet Take 25 mg by mouth every 8 (eight) hours as needed for nausea or vomiting, Until Discontinued, Historical Med      rizatriptan (MAXALT) 10 MG tablet Take 10 mg by mouth once as needed for migraine May repeat in 2 hours if needed, Historical Med      sodium chloride (OCEAN) 0 65 % nasal spray 2 sprays into each nostril 2 (two) times a day, Historical Med      topiramate (TOPAMAX) 200 MG tablet Take 200 mg by mouth 2 (two) times a day, Until Discontinued, Historical Med      verapamil (CALAN-SR) 120 mg CR tablet Take 120 mg by mouth daily at bedtime, Until Discontinued, Historical Med      zolpidem (AMBIEN CR) 6 25 MG CR tablet Take 6 25 mg by mouth daily at bedtime as needed for sleep, Historical Med           No discharge procedures on file  PDMP Review     None           ED Provider  Attending physically available and evaluated Clotilde Berg I managed the patient along with the ED Attending      Electronically Signed by         Marsha Rust MD  08/04/22 0313

## 2022-08-04 NOTE — ED NOTES
Pt requested work note; patient left room prior to the doctor giving the note        Zayda Peña RN  08/04/22 3985

## 2022-08-05 LAB
ATRIAL RATE: 92 BPM
P AXIS: 43 DEGREES
PR INTERVAL: 134 MS
QRS AXIS: -9 DEGREES
QRSD INTERVAL: 78 MS
QT INTERVAL: 352 MS
QTC INTERVAL: 435 MS
T WAVE AXIS: 44 DEGREES
VENTRICULAR RATE: 92 BPM

## 2022-08-05 PROCEDURE — 93010 ELECTROCARDIOGRAM REPORT: CPT | Performed by: INTERNAL MEDICINE

## 2022-08-09 ENCOUNTER — HOSPITAL ENCOUNTER (OUTPATIENT)
Dept: RADIOLOGY | Facility: HOSPITAL | Age: 62
Discharge: HOME | End: 2022-08-09
Payer: COMMERCIAL

## 2022-08-09 DIAGNOSIS — R52 PAIN: ICD-10-CM

## 2022-08-09 PROCEDURE — 72050 X-RAY EXAM NECK SPINE 4/5VWS: CPT

## 2022-08-09 PROCEDURE — 73030 X-RAY EXAM OF SHOULDER: CPT

## 2022-09-02 ENCOUNTER — OFFICE VISIT (OUTPATIENT)
Dept: OBGYN CLINIC | Facility: HOSPITAL | Age: 62
End: 2022-09-02
Payer: COMMERCIAL

## 2022-09-02 VITALS
HEART RATE: 102 BPM | WEIGHT: 188.8 LBS | BODY MASS INDEX: 34.74 KG/M2 | HEIGHT: 62 IN | SYSTOLIC BLOOD PRESSURE: 137 MMHG | DIASTOLIC BLOOD PRESSURE: 85 MMHG

## 2022-09-02 DIAGNOSIS — M54.12 RADICULOPATHY, CERVICAL REGION: Primary | ICD-10-CM

## 2022-09-02 PROCEDURE — 99203 OFFICE O/P NEW LOW 30 MIN: CPT | Performed by: ORTHOPAEDIC SURGERY

## 2022-09-02 NOTE — PROGRESS NOTES
Orthopaedics Office Visit - New Patient Visit    ASSESSMENT/PLAN:    Assessment:   Cervical radiculopathy     Plan:   The patient has an examination consistent with cervical radiculopathy affecting the LUE  I have discussed with the patient the pathophysiology of this diagnosis and reviewed how the examination correlates with this diagnosis  Patient was provided with a referral to start physical therapy  I would like the patient to follow up with Dr Rome Keane for evaluation and treatment of the cervical spine          _____________________________________________________  CHIEF COMPLAINT:  Chief Complaint   Patient presents with    Left Shoulder - Pain         SUBJECTIVE:  Nayana Winter is a 65 y o  female who presents with a chief complaint of Left arm pain  The pain began 1 month(s) ago and is not associated with an acute injury  The patient describes the pain as aching, burning, dull and sharp in intensity,  intermittent in timing, and localizes the pain to the  left sided cervical, left upper trap with radicular pain in the LUE  The pain is worse with cervical ROM and relieved by rest   The pain is associated with numbness and tingling  The pain is not associated with constitutional symptoms  The patient is awoken at night by the pain  The patient has not had any treatment  PAST MEDICAL HISTORY:  Past Medical History:   Diagnosis Date    Asthma     Diabetes mellitus ([address] )     type 2    Diverticulitis     EEG abnormality without seizure     Gastroparesis     GERD (gastroesophageal reflux disease)     Headache, migraine     Hiatal hernia     IBS (irritable bowel syndrome)     Migraines     Sarcoidosis        PAST SURGICAL HISTORY:  Past Surgical History:   Procedure Laterality Date    CHOLECYSTECTOMY      [place] COLONOSCOPY FLX DX W/COLLJ SPEC WHEN PFRMD N/A 3/8/2017    Procedure: EGD AND COLONOSCOPY;  Surgeon: Lucy Balbuena MD;  [facility];   Service: Gastroenterology    REPLACEMENT TOTAL KNEE Right     SIGMOIDECTOMY         FAMILY HISTORY:  Family History   Problem Relation Age of Onset    Diabetes Mother     Heart disease Mother     Cancer Father     Liver cancer Father     Brain cancer Father     Arthritis Sister        SOCIAL HISTORY:  Social History     Tobacco Use    Smoking status: Never Smoker    Smokeless tobacco: Never Used   Vaping Use    Vaping Use: Never used   Substance Use Topics    Alcohol use: Yes     Comment: socially    Drug use: No       MEDICATIONS:    Current Outpatient Medications:     acetaminophen (TYLENOL) 500 mg tablet, Take 500 mg by mouth every 6 (six) hours as needed for mild pain, Disp: , Rfl:     albuterol (PROVENTIL HFA,VENTOLIN HFA) 90 mcg/act inhaler, Inhale 2 puffs every 6 (six) hours as needed for wheezing, Disp: , Rfl:     ascorbic acid (VITAMIN C) 500 mg tablet, Take 500 mg by mouth daily, Disp: , Rfl:     B Complex-C (B-COMPLEX WITH VITAMIN C) tablet, Take 1 tablet by mouth daily, Disp: , Rfl:     Cholecalciferol (VITAMIN D3) 2000 units TABS, Take 1 tablet by mouth daily, Disp: , Rfl:     ciclopirox (LOPROX) 0 77 % cream, Apply topically 2 (two) times a day To affected areas of rash for 4 weeks straight , Disp: 90 g, Rfl: 0    Co-Enzyme Q10 100 MG CAPS, Take 1 capsule by mouth daily, Disp: , Rfl:     dicyclomine (BENTYL) 20 mg tablet, Take 20 mg by mouth as needed  , Disp: , Rfl:     esomeprazole (NexIUM) 40 MG capsule, Take 40 mg by mouth daily, Disp: , Rfl:     HYOSCYAMINE SULFATE PO, Place 1 tablet under the tongue every 6 (six) hours as needed  , Disp: , Rfl:     multivitamin-iron-minerals-folic acid (CENTRUM) chewable tablet, Chew 1 tablet daily, Disp: , Rfl:     sodium chloride (OCEAN) 0 65 % nasal spray, 2 sprays into each nostril 2 (two) times a day, Disp: , Rfl:     topiramate (TOPAMAX) 200 MG tablet, Take 200 mg by mouth 2 (two) times a day, Disp: , Rfl:     verapamil (CALAN-SR) 120 mg CR tablet, Take 120 mg by mouth daily at bedtime, Disp: , Rfl:     aspirin 81 mg chewable tablet, Chew 81 mg daily (Patient not taking: No sig reported), Disp: , Rfl:     betamethasone valerate (VALISONE) 0 1 % ointment, Apply topically once or twice a day to affected itchy areas of the skin for up to 2 weeks  Avoid face, eyes, body folds  (Patient not taking: No sig reported), Disp: 90 g, Rfl: 0    indomethacin (INDOCIN) 25 mg capsule, TAKE ONE CAPSULE AT ONSET OF MIGRAINE (Patient not taking: No sig reported), Disp: , Rfl: 1    Mometasone Furoate 110 MCG/INH AEPB, Inhale 1 puff daily   (Patient not taking: No sig reported), Disp: , Rfl:     promethazine (PHENERGAN) 25 mg tablet, Take 25 mg by mouth every 8 (eight) hours as needed for nausea or vomiting (Patient not taking: No sig reported), Disp: , Rfl:     rizatriptan (MAXALT) 10 MG tablet, Take 10 mg by mouth once as needed for migraine May repeat in 2 hours if needed (Patient not taking: No sig reported), Disp: , Rfl:     zolpidem (AMBIEN CR) 6 25 MG CR tablet, Take 6 25 mg by mouth daily at bedtime as needed for sleep (Patient not taking: No sig reported), Disp: , Rfl:     ALLERGIES:  Allergies   Allergen Reactions    Other Shortness Of Breath     Nuts, coconut    Talwin [Pentazocine] Shortness Of Breath and Other (See Comments)     dizziness       REVIEW OF SYSTEMS:  MSK: see below  Neuro: NVI  Pertinent items are otherwise noted in HPI  A comprehensive review of systems was otherwise negative      LABS:  HgA1c:   Lab Results   Component Value Date    HGBA1C 6 8 (H) 05/08/2022     BMP:   Lab Results   Component Value Date    GLUCOSE 122 08/02/2015    CALCIUM 9 5 08/04/2022     08/02/2015    K 3 4 (L) 08/04/2022    CO2 21 08/04/2022     08/04/2022    BUN 18 08/04/2022    CREATININE 1 00 08/04/2022     CBC: No components found for: CBC    _____________________________________________________  PHYSICAL EXAMINATION:  Vital signs: /85 Pulse 102   Ht 5' 2" (1 575 m)   Wt 85 6 kg (188 lb 12 8 oz)   BMI 34 53 kg/m²   General: No acute distress, awake and alert  Psychiatric: Mood and affect appear appropriate  HEENT: Trachea Midline, No torticollis, no apparent facial trauma  Cardiovascular: No audible murmurs; Extremities appear perfused  Pulmonary: No audible wheezing or stridor  Skin: No open lesions; see further details (if any) below    MUSCULOSKELETAL EXAMINATION:    Cervical Spine Exam  Alignment:  Loss of cervical lordosis  Inspection:  No swelling  No edema  No erythema  No ecchymosis  Palpation:  No tenderness  ROM:  Limited neck flexion  Limited neck extension  Limit rotation and lateral flexion  Strength:  Supraspinatus 4/5   Infraspinatus 5/5  deltoid 5/5, biceps 5/5, triceps 5/5 wrist flexion 5/5, wrist extension 5/5, intrinsics 4/5    _____________________________________________________  STUDIES REVIEWED:  I personally reviewed the images and interpretation is as follows:  Cervical x-rays demonstrates: mild degenerative changes cervical spine    PROCEDURES PERFORMED:  Procedures        Scribe Attestation    I,:  Vandana López am acting as a scribe while in the presence of the attending physician :       I,:  Carlos Cervantes MD personally performed the services described in this documentation    as scribed in my presence :

## 2022-10-02 ENCOUNTER — APPOINTMENT (OUTPATIENT)
Dept: LAB | Facility: CLINIC | Age: 62
End: 2022-10-02
Payer: COMMERCIAL

## 2022-10-02 DIAGNOSIS — E78.5 HYPERLIPIDEMIA, UNSPECIFIED HYPERLIPIDEMIA TYPE: ICD-10-CM

## 2022-10-02 DIAGNOSIS — E11.9 TYPE 2 DIABETES MELLITUS WITHOUT COMPLICATION, WITHOUT LONG-TERM CURRENT USE OF INSULIN (HCC): ICD-10-CM

## 2022-10-02 LAB
ALBUMIN SERPL BCP-MCNC: 3.6 G/DL (ref 3.5–5)
ALP SERPL-CCNC: 66 U/L (ref 46–116)
ALT SERPL W P-5'-P-CCNC: 33 U/L (ref 12–78)
ANION GAP SERPL CALCULATED.3IONS-SCNC: 9 MMOL/L (ref 4–13)
AST SERPL W P-5'-P-CCNC: 14 U/L (ref 5–45)
BILIRUB SERPL-MCNC: 0.26 MG/DL (ref 0.2–1)
BUN SERPL-MCNC: 21 MG/DL (ref 5–25)
CALCIUM SERPL-MCNC: 8.9 MG/DL (ref 8.3–10.1)
CHLORIDE SERPL-SCNC: 108 MMOL/L (ref 96–108)
CO2 SERPL-SCNC: 23 MMOL/L (ref 21–32)
CREAT SERPL-MCNC: 1 MG/DL (ref 0.6–1.3)
EST. AVERAGE GLUCOSE BLD GHB EST-MCNC: 143 MG/DL
GFR SERPL CREATININE-BSD FRML MDRD: 60 ML/MIN/1.73SQ M
GLUCOSE P FAST SERPL-MCNC: 163 MG/DL (ref 65–99)
HBA1C MFR BLD: 6.6 %
LDLC SERPL DIRECT ASSAY-MCNC: 166 MG/DL (ref 0–100)
POTASSIUM SERPL-SCNC: 3.7 MMOL/L (ref 3.5–5.3)
PROT SERPL-MCNC: 7.8 G/DL (ref 6.4–8.4)
SODIUM SERPL-SCNC: 140 MMOL/L (ref 135–147)

## 2022-10-02 PROCEDURE — 83721 ASSAY OF BLOOD LIPOPROTEIN: CPT

## 2022-10-02 PROCEDURE — 80053 COMPREHEN METABOLIC PANEL: CPT

## 2022-10-02 PROCEDURE — 36415 COLL VENOUS BLD VENIPUNCTURE: CPT

## 2022-10-02 PROCEDURE — 83036 HEMOGLOBIN GLYCOSYLATED A1C: CPT

## 2022-10-25 ENCOUNTER — TELEPHONE (OUTPATIENT)
Dept: DERMATOLOGY | Facility: CLINIC | Age: 62
End: 2022-10-25

## 2022-10-25 NOTE — TELEPHONE ENCOUNTER
Received call from patient asking if there are any earlier appts on Thursday in [facility]  Pt is currently scheduled with Dr Rebekah Escalante @ 4:10  Advised pt that there are currently no earlier appts available that day  Pt will call back tomorrow to check again

## 2022-10-27 ENCOUNTER — OFFICE VISIT (OUTPATIENT)
Dept: DERMATOLOGY | Facility: CLINIC | Age: 62
End: 2022-10-27

## 2022-10-27 VITALS — HEIGHT: 62 IN | TEMPERATURE: 97.7 F | WEIGHT: 182 LBS | BODY MASS INDEX: 33.49 KG/M2

## 2022-10-27 DIAGNOSIS — R21 RASH: Primary | ICD-10-CM

## 2022-10-27 RX ORDER — CEPHALEXIN 500 MG/1
CAPSULE ORAL
COMMUNITY
Start: 2022-08-10

## 2022-10-27 RX ORDER — ELETRIPTAN HYDROBROMIDE 40 MG/1
TABLET, FILM COATED ORAL
COMMUNITY

## 2022-10-27 RX ORDER — HYDROXYZINE HYDROCHLORIDE 25 MG/1
TABLET, FILM COATED ORAL
COMMUNITY

## 2022-10-27 RX ORDER — PRAVASTATIN SODIUM 80 MG/1
TABLET ORAL
COMMUNITY

## 2022-10-27 NOTE — PROGRESS NOTES
[facility] Note     Patient Name: Nash Purcell  Encounter Date: 10/27/2022    Have you been cared for by a [place] [id number] Dermatologist in the last 3 years and, if so, which description applies to you? Yes  I have been here within the last 3 years, and my medical history has NOT changed since that time  I am FEMALE/of child-bearing potential     REVIEW OF SYSTEMS:  Have you recently had or currently have any of the following? · No changes in my recent health  PAST MEDICAL HISTORY:  Have you personally ever had or currently have any of the following? If "YES," then please provide more detail  · No changes in my medical history  FAMILY HISTORY:  Any "first degree relatives" (parent, brother, sister, or child) with the following? • No changes in my family's known health  PATIENT EXPERIENCE:    • Do you want the Dermatologist to perform a COMPLETE skin exam today including a clinical examination under the "bra and underwear" areas? NO  • If necessary, do we have your permission to call and leave a detailed message on your Preferred Phone number that includes your specific medical information?   Yes      Allergies   Allergen Reactions   • Other Shortness Of Breath     Nuts, coconut   • Talwin [Pentazocine] Shortness Of Breath and Other (See Comments)     dizziness      Current Outpatient Medications:   •  acetaminophen (TYLENOL) 500 mg tablet, Take 500 mg by mouth every 6 (six) hours as needed for mild pain, Disp: , Rfl:   •  albuterol (PROVENTIL HFA,VENTOLIN HFA) 90 mcg/act inhaler, Inhale 2 puffs every 6 (six) hours as needed for wheezing, Disp: , Rfl:   •  ascorbic acid (VITAMIN C) 500 mg tablet, Take 500 mg by mouth daily, Disp: , Rfl:   •  B Complex-C (B-COMPLEX WITH VITAMIN C) tablet, Take 1 tablet by mouth daily, Disp: , Rfl:   •  Cholecalciferol (VITAMIN D3) 2000 units TABS, Take 1 tablet by mouth daily, Disp: , Rfl:   •  dicyclomine (BENTYL) 20 mg tablet, Take 20 mg by mouth as needed  , Disp: , Rfl:   •  eletriptan (RELPAX) 40 MG tablet, eletriptan 40 mg tablet  take one tablet at onset of migraine; may repeat once in 12 hrs if needed for return of headache, Disp: , Rfl:   •  Empagliflozin 25 MG TABS, Jardiance 25 mg tablet, Disp: , Rfl:   •  esomeprazole (NexIUM) 40 MG capsule, Take 40 mg by mouth daily, Disp: , Rfl:   •  HYOSCYAMINE SULFATE PO, Place 1 tablet under the tongue every 6 (six) hours as needed  , Disp: , Rfl:   •  multivitamin-iron-minerals-folic acid (CENTRUM) chewable tablet, Chew 1 tablet daily, Disp: , Rfl:   •  sodium chloride (OCEAN) 0 65 % nasal spray, 2 sprays into each nostril 2 (two) times a day, Disp: , Rfl:   •  topiramate (TOPAMAX) 200 MG tablet, Take 200 mg by mouth 2 (two) times a day, Disp: , Rfl:   •  verapamil (CALAN-SR) 120 mg CR tablet, Take 120 mg by mouth daily at bedtime, Disp: , Rfl:   •  aspirin 81 mg chewable tablet, Chew 81 mg daily (Patient not taking: No sig reported), Disp: , Rfl:   •  betamethasone valerate (VALISONE) 0 1 % ointment, Apply topically once or twice a day to affected itchy areas of the skin for up to 2 weeks  Avoid face, eyes, body folds  (Patient not taking: No sig reported), Disp: 90 g, Rfl: 0  •  cephalexin (KEFLEX) 500 mg capsule, , Disp: , Rfl:   •  ciclopirox (LOPROX) 0 77 % cream, Apply topically 2 (two) times a day To affected areas of rash for 4 weeks straight   (Patient not taking: Reported on 10/27/2022), Disp: 90 g, Rfl: 0  •  Co-Enzyme Q10 100 MG CAPS, Take 1 capsule by mouth daily, Disp: , Rfl:   •  hydrOXYzine HCL (ATARAX) 25 mg tablet, hydroxyzine HCl 25 mg tablet, Disp: , Rfl:   •  indomethacin (INDOCIN) 25 mg capsule, TAKE ONE CAPSULE AT ONSET OF MIGRAINE (Patient not taking: No sig reported), Disp: , Rfl: 1  •  Mometasone Furoate 110 MCG/INH AEPB, Inhale 1 puff daily   (Patient not taking: No sig reported), Disp: , Rfl:   •  pravastatin (PRAVACHOL) 80 mg tablet, pravastatin 80 mg tablet, Disp: , Rfl:   • promethazine (PHENERGAN) 25 mg tablet, Take 25 mg by mouth every 8 (eight) hours as needed for nausea or vomiting (Patient not taking: No sig reported), Disp: , Rfl:   •  rizatriptan (MAXALT) 10 MG tablet, Take 10 mg by mouth once as needed for migraine May repeat in 2 hours if needed (Patient not taking: No sig reported), Disp: , Rfl:   •  zolpidem (AMBIEN CR) 6 25 MG CR tablet, Take 6 25 mg by mouth daily at bedtime as needed for sleep (Patient not taking: No sig reported), Disp: , Rfl:           • Whom besides the patient is providing clinical information about today's encounter?   o NO ADDITIONAL HISTORIAN (patient alone provided history)    Physical Exam and Assessment/Plan by Diagnosis:        RASH - IMPROVING     Physical Exam:  • (Anatomic Location); (Size and Morphological Description); (Differential Diagnosis):  Bilateral lower leg     • Pertinent Positives:  • Pertinent Negatives: Additional History of Present Condition:  She used betamethasone valerate 0 1% ointment for 4 weeks and uses Cyclopirox cream as needed when it itch  Previously she used over the counter gold bond cream  She sees improvement  Plan:  • Continue to use Ciclopirox 0 77% cream as needed to lower legs ( renewed)   • Start Triamcinolone 0 1% ointment twice a day for 2 weeks straight  Avoid face, groin and underarms  PROCEDURES PERFORMED TODAY ASSOCIATED WITH THIS CONDITION:          NO PROCEDURE PERFORMED TODAY FOR THIS CONDITION  Medical Complexity:    UNDIAGNOSED NEW PROBLEM WITH UNCERTAIN DIAGNOSIS  A condition included in the differential diagnosis represents a high risk of morbidity without treatment         Scribe Attestation    I,:  Eliu Vuong am acting as a scribe while in the presence of the attending physician :       I,:  Gloria Field MD personally performed the services described in this documentation    as scribed in my presence :         Vinny Morrell MD  PGY-2 Dermatology Resident

## 2022-10-28 ENCOUNTER — OFFICE VISIT (OUTPATIENT)
Dept: PAIN MEDICINE | Facility: CLINIC | Age: 62
End: 2022-10-28
Payer: COMMERCIAL

## 2022-10-28 VITALS — HEIGHT: 62 IN | WEIGHT: 182.4 LBS | BODY MASS INDEX: 33.57 KG/M2

## 2022-10-28 DIAGNOSIS — M19.012 ARTHRITIS OF LEFT ACROMIOCLAVICULAR JOINT: ICD-10-CM

## 2022-10-28 DIAGNOSIS — M54.12 RADICULOPATHY, CERVICAL REGION: Primary | ICD-10-CM

## 2022-10-28 PROCEDURE — 99244 OFF/OP CNSLTJ NEW/EST MOD 40: CPT | Performed by: ANESTHESIOLOGY

## 2022-10-28 RX ORDER — TRIAMCINOLONE ACETONIDE 1 MG/G
CREAM TOPICAL
COMMUNITY
Start: 2022-08-10 | End: 2022-10-30

## 2022-10-28 RX ORDER — MECLIZINE HYDROCHLORIDE 25 MG/1
TABLET ORAL
COMMUNITY

## 2022-10-28 RX ORDER — FLUTICASONE PROPIONATE 50 MCG
SPRAY, SUSPENSION (ML) NASAL
COMMUNITY

## 2022-10-28 NOTE — LETTER
October 28, 2022     Sacha Bright, [address] [id number]    Patient: Nash Purcell   YOB: 1960   Date of Visit: 10/28/2022       Dear Dr Karly Rodriguez:    Thank you for referring Regine Gomez to me for evaluation  Below are my notes for this consultation  If you have questions, please do not hesitate to call me  I look forward to following your patient along with you  Sincerely,        Suzanne Dietrich DO        CC: No Recipients  Suzanne Dietrich DO  10/28/2022  2:13 PM  Signed  Assessment  1  Radiculopathy, cervical region    2  Arthritis of left acromioclavicular joint        Plan  58-year-old female with a history of diabetes, referred by Dr Karly Rodriguez, presenting for initial consultation regarding left-sided neck pain that radiates into the left shoulder and into the left upper extremity with associated numbness  She denies any trauma or inciting event  The patient was working from home on August 4, 2022 when pain began  X-ray of the cervical spine shows anterior osteophytes at C5 and C6  Disc heights preserved  X-ray of the left shoulder shows moderate AC joint OA with possible subacromial impingement  She is currently taking Tylenol and ibuprofen p r n  for relief with mild-to-moderate relief  She was prescribed physical therapy, however did not attend secondary to recent death of her mother  Patient's symptoms are most consistent with cervical radiculopathy, however the patient states that her symptoms are intermittent and rare at this point  Left shoulder impingement also on the differential   Physical exam was essentially within normal limits today  1  I will prescribe physical therapy for the cervical spine as well as left shoulder  2  Patient may continue with ibuprofen and Tylenol p r n    3  If the patient fails 6 weeks of conservative treatment may consider an MRI of the cervical spine without contrast  4   I will follow up the patient in 6 weeks       My impressions and treatment recommendations were discussed in detail with the patient who verbalized understanding and had no further questions  Discharge instructions were provided  I personally saw and examined the patient and I agree with the above discussed plan of care  No orders of the defined types were placed in this encounter  New Medications Ordered This Visit   Medications   • mometasone (Asmanex, 60 Metered Doses,) 220 mcg/actuation inhaler     Sig: Asmanex Twisthaler 220 mcg/actuation(60 doses) breath activated inhalr   • fluticasone (FLONASE) 50 mcg/act nasal spray     Sig: fluticasone propionate 50 mcg/actuation nasal spray,suspension   • triamcinolone (KENALOG) 0 1 % cream   • metFORMIN (GLUCOPHAGE) 500 mg tablet     Sig: metformin 500 mg tablet   • meclizine (ANTIVERT) 25 mg tablet     Sig: meclizine 25 mg tablet       History of Present Illness    Clotilde Young is a 65 y o  female with a history of diabetes, referred by Dr Reddy Dotson, presenting for initial consultation regarding left-sided neck pain that radiates into the left shoulder and into the left upper extremity with associated numbness  She denies any trauma or inciting event  The patient was working from home on August 4, 2022 when pain began  She denies any left upper extremity symptoms, bladder bowel incontinence, or balance issues  The patient states that initially when symptoms began she was having shortness of breath  Cardiac pathology has been ruled out  X-ray of the cervical spine shows anterior osteophytes at C5 and C6  Disc heights preserved  X-ray of the left shoulder shows moderate AC joint OA with possible subacromial impingement  She is currently taking Tylenol and ibuprofen p r n  for relief with mild-to-moderate relief  She was prescribed physical therapy, however did not attend secondary to recent death of her mother    The patient rates her pain currently minimal to mild, however pain was initially moderate to severe  The pain is intermittent and does not follow any particular pattern throughout the day  The pain is described as shooting and numbness  The patient denies any specific aggravating factors  She does find some relief with ice and a TENS unit  Other than as stated above, the patient denies any interval changes in medications, medical condition, mental condition, symptoms, or allergies since the last office visit  I have personally reviewed and/or updated the patient's past medical history, past surgical history, family history, social history, current medications, allergies, and vital signs today  Review of Systems   Constitutional: Negative for fever and unexpected weight change  HENT: Negative for trouble swallowing  Eyes: Negative for visual disturbance  Respiratory: Negative for shortness of breath and wheezing  Cardiovascular: Negative for chest pain and palpitations  Gastrointestinal: Negative for constipation, diarrhea, nausea and vomiting  Endocrine: Positive for polyuria  Negative for cold intolerance, heat intolerance and polydipsia  Genitourinary: Negative for difficulty urinating and frequency  Musculoskeletal: Negative for arthralgias, gait problem, joint swelling and myalgias  Skin: Positive for rash  Neurological: Positive for headaches  Negative for dizziness, seizures, syncope and weakness  Hematological: Does not bruise/bleed easily  Psychiatric/Behavioral: Negative for dysphoric mood  All other systems reviewed and are negative        Patient Active Problem List   Diagnosis   • Arthropathy of left shoulder   • Left arm pain   • Hypertension, essential   • Hypokalemia   • Hiatal hernia   • Partial symptomatic epilepsy with complex partial seizures, intractable, without status epilepticus ([facility] [address] )   • Intractable migraine   • Chest pressure   • Complex partial seizures with consciousness impaired ([facility] [address] )   • Radiculopathy, cervical region       Past Medical History:   Diagnosis Date   • Asthma    • Diabetes mellitus ([facility] [address] )     type 2   • Diverticulitis    • EEG abnormality without seizure    • Gastroparesis    • GERD (gastroesophageal reflux disease)    • Headache, migraine    • Hiatal hernia    • IBS (irritable bowel syndrome)    • Migraines    • Sarcoidosis        Past Surgical History:   Procedure Laterality Date   • CHOLECYSTECTOMY     • HI COLONOSCOPY FLX DX W/COLLJ SPEC WHEN PFRMD N/A 3/8/2017    Procedure: EGD AND COLONOSCOPY;  Surgeon: Daniella Lynn MD;  Location: [facility];   Service: Gastroenterology   • REPLACEMENT TOTAL KNEE Right    • SIGMOIDECTOMY         Family History   Problem Relation Age of Onset   • Diabetes Mother    • Heart disease Mother    • Cancer Father    • Liver cancer Father    • Brain cancer Father    • Arthritis Sister        Social History     Occupational History   • Not on file   Tobacco Use   • Smoking status: Never Smoker   • Smokeless tobacco: Never Used   Vaping Use   • Vaping Use: Never used   Substance and Sexual Activity   • Alcohol use: Yes     Comment: socially   • Drug use: No   • Sexual activity: Not on file       Current Outpatient Medications on File Prior to Visit   Medication Sig   • acetaminophen (TYLENOL) 500 mg tablet Take 500 mg by mouth every 6 (six) hours as needed for mild pain   • albuterol (PROVENTIL HFA,VENTOLIN HFA) 90 mcg/act inhaler Inhale 2 puffs every 6 (six) hours as needed for wheezing   • ascorbic acid (VITAMIN C) 500 mg tablet Take 500 mg by mouth daily   • B Complex-C (B-COMPLEX WITH VITAMIN C) tablet Take 1 tablet by mouth daily   • Cholecalciferol (VITAMIN D3) 2000 units TABS Take 1 tablet by mouth daily   • dicyclomine (BENTYL) 20 mg tablet Take 20 mg by mouth as needed     • eletriptan (RELPAX) 40 MG tablet eletriptan 40 mg tablet   take one tablet at onset of migraine; may repeat once in 12 hrs if needed for return of headache   • Empagliflozin 25 MG TABS Jardiance 25 mg tablet   • esomeprazole (NexIUM) 40 MG capsule Take 40 mg by mouth daily   • hydrOXYzine HCL (ATARAX) 25 mg tablet hydroxyzine HCl 25 mg tablet   • HYOSCYAMINE SULFATE PO Place 1 tablet under the tongue every 6 (six) hours as needed     • multivitamin-iron-minerals-folic acid (CENTRUM) chewable tablet Chew 1 tablet daily   • pravastatin (PRAVACHOL) 80 mg tablet pravastatin 80 mg tablet   • sodium chloride (OCEAN) 0 65 % nasal spray 2 sprays into each nostril 2 (two) times a day   • topiramate (TOPAMAX) 200 MG tablet Take 200 mg by mouth 2 (two) times a day   • verapamil (CALAN-SR) 120 mg CR tablet Take 120 mg by mouth daily at bedtime   • aspirin 81 mg chewable tablet Chew 81 mg daily (Patient not taking: No sig reported)   • betamethasone valerate (VALISONE) 0 1 % ointment Apply topically once or twice a day to affected itchy areas of the skin for up to 2 weeks  Avoid face, eyes, body folds  (Patient not taking: No sig reported)   • cephalexin (KEFLEX) 500 mg capsule  (Patient not taking: No sig reported)   • ciclopirox (LOPROX) 0 77 % cream Apply topically 2 (two) times a day To affected areas of rash for 4 weeks straight   (Patient not taking: No sig reported)   • Co-Enzyme Q10 100 MG CAPS Take 1 capsule by mouth daily   • fluticasone (FLONASE) 50 mcg/act nasal spray fluticasone propionate 50 mcg/actuation nasal spray,suspension   • indomethacin (INDOCIN) 25 mg capsule TAKE ONE CAPSULE AT ONSET OF MIGRAINE (Patient not taking: No sig reported)   • meclizine (ANTIVERT) 25 mg tablet meclizine 25 mg tablet   • metFORMIN (GLUCOPHAGE) 500 mg tablet metformin 500 mg tablet   • mometasone (Asmanex, 60 Metered Doses,) 220 mcg/actuation inhaler Asmanex Twisthaler 220 mcg/actuation(60 doses) breath activated inhalr   • Mometasone Furoate 110 MCG/INH AEPB Inhale 1 puff daily   (Patient not taking: No sig reported)   • promethazine (PHENERGAN) 25 mg tablet Take 25 mg by mouth every 8 (eight) hours as needed for nausea or vomiting (Patient not taking: No sig reported)   • rizatriptan (MAXALT) 10 MG tablet Take 10 mg by mouth once as needed for migraine May repeat in 2 hours if needed (Patient not taking: No sig reported)   • triamcinolone (KENALOG) 0 1 % cream    • zolpidem (AMBIEN CR) 6 25 MG CR tablet Take 6 25 mg by mouth daily at bedtime as needed for sleep (Patient not taking: No sig reported)     No current facility-administered medications on file prior to visit  Allergies   Allergen Reactions   • Other Shortness Of Breath     Nuts, coconut   • Talwin [Pentazocine] Shortness Of Breath and Other (See Comments)     dizziness       Physical Exam    Ht 5' 2" (1 575 m)   Wt 82 7 kg (182 lb 6 4 oz)   BMI 33 36 kg/m²     Constitutional: normal, well developed, well nourished, alert, in no distress and non-toxic and no overt pain behavior  Eyes: anicteric  HEENT: grossly intact  Neck: supple, symmetric, trachea midline and no masses   Pulmonary:even and unlabored  Cardiovascular:No edema or pitting edema present  Skin:Normal without rashes or lesions and well hydrated  Psychiatric:Mood and affect appropriate  Neurologic:Cranial Nerves II-XII grossly intact  Musculoskeletal:normal gait  Bilateral cervical paraspinals nontender to palpation  Left trapezius tender to palpation  Bilateral biceps, triceps, and brachioradialis reflexes were 2/4 and symmetrical   Negative Garcia's reflex bilaterally  Bilateral upper extremity strength 5/5 in all muscle groups  Sensation intact to light touch in C5 through T1 dermatomes bilaterally  Negative Spurling's bilaterally  Negative Neer, empty can, and Person test on the left      Imaging    PACS Images     Show images for XR spine cervical complete 4 or 5 vw non injury      Study Result    Narrative & Impression   CERVICAL SPINE     INDICATION:   R52: Pain, unspecified        COMPARISON:  None     VIEWS:  XR SPINE CERVICAL COMPLETE 4 OR 5 VW NON INJURY   Images: 5     FINDINGS:     No fracture       Normal alignment without subluxation      The intervertebral disc spaces are preserved       Small anterior osteophytes at C5 and C6      The neuroforamina are patent      The prevertebral soft tissues are within normal limits        The lung apices are clear      IMPRESSION:     Mild degenerative changes of cervical spine         Workstation performed: [id number]     PACS Images     Show images for XR shoulder 2+ vw left    Study Result    Narrative & Impression   LEFT SHOULDER     INDICATION:   R52: Pain, unspecified      COMPARISON:  3/28/2018     VIEWS:  XR SHOULDER 2+ VW LEFT   Images: 3     FINDINGS:     There is no acute fracture or dislocation      Moderate osteoarthritis acromioclavicular joint    In the abducted view there may be subacromial impingement in the region of the greater tuberosity     No lytic or blastic osseous lesion      Soft tissues are unremarkable      IMPRESSION:  Persistent moderate degenerative arthritis AC joint  Possible subacromial impingement  No acute osseous abnormality      Workstation performed: [id number]

## 2022-10-28 NOTE — PROGRESS NOTES
Assessment  1  Radiculopathy, cervical region    2  Arthritis of left acromioclavicular joint        Plan  44-year-old female with a history of diabetes, referred by Dr Michael Katz, presenting for initial consultation regarding left-sided neck pain that radiates into the left shoulder and into the left upper extremity with associated numbness  She denies any trauma or inciting event  The patient was working from home on August 4, 2022 when pain began  X-ray of the cervical spine shows anterior osteophytes at C5 and C6  Disc heights preserved  X-ray of the left shoulder shows moderate AC joint OA with possible subacromial impingement  She is currently taking Tylenol and ibuprofen p r n  for relief with mild-to-moderate relief  She was prescribed physical therapy, however did not attend secondary to recent death of her mother  Patient's symptoms are most consistent with cervical radiculopathy, however the patient states that her symptoms are intermittent and rare at this point  Left shoulder impingement also on the differential   Physical exam was essentially within normal limits today  1  I will prescribe physical therapy for the cervical spine as well as left shoulder  2  Patient may continue with ibuprofen and Tylenol p r n    3  If the patient fails 6 weeks of conservative treatment may consider an MRI of the cervical spine without contrast  4  I will follow up the patient in 6 weeks       My impressions and treatment recommendations were discussed in detail with the patient who verbalized understanding and had no further questions  Discharge instructions were provided  I personally saw and examined the patient and I agree with the above discussed plan of care  No orders of the defined types were placed in this encounter      New Medications Ordered This Visit   Medications   • mometasone (Asmanex, 60 Metered Doses,) 220 mcg/actuation inhaler     Sig: Asmanex Twisthaler 220 mcg/actuation(60 doses) breath activated inhalr   • fluticasone (FLONASE) 50 mcg/act nasal spray     Sig: fluticasone propionate 50 mcg/actuation nasal spray,suspension   • triamcinolone (KENALOG) 0 1 % cream   • metFORMIN (GLUCOPHAGE) 500 mg tablet     Sig: metformin 500 mg tablet   • meclizine (ANTIVERT) 25 mg tablet     Sig: meclizine 25 mg tablet       History of Present Illness    Clotilde Hathaway is a 65 y o  female with a history of diabetes, referred by Dr Jamila Sanchez, presenting for initial consultation regarding left-sided neck pain that radiates into the left shoulder and into the left upper extremity with associated numbness  She denies any trauma or inciting event  The patient was working from home on August 4, 2022 when pain began  She denies any left upper extremity symptoms, bladder bowel incontinence, or balance issues  The patient states that initially when symptoms began she was having shortness of breath  Cardiac pathology has been ruled out  X-ray of the cervical spine shows anterior osteophytes at C5 and C6  Disc heights preserved  X-ray of the left shoulder shows moderate AC joint OA with possible subacromial impingement  She is currently taking Tylenol and ibuprofen p r n  for relief with mild-to-moderate relief  She was prescribed physical therapy, however did not attend secondary to recent death of her mother  The patient rates her pain currently minimal to mild, however pain was initially moderate to severe  The pain is intermittent and does not follow any particular pattern throughout the day  The pain is described as shooting and numbness  The patient denies any specific aggravating factors  She does find some relief with ice and a TENS unit  Other than as stated above, the patient denies any interval changes in medications, medical condition, mental condition, symptoms, or allergies since the last office visit            I have personally reviewed and/or updated the patient's past medical history, past surgical history, family history, social history, current medications, allergies, and vital signs today  Review of Systems   Constitutional: Negative for fever and unexpected weight change  HENT: Negative for trouble swallowing  Eyes: Negative for visual disturbance  Respiratory: Negative for shortness of breath and wheezing  Cardiovascular: Negative for chest pain and palpitations  Gastrointestinal: Negative for constipation, diarrhea, nausea and vomiting  Endocrine: Positive for polyuria  Negative for cold intolerance, heat intolerance and polydipsia  Genitourinary: Negative for difficulty urinating and frequency  Musculoskeletal: Negative for arthralgias, gait problem, joint swelling and myalgias  Skin: Positive for rash  Neurological: Positive for headaches  Negative for dizziness, seizures, syncope and weakness  Hematological: Does not bruise/bleed easily  Psychiatric/Behavioral: Negative for dysphoric mood  All other systems reviewed and are negative        Patient Active Problem List   Diagnosis   • Arthropathy of left shoulder   • Left arm pain   • Hypertension, essential   • Hypokalemia   • Hiatal hernia   • Partial symptomatic epilepsy with complex partial seizures, intractable, without status epilepticus ([facility] [address] )   • Intractable migraine   • Chest pressure   • Complex partial seizures with consciousness impaired ([facility] [address] )   • Radiculopathy, cervical region       Past Medical History:   Diagnosis Date   • Asthma    • Diabetes mellitus ([facility] [address] )     type 2   • Diverticulitis    • EEG abnormality without seizure    • Gastroparesis    • GERD (gastroesophageal reflux disease)    • Headache, migraine    • Hiatal hernia    • IBS (irritable bowel syndrome)    • Migraines    • Sarcoidosis        Past Surgical History:   Procedure Laterality Date   • CHOLECYSTECTOMY     • AK COLONOSCOPY FLX DX W/COLLJ SPEC WHEN PFRMD N/A 3/8/2017    Procedure: EGD AND COLONOSCOPY;  Surgeon: Royal Rizzo Ailyn Richardson MD;  Location: [facility];   Service: Gastroenterology   • REPLACEMENT TOTAL KNEE Right    • SIGMOIDECTOMY         Family History   Problem Relation Age of Onset   • Diabetes Mother    • Heart disease Mother    • Cancer Father    • Liver cancer Father    • Brain cancer Father    • Arthritis Sister        Social History     Occupational History   • Not on file   Tobacco Use   • Smoking status: Never Smoker   • Smokeless tobacco: Never Used   Vaping Use   • Vaping Use: Never used   Substance and Sexual Activity   • Alcohol use: Yes     Comment: socially   • Drug use: No   • Sexual activity: Not on file       Current Outpatient Medications on File Prior to Visit   Medication Sig   • acetaminophen (TYLENOL) 500 mg tablet Take 500 mg by mouth every 6 (six) hours as needed for mild pain   • albuterol (PROVENTIL HFA,VENTOLIN HFA) 90 mcg/act inhaler Inhale 2 puffs every 6 (six) hours as needed for wheezing   • ascorbic acid (VITAMIN C) 500 mg tablet Take 500 mg by mouth daily   • B Complex-C (B-COMPLEX WITH VITAMIN C) tablet Take 1 tablet by mouth daily   • Cholecalciferol (VITAMIN D3) 2000 units TABS Take 1 tablet by mouth daily   • dicyclomine (BENTYL) 20 mg tablet Take 20 mg by mouth as needed     • eletriptan (RELPAX) 40 MG tablet eletriptan 40 mg tablet   take one tablet at onset of migraine; may repeat once in 12 hrs if needed for return of headache   • Empagliflozin 25 MG TABS Jardiance 25 mg tablet   • esomeprazole (NexIUM) 40 MG capsule Take 40 mg by mouth daily   • hydrOXYzine HCL (ATARAX) 25 mg tablet hydroxyzine HCl 25 mg tablet   • HYOSCYAMINE SULFATE PO Place 1 tablet under the tongue every 6 (six) hours as needed     • multivitamin-iron-minerals-folic acid (CENTRUM) chewable tablet Chew 1 tablet daily   • pravastatin (PRAVACHOL) 80 mg tablet pravastatin 80 mg tablet   • sodium chloride (OCEAN) 0 65 % nasal spray 2 sprays into each nostril 2 (two) times a day   • topiramate (TOPAMAX) 200 MG tablet Take 200 mg by mouth 2 (two) times a day   • verapamil (CALAN-SR) 120 mg CR tablet Take 120 mg by mouth daily at bedtime   • aspirin 81 mg chewable tablet Chew 81 mg daily (Patient not taking: No sig reported)   • betamethasone valerate (VALISONE) 0 1 % ointment Apply topically once or twice a day to affected itchy areas of the skin for up to 2 weeks  Avoid face, eyes, body folds  (Patient not taking: No sig reported)   • cephalexin (KEFLEX) 500 mg capsule  (Patient not taking: No sig reported)   • ciclopirox (LOPROX) 0 77 % cream Apply topically 2 (two) times a day To affected areas of rash for 4 weeks straight  (Patient not taking: No sig reported)   • Co-Enzyme Q10 100 MG CAPS Take 1 capsule by mouth daily   • fluticasone (FLONASE) 50 mcg/act nasal spray fluticasone propionate 50 mcg/actuation nasal spray,suspension   • indomethacin (INDOCIN) 25 mg capsule TAKE ONE CAPSULE AT ONSET OF MIGRAINE (Patient not taking: No sig reported)   • meclizine (ANTIVERT) 25 mg tablet meclizine 25 mg tablet   • metFORMIN (GLUCOPHAGE) 500 mg tablet metformin 500 mg tablet   • mometasone (Asmanex, 60 Metered Doses,) 220 mcg/actuation inhaler Asmanex Twisthaler 220 mcg/actuation(60 doses) breath activated inhalr   • Mometasone Furoate 110 MCG/INH AEPB Inhale 1 puff daily   (Patient not taking: No sig reported)   • promethazine (PHENERGAN) 25 mg tablet Take 25 mg by mouth every 8 (eight) hours as needed for nausea or vomiting (Patient not taking: No sig reported)   • rizatriptan (MAXALT) 10 MG tablet Take 10 mg by mouth once as needed for migraine May repeat in 2 hours if needed (Patient not taking: No sig reported)   • triamcinolone (KENALOG) 0 1 % cream    • zolpidem (AMBIEN CR) 6 25 MG CR tablet Take 6 25 mg by mouth daily at bedtime as needed for sleep (Patient not taking: No sig reported)     No current facility-administered medications on file prior to visit         Allergies   Allergen Reactions   • Other Shortness Of Breath Nuts, coconut   • Talwin [Pentazocine] Shortness Of Breath and Other (See Comments)     dizziness       Physical Exam    Ht 5' 2" (1 575 m)   Wt 82 7 kg (182 lb 6 4 oz)   BMI 33 36 kg/m²     Constitutional: normal, well developed, well nourished, alert, in no distress and non-toxic and no overt pain behavior  Eyes: anicteric  HEENT: grossly intact  Neck: supple, symmetric, trachea midline and no masses   Pulmonary:even and unlabored  Cardiovascular:No edema or pitting edema present  Skin:Normal without rashes or lesions and well hydrated  Psychiatric:Mood and affect appropriate  Neurologic:Cranial Nerves II-XII grossly intact  Musculoskeletal:normal gait  Bilateral cervical paraspinals nontender to palpation  Left trapezius tender to palpation  Bilateral biceps, triceps, and brachioradialis reflexes were 2/4 and symmetrical   Negative Garcia's reflex bilaterally  Bilateral upper extremity strength 5/5 in all muscle groups  Sensation intact to light touch in C5 through T1 dermatomes bilaterally  Negative Spurling's bilaterally  Negative Neer, empty can, and Person test on the left      Imaging    PACS Images     Show images for XR spine cervical complete 4 or 5 vw non injury      Study Result    Narrative & Impression   CERVICAL SPINE     INDICATION:   R52: Pain, unspecified        COMPARISON:  None     VIEWS:  XR SPINE CERVICAL COMPLETE 4 OR 5 VW NON INJURY   Images: 5     FINDINGS:     No fracture       Normal alignment without subluxation      The intervertebral disc spaces are preserved       Small anterior osteophytes at C5 and C6      The neuroforamina are patent      The prevertebral soft tissues are within normal limits        The lung apices are clear      IMPRESSION:     Mild degenerative changes of cervical spine         Workstation performed: [id number]     PACS Images     Show images for XR shoulder 2+ vw left    Study Result    Narrative & Impression   LEFT SHOULDER     INDICATION: R52: Pain, unspecified      COMPARISON:  3/28/2018     VIEWS:  XR SHOULDER 2+ VW LEFT   Images: 3     FINDINGS:     There is no acute fracture or dislocation      Moderate osteoarthritis acromioclavicular joint    In the abducted view there may be subacromial impingement in the region of the greater tuberosity     No lytic or blastic osseous lesion      Soft tissues are unremarkable      IMPRESSION:  Persistent moderate degenerative arthritis AC joint  Possible subacromial impingement  No acute osseous abnormality      Workstation performed: [id number]

## 2022-11-09 ENCOUNTER — EVALUATION (OUTPATIENT)
Dept: PHYSICAL THERAPY | Facility: REHABILITATION | Age: 62
End: 2022-11-09

## 2022-11-09 DIAGNOSIS — M54.12 RADICULOPATHY, CERVICAL REGION: Primary | ICD-10-CM

## 2022-11-09 DIAGNOSIS — M19.012 ARTHRITIS OF LEFT ACROMIOCLAVICULAR JOINT: ICD-10-CM

## 2022-11-16 ENCOUNTER — OFFICE VISIT (OUTPATIENT)
Dept: PHYSICAL THERAPY | Facility: REHABILITATION | Age: 62
End: 2022-11-16

## 2022-11-16 DIAGNOSIS — M54.12 RADICULOPATHY, CERVICAL REGION: Primary | ICD-10-CM

## 2022-11-16 DIAGNOSIS — M19.012 ARTHRITIS OF LEFT ACROMIOCLAVICULAR JOINT: ICD-10-CM

## 2022-11-16 NOTE — PROGRESS NOTES
Daily Note     Today's date: 2022  Patient name: Ayaka Flanagan  : 1960  MRN: [id number]  Referring provider: Sofya Dumas DO  Dx:   Encounter Diagnosis     ICD-10-CM    1  Radiculopathy, cervical region  M54 12       2  Arthritis of left acromioclavicular joint  M19 012                      Subjective: Patient presents with increased neck pain today, which she attributes to the weather  Objective: See treatment diary below      Assessment: Cervical distraction was performed without any relief in patient's distal symptoms  L median nerve glides were performed, patient noted a slight increase in the intensity of her distal symptoms afterwards  No change in symptoms was noted with L upper trap stretching  Based on patient's poor cervcial spine motor control, supine cervical retraction was performed rather than a seated position  Patient reports no change in distal symptoms after performing supine cervical retraction  Postural control cueing is required throughout tx  Periscapular strengthening interventions were tolerated without an increase in pain  Patient would benefit from continued PT  Plan: Continue per plan of care        Precautions: asthma, diabetes, GERD, migraines, R TKA      Manuals        L UT stretch  TB       L median nerve glides  TB       Cervical distraction  TB                Neuro Re-Ed         Supine cervical retraction  20x2s       TB/[place] shoulder LPD/ext  2x10 rtb       TB/tere rows  2x10 rtb       TB b/l ER w/scap squeeze  2x10 rtb       Seated chin tucks                                    Ther Ex         UBE retro  6 min       Pulleys flexion         Serratus wall slides         Doorway pec stretch  10x10s       scap punches                  Pt education+ HEP instruction TP 8 mins                 Ther Activity         pball posture         pball shoulder scaption         Gait Training                           Modalities

## 2022-11-23 ENCOUNTER — APPOINTMENT (OUTPATIENT)
Dept: PHYSICAL THERAPY | Facility: REHABILITATION | Age: 62
End: 2022-11-23

## 2022-11-28 ENCOUNTER — APPOINTMENT (OUTPATIENT)
Dept: PHYSICAL THERAPY | Facility: REHABILITATION | Age: 62
End: 2022-11-28

## 2022-11-29 ENCOUNTER — HOSPITAL ENCOUNTER (OUTPATIENT)
Facility: HOSPITAL | Age: 62
Setting detail: OBSERVATION
Discharge: HOME | End: 2022-12-01
Attending: EMERGENCY MEDICINE | Admitting: PSYCHIATRY & NEUROLOGY

## 2022-11-29 DIAGNOSIS — R68.89 ABNORMAL SENSORY EXAM: ICD-10-CM

## 2022-11-29 DIAGNOSIS — Z86.2 HX OF SARCOIDOSIS: ICD-10-CM

## 2022-11-29 DIAGNOSIS — G43.909 MIGRAINE HEADACHE: Primary | ICD-10-CM

## 2022-11-29 LAB
ANION GAP SERPL CALCULATED.3IONS-SCNC: 8 MMOL/L (ref 4–13)
BASOPHILS # BLD AUTO: 0.03 THOUSANDS/ÂΜL (ref 0–0.1)
BASOPHILS NFR BLD AUTO: 0 % (ref 0–1)
BUN SERPL-MCNC: 21 MG/DL (ref 5–25)
CALCIUM SERPL-MCNC: 9 MG/DL (ref 8.3–10.1)
CHLORIDE SERPL-SCNC: 106 MMOL/L (ref 96–108)
CO2 SERPL-SCNC: 22 MMOL/L (ref 21–32)
CREAT SERPL-MCNC: 1.01 MG/DL (ref 0.6–1.3)
EOSINOPHIL # BLD AUTO: 0.09 THOUSAND/ÂΜL (ref 0–0.61)
EOSINOPHIL NFR BLD AUTO: 1 % (ref 0–6)
ERYTHROCYTE [DISTWIDTH] IN BLOOD BY AUTOMATED COUNT: 13.6 % (ref 11.6–15.1)
GFR SERPL CREATININE-BSD FRML MDRD: 60 ML/MIN/1.73SQ M
GLUCOSE SERPL-MCNC: 260 MG/DL (ref 65–140)
HCT VFR BLD AUTO: 45.5 % (ref 34.8–46.1)
HGB BLD-MCNC: 14.3 G/DL (ref 11.5–15.4)
IMM GRANULOCYTES # BLD AUTO: 0.05 THOUSAND/UL (ref 0–0.2)
IMM GRANULOCYTES NFR BLD AUTO: 0 % (ref 0–2)
LYMPHOCYTES # BLD AUTO: 1.17 THOUSANDS/ÂΜL (ref 0.6–4.47)
LYMPHOCYTES NFR BLD AUTO: 10 % (ref 14–44)
MCH RBC QN AUTO: 28.5 PG (ref 26.8–34.3)
MCHC RBC AUTO-ENTMCNC: 31.4 G/DL (ref 31.4–37.4)
MCV RBC AUTO: 91 FL (ref 82–98)
MONOCYTES # BLD AUTO: 0.11 THOUSAND/ÂΜL (ref 0.17–1.22)
MONOCYTES NFR BLD AUTO: 1 % (ref 4–12)
NEUTROPHILS # BLD AUTO: 10.64 THOUSANDS/ÂΜL (ref 1.85–7.62)
NEUTS SEG NFR BLD AUTO: 88 % (ref 43–75)
NRBC BLD AUTO-RTO: 0 /100 WBCS
PLATELET # BLD AUTO: 221 THOUSANDS/UL (ref 149–390)
PMV BLD AUTO: 10.8 FL (ref 8.9–12.7)
POTASSIUM SERPL-SCNC: 3.7 MMOL/L (ref 3.5–5.3)
RBC # BLD AUTO: 5.02 MILLION/UL (ref 3.81–5.12)
SODIUM SERPL-SCNC: 136 MMOL/L (ref 135–147)
WBC # BLD AUTO: 12.09 THOUSAND/UL (ref 4.31–10.16)

## 2022-11-29 RX ORDER — FLUTICASONE PROPIONATE 220 UG/1
2 AEROSOL, METERED RESPIRATORY (INHALATION)
Status: DISCONTINUED | OUTPATIENT
Start: 2022-11-29 | End: 2022-12-01 | Stop reason: HOSPADM

## 2022-11-29 RX ORDER — METOCLOPRAMIDE HYDROCHLORIDE 5 MG/ML
10 INJECTION INTRAMUSCULAR; INTRAVENOUS ONCE
Status: COMPLETED | OUTPATIENT
Start: 2022-11-29 | End: 2022-11-29

## 2022-11-29 RX ORDER — KETOROLAC TROMETHAMINE 30 MG/ML
15 INJECTION, SOLUTION INTRAMUSCULAR; INTRAVENOUS EVERY 8 HOURS
Status: COMPLETED | OUTPATIENT
Start: 2022-11-29 | End: 2022-11-30

## 2022-11-29 RX ORDER — PANTOPRAZOLE SODIUM 40 MG/1
40 TABLET, DELAYED RELEASE ORAL
Status: DISCONTINUED | OUTPATIENT
Start: 2022-11-30 | End: 2022-12-01 | Stop reason: HOSPADM

## 2022-11-29 RX ORDER — DICYCLOMINE HCL 20 MG
20 TABLET ORAL
Status: DISCONTINUED | OUTPATIENT
Start: 2022-11-30 | End: 2022-12-01 | Stop reason: HOSPADM

## 2022-11-29 RX ORDER — MAGNESIUM SULFATE 1 G/100ML
1 INJECTION INTRAVENOUS 2 TIMES DAILY
Status: COMPLETED | OUTPATIENT
Start: 2022-11-30 | End: 2022-11-30

## 2022-11-29 RX ORDER — ACETAMINOPHEN 325 MG/1
650 TABLET ORAL ONCE
Status: COMPLETED | OUTPATIENT
Start: 2022-11-29 | End: 2022-11-29

## 2022-11-29 RX ORDER — MAGNESIUM SULFATE HEPTAHYDRATE 40 MG/ML
2 INJECTION, SOLUTION INTRAVENOUS ONCE
Status: COMPLETED | OUTPATIENT
Start: 2022-11-29 | End: 2022-11-29

## 2022-11-29 RX ORDER — OLANZAPINE 5 MG/1
5 TABLET ORAL
Status: COMPLETED | OUTPATIENT
Start: 2022-11-29 | End: 2022-11-29

## 2022-11-29 RX ORDER — TOPIRAMATE 100 MG/1
200 TABLET, FILM COATED ORAL 2 TIMES DAILY
Status: DISCONTINUED | OUTPATIENT
Start: 2022-11-29 | End: 2022-12-01 | Stop reason: HOSPADM

## 2022-11-29 RX ORDER — PRAVASTATIN SODIUM 80 MG/1
80 TABLET ORAL
Status: DISCONTINUED | OUTPATIENT
Start: 2022-11-30 | End: 2022-12-01 | Stop reason: HOSPADM

## 2022-11-29 RX ORDER — ALBUTEROL SULFATE 90 UG/1
2 AEROSOL, METERED RESPIRATORY (INHALATION) EVERY 6 HOURS PRN
Status: DISCONTINUED | OUTPATIENT
Start: 2022-11-29 | End: 2022-12-01 | Stop reason: HOSPADM

## 2022-11-29 RX ORDER — PROMETHAZINE HYDROCHLORIDE 25 MG/1
25 TABLET ORAL EVERY 6 HOURS PRN
Status: DISCONTINUED | OUTPATIENT
Start: 2022-11-29 | End: 2022-12-01 | Stop reason: HOSPADM

## 2022-11-29 RX ORDER — FLUTICASONE PROPIONATE 50 MCG
2 SPRAY, SUSPENSION (ML) NASAL DAILY
Status: DISCONTINUED | OUTPATIENT
Start: 2022-11-30 | End: 2022-12-01 | Stop reason: HOSPADM

## 2022-11-29 RX ORDER — VALPROIC ACID 250 MG/1
250 CAPSULE, LIQUID FILLED ORAL ONCE
Status: COMPLETED | OUTPATIENT
Start: 2022-11-29 | End: 2022-11-29

## 2022-11-29 RX ORDER — KETOROLAC TROMETHAMINE 30 MG/ML
30 INJECTION, SOLUTION INTRAMUSCULAR; INTRAVENOUS ONCE
Status: COMPLETED | OUTPATIENT
Start: 2022-11-29 | End: 2022-11-29

## 2022-11-29 RX ORDER — HYDROXYZINE HYDROCHLORIDE 25 MG/1
25 TABLET, FILM COATED ORAL 3 TIMES DAILY PRN
Status: DISCONTINUED | OUTPATIENT
Start: 2022-11-29 | End: 2022-12-01 | Stop reason: HOSPADM

## 2022-11-29 RX ORDER — DEXAMETHASONE SODIUM PHOSPHATE 10 MG/ML
10 INJECTION, SOLUTION INTRAMUSCULAR; INTRAVENOUS ONCE
Status: COMPLETED | OUTPATIENT
Start: 2022-11-29 | End: 2022-11-29

## 2022-11-29 RX ORDER — DIPHENHYDRAMINE HYDROCHLORIDE 50 MG/ML
25 INJECTION INTRAMUSCULAR; INTRAVENOUS EVERY 8 HOURS
Status: COMPLETED | OUTPATIENT
Start: 2022-11-29 | End: 2022-11-30

## 2022-11-29 RX ORDER — TIZANIDINE 2 MG/1
2 TABLET ORAL 3 TIMES DAILY
Status: DISCONTINUED | OUTPATIENT
Start: 2022-11-29 | End: 2022-12-01 | Stop reason: HOSPADM

## 2022-11-29 RX ORDER — INSULIN LISPRO 100 [IU]/ML
1-5 INJECTION, SOLUTION INTRAVENOUS; SUBCUTANEOUS
Status: DISCONTINUED | OUTPATIENT
Start: 2022-11-30 | End: 2022-12-01 | Stop reason: HOSPADM

## 2022-11-29 RX ORDER — ECHINACEA PURPUREA EXTRACT 125 MG
2 TABLET ORAL
Status: DISCONTINUED | OUTPATIENT
Start: 2022-11-29 | End: 2022-12-01 | Stop reason: HOSPADM

## 2022-11-29 RX ORDER — MECLIZINE HYDROCHLORIDE 25 MG/1
25 TABLET ORAL EVERY 8 HOURS PRN
Status: DISCONTINUED | OUTPATIENT
Start: 2022-11-29 | End: 2022-12-01 | Stop reason: HOSPADM

## 2022-11-29 RX ORDER — ENOXAPARIN SODIUM 100 MG/ML
40 INJECTION SUBCUTANEOUS DAILY
Status: DISCONTINUED | OUTPATIENT
Start: 2022-11-30 | End: 2022-12-01 | Stop reason: HOSPADM

## 2022-11-29 RX ADMIN — TIZANIDINE 2 MG: 2 TABLET ORAL at 21:05

## 2022-11-29 RX ADMIN — DIPHENHYDRAMINE HYDROCHLORIDE 25 MG: 50 INJECTION, SOLUTION INTRAMUSCULAR; INTRAVENOUS at 21:06

## 2022-11-29 RX ADMIN — TOPIRAMATE 200 MG: 100 TABLET, FILM COATED ORAL at 22:27

## 2022-11-29 RX ADMIN — KETOROLAC TROMETHAMINE 15 MG: 30 INJECTION, SOLUTION INTRAMUSCULAR at 21:05

## 2022-11-29 RX ADMIN — KETOROLAC TROMETHAMINE 30 MG: 30 INJECTION, SOLUTION INTRAMUSCULAR at 14:15

## 2022-11-29 RX ADMIN — OLANZAPINE 5 MG: 5 TABLET, FILM COATED ORAL at 21:05

## 2022-11-29 RX ADMIN — SODIUM CHLORIDE 1000 ML: 0.9 INJECTION, SOLUTION INTRAVENOUS at 14:15

## 2022-11-29 RX ADMIN — VALPROIC ACID 250 MG: 250 CAPSULE ORAL at 17:14

## 2022-11-29 RX ADMIN — VALPROIC ACID 250 MG: 250 CAPSULE ORAL at 15:43

## 2022-11-29 RX ADMIN — VERAPAMIL HYDROCHLORIDE 120 MG: 120 TABLET, FILM COATED, EXTENDED RELEASE ORAL at 22:26

## 2022-11-29 RX ADMIN — METOCLOPRAMIDE HYDROCHLORIDE 10 MG: 5 INJECTION INTRAMUSCULAR; INTRAVENOUS at 14:16

## 2022-11-29 RX ADMIN — FLUTICASONE PROPIONATE 2 PUFF: 220 AEROSOL, METERED RESPIRATORY (INHALATION) at 22:26

## 2022-11-29 RX ADMIN — MAGNESIUM SULFATE HEPTAHYDRATE 2 G: 40 INJECTION, SOLUTION INTRAVENOUS at 14:18

## 2022-11-29 RX ADMIN — ACETAMINOPHEN 650 MG: 325 TABLET ORAL at 14:16

## 2022-11-29 RX ADMIN — DEXAMETHASONE SODIUM PHOSPHATE 10 MG: 10 INJECTION, SOLUTION INTRAMUSCULAR; INTRAVENOUS at 15:44

## 2022-11-29 NOTE — LETTER
[address] [facility] [address]  [address] [address]  Dept: [phone]    December 1, 2022     Patient: Yoni Soliz   YOB: 1960   Date of Visit: 11/29/2022       To Whom it May Concern:    Susanna Mclean is under my professional care  She was seen in the hospital from 11/29/2022   to 12/01/22  She may return to work on December 3, 2022 without limitations  If you have any questions or concerns, please don't hesitate to call           Sincerely,          Vanessa Harper MD

## 2022-11-29 NOTE — ED ATTENDING ATTESTATION
11/29/2022  IAbby MD, saw and evaluated the patient  I have discussed the patient with the resident/non-physician practitioner and agree with the resident's/non-physician practitioner's findings, Plan of Care, and MDM as documented in the resident's/non-physician practitioner's note, except where noted  All available labs and Radiology studies were reviewed  I was present for key portions of any procedure(s) performed by the resident/non-physician practitioner and I was immediately available to provide assistance  At this point I agree with the current assessment done in the Emergency Department  I have conducted an independent evaluation of this patient a history and physical is as follows:    ED Course         Critical Care Time  Procedures    64 yo female with hx of migraine, dm, asthma, diverticulitis, c/o headache similar to previous migraines since Sunday  Pt headache worsening today  Pt feels dizziness vertigo similar to previous and no relief with meclizine  No photophobia  Nausea, no vomiting  Pt with cough and sinus congestion  Vss, afebrile, lungs cta, rrr, abdomen soft nontender, no neuro deficits  Migraine, pain meds

## 2022-11-29 NOTE — ED PROVIDER NOTES
History  Chief Complaint   Patient presents with   • Migraine     Pt c/o migraine with dizziness and generalized facial numbness  Pt states her migraine meds have not relieved  Pt reports migraine has been ongoing for one week     Year old female with a history of migraines, diabetes, asthma sarcoidosis, and IBS who presents with a headache consistent with her typical migraine that began on 11/20 has been progressively worsening  She states that she also had some intermittent vertigo which she usually gets with migraines  Today she began experiencing some tingling in her face bilaterally which prompted her to come the emergency department  She states that she has taken her medications without any relief  Today she has only taken meclizine for her vertigo  For the last 2 days she has a mild nonproductive cough and some mild sinus congestion  She states that she has had some diarrhea recently which she attributes to her IBS, but denies hematochezia or melena  She complains of some left lower quadrant abdominal discomfort, states this is mild  She denies fever, chills, visual disturbances, sore throat, chest pain, shortness of breath, nausea, vomiting, abdominal pain, dysuria, and hematuria  Prior to Admission Medications   Prescriptions Last Dose Informant Patient Reported? Taking?    B Complex-C (B-COMPLEX WITH VITAMIN C) tablet 11/29/2022  Yes Yes   Sig: Take 1 tablet by mouth daily   Cholecalciferol (VITAMIN D3) 2000 units TABS 11/29/2022  Yes Yes   Sig: Take 1 tablet by mouth daily   Empagliflozin 25 MG TABS 11/29/2022  Yes Yes   Sig: Jardiance 25 mg tablet   HYOSCYAMINE SULFATE PO 11/29/2022  Yes Yes   Sig: Place 1 tablet under the tongue every 6 (six) hours as needed     Mometasone Furoate 110 MCG/INH AEPB   Yes Yes   Sig: Inhale 1 puff every evening   acetaminophen (TYLENOL) 500 mg tablet 11/29/2022  Yes Yes   Sig: Take 500 mg by mouth every 6 (six) hours as needed for mild pain   albuterol (PROVENTIL HFA,VENTOLIN HFA) 90 mcg/act inhaler 2022  Yes Yes   Sig: Inhale 2 puffs every 6 (six) hours as needed for wheezing   ascorbic acid (VITAMIN C) 500 mg tablet 2022  Yes Yes   Sig: Take 500 mg by mouth daily   cephalexin (KEFLEX) 500 mg capsule Not Taking  Yes No   Patient not taking: Reported on 10/27/2022   ciclopirox (LOPROX) 0 77 % cream 2022  No Yes   Sig: Apply topically 2 (two) times a day To affected areas of rash for 4 weeks     dicyclomine (BENTYL) 20 mg tablet 2022  Yes Yes   Sig: Take 20 mg by mouth as needed     eletriptan (RELPAX) 40 MG tablet More than a month  Yes No   Sig: eletriptan 40 mg tablet   take one tablet at onset of migraine; may repeat once in 12 hrs if needed for return of headache   esomeprazole (NexIUM) 40 MG capsule 2022  Yes Yes   Sig: Take 40 mg by mouth daily   fluticasone (FLONASE) 50 mcg/act nasal spray 2022  Yes Yes   Sig: fluticasone propionate 50 mcg/actuation nasal spray,suspension   hydrOXYzine HCL (ATARAX) 25 mg tablet 2022  Yes Yes   Si mg 3 (three) times a day as needed   meclizine (ANTIVERT) 25 mg tablet 2022  Yes Yes   Sig: meclizine 25 mg tablet   metFORMIN (GLUCOPHAGE) 500 mg tablet 2022  Yes Yes   Si mg 2 (two) times a day with meals   mometasone 220 mcg/actuation inhaler 2022  Yes Yes   Sig: Asmanex Twisthaler 220 mcg/actuation(60 doses) breath activated inhalr   multivitamin-iron-minerals-folic acid (CENTRUM) chewable tablet 2022  Yes Yes   Sig: Chew 1 tablet daily   pravastatin (PRAVACHOL) 80 mg tablet 2022  Yes Yes   Si mg daily   promethazine (PHENERGAN) 25 mg tablet 2022  Yes Yes   Sig: Take 25 mg by mouth every 6 (six) hours as needed for nausea or vomiting   sodium chloride (OCEAN) 0 65 % nasal spray 2022  Yes Yes   Si sprays into each nostril 2 (two) times a day   topiramate (TOPAMAX) 200 MG tablet 2022  Yes Yes   Sig: Take 200 mg by mouth 2 (two) times a day   triamcinolone (KENALOG) 0 1 % ointment Not Taking  No No   Sig: Apply 2-4 times a day for up to 2 weeks  Avoid face and body folds  Patient not taking: Reported on 11/29/2022   verapamil (CALAN-SR) 120 mg CR tablet 11/29/2022  Yes Yes   Sig: Take 120 mg by mouth in the morning      Facility-Administered Medications: None       Past Medical History:   Diagnosis Date   • Asthma    • Diabetes mellitus ([facility] [address] )     type 2   • Diverticulitis    • EEG abnormality without seizure    • Gastroparesis    • GERD (gastroesophageal reflux disease)    • Headache, migraine    • Hiatal hernia    • IBS (irritable bowel syndrome)    • Migraines    • Sarcoidosis        Past Surgical History:   Procedure Laterality Date   • CHOLECYSTECTOMY     • OK COLONOSCOPY FLX DX W/COLLJ SPEC WHEN PFRMD N/A 3/8/2017    Procedure: EGD AND COLONOSCOPY;  Surgeon: Lynette Ott MD;  Location: [facility]; Service: Gastroenterology   • REPLACEMENT TOTAL KNEE Right    • SIGMOIDECTOMY         Family History   Problem Relation Age of Onset   • Diabetes Mother    • Heart disease Mother    • Cancer Father    • Liver cancer Father    • Brain cancer Father    • Arthritis Sister      I have reviewed and agree with the history as documented  E-Cigarette/Vaping   • E-Cigarette Use Never User      E-Cigarette/Vaping Substances   • Nicotine No    • THC No    • CBD No    • Flavoring No      Social History     Tobacco Use   • Smoking status: Never   • Smokeless tobacco: Never   Vaping Use   • Vaping Use: Never used   Substance Use Topics   • Alcohol use: Yes     Comment: socially   • Drug use: No        Review of Systems   Constitutional: Negative for chills, diaphoresis and fever  HENT: Positive for congestion  Negative for sore throat and trouble swallowing  Eyes: Negative for photophobia, pain, redness and visual disturbance  Respiratory: Positive for cough  Negative for shortness of breath and wheezing      Cardiovascular: Negative for chest pain, palpitations and leg swelling  Gastrointestinal: Positive for diarrhea  Negative for abdominal pain, nausea and vomiting  Genitourinary: Negative for dysuria, flank pain and hematuria  Musculoskeletal: Negative for arthralgias and myalgias  Skin: Negative for color change, pallor and rash  Neurological: Positive for dizziness and headaches  Negative for seizures, syncope, facial asymmetry, speech difficulty, weakness, light-headedness and numbness  All other systems reviewed and are negative  Physical Exam  ED Triage Vitals [11/29/22 1234]   Temperature Pulse Respirations Blood Pressure SpO2   97 8 °F (36 6 °C) 94 16 (!) 171/75 99 %      Temp Source Heart Rate Source Patient Position - Orthostatic VS BP Location FiO2 (%)   Oral Monitor Sitting Left arm --      Pain Score       10 - Worst Possible Pain             Orthostatic Vital Signs  Vitals:    11/29/22 1630 11/29/22 1730 11/29/22 1800 11/29/22 2021   BP: 140/64 127/68 129/74 132/73   Pulse: 74 80 80 85   Patient Position - Orthostatic VS: Lying  Lying        Physical Exam  Vitals and nursing note reviewed  Constitutional:       General: She is not in acute distress  Appearance: Normal appearance  She is not ill-appearing, toxic-appearing or diaphoretic  HENT:      Head: Normocephalic and atraumatic  Right Ear: Tympanic membrane, ear canal and external ear normal       Left Ear: Tympanic membrane, ear canal and external ear normal       Nose: Nose normal  No congestion or rhinorrhea  Mouth/Throat:      Mouth: Mucous membranes are moist       Pharynx: Oropharynx is clear  Eyes:      General: No scleral icterus  Extraocular Movements: Extraocular movements intact  Conjunctiva/sclera: Conjunctivae normal       Pupils: Pupils are equal, round, and reactive to light  Cardiovascular:      Rate and Rhythm: Normal rate and regular rhythm  Pulses: Normal pulses        Heart sounds: Normal heart sounds  No murmur heard  No friction rub  No gallop  Pulmonary:      Effort: Pulmonary effort is normal       Breath sounds: Normal breath sounds  No wheezing, rhonchi or rales  Abdominal:      General: Abdomen is flat  Palpations: Abdomen is soft  Tenderness: There is abdominal tenderness  There is no right CVA tenderness, left CVA tenderness, guarding or rebound  Comments: Mild left lower quadrant tenderness  Musculoskeletal:         General: No swelling, tenderness, deformity or signs of injury  Normal range of motion  Cervical back: Normal range of motion and neck supple  No rigidity or tenderness  Right lower leg: No edema  Left lower leg: No edema  Lymphadenopathy:      Cervical: No cervical adenopathy  Skin:     General: Skin is warm and dry  Capillary Refill: Capillary refill takes less than 2 seconds  Coloration: Skin is not jaundiced or pale  Findings: No bruising, erythema, lesion or rash  Neurological:      General: No focal deficit present  Mental Status: She is alert and oriented to person, place, and time  Cranial Nerves: No cranial nerve deficit  Sensory: No sensory deficit  Motor: No weakness        Coordination: Coordination normal       Gait: Gait normal          ED Medications  Medications   albuterol (PROVENTIL HFA,VENTOLIN HFA) inhaler 2 puff (has no administration in time range)   ciclopirox (LOPROX) 0 77 % cream (has no administration in time range)   dicyclomine (BENTYL) tablet 20 mg (has no administration in time range)   Empagliflozin (JARDIANCE) tablet 20 mg (has no administration in time range)   pantoprazole (PROTONIX) EC tablet 40 mg (has no administration in time range)   fluticasone (FLONASE) 50 mcg/act nasal spray 2 spray (has no administration in time range)   hydrOXYzine HCL (ATARAX) tablet 25 mg (has no administration in time range)   hyoscyamine (LEVSIN/SL) SL tablet 0 125 mg (has no administration in time range)   meclizine (ANTIVERT) tablet 25 mg (has no administration in time range)   metFORMIN (GLUCOPHAGE) tablet 500 mg (has no administration in time range)   fluticasone (FLOVENT HFA) 220 mcg/act inhaler 2 puff (has no administration in time range)   pravastatin (PRAVACHOL) tablet 80 mg (has no administration in time range)   promethazine (PHENERGAN) tablet 25 mg (has no administration in time range)   sodium chloride (OCEAN) 0 65 % nasal spray 2 spray (has no administration in time range)   topiramate (TOPAMAX) tablet 200 mg (has no administration in time range)   verapamil (CALAN-SR) CR tablet 120 mg (has no administration in time range)   enoxaparin (LOVENOX) subcutaneous injection 40 mg (has no administration in time range)   ketorolac (TORADOL) injection 15 mg (15 mg Intravenous Given 11/29/22 2105)     And   diphenhydrAMINE (BENADRYL) injection 25 mg (25 mg Intravenous Given 11/29/22 2106)   valproate (DEPACON) 1,000 mg in sodium chloride 0 9 % 50 mL IVPB (has no administration in time range)   insulin lispro (HumaLOG) 100 units/mL subcutaneous injection 1-5 Units (has no administration in time range)   magnesium sulfate IVPB (premix) SOLN 1 g (has no administration in time range)   tiZANidine (ZANAFLEX) tablet 2 mg (2 mg Oral Given 11/29/22 2105)   sodium chloride 0 9 % bolus 1,000 mL (0 mL Intravenous Stopped 11/29/22 1543)   metoclopramide (REGLAN) injection 10 mg (10 mg Intravenous Given 11/29/22 1416)   ketorolac (TORADOL) injection 30 mg (30 mg Intravenous Given 11/29/22 1415)   magnesium sulfate 2 g/50 mL IVPB (premix) 2 g (0 g Intravenous Stopped 11/29/22 1543)   acetaminophen (TYLENOL) tablet 650 mg (650 mg Oral Given 11/29/22 1416)   dexamethasone (PF) (DECADRON) injection 10 mg (10 mg Intravenous Given 11/29/22 1544)   valproic acid (DEPAKENE) capsule 250 mg (250 mg Oral Given 11/29/22 1543)   valproic acid (DEPAKENE) capsule 250 mg (250 mg Oral Given 11/29/22 1714)   OLANZapine (ZyPREXA) tablet 5 mg (5 mg Oral Given 11/29/22 2105)       Diagnostic Studies  Results Reviewed     Procedure Component Value Units Date/Time    Basic metabolic panel [[id number]]  (Abnormal) Collected: 11/29/22 1948    Lab Status: Final result Specimen: Blood from Arm, Left Updated: 11/29/22 2024     Sodium 136 mmol/L      Potassium 3 7 mmol/L      Chloride 106 mmol/L      CO2 22 mmol/L      ANION GAP 8 mmol/L      BUN 21 mg/dL      Creatinine 1 01 mg/dL      Glucose 260 mg/dL      Calcium 9 0 mg/dL      eGFR 60 ml/min/1 73sq m     Narrative:      [place] guidelines for Chronic Kidney Disease (CKD):   •  Stage 1 with normal or high GFR (GFR > 90 mL/min/1 73 square meters)  •  Stage 2 Mild CKD (GFR = 60-89 mL/min/1 73 square meters)  •  Stage 3A Moderate CKD (GFR = 45-59 mL/min/1 73 square meters)  •  Stage 3B Moderate CKD (GFR = 30-44 mL/min/1 73 square meters)  •  Stage 4 Severe CKD (GFR = 15-29 mL/min/1 73 square meters)  •  Stage 5 End Stage CKD (GFR <15 mL/min/1 73 square meters)  Note: GFR calculation is accurate only with a steady state creatinine    CBC and differential [[id number]]  (Abnormal) Collected: 11/29/22 1948    Lab Status: Final result Specimen: Blood from Arm, Left Updated: 11/29/22 2005     WBC 12 09 Thousand/uL      RBC 5 02 Million/uL      Hemoglobin 14 3 g/dL      Hematocrit 45 5 %      MCV 91 fL      MCH 28 5 pg      MCHC 31 4 g/dL      RDW 13 6 %      MPV 10 8 fL      Platelets 721 Thousands/uL      nRBC 0 /100 WBCs      Neutrophils Relative 88 %      Immat GRANS % 0 %      Lymphocytes Relative 10 %      Monocytes Relative 1 %      Eosinophils Relative 1 %      Basophils Relative 0 %      Neutrophils Absolute 10 64 Thousands/µL      Immature Grans Absolute 0 05 Thousand/uL      Lymphocytes Absolute 1 17 Thousands/µL      Monocytes Absolute 0 11 Thousand/µL      Eosinophils Absolute 0 09 Thousand/µL      Basophils Absolute 0 03 Thousands/µL                  No orders to display         Procedures  Procedures      ED Course                             SBIRT 22yo+    Flowsheet Row Most Recent Value   SBIRT (25 yo +)    In order to provide better care to our patients, we are screening all of our patients for alcohol and drug use  Would it be okay to ask you these screening questions? No Filed at: 11/29/2022 1236                [facility]  Number of Diagnoses or Management Options  Migraine headache  Diagnosis management comments: Year old female with a history of migraines, diabetes, asthma sarcoidosis, and IBS who presents with a headache consistent with her typical migraine that began on 11/20 has been progressively worsening and bilateral facial tingling that began this morning  Patient's vitals are within the normal limits  On exam cranial nerves 2-12 are intact, coordination is intact to finger-to-nose he, strength and sensation are intact throughout all extremities bilaterally, heart is regular rate and rhythm, lungs are clear to auscultation bilaterally, abdomen is soft with mild left lower quadrant tenderness but no rebound or guarding  Suspect the patient's symptoms are due to migraine  Will order Tylenol, Toradol, Reglan, magnesium sulfate, and 1 L of IV fluids  The patient reports minimal improvement in her symptoms  Will order Decadron and valproic acid  Patient reports moderate improvement in her pain initially and significant improvement in her facial tingling  Patient states she still does not feel comfortable enough to go home  Will order repeat dose of valproic acid  Patient states that her headache is now similar to her initial presentation  The patient is discussed Dr Fco Ortiz of neurology who states they will admit the patient but requests basic labs  Patient is admitted to the service of Dr Tana Bull for intractable migraine        Disposition  Final diagnoses:   Migraine headache     Time reflects when diagnosis was documented in both MDM as applicable and the Disposition within this note     Time User Action Codes Description Comment    11/29/2022  7:37 PM Kevin Stewart Add [[id number] [id number]] Migraine headache       ED Disposition     ED Disposition   Admit    Condition   Stable    Date/Time   Tue Nov 29, 2022  7:38 PM    Comment   Case was discussed with Dr Rene Campbell and the patient's admission status was agreed to be Admission Status: observation status to the service of Dr Dann Rodriguez  Follow-up Information    None         Current Discharge Medication List      CONTINUE these medications which have NOT CHANGED    Details   acetaminophen (TYLENOL) 500 mg tablet Take 500 mg by mouth every 6 (six) hours as needed for mild pain      albuterol (PROVENTIL HFA,VENTOLIN HFA) 90 mcg/act inhaler Inhale 2 puffs every 6 (six) hours as needed for wheezing      ascorbic acid (VITAMIN C) 500 mg tablet Take 500 mg by mouth daily      B Complex-C (B-COMPLEX WITH VITAMIN C) tablet Take 1 tablet by mouth daily      Cholecalciferol (VITAMIN D3) 2000 units TABS Take 1 tablet by mouth daily      ciclopirox (LOPROX) 0 77 % cream Apply topically 2 (two) times a day To affected areas of rash for 4 weeks    Qty: 90 g, Refills: 0    Associated Diagnoses: Rash      dicyclomine (BENTYL) 20 mg tablet Take 20 mg by mouth as needed        Empagliflozin 25 MG TABS Jardiance 25 mg tablet      esomeprazole (NexIUM) 40 MG capsule Take 40 mg by mouth daily      fluticasone (FLONASE) 50 mcg/act nasal spray fluticasone propionate 50 mcg/actuation nasal spray,suspension      hydrOXYzine HCL (ATARAX) 25 mg tablet 25 mg 3 (three) times a day as needed      HYOSCYAMINE SULFATE PO Place 1 tablet under the tongue every 6 (six) hours as needed        meclizine (ANTIVERT) 25 mg tablet meclizine 25 mg tablet      metFORMIN (GLUCOPHAGE) 500 mg tablet 500 mg 2 (two) times a day with meals      mometasone 220 mcg/actuation inhaler Asmanex Twisthaler 220 mcg/actuation(60 doses) breath activated inhalr      Mometasone Furoate 110 MCG/INH AEPB Inhale 1 puff every evening      multivitamin-iron-minerals-folic acid (CENTRUM) chewable tablet Chew 1 tablet daily      pravastatin (PRAVACHOL) 80 mg tablet 80 mg daily      promethazine (PHENERGAN) 25 mg tablet Take 25 mg by mouth every 6 (six) hours as needed for nausea or vomiting      sodium chloride (OCEAN) 0 65 % nasal spray 2 sprays into each nostril 2 (two) times a day      topiramate (TOPAMAX) 200 MG tablet Take 200 mg by mouth 2 (two) times a day      verapamil (CALAN-SR) 120 mg CR tablet Take 120 mg by mouth in the morning      cephalexin (KEFLEX) 500 mg capsule       eletriptan (RELPAX) 40 MG tablet eletriptan 40 mg tablet   take one tablet at onset of migraine; may repeat once in 12 hrs if needed for return of headache      triamcinolone (KENALOG) 0 1 % ointment Apply 2-4 times a day for up to 2 weeks  Avoid face and body folds  Qty: 80 g, Refills: 0    Associated Diagnoses: Rash           No discharge procedures on file  PDMP Review     None           ED Provider  Attending physically available and evaluated Clotilde Berg I managed the patient along with the ED Attending      Electronically Signed by         Rafaela Kathleen DO  11/29/22 3850

## 2022-11-30 ENCOUNTER — APPOINTMENT (OUTPATIENT)
Dept: PHYSICAL THERAPY | Facility: REHABILITATION | Age: 62
End: 2022-11-30

## 2022-11-30 ENCOUNTER — APPOINTMENT (OUTPATIENT)
Dept: RADIOLOGY | Facility: HOSPITAL | Age: 62
End: 2022-11-30

## 2022-11-30 PROBLEM — R42 DIZZINESS AND GIDDINESS: Status: ACTIVE | Noted: 2018-02-13

## 2022-11-30 PROBLEM — G43.701 CHRONIC MIGRAINE WITHOUT AURA, WITH STATUS MIGRAINOSUS: Status: ACTIVE | Noted: 2020-01-13

## 2022-11-30 PROBLEM — G44.41 INTRACTABLE HEADACHE CAUSED BY DRUG: Status: ACTIVE | Noted: 2022-02-22

## 2022-11-30 PROBLEM — R68.89 ABNORMAL SENSORY EXAM: Status: ACTIVE | Noted: 2022-11-30

## 2022-11-30 PROBLEM — M53.0 CERVICOCRANIAL SYNDROME: Status: ACTIVE | Noted: 2020-02-10

## 2022-11-30 PROBLEM — G43.009 MIGRAINE WITHOUT AURA: Status: ACTIVE | Noted: 2018-04-14

## 2022-11-30 PROBLEM — Z91.14 NONCOMPLIANCE WITH MEDICATION REGIMEN: Status: ACTIVE | Noted: 2020-06-01

## 2022-11-30 PROBLEM — E11.9 TYPE 2 DIABETES MELLITUS, WITHOUT LONG-TERM CURRENT USE OF INSULIN (HCC): Status: ACTIVE | Noted: 2022-11-30

## 2022-11-30 PROBLEM — Z56.9: Status: ACTIVE | Noted: 2020-02-10

## 2022-11-30 PROBLEM — Z91.148 NONCOMPLIANCE WITH MEDICATION REGIMEN: Status: ACTIVE | Noted: 2020-06-01

## 2022-11-30 PROBLEM — H53.10 SUBJECTIVE VISUAL DISTURBANCE: Status: ACTIVE | Noted: 2020-02-10

## 2022-11-30 PROBLEM — G43.711 CHRONIC MIGRAINE WITHOUT AURA, INTRACTABLE, WITH STATUS MIGRAINOSUS: Status: ACTIVE | Noted: 2018-04-14

## 2022-11-30 PROBLEM — T50.905A INTRACTABLE HEADACHE CAUSED BY DRUG: Status: ACTIVE | Noted: 2022-02-22

## 2022-11-30 PROBLEM — R10.9 ABDOMINAL PAIN: Status: ACTIVE | Noted: 2017-04-11

## 2022-11-30 LAB
GLUCOSE SERPL-MCNC: 113 MG/DL (ref 65–140)
GLUCOSE SERPL-MCNC: 115 MG/DL (ref 65–140)
GLUCOSE SERPL-MCNC: 123 MG/DL (ref 65–140)

## 2022-11-30 RX ORDER — SODIUM CHLORIDE, SODIUM GLUCONATE, SODIUM ACETATE, POTASSIUM CHLORIDE, MAGNESIUM CHLORIDE, SODIUM PHOSPHATE, DIBASIC, AND POTASSIUM PHOSPHATE .53; .5; .37; .037; .03; .012; .00082 G/100ML; G/100ML; G/100ML; G/100ML; G/100ML; G/100ML; G/100ML
1000 INJECTION, SOLUTION INTRAVENOUS ONCE
Status: COMPLETED | OUTPATIENT
Start: 2022-11-30 | End: 2022-11-30

## 2022-11-30 RX ORDER — LANOLIN ALCOHOL/MO/W.PET/CERES
3 CREAM (GRAM) TOPICAL
Status: DISCONTINUED | OUTPATIENT
Start: 2022-11-30 | End: 2022-12-01 | Stop reason: HOSPADM

## 2022-11-30 RX ORDER — LORAZEPAM 2 MG/ML
0.5 INJECTION INTRAMUSCULAR ONCE AS NEEDED
Status: DISCONTINUED | OUTPATIENT
Start: 2022-11-30 | End: 2022-12-01 | Stop reason: HOSPADM

## 2022-11-30 RX ORDER — LORAZEPAM 2 MG/ML
0.5 INJECTION INTRAMUSCULAR ONCE AS NEEDED
Status: COMPLETED | OUTPATIENT
Start: 2022-11-30 | End: 2022-11-30

## 2022-11-30 RX ADMIN — DIPHENHYDRAMINE HYDROCHLORIDE 25 MG: 50 INJECTION, SOLUTION INTRAMUSCULAR; INTRAVENOUS at 12:10

## 2022-11-30 RX ADMIN — FLUTICASONE PROPIONATE 2 PUFF: 220 AEROSOL, METERED RESPIRATORY (INHALATION) at 20:40

## 2022-11-30 RX ADMIN — DICYCLOMINE HYDROCHLORIDE 20 MG: 20 TABLET ORAL at 12:10

## 2022-11-30 RX ADMIN — FLUTICASONE PROPIONATE 2 SPRAY: 50 SPRAY, METERED NASAL at 08:50

## 2022-11-30 RX ADMIN — TIZANIDINE 2 MG: 2 TABLET ORAL at 15:50

## 2022-11-30 RX ADMIN — SODIUM CHLORIDE 1000 MG: 9 INJECTION, SOLUTION INTRAVENOUS at 20:39

## 2022-11-30 RX ADMIN — MAGNESIUM SULFATE HEPTAHYDRATE 1 G: 1 INJECTION, SOLUTION INTRAVENOUS at 08:50

## 2022-11-30 RX ADMIN — TIZANIDINE 2 MG: 2 TABLET ORAL at 20:39

## 2022-11-30 RX ADMIN — METFORMIN HYDROCHLORIDE 500 MG: 500 TABLET ORAL at 15:49

## 2022-11-30 RX ADMIN — KETOROLAC TROMETHAMINE 15 MG: 30 INJECTION, SOLUTION INTRAMUSCULAR at 05:35

## 2022-11-30 RX ADMIN — DICYCLOMINE HYDROCHLORIDE 20 MG: 20 TABLET ORAL at 08:49

## 2022-11-30 RX ADMIN — PROMETHAZINE HYDROCHLORIDE 25 MG: 25 TABLET ORAL at 20:34

## 2022-11-30 RX ADMIN — SODIUM CHLORIDE, SODIUM GLUCONATE, SODIUM ACETATE, POTASSIUM CHLORIDE AND MAGNESIUM CHLORIDE 1000 ML: 526; 502; 368; 37; 30 INJECTION, SOLUTION INTRAVENOUS at 15:50

## 2022-11-30 RX ADMIN — ALBUTEROL SULFATE 2 PUFF: 90 AEROSOL, METERED RESPIRATORY (INHALATION) at 08:50

## 2022-11-30 RX ADMIN — MAGNESIUM SULFATE HEPTAHYDRATE 1 G: 1 INJECTION, SOLUTION INTRAVENOUS at 22:10

## 2022-11-30 RX ADMIN — METFORMIN HYDROCHLORIDE 500 MG: 500 TABLET ORAL at 08:49

## 2022-11-30 RX ADMIN — KETOROLAC TROMETHAMINE 15 MG: 30 INJECTION, SOLUTION INTRAMUSCULAR at 12:10

## 2022-11-30 RX ADMIN — FLUTICASONE PROPIONATE 2 PUFF: 220 AEROSOL, METERED RESPIRATORY (INHALATION) at 08:51

## 2022-11-30 RX ADMIN — Medication 3 MG: at 22:10

## 2022-11-30 RX ADMIN — TIZANIDINE 2 MG: 2 TABLET ORAL at 08:49

## 2022-11-30 RX ADMIN — TOPIRAMATE 200 MG: 100 TABLET, FILM COATED ORAL at 17:00

## 2022-11-30 RX ADMIN — PANTOPRAZOLE SODIUM 40 MG: 40 TABLET, DELAYED RELEASE ORAL at 05:35

## 2022-11-30 RX ADMIN — EMPAGLIFLOZIN 20 MG: 10 TABLET, FILM COATED ORAL at 08:49

## 2022-11-30 RX ADMIN — SODIUM CHLORIDE 1000 MG: 9 INJECTION, SOLUTION INTRAVENOUS at 10:02

## 2022-11-30 RX ADMIN — ENOXAPARIN SODIUM 40 MG: 40 INJECTION SUBCUTANEOUS at 08:49

## 2022-11-30 RX ADMIN — PRAVASTATIN SODIUM 80 MG: 80 TABLET ORAL at 15:49

## 2022-11-30 RX ADMIN — DICYCLOMINE HYDROCHLORIDE 20 MG: 20 TABLET ORAL at 15:50

## 2022-11-30 RX ADMIN — LORAZEPAM 0.5 MG: 2 INJECTION INTRAMUSCULAR; INTRAVENOUS at 17:57

## 2022-11-30 RX ADMIN — GADOBUTROL 8 ML: 604.72 INJECTION INTRAVENOUS at 19:46

## 2022-11-30 RX ADMIN — TOPIRAMATE 200 MG: 100 TABLET, FILM COATED ORAL at 08:49

## 2022-11-30 RX ADMIN — VERAPAMIL HYDROCHLORIDE 120 MG: 120 TABLET, FILM COATED, EXTENDED RELEASE ORAL at 08:51

## 2022-11-30 RX ADMIN — DIPHENHYDRAMINE HYDROCHLORIDE 25 MG: 50 INJECTION, SOLUTION INTRAMUSCULAR; INTRAVENOUS at 05:35

## 2022-11-30 NOTE — PROGRESS NOTES
Progress Note - Neurology   Jenny King 65 y o  female MRN: [id number]  [facility] Encounter: [id number]      Assessment/Plan     Type 2 diabetes mellitus, without long-term current use of insulin ([facility] )  Assessment & Plan  Lab Results   Component Value Date    HGBA1C 6 6 (H) 10/02/2022       No results for input(s): POCGLU in the last 72 hours  Blood Sugar Average: Last 72 hrs:       Continue home Jardiance and Metformin  Added SSI coverage algorithm 2 for hyperglycemia  Avoiding steroids for now    Partial symptomatic epilepsy with complex partial seizures, intractable, without status epilepticus ([facility] )  Assessment & Plan  Reported history of Epilepsy but denies having seizures in the past  Reviewed prior EEGs  Continue home Topiramate 200 mg bid  * Chronic migraine without aura, intractable, with status migrainosus  Assessment & Plan  This is a 65 y o  female presenting for status migrainosus  Current headache started on 11/20/22 and is similar to her prior migraine features with no new symptoms  Has not responded well to home treatments  Currently has a 5/10 headache, left>right, throbbing, with associated nausea, photophobia, and phonophobia  Additionally noted to have left > right neck tenderness  Headaches are likely multifactorial including musculoskeletal/cervicogenic, medication overuse, dehydration (having diarrhea), and secondary to increased stress  Plan:  - admit to neurology service  - obtain baseline CBC and BMP - renal function is stable, glucose is elevated  - No indication for head imaging at this time  - Migraine cocktail with Toradol + Benadryl + antiemetic every 8 hours x 3 doses  - Tizanidine 2 mg q8h for musculoskeletal pain  - Requesting something for sleep  Will give one-time dose of Zyprexa 5 mg as it can help with headache as well    - Depakote 1 g bid x 2 doses  - Avoid Tylenol/ibuprofen containing medications  - Will have day team call her Neurologist to obtain full list of medications she has tried in the past  - Patient is prescribed triptans at home  She has had multiple ER visits for chest pain  She has had a normal stress test in 2019 and reports that everything has checked out "fine" though I am hesitant to prescribe either DHE or -triptan with unclear cardiac history  Unfortunately these records are through her private cardiologist that is not on Epic  - With her diabetes and currently hyperglycemia, would avoid steroids for now  - Continue home Topamax 200 mg bid and Verapamil 120 mg daily  - Ice/heat as needed            {Neurology Follow Up:43787}    Subjective:   Dimple Denton is a 64 yo female with a PMH of chronic migraines without aura, HTN, and Type II DM who presented for numbness of the face and mouth 2/2 to status migrainosus  Today, she reports that her numbness is improved in both her face and mouth w/ reduced pain rated 5/10  She denies n/v, blurred vision, and dizziness  Patient still reports poor sleep overnight  Patient's most recent migraine episode began on 11/20 without any preceding aura and sudden onset; onset was more sudden than normal  Patient did not feel she had time to take acute migraine medication  Had n/v, blurred vision, and dizziness, which she described as feeling off balance  Since that time, symptoms have waxed and waned in severity between 5-6/10  However, numbness of entire face and lips only presented yesterday morning, along with a flare up of other symptoms including carla which prompted her visit to the ER yesterday afternoon  She has never presented with numbness with any of her previous episodes  Patient was last hospitalized for migraine in April of 2018 for chronic migrainosus and recive        Patient is currently on acute migraine onset prophylaxis but does not know the name (potentially Eletriptan), has tried multiple medications for acute migraine onset       PMH:   -Asthma  -Diverticuliitis   -DM Type II  -Subclinical epilepticus     2 admission for chest pain since 2018 admission for migrane; stress test on 5/16/2019, negative for ischemia  Has seen cardiologist in private practice    Objective      ROS:  General ROS: negative  migraine headaches    Vitals: Blood pressure 123/70, pulse 78, temperature 97 5 °F (36 4 °C), resp  rate 16, height 5' 2" (1 575 m), weight 82 6 kg (182 lb), SpO2 94 %  ,Body mass index is 33 29 kg/m²  Physical Exam: /70   Pulse 78   Temp 97 5 °F (36 4 °C)   Resp 16   Ht 5' 2" (1 575 m)   Wt 82 6 kg (182 lb)   SpO2 94%   BMI 33 29 kg/m²     General Appearance:    Alert, cooperative, no distress, appears stated age   Head:    Normocephalic, without obvious abnormality, atraumatic   Eyes:    PERRL, conjunctiva/corneas clear, EOM's intact, fundi     benign, both eyes       Extremities: In right foot, patient reports reduced sensation to pain,  vibration, and temperature sensation below the ankle joint  (Right toe vibration 9 seconds; Left toe vibration 15 seconds)  Below left ankle joint, only reduced temperature sensation and decreased proprioception at the left big toe       Pulses:   2+ and symmetric all extremities   Neurologic:   CNII-XII intact, normal strength, sensation and reflexes     throughout       Lab, Imaging and other studies: {[facility] Neurology Labs:51431}  VTE Prophylaxis: {[facility] VTE prophylaxis:58060}    Counseling / Coordination of Care  {[facility] neuro YUMIKO statement:72100}       A/P     Chronic migraine without aura with status migrainosus   -Continue Diphenhydramine 25 mg and Ketorolac 15 mg Q8H   -Contact previous neurologist, Dr Sisi Truong for current migrane medication and previous failed course    Type II DM  -Continue Jardiance and Metformin  -Order  -Outpatient EMG bilateral

## 2022-11-30 NOTE — ASSESSMENT & PLAN NOTE
This is a 65 y o  female presenting for status migrainosus improved with migraine cocktails  Due to worsening worsening symptoms and symptoms of sensational abnormality and hx of sarcoidosis, MRI brain w/wo and MRI L spine were done and did not show any acute abnormality  Plan:  Patient is much better in the afternoon and is ready to be discharged home  - 11/30 Discussed with patient's neurology Dr Mirta Pinedo  She confirms that patient takes Eletriptan 40mg as rescue medication   - Continue home Topamax 200 mg bid and Verapamil 120 mg daily  -No new changes to home mediations  Follow up with her outpatient Neruology Dr Mirta Pinedo in 4-6 weeks after discharge

## 2022-11-30 NOTE — ASSESSMENT & PLAN NOTE
Lab Results   Component Value Date    HGBA1C 6 6 (H) 10/02/2022       Recent Labs     11/30/22  1130 11/30/22  1651 12/01/22  0742 12/01/22  1125   POCGLU 115 113 101 113       Blood Sugar Average: Last 72 hrs:  (P) 113     Continue home Jardiance and Metformin  Added SSI coverage algorithm 2 for hyperglycemia  Hypoglycemia protocol  Avoiding steroids for now

## 2022-11-30 NOTE — H&P
NEUROLOGY RESIDENCY - ADMISSION H&P NOTE     Name: Maryam Ponce   Age & Sex: 65 y o  female   MRN: [id number]  Unit/Bed#: [facility] 602-01   Encounter: [id number]    ASSESSMENT & PLAN     * Chronic migraine without aura, intractable, with status migrainosus  Assessment & Plan  This is a 65 y o  female presenting for status migrainosus  Current headache started on 11/20/22 and is similar to her prior migraine features with no new symptoms  Has not responded well to home treatments  Currently has a 5/10 headache, left>right, throbbing, with associated nausea, photophobia, and phonophobia  Additionally noted to have left > right neck tenderness  Headaches are likely multifactorial including musculoskeletal/cervicogenic, medication overuse, dehydration (having diarrhea), and secondary to increased stress  Headaches are improved today  Plan:  -Will get MRI brain w/wo and MRI L spine w/o due to hx of sarcoidosis, bilateral sensory changes of her feet and patient requests to be done here inpatient rather than outpatient due to prolonged wait time  Cannot find her records everywhere  - Migraine cocktail with Toradol + Benadryl + antiemetic every 8 hours x 3 doses  - Tizanidine 2 mg q8h for musculoskeletal pain  - Depakote 1 g bid x 2 doses  - Avoid Tylenol/ibuprofen containing medications  - Discussed with patient's neurology Dr Mohini Crespo  She confirms that patient takes Eletriptan 40mg as rescue medication   - With her diabetes and currently hyperglycemia, would avoid steroids for now  - Continue home Topamax 200 mg bid and Verapamil 120 mg daily  - Ice/heat as needed  -Hydration    Plan discharge tomorrow if patient improves more        Type 2 diabetes mellitus, without long-term current use of insulin [facility])  Assessment & Plan  Lab Results   Component Value Date    HGBA1C 6 6 (H) 10/02/2022       Recent Labs     11/30/22  0806 11/30/22  1130   POCGLU 123 115       Blood Sugar Average: Last 72 hrs:  (P) 119     Continue home Jardiance and Metformin  Added SSI coverage algorithm 2 for hyperglycemia  Hypoglycemia protocol  Avoiding steroids for now    Partial symptomatic epilepsy with complex partial seizures, intractable, without status epilepticus ([facility] [address] )  Assessment & Plan  Reported history of Epilepsy but denies having seizures in the past    12/21/2020 routine in a week EEG: This is an abnormal routine EEG due to bitemporal independent (left-predominant) sharp activity reflecting underlying focal cortical hyper-irritability and enhanced epileptogenic potential   These findings are consistent with the patient’s clinical diagnosis of localization-related (partial-onset) epilepsy  Continue home Topiramate 200 mg bid  Hypertension, essential  Assessment & Plan  Continue home medication verapamil 120 mg daily      Clotilde Berg will need follow up in 4 weeks with Dr Caitlin Hodges, Neurologist who does not seem to be under [facility]'s  She will not require outpatient neurological testing  Pending for discharge: MRI    VTE Prophylaxis: Enoxaparin (Lovenox)  / sequential compression device   Code Status: Level 1  POLST: POLST form is not discussed and not completed at this time  Anticipated Length of Stay:  Patient will be admitted on an Observation basis with an anticipated length of stay of 2 midnights  Justification for Hospital Stay: Worsening migraine headaches and pending [address]     Chief Complaint   Patient presents with   • Migraine     Pt c/o migraine with dizziness and generalized facial numbness  Pt states her migraine meds have not relieved  Pt reports migraine has been ongoing for one week        HISTORY OF PRESENT ILLNESS     Len Groves is a 65 y o  female with history of chronic migraines, HTN, focal impaired awareness epilepsy, sarcoidosis, asthma, diverticulitis, IBS, and T2DM   She presents to the ED for prolonged migraine headache that has not been responsive to her home medication regimen      Per patient, headache started on 11/20/22  Had quick progression from dull headache to throbbing headache  Her headaches are primarily on the left side and are throbbing  She also admits to having neck pain for which she is currently seeing physical therapy  Her current headache is typical for her prior migraine headaches  She has had prior migraines that have required admission to break the headache cycle  At home, she did not take her Eletriptan due to the quick onset of her headache  She has been taking around the clock Tylenol sinus, ibuprofen, phenergan, compazine, and meclizine for her headaches  For prevention she is currently on Topamax 200 mg bid  She is also on verapamil for BP which can also help with migraine prevention  Associated symptoms include nausea without vomiting, sensitivity to light/sound, intermittent numbness in the face, and vertigo  She has had all of these symptoms with her migraines in the past  There are no new features  At present, her current headache is rated at a 5/10  She also mentions that she is also having an exacerbation of her diverticulitis/IBS and has had increased diarrhea the last few days  Her appetite is good and she is eating/drinking well      The last time she was admitted for migraine headaches was in April 2018  She received migraine cocktail, IV Toradol, magnesium, benadryl, Reglan, solumedrol, and DHE with resolution of her headache       Since her last admission for migraine, she has had at least 1-2 admissions for chest pain  Exercise stress test on 5/16/2019 was negative for ischemia  She has seen a cardiologist who is also in private practice and thus notes are not available to review       Patient currently is seen by private practice family medicine (Dr Alessandra Don) and Neurology (Dr Alben Koyanagi)  Records are not available to review  She cannot take Imitrex due to palpitations  Had no relief with Maxalt   Did not tolerate Pamelor well      With regards to Epilepsy history, first diagnosed in May 2011  She denies ever having seizures  Family history of seizures in brother and uncle  Has had several EEG's over the last 8 years including routine and sleep-deprived  On review of reports, appears that she has bifrontotemporal sharp activity consistent with epileptogenic discharges  She is scheduled to have an updated EEG in the near future  On 11/30/22, patient reported feeling significantly better during morning rounds, reporting her migraine pain as 4-5/10 and improved sensation of the face and lips, reporting only mild numbness  Patient reports good appetitive, but reported poor, interrupted sleep; however, this is baseline regardless of migraine  PE showed reduced proprioception, pain sensation, and vibration sensation below the right ankle  Decrease temperature sensation bilaterally  Pertinent Negatives include: n/v, dizziness, weakness, speech or visual changes  REVIEW OF SYSTEMS     Review of Systems   Constitutional: Negative for appetite change, chills, fatigue and fever  HENT: Negative for facial swelling  Eyes: Negative for photophobia, pain, discharge, redness and visual disturbance  Respiratory: Negative  Cardiovascular: Negative  Gastrointestinal: Negative for abdominal distention, abdominal pain and diarrhea  Skin: Positive for rash  Neurological: Positive for numbness and headaches  Negative for dizziness, tremors, syncope, facial asymmetry, speech difficulty and weakness  Psychiatric/Behavioral: Positive for sleep disturbance         PAST MEDICAL HISTORY     Past Medical History:   Diagnosis Date   • Asthma    • Diabetes mellitus ([facility] [address] )     type 2   • Diverticulitis    • EEG abnormality without seizure    • Gastroparesis    • GERD (gastroesophageal reflux disease)    • Headache, migraine    • Hiatal hernia    • IBS (irritable bowel syndrome)    • Migraines    • Sarcoidosis Allergies: Allergies   Allergen Reactions   • Other Shortness Of Breath     Nuts, coconut   • Talwin [Pentazocine] Shortness Of Breath and Other (See Comments)     dizziness       PAST SURGICAL HISTORY     Past Surgical History:   Procedure Laterality Date   • CHOLECYSTECTOMY     • [place] COLONOSCOPY FLX DX W/COLLJ SPEC WHEN PFRMD N/A 3/8/2017    Procedure: EGD AND COLONOSCOPY;  Surgeon: Caitlin Addison MD;  Location:  GI LAB;   Service: Gastroenterology   • REPLACEMENT TOTAL KNEE Right    • SIGMOIDECTOMY         SOCIAL & FAMILY HISTORY     Social History     Substance and Sexual Activity   Alcohol Use Yes    Comment: socially     Substance and Sexual Activity   Alcohol Use Yes    Comment: socially        Substance and Sexual Activity   Drug Use No     Social History     Tobacco Use   Smoking Status Never   Smokeless Tobacco Never       Marital Status: /Civil Union   Occupation: medical records  Patient Pre-hospital Living Situation: home with spouse  Patient Pre-hospital Level of Mobility: limited by arthritis  Patient Pre-hospital Diet Restrictions: diabetic    Family History:  Family History   Problem Relation Age of Onset   • Diabetes Mother    • Heart disease Mother    • Cancer Father    • Liver cancer Father    • Brain cancer Father    • Arthritis Sister        OBJECTIVE     Vitals:    22 2203 22 0808 22 1428   BP: 132/73 143/63 123/70 104/60   BP Location:       Pulse: 85 96 78 79   Resp: 18 18 16    Temp: 97 5 °F (36 4 °C) 97 5 °F (36 4 °C) 98 1 °F (36 7 °C) 98 °F (36 7 °C)   TempSrc: Oral      SpO2: 97% 95% 94% 96%   Weight: 82 6 kg (182 lb)      Height: 5' 2" (1 575 m)           Temperature:   Temp (24hrs), Av 8 °F (36 6 °C), Min:97 5 °F (36 4 °C), Max:98 1 °F (36 7 °C)    Temperature: 98 °F (36 7 °C)    Intake & Output:  I/O        0701   0700  0701   0700    IV Piggyback  1050    Total Intake  1050    Net  +1050 Weights:        Body mass index is 33 29 kg/m²  Weight (last 2 days)     Date/Time Weight    11/29/22 20:21:45 82 6 (182)          Physical Exam  Constitutional:       Appearance: Normal appearance  She is obese  HENT:      Nose: Nose normal    Eyes:      General: Lids are normal       Extraocular Movements: Extraocular movements intact and EOM normal       Conjunctiva/sclera: Conjunctivae normal       Pupils: Pupils are equal, round, and reactive to light  Cardiovascular:      Rate and Rhythm: Normal rate  Pulses: Normal pulses  Pulmonary:      Effort: Pulmonary effort is normal  No respiratory distress  Abdominal:      General: Bowel sounds are normal       Palpations: Abdomen is soft  Musculoskeletal:         General: Normal range of motion  Neurological:      Mental Status: She is alert and oriented to person, place, and time  Cranial Nerves: Cranial nerves 2-12 are intact  Motor: Motor function is intact  Coordination: Finger-Nose-Finger Test normal       Gait: Gait is intact  Deep Tendon Reflexes:      Reflex Scores:       Tricep reflexes are 2+ on the right side and 2+ on the left side  Bicep reflexes are 2+ on the right side and 2+ on the left side  Brachioradialis reflexes are 2+ on the right side and 2+ on the left side  Patellar reflexes are 2+ on the right side and 2+ on the left side  Achilles reflexes are 2+ on the right side and 2+ on the left side  Psychiatric:         Mood and Affect: Mood normal          Speech: Speech normal           Neurologic Exam     Mental Status   Oriented to person, place, and time  Oriented to person  Oriented to place  Speech: speech is normal   Level of consciousness: alert    Cranial Nerves   Cranial nerves II through XII intact       CN II   Visual acuity: normal  Right visual field deficit: none  Left visual field deficit: none     CN III, IV, VI   Pupils are equal, round, and reactive to light   Extraocular motions are normal    Right pupil: Size: 4 mm  Shape: regular  Reactivity: brisk  Consensual response: intact  Accommodation: intact  Left pupil: Size: 4 mm  Shape: regular  Reactivity: brisk  Consensual response: intact  Accommodation: intact  CN III: no CN III palsy  CN VI: no CN VI palsy  Nystagmus: none   Diplopia: none  Conjugate gaze: present    CN V   Right facial sensation deficit: none  Left facial sensation deficit: none  Right corneal reflex: normal  Left corneal reflex: normal    CN VII   Facial expression full, symmetric     Right facial weakness: none  Left facial weakness: none    CN VIII   CN VIII normal      CN IX, X   CN IX normal    CN X normal    Palate: symmetric    CN XI   CN XI normal    Right trapezius strength: normal  Left trapezius strength: normal    CN XII   CN XII normal      Motor Exam   Muscle bulk: normal  Overall muscle tone: normal  Right arm tone: normal  Left arm tone: normal  Right arm pronator drift: absent  Left arm pronator drift: absent  Right leg tone: normal  Left leg tone: normal    Strength   Right biceps: 5/5  Left biceps: 5/5  Right triceps: 5/5  Left triceps: 5/5  Right wrist flexion: 5/5  Left wrist flexion: 5/5  Right wrist extension: 5/5  Left wrist extension: 5/5  Right interossei: 5/5  Left interossei: 5/5  Right iliopsoas: 5/5  Left iliopsoas: 5/5  Right quadriceps: 5/5  Left quadriceps: 5/5  Right hamstrin/5  Left hamstrin/5  Right anterior tibial: 5/5  Left anterior tibial: 5/5  Right posterior tibial: 5/5  Left posterior tibial: 5/5    Sensory Exam   Right arm light touch: normal  Left arm light touch: normal  Right leg light touch: decreased from ankle  Left leg light touch: normal  Right arm vibration: normal  Left arm vibration: normal  Right leg vibration: decreased from ankle  Left leg vibration: normal  Right arm proprioception: normal  Left arm proprioception: normal  Right leg proprioception: normal  Left leg proprioception: decreased from toes  Right arm pinprick: normal  Left arm pinprick: normal  Right leg pinprick: decreased from ankle  Left leg pinprick: normal    Gait, Coordination, and Reflexes     Gait  Gait: normal    Coordination   Finger to nose coordination: normal    Tremor   Resting tremor: absent  Intention tremor: absent    Reflexes   Right brachioradialis: 2+  Left brachioradialis: 2+  Right biceps: 2+  Left biceps: 2+  Right triceps: 2+  Left triceps: 2+  Right patellar: 2+  Left patellar: 2+  Right achilles: 2+  Left achilles: 2+  Right : 2+  Left : 2+  Right plantar: normal  Left plantar: normal     LABORATORY DATA     Labs: I have personally reviewed pertinent reports  Results from last 7 days   Lab Units 11/29/22 1948   WBC Thousand/uL 12 09*   HEMOGLOBIN g/dL 14 3   HEMATOCRIT % 45 5   PLATELETS Thousands/uL 221   NEUTROS PCT % 88*   MONOS PCT % 1*      Results from last 7 days   Lab Units 11/29/22 1948   SODIUM mmol/L 136   POTASSIUM mmol/L 3 7   CHLORIDE mmol/L 106   CO2 mmol/L 22   BUN mg/dL 21   CREATININE mg/dL 1 01   CALCIUM mg/dL 9 0                            Micro:  Lab Results   Component Value Date    URINECX 50,000-59,000 cfu/ml 07/20/2021       IMAGING & DIAGNOSTIC TESTING     Radiology Results: I have personally reviewed pertinent reports  and I have personally reviewed pertinent films in PACS    No results found  Other Diagnostic Testing: I have personally reviewed pertinent reports        ACTIVE MEDICATIONS     Current Facility-Administered Medications   Medication Dose Route Frequency   • albuterol (PROVENTIL HFA,VENTOLIN HFA) inhaler 2 puff  2 puff Inhalation Q6H PRN   • ciclopirox (LOPROX) 0 77 % cream   Topical BID   • dicyclomine (BENTYL) tablet 20 mg  20 mg Oral TID AC   • Empagliflozin (JARDIANCE) tablet 20 mg  20 mg Oral Daily   • enoxaparin (LOVENOX) subcutaneous injection 40 mg  40 mg Subcutaneous Daily   • fluticasone (FLONASE) 50 mcg/act nasal spray 2 spray  2 spray Each Nare Daily   • fluticasone (FLOVENT HFA) 220 mcg/act inhaler 2 puff  2 puff Inhalation BID   • hydrOXYzine HCL (ATARAX) tablet 25 mg  25 mg Oral TID PRN   • hyoscyamine (LEVSIN/SL) SL tablet 0 125 mg  0 125 mg Sublingual Q6H PRN   • insulin lispro (HumaLOG) 100 units/mL subcutaneous injection 1-5 Units  1-5 Units Subcutaneous TID AC   • LORazepam (ATIVAN) injection 0 5 mg  0 5 mg Intravenous Once PRN   • LORazepam (ATIVAN) injection 0 5 mg  0 5 mg Intravenous Once PRN   • magnesium sulfate IVPB (premix) SOLN 1 g  1 g Intravenous BID   • meclizine (ANTIVERT) tablet 25 mg  25 mg Oral Q8H PRN   • melatonin tablet 3 mg  3 mg Oral HS   • metFORMIN (GLUCOPHAGE) tablet 500 mg  500 mg Oral BID With Meals   • multi-electrolyte (ISOLYTE-S PH 7 4) bolus 1,000 mL  1,000 mL Intravenous Once   • pantoprazole (PROTONIX) EC tablet 40 mg  40 mg Oral Early Morning   • pravastatin (PRAVACHOL) tablet 80 mg  80 mg Oral Daily With Dinner   • promethazine (PHENERGAN) tablet 25 mg  25 mg Oral Q6H PRN   • sodium chloride (OCEAN) 0 65 % nasal spray 2 spray  2 spray Each Nare Q1H PRN   • tiZANidine (ZANAFLEX) tablet 2 mg  2 mg Oral TID   • topiramate (TOPAMAX) tablet 200 mg  200 mg Oral BID   • valproate (DEPACON) 1,000 mg in sodium chloride 0 9 % 50 mL IVPB  1,000 mg Intravenous BID   • verapamil (CALAN-SR) CR tablet 120 mg  120 mg Oral Daily         HOME MEDICATIONS     Prior to Admission medications    Medication Sig Start Date End Date Taking?  Authorizing Provider   acetaminophen (TYLENOL) 500 mg tablet Take 500 mg by mouth every 6 (six) hours as needed for mild pain   Yes Historical Provider, MD   albuterol (PROVENTIL HFA,VENTOLIN HFA) 90 mcg/act inhaler Inhale 2 puffs every 6 (six) hours as needed for wheezing   Yes Historical Provider, MD   ascorbic acid (VITAMIN C) 500 mg tablet Take 500 mg by mouth daily   Yes Historical Provider, MD   B Complex-C (B-COMPLEX WITH VITAMIN C) tablet Take 1 tablet by mouth daily Yes Historical Provider, MD   Cholecalciferol (VITAMIN D3) 2000 units TABS Take 1 tablet by mouth daily   Yes Historical Provider, MD   ciclopirox (LOPROX) 0 77 % cream Apply topically 2 (two) times a day To affected areas of rash for 4 weeks   10/30/22  Yes Enrique Curiel MD   dicyclomine (BENTYL) 20 mg tablet Take 20 mg by mouth as needed     Yes Historical Provider, MD   Empagliflozin 25 MG TABS Jardiance 25 mg tablet   Yes Historical Provider, MD   esomeprazole (NexIUM) 40 MG capsule Take 40 mg by mouth daily   Yes Historical Provider, MD   fluticasone (FLONASE) 50 mcg/act nasal spray fluticasone propionate 50 mcg/actuation nasal spray,suspension   Yes Historical Provider, MD   hydrOXYzine HCL (ATARAX) 25 mg tablet 25 mg 3 (three) times a day as needed   Yes Historical Provider, MD   HYOSCYAMINE SULFATE PO Place 1 tablet under the tongue every 6 (six) hours as needed     Yes Historical Provider, MD   meclizine (ANTIVERT) 25 mg tablet meclizine 25 mg tablet   Yes Historical Provider, MD   metFORMIN (GLUCOPHAGE) 500 mg tablet 500 mg 2 (two) times a day with meals   Yes Historical Provider, MD   mometasone 220 mcg/actuation inhaler Asmanex Twisthaler 220 mcg/actuation(60 doses) breath activated inhalr   Yes Historical Provider, MD   Mometasone Furoate 110 MCG/INH AEPB Inhale 1 puff every evening   Yes Historical Provider, MD   multivitamin-iron-minerals-folic acid (CENTRUM) chewable tablet Chew 1 tablet daily   Yes Historical Provider, MD   pravastatin (PRAVACHOL) 80 mg tablet 80 mg daily   Yes Historical Provider, MD   promethazine (PHENERGAN) 25 mg tablet Take 25 mg by mouth every 6 (six) hours as needed for nausea or vomiting   Yes Historical Provider, MD   sodium chloride (OCEAN) 0 65 % nasal spray 2 sprays into each nostril 2 (two) times a day   Yes Historical Provider, MD   topiramate (TOPAMAX) 200 MG tablet Take 200 mg by mouth 2 (two) times a day   Yes Historical Provider, MD   verapamil (CALAN-SR) 120 mg CR tablet Take 120 mg by mouth in the morning   Yes Historical Provider, MD   cephalexin (KEFLEX) 500 mg capsule  8/10/22   Historical Provider, MD   eletriptan (RELPAX) 40 MG tablet eletriptan 40 mg tablet   take one tablet at onset of migraine; may repeat once in 12 hrs if needed for return of headache    Historical Provider, MD   triamcinolone (KENALOG) 0 1 % ointment Apply 2-4 times a day for up to 2 weeks  Avoid face and body folds  Patient not taking: Reported on 11/29/2022 10/30/22   Camila Sandoval MD   aspirin 81 mg chewable tablet Chew 81 mg daily  Patient not taking: No sig reported  11/29/22  Historical Provider, MD   Co-Enzyme Q10 100 MG CAPS Take 1 capsule by mouth daily  11/29/22  Historical Provider, MD   indomethacin (INDOCIN) 25 mg capsule TAKE ONE CAPSULE AT ONSET OF MIGRAINE  Patient not taking: No sig reported 4/9/18 11/29/22  Historical Provider, MD   rizatriptan (MAXALT) 10 MG tablet Take 10 mg by mouth once as needed for migraine May repeat in 2 hours if needed  Patient not taking: No sig reported  11/29/22  Historical Provider, MD   zolpidem (AMBIEN CR) 6 25 MG CR tablet Take 6 25 mg by mouth daily at bedtime as needed for sleep  Patient not taking: No sig reported  11/29/22  Historical Provider, MD       ======    I have discussed the patient's history, physical exam findings, assessment, and plan in detail with attending, Dr Mei Garcia    Thank you for allowing me to participate in the care of your patient, Stana Ramp Gary Gottron, M3  [facility] at [facility], [address] [facility] Neurology Residency, PGY-2

## 2022-11-30 NOTE — RESPIRATORY THERAPY NOTE
RT Protocol Note  Omi Brothers 65 y o  female MRN: [id number]  Unit/Bed#: [facility] 602-01 Encounter: [id number]    Assessment    Active Problems:    * No active hospital problems  *      Home Pulmonary Medications:  Albuteral PRN        Past Medical History:   Diagnosis Date    Asthma     Diabetes mellitus ([address] )     type 2    Diverticulitis     EEG abnormality without seizure     Gastroparesis     GERD (gastroesophageal reflux disease)     Headache, migraine     Hiatal hernia     IBS (irritable bowel syndrome)     Migraines     Sarcoidosis      Social History     Socioeconomic History    Marital status: /Civil Union     Spouse name: None    Number of children: None    Years of education: None    Highest education level: None   Occupational History    None   Tobacco Use    Smoking status: Never    Smokeless tobacco: Never   Vaping Use    Vaping Use: Never used   Substance and Sexual Activity    Alcohol use: Yes     Comment: socially    Drug use: No    Sexual activity: None   Other Topics Concern    None   Social History Narrative    None     Social Determinants of Health     Financial Resource Strain: Not on file   Food Insecurity: Not on file   Transportation Needs: Not on file   Physical Activity: Not on file   Stress: Not on file   Social Connections: Not on file   Intimate Partner Violence: Not on file   Housing Stability: Not on file       Subjective         Objective    Physical Exam:   Assessment Type: (P) Assess only  General Appearance: (P) Alert, Awake  Respiratory Pattern: (P) Normal  Chest Assessment: (P) Chest expansion symmetrical  Bilateral Breath Sounds: (P) Clear  Cough: (P) Non-productive    Vitals:  Blood pressure 132/73, pulse 85, temperature 97 5 °F (36 4 °C), temperature source Oral, resp  rate 18, height 5' 2" (1 575 m), weight 82 6 kg (182 lb), SpO2 97 %  Imaging and other studies: I have personally reviewed pertinent reports              Plan    Respiratory Plan: (P) No distress/Pulmonary history        Resp Comments: (P) Pt has hx of asthma, prn inhaler ordered, dc protocol

## 2022-11-30 NOTE — PLAN OF CARE
Problem: MOBILITY - ADULT  Goal: Maintain or return to baseline ADL function  Description: INTERVENTIONS:  -  Assess patient's ability to carry out ADLs; assess patient's baseline for ADL function and identify physical deficits which impact ability to perform ADLs (bathing, care of mouth/teeth, toileting, grooming, dressing, etc )  - Assess/evaluate cause of self-care deficits   - Assess range of motion  - Assess patient's mobility; develop plan if impaired  - Assess patient's need for assistive devices and provide as appropriate  - Encourage maximum independence but intervene and supervise when necessary  - Involve family in performance of ADLs  - Assess for home care needs following discharge   - Consider OT consult to assist with ADL evaluation and planning for discharge  - Provide patient education as appropriate  Outcome: Progressing  Goal: Maintains/Returns to pre admission functional level  Description: INTERVENTIONS:  - Perform BMAT or MOVE assessment daily    - Set and communicate daily mobility goal to care team and patient/family/caregiver     - Collaborate with rehabilitation services on mobility goals if consulted    Outcome: Progressing     Problem: PAIN - ADULT  Goal: Verbalizes/displays adequate comfort level or baseline comfort level  Description: Interventions:  - Encourage patient to monitor pain and request assistance  - Assess pain using appropriate pain scale  - Administer analgesics based on type and severity of pain and evaluate response  - Implement non-pharmacological measures as appropriate and evaluate response  - Consider cultural and social influences on pain and pain management  - Notify physician/advanced practitioner if interventions unsuccessful or patient reports new pain  Outcome: Progressing     Problem: INFECTION - ADULT  Goal: Absence or prevention of progression during hospitalization  Description: INTERVENTIONS:  - Assess and monitor for signs and symptoms of infection  - Monitor lab/diagnostic results  - Monitor all insertion sites, i e  indwelling lines, tubes, and drains  - Monitor endotracheal if appropriate and nasal secretions for changes in amount and color  - [place] appropriate cooling/warming therapies per order  - Administer medications as ordered  - Instruct and encourage patient and family to use good hand hygiene technique  - Identify and instruct in appropriate isolation precautions for identified infection/condition  Outcome: Progressing  Goal: Absence of fever/infection during neutropenic period  Description: INTERVENTIONS:  - Monitor WBC    Outcome: Progressing     Problem: SAFETY ADULT  Goal: Maintain or return to baseline ADL function  Description: INTERVENTIONS:  -  Assess patient's ability to carry out ADLs; assess patient's baseline for ADL function and identify physical deficits which impact ability to perform ADLs (bathing, care of mouth/teeth, toileting, grooming, dressing, etc )  - Assess/evaluate cause of self-care deficits   - Assess range of motion  - Assess patient's mobility; develop plan if impaired  - Assess patient's need for assistive devices and provide as appropriate  - Encourage maximum independence but intervene and supervise when necessary  - Involve family in performance of ADLs  - Assess for home care needs following discharge   - Consider OT consult to assist with ADL evaluation and planning for discharge  - Provide patient education as appropriate  Outcome: Progressing  Goal: Maintains/Returns to pre admission functional level  Description: INTERVENTIONS:  - Perform BMAT or MOVE assessment daily    - Set and communicate daily mobility goal to care team and patient/family/caregiver     - Collaborate with rehabilitation services on mobility goals if consulted    Outcome: Progressing  Goal: Patient will remain free of falls  Description: INTERVENTIONS:  - Educate patient/family on patient safety including physical limitations  - Instruct patient to call for assistance with activity   - Consult OT/PT to assist with strengthening/mobility   - Keep Call bell within reach  - Keep bed low and locked with side rails adjusted as appropriate  - Keep care items and personal belongings within reach  - Initiate and maintain comfort rounds  - Make Fall Risk Sign visible to staff  - Offer Toileting every  Hours, in advance of need  - Initiate/Maintain alarm  - Obtain necessary fall risk management equipment:  - Apply yellow socks and bracelet for high fall risk patients  - Consider moving patient to room near nurses station  Outcome: Progressing     Problem: DISCHARGE PLANNING  Goal: Discharge to home or other facility with appropriate resources  Description: INTERVENTIONS:  - Identify barriers to discharge w/patient and caregiver  - Arrange for needed discharge resources and transportation as appropriate  - Identify discharge learning needs (meds, wound care, etc )  - Arrange for interpretive services to assist at discharge as needed  - Refer to Case Management Department for coordinating discharge planning if the patient needs post-hospital services based on physician/advanced practitioner order or complex needs related to functional status, cognitive ability, or social support system  Outcome: Progressing     Problem: Knowledge Deficit  Goal: Patient/family/caregiver demonstrates understanding of disease process, treatment plan, medications, and discharge instructions  Description: Complete learning assessment and assess knowledge base    Interventions:  - Provide teaching at level of understanding  - Provide teaching via preferred learning methods  Outcome: Progressing

## 2022-11-30 NOTE — ASSESSMENT & PLAN NOTE
Reported history of Epilepsy but denies having seizures in the past    12/21/2020 routine in a week EEG: This is an abnormal routine EEG due to bitemporal independent (left-predominant) sharp activity reflecting underlying focal cortical hyper-irritability and enhanced epileptogenic potential   These findings are consistent with the patient’s clinical diagnosis of localization-related (partial-onset) epilepsy  Continue home Topiramate 200 mg bid

## 2022-11-30 NOTE — QUICK NOTE
Overnight Resident Admit Note  Tiera Agosto 65 y o  female MRN: [id number]  Unit/Bed#: [facility] 602-01 Encounter: [id number]        Patient seen and examined by the overnight resident for admission  Case was discussed with on-call Neurologist, Dr Vitor Orellana Preliminary recommendations are as outlined below  Formal H&P to follow  Assessment and Plan:    * Chronic migraine without aura, intractable, with status migrainosus  Assessment & Plan  This is a 65 y o  female presenting for status migrainosus  Current headache started on 11/20/22 and is similar to her prior migraine features with no new symptoms  Has not responded well to home treatments  Currently has a 5/10 headache, left>right, throbbing, with associated nausea, photophobia, and phonophobia  Additionally noted to have left > right neck tenderness  Headaches are likely multifactorial including musculoskeletal/cervicogenic, medication overuse, dehydration (having diarrhea), and secondary to increased stress  Plan:  - admit to neurology service  - obtain baseline CBC and BMP - renal function is stable, glucose is elevated  - No indication for head imaging at this time  - Migraine cocktail with Toradol + Benadryl + antiemetic every 8 hours x 3 doses  - Tizanidine 2 mg q8h for musculoskeletal pain  - Requesting something for sleep  Will give one-time dose of Zyprexa 5 mg as it can help with headache as well  - Depakote 1 g bid x 2 doses  - Avoid Tylenol/ibuprofen containing medications  - Will have day team call her Neurologist to obtain full list of medications she has tried in the past  - Patient is prescribed triptans at home  She has had multiple ER visits for chest pain  She has had a normal stress test in 2019 and reports that everything has checked out "fine" though I am hesitant to prescribe either DHE or -triptan with unclear cardiac history   Unfortunately these records are through her private cardiologist that is not on Epic  - With her diabetes and currently hyperglycemia, would avoid steroids for now  - Continue home Topamax 200 mg bid and Verapamil 120 mg daily  - Ice/heat as needed      Type 2 diabetes mellitus, without long-term current use of insulin ([facility])  Assessment & Plan  Lab Results   Component Value Date    HGBA1C 6 6 (H) 10/02/2022       No results for input(s): POCGLU in the last 72 hours  Blood Sugar Average: Last 72 hrs:       Continue home Jardiance and Metformin  Added SSI coverage algorithm 2 for hyperglycemia  Avoiding steroids for now    Partial symptomatic epilepsy with complex partial seizures, intractable, without status epilepticus ([facility] [address] )  Assessment & Plan  Reported history of Epilepsy but denies having seizures in the past  Reviewed prior EEGs  Continue home Topiramate 200 mg bid  History:    Vilma Hunter is a 65 y o  female with history of chronic migraines, HTN, focal impaired awareness epilepsy, sarcoidosis, asthma, diverticulitis, IBS, and T2DM  She presents to the ED for prolonged migraine headache that has not been responsive to her home medication regimen      Per patient, headache started on 11/20/22  Had quick progression from dull headache to throbbing headache  Her headaches are primarily on the left side and are throbbing  She also admits to having neck pain for which she is currently seeing physical therapy  Her current headache is typical for her prior migraine headaches  She has had prior migraines that have required admission to break the headache cycle  At home, she did not take her Eletriptan due to the quick onset of her headache  She has been taking around the clock Tylenol sinus, ibuprofen, phenergan, compazine, and meclizine for her headaches  For prevention she is currently on Topamax 200 mg bid  She is also on verapamil for BP which can also help with migraine prevention   Associated symptoms include nausea without vomiting, sensitivity to light/sound, intermittent numbness in the face, and vertigo  She has had all of these symptoms with her migraines in the past  There are no new features  At present, her current headache is rated at a 5/10  She also mentions that she is also having an exacerbation of her diverticulitis/IBS and has had increased diarrhea the last few days  Her appetite is good and she is eating/drinking well      The last time she was admitted for migraine headaches was in April 2018  She received migraine cocktail, IV Toradol, magnesium, benadryl, Reglan, solumedrol, and DHE with resolution of her headache       Since her last admission for migraine, she has had at least 1-2 admissions for chest pain  Exercise stress test on 5/16/2019 was negative for ischemia  She has seen a cardiologist who is also in private practice and thus notes are not available to review       Patient currently is seen by private practice family medicine (Dr Lexi Owens) and Neurology (Dr Kenia Clifton)  Records are not available to review  She cannot take Imitrex due to palpitations  Had no relief with Maxalt  Did not tolerate Pamelor well      With regards to Epilepsy history, first diagnosed in May 2011  She denies ever having seizures  Family history of seizures in brother and uncle  Has had several EEG's over the last 8 years including routine and sleep-deprived  On review of reports, appears that she has bifrontotemporal sharp activity consistent with epileptogenic discharges  She is scheduled to have an updated EEG in the near future  Physical Exam:  /63   Pulse 96   Temp 97 5 °F (36 4 °C)   Resp 18   Ht 5' 2" (1 575 m)   Wt 82 6 kg (182 lb)   SpO2 95%   BMI 33 29 kg/m²      General: awake and alert, in no acute distress  Appears comfortable  HEENT: normocephalic, atraumatic   No phonophobia  Eyes: No photophobia  CV: well perfused, RRR  Resp: lungs CTA bilaterally  Abd: soft, non-tender, non-distended  Skin: no rashes  Psych: normal mood and affect    Mental status: Awake, alert, oriented x 3  Able to name and repeat  Able to perform simple calculations  CN: PERRL, EOM intact, face symmetric, hearing intact, sensation intact, palate elevates symmetrically, tongue protrudes midline, equal shoulder shrug and head turn  Motor: Effort-dependent exam secondary to arthritic pain   Strength is 5/5 throughout with maximal effort  Sensation: intact to light touch  Coordination: Normal FNF and rapid alternating movements  Reflexes: 2+ and symmetric  Gait: not tested        ======    Thank you for allowing me to participate in the care of your patient, DO Cain Villarreal's Neurology Residency, PGY-3

## 2022-11-30 NOTE — PLAN OF CARE
Problem: MOBILITY - ADULT  Goal: Maintain or return to baseline ADL function  Description: INTERVENTIONS:  -  Assess patient's ability to carry out ADLs; assess patient's baseline for ADL function and identify physical deficits which impact ability to perform ADLs (bathing, care of mouth/teeth, toileting, grooming, dressing, etc )  - Assess/evaluate cause of self-care deficits   - Assess range of motion  - Assess patient's mobility; develop plan if impaired  - Assess patient's need for assistive devices and provide as appropriate  - Encourage maximum independence but intervene and supervise when necessary  - Involve family in performance of ADLs  - Assess for home care needs following discharge   - Consider OT consult to assist with ADL evaluation and planning for discharge  - Provide patient education as appropriate  Outcome: Progressing  Goal: Maintains/Returns to pre admission functional level  Description: INTERVENTIONS:  - Perform BMAT or MOVE assessment daily    - Set and communicate daily mobility goal to care team and patient/family/caregiver  - Collaborate with rehabilitation services on mobility goals if consulted  - Perform Range of Motion 3 times a day  - Reposition patient every 2 hours    - Dangle patient 3 times a day  - Stand patient 3 times a day  - Ambulate patient 3 times a day  - Out of bed to chair 3 times a day   - Out of bed for meals 3  Problem: PAIN - ADULT  Goal: Verbalizes/displays adequate comfort level or baseline comfort level  Description: Interventions:  - Encourage patient to monitor pain and request assistance  - Assess pain using appropriate pain scale  - Administer analgesics based on type and severity of pain and evaluate response  - Implement non-pharmacological measures as appropriate and evaluate response  - Consider cultural and social influences on pain and pain management  - Notify physician/advanced practitioner if interventions unsuccessful or patient reports new pain  Outcome: Progressing     Problem: INFECTION - ADULT  Goal: Absence or prevention of progression during hospitalization  Description: INTERVENTIONS:  - Assess and monitor for signs and symptoms of infection  - Monitor lab/diagnostic results  - Monitor all insertion sites, i e  indwelling lines, tubes, and drains  - Monitor endotracheal if appropriate and nasal secretions for changes in amount and color  - [place] appropriate cooling/warming therapies per order  - Administer medications as ordered  - Instruct and encourage patient and family to use good hand hygiene technique  - Identify and instruct in appropriate isolation precautions for identified infection/condition  Outcome: Progressing  Goal: Absence of fever/infection during neutropenic period  Description: INTERVENTIONS:  - Monitor WBC    Outcome: Progressing     Problem: DISCHARGE PLANNING  Goal: Discharge to home or other facility with appropriate resources  Description: INTERVENTIONS:  - Identify barriers to discharge w/patient and caregiver  - Arrange for needed discharge resources and transportation as appropriate  - Identify discharge learning needs (meds, wound care, etc )  - Arrange for interpretive services to assist at discharge as needed  - Refer to Case Management Department for coordinating discharge planning if the patient needs post-hospital services based on physician/advanced practitioner order or complex needs related to functional status, cognitive ability, or social support system  Outcome: Progressing     Problem: Knowledge Deficit  Goal: Patient/family/caregiver demonstrates understanding of disease process, treatment plan, medications, and discharge instructions  Description: Complete learning assessment and assess knowledge base    Interventions:  - Provide teaching at level of understanding  - Provide teaching via preferred learning methods  Outcome: Progressing    times a day  - Out of bed for toileting  - Record patient progress and toleration of activity level   Outcome: Progressing

## 2022-12-01 VITALS
TEMPERATURE: 97.8 F | OXYGEN SATURATION: 96 % | HEIGHT: 62 IN | RESPIRATION RATE: 16 BRPM | WEIGHT: 182 LBS | BODY MASS INDEX: 33.49 KG/M2 | DIASTOLIC BLOOD PRESSURE: 60 MMHG | SYSTOLIC BLOOD PRESSURE: 105 MMHG | HEART RATE: 79 BPM

## 2022-12-01 LAB
ANION GAP SERPL CALCULATED.3IONS-SCNC: 8 MMOL/L (ref 4–13)
BASOPHILS # BLD AUTO: 0.03 THOUSANDS/ÂΜL (ref 0–0.1)
BASOPHILS NFR BLD AUTO: 0 % (ref 0–1)
BUN SERPL-MCNC: 29 MG/DL (ref 5–25)
CALCIUM SERPL-MCNC: 8.4 MG/DL (ref 8.3–10.1)
CHLORIDE SERPL-SCNC: 110 MMOL/L (ref 96–108)
CO2 SERPL-SCNC: 22 MMOL/L (ref 21–32)
CREAT SERPL-MCNC: 0.86 MG/DL (ref 0.6–1.3)
EOSINOPHIL # BLD AUTO: 0.23 THOUSAND/ÂΜL (ref 0–0.61)
EOSINOPHIL NFR BLD AUTO: 3 % (ref 0–6)
ERYTHROCYTE [DISTWIDTH] IN BLOOD BY AUTOMATED COUNT: 13.8 % (ref 11.6–15.1)
GFR SERPL CREATININE-BSD FRML MDRD: 73 ML/MIN/1.73SQ M
GLUCOSE SERPL-MCNC: 101 MG/DL (ref 65–140)
GLUCOSE SERPL-MCNC: 108 MG/DL (ref 65–140)
GLUCOSE SERPL-MCNC: 113 MG/DL (ref 65–140)
HCT VFR BLD AUTO: 37.6 % (ref 34.8–46.1)
HGB BLD-MCNC: 12.1 G/DL (ref 11.5–15.4)
IMM GRANULOCYTES # BLD AUTO: 0.04 THOUSAND/UL (ref 0–0.2)
IMM GRANULOCYTES NFR BLD AUTO: 1 % (ref 0–2)
LYMPHOCYTES # BLD AUTO: 2.45 THOUSANDS/ÂΜL (ref 0.6–4.47)
LYMPHOCYTES NFR BLD AUTO: 29 % (ref 14–44)
MCH RBC QN AUTO: 29.3 PG (ref 26.8–34.3)
MCHC RBC AUTO-ENTMCNC: 32.2 G/DL (ref 31.4–37.4)
MCV RBC AUTO: 91 FL (ref 82–98)
MONOCYTES # BLD AUTO: 0.49 THOUSAND/ÂΜL (ref 0.17–1.22)
MONOCYTES NFR BLD AUTO: 6 % (ref 4–12)
NEUTROPHILS # BLD AUTO: 5.28 THOUSANDS/ÂΜL (ref 1.85–7.62)
NEUTS SEG NFR BLD AUTO: 61 % (ref 43–75)
NRBC BLD AUTO-RTO: 0 /100 WBCS
PLATELET # BLD AUTO: 185 THOUSANDS/UL (ref 149–390)
PMV BLD AUTO: 11 FL (ref 8.9–12.7)
POTASSIUM SERPL-SCNC: 3.4 MMOL/L (ref 3.5–5.3)
RBC # BLD AUTO: 4.13 MILLION/UL (ref 3.81–5.12)
SODIUM SERPL-SCNC: 140 MMOL/L (ref 135–147)
WBC # BLD AUTO: 8.52 THOUSAND/UL (ref 4.31–10.16)

## 2022-12-01 RX ORDER — KETOROLAC TROMETHAMINE 30 MG/ML
15 INJECTION, SOLUTION INTRAMUSCULAR; INTRAVENOUS EVERY 8 HOURS
Status: DISCONTINUED | OUTPATIENT
Start: 2022-12-01 | End: 2022-12-01

## 2022-12-01 RX ORDER — PROMETHAZINE HYDROCHLORIDE 25 MG/1
25 TABLET ORAL ONCE
Status: DISCONTINUED | OUTPATIENT
Start: 2022-12-01 | End: 2022-12-01

## 2022-12-01 RX ORDER — KETOROLAC TROMETHAMINE 30 MG/ML
15 INJECTION, SOLUTION INTRAMUSCULAR; INTRAVENOUS ONCE
Status: DISCONTINUED | OUTPATIENT
Start: 2022-12-01 | End: 2022-12-01

## 2022-12-01 RX ORDER — DIPHENHYDRAMINE HYDROCHLORIDE 50 MG/ML
25 INJECTION INTRAMUSCULAR; INTRAVENOUS EVERY 8 HOURS
Status: DISCONTINUED | OUTPATIENT
Start: 2022-12-01 | End: 2022-12-01

## 2022-12-01 RX ORDER — DIPHENHYDRAMINE HYDROCHLORIDE 50 MG/ML
25 INJECTION INTRAMUSCULAR; INTRAVENOUS ONCE
Status: DISCONTINUED | OUTPATIENT
Start: 2022-12-01 | End: 2022-12-01

## 2022-12-01 RX ORDER — METOCLOPRAMIDE HYDROCHLORIDE 5 MG/ML
10 INJECTION INTRAMUSCULAR; INTRAVENOUS ONCE
Status: COMPLETED | OUTPATIENT
Start: 2022-12-01 | End: 2022-12-01

## 2022-12-01 RX ADMIN — DICYCLOMINE HYDROCHLORIDE 20 MG: 20 TABLET ORAL at 12:19

## 2022-12-01 RX ADMIN — DICYCLOMINE HYDROCHLORIDE 20 MG: 20 TABLET ORAL at 08:10

## 2022-12-01 RX ADMIN — DIPHENHYDRAMINE HYDROCHLORIDE 25 MG: 50 INJECTION INTRAMUSCULAR; INTRAVENOUS at 04:44

## 2022-12-01 RX ADMIN — PROMETHAZINE HYDROCHLORIDE 25 MG: 25 TABLET ORAL at 04:42

## 2022-12-01 RX ADMIN — METFORMIN HYDROCHLORIDE 500 MG: 500 TABLET ORAL at 08:10

## 2022-12-01 RX ADMIN — TIZANIDINE 2 MG: 2 TABLET ORAL at 08:10

## 2022-12-01 RX ADMIN — HYOSCYAMINE SULFATE 0.12 MG: 0.12 TABLET ORAL at 08:10

## 2022-12-01 RX ADMIN — FLUTICASONE PROPIONATE 2 SPRAY: 50 SPRAY, METERED NASAL at 08:15

## 2022-12-01 RX ADMIN — SODIUM CHLORIDE 1000 ML: 0.9 INJECTION, SOLUTION INTRAVENOUS at 12:19

## 2022-12-01 RX ADMIN — PANTOPRAZOLE SODIUM 40 MG: 40 TABLET, DELAYED RELEASE ORAL at 04:43

## 2022-12-01 RX ADMIN — ENOXAPARIN SODIUM 40 MG: 40 INJECTION SUBCUTANEOUS at 08:11

## 2022-12-01 RX ADMIN — EMPAGLIFLOZIN 20 MG: 10 TABLET, FILM COATED ORAL at 08:13

## 2022-12-01 RX ADMIN — KETOROLAC TROMETHAMINE 15 MG: 30 INJECTION, SOLUTION INTRAMUSCULAR at 04:44

## 2022-12-01 RX ADMIN — METOCLOPRAMIDE 10 MG: 5 INJECTION, SOLUTION INTRAMUSCULAR; INTRAVENOUS at 07:44

## 2022-12-01 RX ADMIN — TOPIRAMATE 200 MG: 100 TABLET, FILM COATED ORAL at 08:10

## 2022-12-01 RX ADMIN — FLUTICASONE PROPIONATE 2 PUFF: 220 AEROSOL, METERED RESPIRATORY (INHALATION) at 08:15

## 2022-12-01 NOTE — DISCHARGE INSTRUCTIONS
Please follow-up with your primary care doctor Samantha Hannon MD in a week    Please follow-up with your neurologist Dr Fatuma Ceja in 4-6 weeks    Migraine headache Ambulatory Care attached    No new changes to your home medications  Recommend taking medications consistently

## 2022-12-01 NOTE — DISCHARGE SUMMARY
NEUROLOGY RESIDENCY - DISCHARGE SUMMARY     Name: Alla Sarkar   Age & Sex: 65 y o  female   MRN: [id number]  Unit/Bed#: [facility] 602-01   Encounter: [id number]    Discharging Resident Physician: Deyvi Figueroa MD  Attending: No att  providers found  PCP: Chris Saunders MD  Admission Date: 11/29/2022  Discharge Date: 12/01/22    Alla Sarkar will need follow up in at the next regular appointment with Neurologist Dr Becca Pedro  ASSESSMENT & PLAN     * Chronic migraine without aura, intractable, with status migrainosus  Assessment & Plan  This is a 65 y o  female presenting for status migrainosus improved with migraine cocktails  Due to worsening worsening symptoms and symptoms of sensational abnormality and hx of sarcoidosis, MRI brain w/wo and MRI L spine were done and did not show any acute abnormality  Plan:  Patient is much better in the afternoon and is ready to be discharged home  - 11/30 Discussed with patient's neurology Dr Becca Pedro  She confirms that patient takes Eletriptan 40mg as rescue medication   - Continue home Topamax 200 mg bid and Verapamil 120 mg daily  -No new changes to home mediations  Follow up with her outpatient Neruology Dr Becca Pedro in 4-6 weeks after discharge      Type 2 diabetes mellitus, without long-term current use of insulin [facility])  Assessment & Plan  Lab Results   Component Value Date    HGBA1C 6 6 (H) 10/02/2022       Recent Labs     11/30/22  1130 11/30/22  1651 12/01/22  0742 12/01/22  1125   POCGLU 115 113 101 113       Blood Sugar Average: Last 72 hrs:  (P) 113     Continue home Jardiance and Metformin  Added SSI coverage algorithm 2 for hyperglycemia  Hypoglycemia protocol  Avoiding steroids for now    Partial symptomatic epilepsy with complex partial seizures, intractable, without status epilepticus ([facility] [address] )  Assessment & Plan  Reported history of Epilepsy but denies having seizures in the past    12/21/2020 routine in a week EEG: This is an abnormal routine EEG due to bitemporal independent (left-predominant) sharp activity reflecting underlying focal cortical hyper-irritability and enhanced epileptogenic potential   These findings are consistent with the patient’s clinical diagnosis of localization-related (partial-onset) epilepsy  Continue home Topiramate 200 mg bid  Hypertension, essential  Assessment & Plan  Continue home medication verapamil 120 mg daily      Disposition:     Home    Reason for Admission: Chronic migraine without aura intractable, with status migrainosus      Consultations During Hospital Stay:  · None     Procedures Performed:     · None    Significant Findings / Test Results:     MRI lumbar spine wo contrast   Final Result by Yue Bailey MD (12/01 0361)   Grade 1 anterolisthesis at L5-S1 with bilateral pars defects  Osseous fusion of the L5-S1 disc space  Mild right and moderate severe left foraminal stenosis at L5-S1  No other disc herniation, canal or foraminal stenosis  MRI brain w wo contrast   Final Result by Robbie Monreal MD (32/59 [id number])         1  No acute infarction, intracranial hemorrhage or mass  2   Mild ethmoidal and right maxillary sinus disease  Incidental Findings:   · None      Test Results Pending at Discharge (will require follow up):   · No tests pending  FU with neurologist Dr Janay Lindsay     Outpatient Tests Requested:  · None    Complications:  None    Hospital Course:     Mohini Cobb is a 65 y o  female patient who originally presented to the hospital on 11/29/2022 due to migrainosus with numbness of the face and mouth, severe pain rated 10/10, blurriness of vision, dizziness, and N/V  These symptoms first began suddenly on 11/20 with no warning signs and fluctuated between onset and admission  The patient reports numbness has decreased significantly and no longer reports blurriness of vision, or dizziness   Her HA pain has fluctuated during her stay, rating between 5/10 and 10/10  During PE exam on 11/30, the patient reported decreased sensation to light touch, vibration, and nociception below the ankle of the right foot with a bilateral decrease in temperature  She also displayed a decreased proprioception of only her left foot  Because of these symptoms and her dx of sarcoidosis, a MRI brain w wo contrast and a MRI lumbar spine wo contrast was ordered to r/o neurosarcoidosis  MRI was unremarkable for acute abnormalities  Her outpatient neurologist Dr Joseph Dave was contacted on 11/30 and she confirmed the patient was prescribed Topamax 200mg BID and Verapamil 120mg qd as well as Eletriptan 40mg PRN and Pheneran 25 mg PRN  Dr Joseph Dave also confirmed that she has had several MRIs in the past which were normal  During her stay, the patient was given a cocktail of Toradol 15mg q8, Benadryl 25mg q8, and Zanaflex 2mg q8, which has helped alleviate her symptoms  By the afternoon of 12/1, her HA pain had lessened to a dull 5/10 and she reported the numbness of her face and lips had disappeared completely  She is stable to be discharged home  Condition at Discharge: fair     Discharge Day Visit / Exam:     Subjective:   Patient was alert, relaxed, and in no obvious distress during morning interview  Patient reported nausea and sharp HA pain rated 10/10 and more severe on the right side  However, by mid-afternoon, her pain had decreased to 5/10 and nausea had subsided  She no longer reports numbness of the face or lips  Patient denies photophobia, blurriness of vision, dizziness, or any weakness  Patient slept well with minimal disturbance and has a good appetite despite nausea  On PE, patient could discern light touch, but reported mildly decreased sensation in left foot below ankle  Proprioception was normal in both feet      Vitals: Blood Pressure: 105/60 (12/01/22 0741)  Pulse: 79 (12/01/22 0741)  Temperature: 97 8 °F (36 6 °C) (12/01/22 0741)  Temp Source: Oral (11/29/22 2021)  Respirations: 16 (12/01/22 0741)  Height: 5' 2" (157 5 cm) (11/29/22 2021)  Weight - Scale: 82 6 kg (182 lb) (11/29/22 2021)  SpO2: 96 % (12/01/22 0741)    Exam:     Physical Exam  Constitutional:       Appearance: Normal appearance  Eyes:      Extraocular Movements: Extraocular movements intact  Cardiovascular:      Rate and Rhythm: Normal rate and regular rhythm  Heart sounds: Normal heart sounds  Pulmonary:      Effort: Pulmonary effort is normal       Breath sounds: Normal breath sounds  Abdominal:      General: Bowel sounds are normal    Neurological:      Mental Status: She is alert and oriented to person, place, and time  Cranial Nerves: Cranial nerves 2-12 are intact  Motor: Motor strength is normal          Neurologic Exam     Mental Status   Oriented to person, place, and time  Level of consciousness: alert    Cranial Nerves   Cranial nerves II through XII intact  Motor Exam   Muscle bulk: normal  Overall muscle tone: normal    Strength   Strength 5/5 throughout  Sensory Exam   Light touch normal    Proprioception normal      Gait, Coordination, and Reflexes     Tremor   Resting tremor: absent  Intention tremor: absent  Action tremor: absent    Reflexes   Right plantar: normal  Left plantar: normal      Discussion with Family:  Patient denied family discussion  She will update herself  Discharge instructions/Information to patient and family:   See after visit summary for information provided to patient and family  Provisions for Follow-Up Care:  See after visit summary for information related to follow-up care and any pertinent home health orders  Planned Readmission: None    Discharge Statement:  I spent 45 minutes discharging the patient  This time was spent on the day of discharge  I had direct contact with the patient on the day of discharge   Greater than 50% of the total time was spent examining patient, answering all patient questions, arranging and discussing plan of care with patient as well as directly providing post-discharge instructions  Additional time then spent on discharge activities  Discharge Medications:  See after visit summary for reconciled discharge medications provided to patient and family        ** Please Note: This note has been constructed using a voice recognition system **      ==  Wilfred Pineda, M3   [facility] at [place] [id number], [id number] [facility] Neurology Residency, PGY-2

## 2022-12-01 NOTE — PLAN OF CARE
Problem: MOBILITY - ADULT  Goal: Maintain or return to baseline ADL function  Description: INTERVENTIONS:  -  Assess patient's ability to carry out ADLs; assess patient's baseline for ADL function and identify physical deficits which impact ability to perform ADLs (bathing, care of mouth/teeth, toileting, grooming, dressing, etc )  - Assess/evaluate cause of self-care deficits   - Assess range of motion  - Assess patient's mobility; develop plan if impaired  - Assess patient's need for assistive devices and provide as appropriate  - Encourage maximum independence but intervene and supervise when necessary  - Involve family in performance of ADLs  - Assess for home care needs following discharge   - Consider OT consult to assist with ADL evaluation and planning for discharge  - Provide patient education as appropriate  Outcome: Progressing  Goal: Maintains/Returns to pre admission functional level  Description: INTERVENTIONS:  - Perform BMAT or MOVE assessment daily    - Set and communicate daily mobility goal to care team and patient/family/caregiver  - Collaborate with rehabilitation services on mobility goals if consulted  - Perform Range of Motion 3 times a day  - Reposition patient every 2 hours    - Dangle patient 3 times a day  - Stand patient 3 times a day  - Ambulate patient 3 times a day  - Out of bed to chair 3 times a day   - Out of bed for meals 3 times a day  - Out of bed for toileting  Problem: PAIN - ADULT  Goal: Verbalizes/displays adequate comfort level or baseline comfort level  Description: Interventions:  - Encourage patient to monitor pain and request assistance  - Assess pain using appropriate pain scale  - Administer analgesics based on type and severity of pain and evaluate response  - Implement non-pharmacological measures as appropriate and evaluate response  - Consider cultural and social influences on pain and pain management  - Notify physician/advanced practitioner if interventions unsuccessful or patient reports new pain  Outcome: Progressing     Problem: INFECTION - ADULT  Goal: Absence or prevention of progression during hospitalization  Description: INTERVENTIONS:  - Assess and monitor for signs and symptoms of infection  - Monitor lab/diagnostic results  - Monitor all insertion sites, i e  indwelling lines, tubes, and drains  - Monitor endotracheal if appropriate and nasal secretions for changes in amount and color  - [place] appropriate cooling/warming therapies per order  - Administer medications as ordered  - Instruct and encourage patient and family to use good hand hygiene technique  - Identify and instruct in appropriate isolation precautions for identified infection/condition  Outcome: Progressing  Goal: Absence of fever/infection during neutropenic period  Description: INTERVENTIONS:  - Monitor WBC    Outcome: Progressing     Problem: DISCHARGE PLANNING  Goal: Discharge to home or other facility with appropriate resources  Description: INTERVENTIONS:  - Identify barriers to discharge w/patient and caregiver  - Arrange for needed discharge resources and transportation as appropriate  - Identify discharge learning needs (meds, wound care, etc )  - Arrange for interpretive services to assist at discharge as needed  - Refer to Case Management Department for coordinating discharge planning if the patient needs post-hospital services based on physician/advanced practitioner order or complex needs related to functional status, cognitive ability, or social support system  Outcome: Progressing     Problem: Knowledge Deficit  Goal: Patient/family/caregiver demonstrates understanding of disease process, treatment plan, medications, and discharge instructions  Description: Complete learning assessment and assess knowledge base    Interventions:  - Provide teaching at level of understanding  - Provide teaching via preferred learning methods  Outcome: Progressing     - Record patient progress and toleration of activity level   Outcome: Progressing

## 2022-12-01 NOTE — PLAN OF CARE
Problem: MOBILITY - ADULT  Goal: Maintain or return to baseline ADL function  Description: INTERVENTIONS:  -  Assess patient's ability to carry out ADLs; assess patient's baseline for ADL function and identify physical deficits which impact ability to perform ADLs (bathing, care of mouth/teeth, toileting, grooming, dressing, etc )  - Assess/evaluate cause of self-care deficits   - Assess range of motion  - Assess patient's mobility; develop plan if impaired  - Assess patient's need for assistive devices and provide as appropriate  - Encourage maximum independence but intervene and supervise when necessary  - Involve family in performance of ADLs  - Assess for home care needs following discharge   - Consider OT consult to assist with ADL evaluation and planning for discharge  - Provide patient education as appropriate  Outcome: Progressing  Goal: Maintains/Returns to pre admission functional level  Description: INTERVENTIONS:  - Perform BMAT or MOVE assessment daily    - Set and communicate daily mobility goal to care team and patient/family/caregiver  - Collaborate with rehabilitation services on mobility goals if consulted  - Perform Range of Motion  times a day  - Reposition patient every  hours    - Dangle patient  times a day  - Stand patient  times a day  - Ambulate patient  times a day  - Out of bed to chair  times a day   - Out of bed for meals  times a day  - Out of bed for toileting  - Record patient progress and toleration of activity level   Outcome: Progressing     Problem: PAIN - ADULT  Goal: Verbalizes/displays adequate comfort level or baseline comfort level  Description: Interventions:  - Encourage patient to monitor pain and request assistance  - Assess pain using appropriate pain scale  - Administer analgesics based on type and severity of pain and evaluate response  - Implement non-pharmacological measures as appropriate and evaluate response  - Consider cultural and social influences on pain and pain management  - Notify physician/advanced practitioner if interventions unsuccessful or patient reports new pain  Outcome: Progressing     Problem: INFECTION - ADULT  Goal: Absence or prevention of progression during hospitalization  Description: INTERVENTIONS:  - Assess and monitor for signs and symptoms of infection  - Monitor lab/diagnostic results  - Monitor all insertion sites, i e  indwelling lines, tubes, and drains  - Monitor endotracheal if appropriate and nasal secretions for changes in amount and color  - [place] appropriate cooling/warming therapies per order  - Administer medications as ordered  - Instruct and encourage patient and family to use good hand hygiene technique  - Identify and instruct in appropriate isolation precautions for identified infection/condition  Outcome: Progressing  Goal: Absence of fever/infection during neutropenic period  Description: INTERVENTIONS:  - Monitor WBC    Outcome: Progressing     Problem: SAFETY ADULT  Goal: Maintain or return to baseline ADL function  Description: INTERVENTIONS:  -  Assess patient's ability to carry out ADLs; assess patient's baseline for ADL function and identify physical deficits which impact ability to perform ADLs (bathing, care of mouth/teeth, toileting, grooming, dressing, etc )  - Assess/evaluate cause of self-care deficits   - Assess range of motion  - Assess patient's mobility; develop plan if impaired  - Assess patient's need for assistive devices and provide as appropriate  - Encourage maximum independence but intervene and supervise when necessary  - Involve family in performance of ADLs  - Assess for home care needs following discharge   - Consider OT consult to assist with ADL evaluation and planning for discharge  - Provide patient education as appropriate  Outcome: Progressing  Goal: Maintains/Returns to pre admission functional level  Description: INTERVENTIONS:  - Perform BMAT or MOVE assessment daily    - Set and communicate daily mobility goal to care team and patient/family/caregiver  - Collaborate with rehabilitation services on mobility goals if consulted  - Perform Range of Motion  times a day  - Reposition patient every  hours  - Dangle patient  times a day  - Stand patient  times a day  - Ambulate patient  times a day  - Out of bed to chair  times a day   - Out of bed for meals  times a day  - Out of bed for toileting  - Record patient progress and toleration of activity level   Outcome: Progressing  Goal: Patient will remain free of falls  Description: INTERVENTIONS:  - Educate patient/family on patient safety including physical limitations  - Instruct patient to call for assistance with activity   - Consult OT/PT to assist with strengthening/mobility   - Keep Call bell within reach  - Keep bed low and locked with side rails adjusted as appropriate  - Keep care items and personal belongings within reach  - Initiate and maintain comfort rounds  - Make Fall Risk Sign visible to staff  - Offer Toileting every  Hours, in advance of need  - Initiate/Maintain alarm  - Obtain necessary fall risk management equipment  - Apply yellow socks and bracelet for high fall risk patients  - Consider moving patient to room near nurses station  Outcome: Progressing     Problem: Knowledge Deficit  Goal: Patient/family/caregiver demonstrates understanding of disease process, treatment plan, medications, and discharge instructions  Description: Complete learning assessment and assess knowledge base    Interventions:  - Provide teaching at level of understanding  - Provide teaching via preferred learning methods  Outcome: Progressing     Problem: DISCHARGE PLANNING  Goal: Discharge to home or other facility with appropriate resources  Description: INTERVENTIONS:  - Identify barriers to discharge w/patient and caregiver  - Arrange for needed discharge resources and transportation as appropriate  - Identify discharge learning needs (meds, wound care, etc )  - Arrange for interpretive services to assist at discharge as needed  - Refer to Case Management Department for coordinating discharge planning if the patient needs post-hospital services based on physician/advanced practitioner order or complex needs related to functional status, cognitive ability, or social support system  Outcome: Progressing

## 2022-12-05 ENCOUNTER — APPOINTMENT (OUTPATIENT)
Dept: PHYSICAL THERAPY | Facility: REHABILITATION | Age: 62
End: 2022-12-05

## 2022-12-06 ENCOUNTER — LAB REQUISITION (OUTPATIENT)
Dept: LAB | Facility: HOSPITAL | Age: 62
End: 2022-12-06

## 2022-12-06 DIAGNOSIS — R53.83 OTHER FATIGUE: ICD-10-CM

## 2022-12-06 DIAGNOSIS — G43.909 MIGRAINE, UNSPECIFIED, NOT INTRACTABLE, WITHOUT STATUS MIGRAINOSUS: ICD-10-CM

## 2022-12-06 LAB
ANION GAP SERPL CALCULATED.3IONS-SCNC: 7 MMOL/L (ref 4–13)
BUN SERPL-MCNC: 18 MG/DL (ref 5–25)
CALCIUM SERPL-MCNC: 9.7 MG/DL (ref 8.3–10.1)
CHLORIDE SERPL-SCNC: 107 MMOL/L (ref 96–108)
CO2 SERPL-SCNC: 25 MMOL/L (ref 21–32)
CREAT SERPL-MCNC: 0.87 MG/DL (ref 0.6–1.3)
ERYTHROCYTE [SEDIMENTATION RATE] IN BLOOD: 31 MM/HOUR (ref 0–29)
GFR SERPL CREATININE-BSD FRML MDRD: 72 ML/MIN/1.73SQ M
GLUCOSE SERPL-MCNC: 115 MG/DL (ref 65–140)
POTASSIUM SERPL-SCNC: 3.9 MMOL/L (ref 3.5–5.3)
SODIUM SERPL-SCNC: 139 MMOL/L (ref 135–147)

## 2022-12-07 ENCOUNTER — OFFICE VISIT (OUTPATIENT)
Dept: PHYSICAL THERAPY | Facility: REHABILITATION | Age: 62
End: 2022-12-07

## 2022-12-07 DIAGNOSIS — M19.012 ARTHRITIS OF LEFT ACROMIOCLAVICULAR JOINT: ICD-10-CM

## 2022-12-07 DIAGNOSIS — M54.12 RADICULOPATHY, CERVICAL REGION: Primary | ICD-10-CM

## 2022-12-07 NOTE — PROGRESS NOTES
Daily Note     Today's date: 2022  Patient name: Kelsey Truong  : 1960  MRN: [id number]  Referring provider: Wallace Lewis PA-C  Dx:   Encounter Diagnosis     ICD-10-CM    1  Radiculopathy, cervical region  M54 12       2  Arthritis of left acromioclavicular joint  M19 012                      Subjective: Pt reported tightness and soreness today  She has missed a few weeks of PT due to being in and out of the hospital with migranes and diverticulitis  She noted still having a dull headache but feeling better  Objective: See treatment diary below      Assessment: Tolerated treatment fair  Patient demonstrated fatigue post treatment and exhibited good technique with therapeutic exercises  VC's needed for correct technique throughout session  Added pulleys, scap punches, and wall slides to good tolerance  Mild tightness noted with manuals  Monitor symptoms NV  Plan: Continue per plan of care  Precautions: asthma, diabetes, GERD, migraines, R TKA      Manuals  127      L UT stretch  TB TC      L median nerve glides  TB TC      Cervical distraction  TB TC               Neuro Re-Ed         Supine cervical retraction  20x2s 3" 2x10      TB/[place] shoulder LPD/ext  2x10 rtb tere 6# 2x10      TB/tere rows  2x10 rtb tere 6# 2x10      TB b/l ER w/scap squeeze  2x10 rtb 2x10 rtb      Seated chin tucks                                    Ther Ex         UBE retro  6 min 5 min      Pulleys flexion   5"x3' ea        Serratus wall slides   5"x10      Doorway pec stretch  10x10s 10"x10      scap punches   3"x10               Pt education+ HEP instruction TP 8 mins                 Ther Activity         pball posture         pball shoulder scaption         Gait Training                           Modalities

## 2022-12-09 ENCOUNTER — OFFICE VISIT (OUTPATIENT)
Dept: PAIN MEDICINE | Facility: CLINIC | Age: 62
End: 2022-12-09

## 2022-12-09 VITALS
BODY MASS INDEX: 33.29 KG/M2 | DIASTOLIC BLOOD PRESSURE: 71 MMHG | HEART RATE: 92 BPM | HEIGHT: 62 IN | SYSTOLIC BLOOD PRESSURE: 145 MMHG

## 2022-12-09 DIAGNOSIS — M19.012 ARTHRITIS OF LEFT ACROMIOCLAVICULAR JOINT: ICD-10-CM

## 2022-12-09 DIAGNOSIS — M54.12 RADICULOPATHY, CERVICAL REGION: Primary | ICD-10-CM

## 2022-12-09 NOTE — PROGRESS NOTES
Assessment:  1  Radiculopathy, cervical region    2  Arthritis of left acromioclavicular joint        Plan:  1  Continue with physical therapy  Patient continues with symptoms after 6 weeks of conservative treatment, will order an MRI of the cervical spine without contrast  2  patient may continue ibuprofen and Tylenol as needed  3  Follow-up after imaging or sooner if needed    History of Present Illness: The patient is a 65 y o  female with a history of diabetes last seen on 10/28/22 who presents for a follow up office visit in regards to chronic left-sided neck pain that radiates into the left shoulder and left upper extremity with associated numbness and paresthesias  React pathology has been ruled out  X-ray of the cervical spine reveals anterior osteophytes at C5 and C6  She has participated in 3 sessions of physical therapy with improvement of her symptoms  She is taking Tylenol and ibuprofen as needed with mild to moderate relief  She rates her pain currently a 3 out of 10 on the numeric pain rating scale  She occasionally has pain at night which is described as dull aching    I have personally reviewed and/or updated the patient's past medical history, past surgical history, family history, social history, current medications, allergies, and vital signs today  Review of Systems:    Review of Systems   Respiratory: Negative for shortness of breath  Cardiovascular: Negative for chest pain  Gastrointestinal: Negative for constipation, diarrhea, nausea and vomiting  Musculoskeletal: Negative for arthralgias, gait problem, joint swelling and myalgias  Skin: Negative for rash  Neurological: Negative for dizziness, seizures and weakness  All other systems reviewed and are negative          Past Medical History:   Diagnosis Date   • Asthma    • [facility]     type 2   • Diverticulitis    • EEG abnormality without seizure    • Gastroparesis    • GERD (gastroesophageal reflux disease)    • Headache, migraine    • Hiatal hernia    • IBS (irritable bowel syndrome)    • Migraines    • Sarcoidosis        Past Surgical History:   Procedure Laterality Date   • CHOLECYSTECTOMY     • PA COLONOSCOPY FLX DX W/COLLJ SPEC WHEN PFRMD N/A 3/8/2017    Procedure: EGD AND COLONOSCOPY;  Surgeon: Felisha Reynolds MD;  Location: [facility]; Service: Gastroenterology   • REPLACEMENT TOTAL KNEE Right    • SIGMOIDECTOMY         Family History   Problem Relation Age of Onset   • Diabetes Mother    • Heart disease Mother    • Cancer Father    • Liver cancer Father    • Brain cancer Father    • Arthritis Sister        Social History     Occupational History   • Not on file   Tobacco Use   • Smoking status: Never   • Smokeless tobacco: Never   Vaping Use   • Vaping Use: Never used   Substance and Sexual Activity   • Alcohol use: Yes     Comment: socially   • Drug use: No   • Sexual activity: Not on file         Current Outpatient Medications:   •  acetaminophen (TYLENOL) 500 mg tablet, Take 500 mg by mouth every 6 (six) hours as needed for mild pain, Disp: , Rfl:   •  albuterol (PROVENTIL HFA,VENTOLIN HFA) 90 mcg/act inhaler, Inhale 2 puffs every 6 (six) hours as needed for wheezing, Disp: , Rfl:   •  ascorbic acid (VITAMIN C) 500 mg tablet, Take 500 mg by mouth daily, Disp: , Rfl:   •  B Complex-C (B-COMPLEX WITH VITAMIN C) tablet, Take 1 tablet by mouth daily, Disp: , Rfl:   •  Cholecalciferol (VITAMIN D3) 2000 units TABS, Take 1 tablet by mouth daily, Disp: , Rfl:   •  ciclopirox (LOPROX) 0 77 % cream, Apply topically 2 (two) times a day To affected areas of rash for 4 weeks  , Disp: 90 g, Rfl: 0  •  dicyclomine (BENTYL) 20 mg tablet, Take 20 mg by mouth as needed  , Disp: , Rfl:   •  eletriptan (RELPAX) 40 MG tablet, eletriptan 40 mg tablet  take one tablet at onset of migraine; may repeat once in 12 hrs if needed for return of headache, Disp: , Rfl:   •  Empagliflozin 25 MG TABS, Jardiance 25 mg tablet, Disp: , Rfl:   •  esomeprazole (NexIUM) 40 MG capsule, Take 40 mg by mouth daily, Disp: , Rfl:   •  fluticasone (FLONASE) 50 mcg/act nasal spray, fluticasone propionate 50 mcg/actuation nasal spray,suspension, Disp: , Rfl:   •  hydrOXYzine HCL (ATARAX) 25 mg tablet, 25 mg 3 (three) times a day as needed, Disp: , Rfl:   •  HYOSCYAMINE SULFATE PO, Place 1 tablet under the tongue every 6 (six) hours as needed  , Disp: , Rfl:   •  meclizine (ANTIVERT) 25 mg tablet, meclizine 25 mg tablet, Disp: , Rfl:   •  metFORMIN (GLUCOPHAGE) 500 mg tablet, 500 mg 2 (two) times a day with meals, Disp: , Rfl:   •  mometasone 220 mcg/actuation inhaler, Asmanex Twisthaler 220 mcg/actuation(60 doses) breath activated inhalr, Disp: , Rfl:   •  multivitamin-iron-minerals-folic acid (CENTRUM) chewable tablet, Chew 1 tablet daily, Disp: , Rfl:   •  pravastatin (PRAVACHOL) 80 mg tablet, 80 mg daily, Disp: , Rfl:   •  promethazine (PHENERGAN) 25 mg tablet, Take 25 mg by mouth every 6 (six) hours as needed for nausea or vomiting, Disp: , Rfl:   •  sodium chloride (OCEAN) 0 65 % nasal spray, 2 sprays into each nostril 2 (two) times a day, Disp: , Rfl:   •  topiramate (TOPAMAX) 200 MG tablet, Take 200 mg by mouth 2 (two) times a day, Disp: , Rfl:   •  verapamil (CALAN-SR) 120 mg CR tablet, Take 120 mg by mouth in the morning, Disp: , Rfl:     Allergies   Allergen Reactions   • Other Shortness Of Breath     Nuts, coconut   • Talwin [Pentazocine] Shortness Of Breath and Other (See Comments)     dizziness       Physical Exam:    /71   Pulse 92   Ht 5' 2" (1 575 m)   BMI 33 29 kg/m²     Constitutional:normal, well developed, well nourished, alert, in no distress and non-toxic and no overt pain behavior    Eyes:anicteric  HEENT:grossly intact  Neck:supple, symmetric, trachea midline and no masses   Pulmonary:even and unlabored  Cardiovascular:No edema or pitting edema present  Skin:Normal without rashes or lesions and well hydrated  Psychiatric:Mood and affect appropriate  Neurologic:Cranial Nerves II-XII grossly intact  Musculoskeletal:normal gait      Imaging  No orders to display         No orders of the defined types were placed in this encounter

## 2022-12-13 ENCOUNTER — OFFICE VISIT (OUTPATIENT)
Dept: PHYSICAL THERAPY | Facility: REHABILITATION | Age: 62
End: 2022-12-13

## 2022-12-13 DIAGNOSIS — M54.12 RADICULOPATHY, CERVICAL REGION: Primary | ICD-10-CM

## 2022-12-13 DIAGNOSIS — M19.012 ARTHRITIS OF LEFT ACROMIOCLAVICULAR JOINT: ICD-10-CM

## 2022-12-13 NOTE — PROGRESS NOTES
Daily Note     Today's date: 2022  Patient name: Rene Arzola  : 1960  MRN: [id number]  Referring provider: Tamika Mcmahon PA-C  Dx:   Encounter Diagnosis     ICD-10-CM    1  Radiculopathy, cervical region  M54 12       2  Arthritis of left acromioclavicular joint  M19 012                      Subjective: Pt  Reports improvement overall upon arrival      Objective: See treatment diary below      Assessment: Tolerated treatment well  Patient would benefit from continued PT   Pt  able to complete all exercises with no increase in pain during or after session  Pt requires some cuing throughout session for correct form, does well with tactile cuing  Pt  1:1 with PTA for entirety  Plan: Continue per plan of care  Precautions: asthma, diabetes, GERD, migraines, R TKA      Manuals      L UT stretch  TB TC KP 2'     L median nerve glides  TB TC KP 2'     Cervical distraction  TB TC KP 5'              Neuro Re-Ed         Supine cervical retraction  20x2s 3" 2x10 2x10 3"     TB/[place] shoulder LPD/ext  2x10 rtb tere 6# 2x10 2x10 6"     TB/tere rows  2x10 rtb tere 6# 2x10 2x10 6"     TB b/l ER w/scap squeeze  2x10 rtb 2x10 rtb 2x10 rtb     Seated chin tucks                                    Ther Ex         UBE retro  6 min 5 min 5'     Pulleys flexion   5"x3' ea   3' ea 5"     Serratus wall slides   5"x10 10x5"     Doorway pec stretch  10x10s 10"x10 10x10"     scap punches   3"x10 2x10 3"              Pt education+ HEP instruction TP 8 mins                 Ther Activity         pball posture         pball shoulder scaption         Gait Training                           Modalities

## 2022-12-15 ENCOUNTER — APPOINTMENT (OUTPATIENT)
Dept: PHYSICAL THERAPY | Facility: REHABILITATION | Age: 62
End: 2022-12-15

## 2022-12-19 ENCOUNTER — OFFICE VISIT (OUTPATIENT)
Dept: PHYSICAL THERAPY | Facility: REHABILITATION | Age: 62
End: 2022-12-19

## 2022-12-19 DIAGNOSIS — M54.12 RADICULOPATHY, CERVICAL REGION: Primary | ICD-10-CM

## 2022-12-19 DIAGNOSIS — M19.012 ARTHRITIS OF LEFT ACROMIOCLAVICULAR JOINT: ICD-10-CM

## 2022-12-19 NOTE — PROGRESS NOTES
Daily Note     Today's date: 2022  Patient name: Rip Ackerman  : 1960  MRN: [id number]  Referring provider: Cortney Amaya PA-C  Dx:   Encounter Diagnosis     ICD-10-CM    1  Radiculopathy, cervical region  M54 12       2  Arthritis of left acromioclavicular joint  M19 012                      Subjective: Pt reports having some pain L cervical spine/UT region  Objective: See treatment diary below      Assessment: Tolerated treatment well  VC's required for correct technique with exercises  Pt reports having some tightness L cervical spine post session, but denies pain  Patient would benefit from continued PT      Plan: Continue per plan of care  Precautions: asthma, diabetes, GERD, migraines, R TKA      Manuals     L UT stretch  TB TC KP 2' TP 2'    L median nerve glides  TB TC KP 2' TP 2'    Cervical distraction  TB TC KP 5' Tp 4'             Neuro Re-Ed         Supine cervical retraction  20x2s 3" 2x10 2x10 3" 5" 2x10    TB/[place] shoulder LPD/ext  2x10 rtb tere 6# 2x10 2x10 6" 8# 2x15    TB/tere rows  2x10 rtb teer 6# 2x10 2x10 6" 10# 2x15    TB b/l ER w/scap squeeze  2x10 rtb 2x10 rtb 2x10 rtb rtb 2x10    Seated chin tucks                                    Ther Ex         UBE retro  6 min 5 min 5' 90rpm 5'    Pulleys flexion   5"x3' ea   3' ea 5" 3' 5"ea    Serratus wall slides   5"x10 10x5" 5"x10    Doorway pec stretch  10x10s 10"x10 10x10" 10"x10    scap punches   3"x10 2x10 3" 3" 2x10             Pt education+ HEP instruction TP 8 mins                 Ther Activity         pball posture     2 mins    pball shoulder scaption     x15    Gait Training                           Modalities

## 2022-12-21 ENCOUNTER — OFFICE VISIT (OUTPATIENT)
Dept: PHYSICAL THERAPY | Facility: REHABILITATION | Age: 62
End: 2022-12-21

## 2022-12-21 DIAGNOSIS — M19.012 ARTHRITIS OF LEFT ACROMIOCLAVICULAR JOINT: ICD-10-CM

## 2022-12-21 DIAGNOSIS — M54.12 RADICULOPATHY, CERVICAL REGION: Primary | ICD-10-CM

## 2022-12-21 NOTE — PROGRESS NOTES
Daily Note     Today's date: 2022  Patient name: Rip Ackerman  : 1960  MRN: [id number]  Referring provider: Cortney Amaya PA-C  Dx:   Encounter Diagnosis     ICD-10-CM    1  Radiculopathy, cervical region  M54 12       2  Arthritis of left acromioclavicular joint  M19 012                      Subjective: Pt reported increased pain and soreness today  Objective: See treatment diary below      Assessment: Tolerated treatment well  Patient demonstrated fatigue post treatment and exhibited good technique with therapeutic exercises  VC's needed for correct technique throughout session  Mild restrictions noted with GISTM  Plan: Continue per plan of care  1 on  with PTA and PT     Precautions: asthma, diabetes, GERD, migraines, R TKA      Manuals    L UT stretch TB TC KP 2' TP 2' TC 2'   L median nerve glides TB TC KP 2' TP 2' TC 2'   Cervical distraction TB TC KP 5' Tp 4' TC 6''           Neuro Re-Ed        Supine cervical retraction 20x2s 3" 2x10 2x10 3" 5" 2x10 5" 2x10   TB/[place] shoulder LPD/ext 2x10 rtb olya 6# 2x10 2x10 6" 8# 2x15 8# 2x15   TB/[place] rows 2x10 rtb olya 6# 2x10 2x10 6" 10# 2x15 10# 2x15   TB b/l ER w/scap squeeze 2x10 rtb 2x10 rtb 2x10 rtb rtb 2x10 rtb 2x10   Seated chin tucks                                Ther Ex        UBE retro 6 min 5 min 5' 90rpm 5' 90 rpm x5'   Pulleys flexion  5"x3' ea  3' ea 5" 3' 5"ea 5"x3' ea     Serratus wall slides  5"x10 10x5" 5"x10 5"x10   Doorway pec stretch 10x10s 10"x10 10x10" 10"x10 10"x10   scap punches  3"x10 2x10 3" 3" 2x10 3" 2x10           Pt education+ HEP instruction                Ther Activity        pball posture    2 mins 2 mins   pball shoulder scaption    x15 x15   Gait Training                        Modalities

## 2022-12-27 ENCOUNTER — HOSPITAL ENCOUNTER (OUTPATIENT)
Dept: NEUROLOGY | Facility: CLINIC | Age: 62
Discharge: HOME | End: 2022-12-27

## 2022-12-27 DIAGNOSIS — G40.209 COMPLEX PARTIAL SEIZURES WITH CONSCIOUSNESS IMPAIRED (HCC): ICD-10-CM

## 2022-12-28 ENCOUNTER — OFFICE VISIT (OUTPATIENT)
Dept: PHYSICAL THERAPY | Facility: REHABILITATION | Age: 62
End: 2022-12-28

## 2022-12-28 DIAGNOSIS — M54.12 RADICULOPATHY, CERVICAL REGION: Primary | ICD-10-CM

## 2022-12-28 DIAGNOSIS — M19.012 ARTHRITIS OF LEFT ACROMIOCLAVICULAR JOINT: ICD-10-CM

## 2022-12-28 NOTE — PROGRESS NOTES
Daily Note     Today's date: 2022  Patient name: Walter Giron  : 1960  MRN: [id number]  Referring provider: Marylin Hernandez PA-C  Dx:   Encounter Diagnosis     ICD-10-CM    1  Radiculopathy, cervical region  M54 12       2  Arthritis of left acromioclavicular joint  M19 012                      Subjective: Pt noted some tightness and stiffness today  Objective: See treatment diary below      Assessment: Tolerated treatment well  Patient demonstrated fatigue post treatment and exhibited good technique with therapeutic exercises  VC's needed for correct technique throughout session  Mild restrictions noted with GISTM  Monitor NV  Plan: Continue per plan of care  Precautions: asthma, diabetes, GERD, migraines, R TKA      Manuals    L UT stretch TB TC KP 2' TP 2' TC 2' TC 2'   L median nerve glides TB TC KP 2' TP 2' TC 2' TC 2'   Cervical distraction TB TC KP 5' Tp 4' TC 6'' TC 6'            Neuro Re-Ed         Supine cervical retraction 20x2s 3" 2x10 2x10 3" 5" 2x10 5" 2x10 5"x20   TB/[place] shoulder LPD/ext 2x10 rtb tere 6# 2x10 2x10 6" 8# 2x15 8# 2x15 8# 2x15   TB/tere rows 2x10 rtb tere 6# 2x10 2x10 6" 10# 2x15 10# 2x15 10# 2x15   TB b/l ER w/scap squeeze 2x10 rtb 2x10 rtb 2x10 rtb rtb 2x10 rtb 2x10 np   Seated chin tucks                                    Ther Ex         UBE retro 6 min 5 min 5' 90rpm 5' 90 rpm x5' 90 rpm x5'   Pulleys flexion  5"x3' ea  3' ea 5" 3' 5"ea 5"x3' ea  5"x3' ea     Serratus wall slides  5"x10 10x5" 5"x10 5"x10 5"x10   Doorway pec stretch 10x10s 10"x10 10x10" 10"x10 10"x10 10"x10   scap punches  3"x10 2x10 3" 3" 2x10 3" 2x10 3" x20            Pt education+ HEP instruction                  Ther Activity         pball posture    2 mins 2 mins 2'   pball shoulder scaption    x15 x15 x15   Gait Training                           Modalities

## 2022-12-29 ENCOUNTER — TELEPHONE (OUTPATIENT)
Dept: PAIN MEDICINE | Facility: CLINIC | Age: 62
End: 2022-12-29

## 2022-12-29 DIAGNOSIS — M54.12 CERVICAL RADICULOPATHY: Primary | ICD-10-CM

## 2022-12-29 NOTE — TELEPHONE ENCOUNTER
Caller: Jimmie ( PT)    Doctor: Dr Sukh Vázquez    Reason for call: Pt called in to let the doctor know that the 6 weeks of  PT has been completed and she has 2 more to go   So he can place the order for the MRI in her chart      Call back#: [phone]

## 2022-12-29 NOTE — TELEPHONE ENCOUNTER
Caller: Chemo Forrester     Doctor: Dr Chelle Clements for call: Patient returning call gave message below      Call back#: [phone]

## 2023-01-03 ENCOUNTER — OFFICE VISIT (OUTPATIENT)
Dept: PHYSICAL THERAPY | Facility: REHABILITATION | Age: 63
End: 2023-01-03

## 2023-01-03 DIAGNOSIS — M54.12 RADICULOPATHY, CERVICAL REGION: Primary | ICD-10-CM

## 2023-01-03 DIAGNOSIS — M19.012 ARTHRITIS OF LEFT ACROMIOCLAVICULAR JOINT: ICD-10-CM

## 2023-01-03 NOTE — PROGRESS NOTES
Daily Note     Today's date: 1/3/2023  Patient name: Lacie Ash  : 1960  MRN: [id number]  Referring provider: Price Stout PA-C  Dx:   Encounter Diagnosis     ICD-10-CM    1  Radiculopathy, cervical region  M54 12       2  Arthritis of left acromioclavicular joint  M19 012                      Subjective: Pt reported some discomfort today  Objective: See treatment diary below      Assessment: Tolerated treatment well  Patient demonstrated fatigue post treatment and exhibited good technique with therapeutic exercises  VC's needed for correct technique throughout session  Mild restrictions noted with manuals  Plan: Continue per plan of care  Precautions: asthma, diabetes, GERD, migraines, R TKA      Manuals  1/3   L UT stretch TC KP 2' TP 2' TC 2' TC 2' TC 2'   L median nerve glides TC KP 2' TP 2' TC 2' TC 2' TC 2'   Cervical distraction TC KP 5' Tp 4' TC 6'' TC 6' TC 6'            Neuro Re-Ed         Supine cervical retraction 3" 2x10 2x10 3" 5" 2x10 5" 2x10 5"x20 5"x20   TB/[place] shoulder LPD/ext tere 6# 2x10 2x10 6" 8# 2x15 8# 2x15 8# 2x15 8# 2x15   TB/tere rows tere 6# 2x10 2x10 6" 10# 2x15 10# 2x15 10# 2x15 10# 2x15   TB b/l ER w/scap squeeze 2x10 rtb 2x10 rtb rtb 2x10 rtb 2x10 np np   Seated chin tucks                                    Ther Ex         UBE retro 5 min 5' 90rpm 5' 90 rpm x5' 90 rpm x5' 90 rpm x5'   Pulleys flexion 5"x3' ea  3' ea 5" 3' 5"ea 5"x3' ea  5"x3' ea   5"x3'   Serratus wall slides 5"x10 10x5" 5"x10 5"x10 5"x10 5"x10   Doorway pec stretch 10"x10 10x10" 10"x10 10"x10 10"x10 10"x10   scap punches 3"x10 2x10 3" 3" 2x10 3" 2x10 3" x20 3"x20            Pt education+ HEP instruction                  Ther Activity         pball posture   2 mins 2 mins 2' 2'   pball shoulder scaption   x15 x15 x15 x15   Gait Training                           Modalities

## 2023-01-04 ENCOUNTER — TELEPHONE (OUTPATIENT)
Dept: NEUROLOGY | Facility: CLINIC | Age: 63
End: 2023-01-04

## 2023-01-04 NOTE — TELEPHONE ENCOUNTER
Per Hospital Notes:      Jered Finney [address] will need follow up in 4 weeks with Dr Zaheer Granger, Neurologist who does not seem to be under [facility]  She will not require outpatient neurological testing    Patient wants to follow up with our practice  Per Dr Juan Diego Singh he stated [facility]      HFU/SLB 11-29-22 to 12-1-22/Migraine    I called patient to schedule HFU from triage and no answer lmom for her to call back to confirm 1-30-23 at 9 am with Brant Spain in [place]

## 2023-01-04 NOTE — TELEPHONE ENCOUNTER
Pt called back to change appt day and time     New appt is 2/3/23 @ 2:00 with Gamaliel in [facility]

## 2023-01-05 ENCOUNTER — EVALUATION (OUTPATIENT)
Dept: PHYSICAL THERAPY | Facility: REHABILITATION | Age: 63
End: 2023-01-05

## 2023-01-05 DIAGNOSIS — M54.12 RADICULOPATHY, CERVICAL REGION: Primary | ICD-10-CM

## 2023-01-05 DIAGNOSIS — M19.012 ARTHRITIS OF LEFT ACROMIOCLAVICULAR JOINT: ICD-10-CM

## 2023-01-05 NOTE — PROGRESS NOTES
PT Re-Evaluation     Today's date: 2023  Patient name: Roya Cagle  : 1960  MRN: [id number]  Referring provider: Mary Suggs PA-C  Dx:   Encounter Diagnosis     ICD-10-CM    1  Radiculopathy, cervical region  M54 12       2  Arthritis of left acromioclavicular joint  M19 012               Updated measurements and functional status taken this session  Assessment  Assessment details: Pt has progressed well, demonstrating improvement in cervical and shoulder ROM (pain-free), strength, UE paresthesias (abolished) and function with a decrease in pain  Pt's chief complaint is that of stiffness L UT region  Pt has not been as compliant with HEP as instructed  Reviewed TE's for d/c and pt demonstrates an understanding  Pt is scheduled for MRI 1/15 and will contact us with an update once she gets her results  Impairments: abnormal or restricted ROM, activity intolerance and pain with function  Understanding of Dx/Px/POC: good   Prognosis: good    Goals  Short Term:  Pt will report decreased levels of pain by at least 2 subjective ratings in 4 weeks-met  Pt will demonstrate improved ROM by at least 10 degrees in 4 weeks-partially met  Pt will demonstrate improved strength by 1/2 grade MMT in 4 weeks-met  Long Term:   Pt will be independent in their HEP in 6 weeks-met  Pt will demonstrate improved FOTO, > predicted level-met (64 w/goal of 61)  Pt will not experience N/T in L UE with ADL's-met    Plan  Plan details: Patient was educated in [address] [place] [address] and Plan of Care  All questions were answered to pt's satisfaction  Planned therapy interventions: home exercise program  Treatment plan discussed with: patient        Subjective Evaluation    History of Present Illness  Mechanism of injury: Pt reports overall improvement since starting therapy  Pt is no longer experiencing L UE paraesthesias  Pt is reporting less pain  Pt's chief complaint is that of stiffness in L UT region     Pt continues to avoid lifting heavy items, but denies other functional limitations  Pain  At best pain rating: 3  At worst pain rating: 3  Location: L cervical spine    Treatments  Current treatment: physical therapy  Patient Goals  Patient goals for therapy: decreased pain, increased motion, increased strength and independence with ADLs/IADLs          Objective     Active Range of Motion   Cervical/Thoracic Spine       Cervical    Flexion:  WFL  Extension:  WFL  Left lateral flexion: 28 degrees      Right lateral flexion: 28 degrees      Left rotation: 60 degrees  Right rotation: 65 degrees         Left Shoulder   Flexion: 149 degrees   Abduction: 144 degrees   External rotation BTH: WFL  Internal rotation BTB: WFL    Strength/Myotome Testing     Left Shoulder     Planes of Motion   External rotation at 0°: 5     Tests   Cervical     Left   Negative Spurling's Test A  Left Shoulder   Negative Hawkin's and Neer's  Precautions: asthma, diabetes, GERD, migraines, R TKA        Manuals 12/7 12/13 12/19 12/21 12/28 1/3 1/5   L UT stretch TC KP 2' TP 2' TC 2' TC 2' TC 2' np   L median nerve glides TC KP 2' TP 2' TC 2' TC 2' TC 2' np   Cervical distraction TC KP 5' Tp 4' TC 6'' TC 6' TC 6' np                    Neuro Re-Ed                Supine cervical retraction 3" 2x10 2x10 3" 5" 2x10 5" 2x10 5"x20 5"x20 reviewed   TB/[place] shoulder LPD/ext tere 6# 2x10 2x10 6" 8# 2x15 8# 2x15 8# 2x15 8# 2x15 reviewed   TB/tere rows tere 6# 2x10 2x10 6" 10# 2x15 10# 2x15 10# 2x15 10# 2x15 reviewed   TB b/l ER w/scap squeeze 2x10 rtb 2x10 rtb rtb 2x10 rtb 2x10 np np reviewed   Seated chin tucks                                                                   Ther Ex                UBE retro 5 min 5' 90rpm 5' 90 rpm x5' 90 rpm x5' 90 rpm x5' 90 rpm 5'   Pulleys flexion 5"x3' ea  3' ea 5" 3' 5"ea 5"x3' ea  5"x3' ea   5"x3' np   Serratus wall slides 5"x10 10x5" 5"x10 5"x10 5"x10 5"x10 reviewed   Doorway pec stretch 10"x10 10x10" 10"x10 10"x10 10"x10 10"x10 reviewed   scap punches 3"x10 2x10 3" 3" 2x10 3" 2x10 3" x20 3"x20 reviewed                    Pt education+ HEP instruction                 Re-assessment             TP   Ther Activity                pball posture     2 mins 2 mins 2' 2' np   pball shoulder scaption     x15 x15 x15 x15 np   Gait Training                                                  Modalities

## 2023-01-15 ENCOUNTER — HOSPITAL ENCOUNTER (OUTPATIENT)
Dept: RADIOLOGY | Facility: HOSPITAL | Age: 63
Discharge: HOME | End: 2023-01-15

## 2023-01-15 DIAGNOSIS — M54.12 CERVICAL RADICULOPATHY: ICD-10-CM

## 2023-01-18 ENCOUNTER — TELEPHONE (OUTPATIENT)
Dept: PAIN MEDICINE | Facility: CLINIC | Age: 63
End: 2023-01-18

## 2023-01-18 NOTE — TELEPHONE ENCOUNTER
S/w pt and advised of same  RN advised to cont w/ PT as per [address] LOV note  Pt verbalized understanding and will call PT to [facility] further appts

## 2023-01-18 NOTE — TELEPHONE ENCOUNTER
----- Message from Beryle Maltos, [address] sent at 1/18/2023  2:35 PM EST -----  MRI of the cervical spine reveals multilevel degenerative disc disease and arthritis from C2-3 to C6-7 with mild central canal narrowing at C3-4

## 2023-01-29 ENCOUNTER — APPOINTMENT (OUTPATIENT)
Dept: LAB | Facility: CLINIC | Age: 63
End: 2023-01-29

## 2023-01-29 DIAGNOSIS — M54.12 RADICULOPATHY, CERVICAL REGION: ICD-10-CM

## 2023-01-29 DIAGNOSIS — I10 HYPERTENSION, ESSENTIAL: ICD-10-CM

## 2023-01-29 DIAGNOSIS — E11.9 TYPE 2 DIABETES MELLITUS WITHOUT COMPLICATION, WITHOUT LONG-TERM CURRENT USE OF INSULIN (HCC): ICD-10-CM

## 2023-01-29 DIAGNOSIS — E87.6 HYPOKALEMIA: ICD-10-CM

## 2023-01-29 DIAGNOSIS — R53.83 OTHER FATIGUE: ICD-10-CM

## 2023-01-29 DIAGNOSIS — E78.5 HYPERLIPIDEMIA, UNSPECIFIED HYPERLIPIDEMIA TYPE: ICD-10-CM

## 2023-01-29 LAB
ALBUMIN SERPL BCP-MCNC: 3.7 G/DL (ref 3.5–5)
ALP SERPL-CCNC: 78 U/L (ref 46–116)
ALT SERPL W P-5'-P-CCNC: 38 U/L (ref 12–78)
ANION GAP SERPL CALCULATED.3IONS-SCNC: 7 MMOL/L (ref 4–13)
AST SERPL W P-5'-P-CCNC: 16 U/L (ref 5–45)
BASOPHILS # BLD AUTO: 0.02 THOUSANDS/ÂΜL (ref 0–0.1)
BASOPHILS NFR BLD AUTO: 0 % (ref 0–1)
BILIRUB SERPL-MCNC: 0.31 MG/DL (ref 0.2–1)
BUN SERPL-MCNC: 26 MG/DL (ref 5–25)
CALCIUM SERPL-MCNC: 9 MG/DL (ref 8.3–10.1)
CHLORIDE SERPL-SCNC: 110 MMOL/L (ref 96–108)
CHOLEST SERPL-MCNC: 181 MG/DL
CO2 SERPL-SCNC: 24 MMOL/L (ref 21–32)
CREAT SERPL-MCNC: 0.89 MG/DL (ref 0.6–1.3)
EOSINOPHIL # BLD AUTO: 0.25 THOUSAND/ÂΜL (ref 0–0.61)
EOSINOPHIL NFR BLD AUTO: 3 % (ref 0–6)
ERYTHROCYTE [DISTWIDTH] IN BLOOD BY AUTOMATED COUNT: 13.2 % (ref 11.6–15.1)
EST. AVERAGE GLUCOSE BLD GHB EST-MCNC: 134 MG/DL
GFR SERPL CREATININE-BSD FRML MDRD: 70 ML/MIN/1.73SQ M
GLUCOSE P FAST SERPL-MCNC: 132 MG/DL (ref 65–99)
HBA1C MFR BLD: 6.3 %
HCT VFR BLD AUTO: 40.1 % (ref 34.8–46.1)
HDLC SERPL-MCNC: 59 MG/DL
HGB BLD-MCNC: 12.7 G/DL (ref 11.5–15.4)
IMM GRANULOCYTES # BLD AUTO: 0.04 THOUSAND/UL (ref 0–0.2)
IMM GRANULOCYTES NFR BLD AUTO: 1 % (ref 0–2)
LDLC SERPL CALC-MCNC: 99 MG/DL (ref 0–100)
LYMPHOCYTES # BLD AUTO: 1.89 THOUSANDS/ÂΜL (ref 0.6–4.47)
LYMPHOCYTES NFR BLD AUTO: 24 % (ref 14–44)
MCH RBC QN AUTO: 28.9 PG (ref 26.8–34.3)
MCHC RBC AUTO-ENTMCNC: 31.7 G/DL (ref 31.4–37.4)
MCV RBC AUTO: 91 FL (ref 82–98)
MONOCYTES # BLD AUTO: 0.48 THOUSAND/ÂΜL (ref 0.17–1.22)
MONOCYTES NFR BLD AUTO: 6 % (ref 4–12)
NEUTROPHILS # BLD AUTO: 5.18 THOUSANDS/ÂΜL (ref 1.85–7.62)
NEUTS SEG NFR BLD AUTO: 66 % (ref 43–75)
NONHDLC SERPL-MCNC: 122 MG/DL
NRBC BLD AUTO-RTO: 0 /100 WBCS
PLATELET # BLD AUTO: 209 THOUSANDS/UL (ref 149–390)
PMV BLD AUTO: 10.6 FL (ref 8.9–12.7)
POTASSIUM SERPL-SCNC: 3.6 MMOL/L (ref 3.5–5.3)
PROT SERPL-MCNC: 7.7 G/DL (ref 6.4–8.4)
RBC # BLD AUTO: 4.4 MILLION/UL (ref 3.81–5.12)
SODIUM SERPL-SCNC: 141 MMOL/L (ref 135–147)
TRIGL SERPL-MCNC: 117 MG/DL
WBC # BLD AUTO: 7.86 THOUSAND/UL (ref 4.31–10.16)

## 2023-02-02 ENCOUNTER — OFFICE VISIT (OUTPATIENT)
Dept: NEUROLOGY | Facility: CLINIC | Age: 63
End: 2023-02-02

## 2023-02-02 VITALS
OXYGEN SATURATION: 99 % | HEART RATE: 88 BPM | DIASTOLIC BLOOD PRESSURE: 64 MMHG | TEMPERATURE: 97.4 F | WEIGHT: 180.8 LBS | BODY MASS INDEX: 33.07 KG/M2 | RESPIRATION RATE: 16 BRPM | SYSTOLIC BLOOD PRESSURE: 120 MMHG

## 2023-02-02 DIAGNOSIS — G43.701 CHRONIC MIGRAINE WITHOUT AURA, WITH STATUS MIGRAINOSUS: Primary | ICD-10-CM

## 2023-02-02 DIAGNOSIS — G40.209 COMPLEX PARTIAL SEIZURES WITH CONSCIOUSNESS IMPAIRED (HCC): ICD-10-CM

## 2023-02-02 RX ORDER — ELETRIPTAN HYDROBROMIDE 40 MG/1
TABLET, FILM COATED ORAL
Qty: 20 TABLET | Refills: 0 | Status: SHIPPED | OUTPATIENT
Start: 2023-02-02

## 2023-02-02 RX ORDER — CEPHALEXIN 500 MG/1
CAPSULE ORAL
COMMUNITY
Start: 2022-12-24

## 2023-02-02 RX ORDER — TIZANIDINE 2 MG/1
2 TABLET ORAL
Qty: 30 TABLET | Refills: 3 | Status: SHIPPED | OUTPATIENT
Start: 2023-02-02

## 2023-02-02 RX ORDER — KETOROLAC TROMETHAMINE 10 MG/1
10 TABLET, FILM COATED ORAL EVERY 6 HOURS PRN
Qty: 20 TABLET | Refills: 0 | Status: SHIPPED | OUTPATIENT
Start: 2023-02-02

## 2023-02-02 RX ORDER — TRIAMCINOLONE ACETONIDE 1 MG/G
CREAM TOPICAL
COMMUNITY

## 2023-02-02 NOTE — PROGRESS NOTES
[facility]  PATIENT:  Pradeep Madera  MRN:  [id number]  :  1960  DATE OF SERVICE:  2023      Assessment/Plan:     Chronic Migraine:     I had the pleasure of seeing Jimmie today at the [facility] in [place]  Jimmie is coming in for a hospital follow up regarding one of her more recent migraine episodes that which she was admitted to the hospital for  Patient does not have migraines everyday  She will only go into a migraine cycle only about once or twice a year  When in this cycle, her migraine could last anywhere between 1 or 2 days or even all the way up to a week  The location of her migraines is different every time, usually more frontalis and/or temporalis region  Pain scale can range anywhere between a 1 or 2 up to all the way of a 10/10  Regarding the frequency of the patient's migraines I figured it would be best to start with more abortive and bridging options for her  She is doing relatively well with topiramate and verapamil at the moment so there is no reason to mess with this  Patient has tried many different triptan medications at this moment in time  However, most recent was eletriptan so I decided to keep the patient on this for the time being  Also, her most recent prescription stated the patient could not take more than 1 tablet in a 12 hour period  This should really be after 2 hours, the patient can take one additional dose  This confusion in timing of second dosage definitely did not help the patient resolve her migraines  I had also prescribed Toradol 10 mg which the patient can take as needed every 6 hours at the onset of the patient's migraine as well to try and break it  Also, trying tizanidine again to see if it will help the patient sleep at night and help with muscle stiffness in her neck  -Patient is to continue taking Eletriptan 40 mg, 1 tablet as needed for migraine abortive therapy    Patient may repeat this dose after 2 hours if still having a migraine  Do not take more than 2 tablets in 1 day and do not take more than 9 tablets in a month   -Starting Toradol 10 mg as needed every 6 hours for mild headache pain and to break a migraine  Patient is to only take this medication when she experiences one of her migraine bouts  This will be taken in hopes to break her current migraine cycle  She should be taking this along with promethazine and her triptan medication at the same time   -Starting tizanidine 2 mg once daily at bedtime  May help the patient sleep a little bit better at night and also help with muscle stiffness, neck stiffness that may trigger some of her chronic migraines   -Continue Topamax 200 mg twice daily for seizure prophylaxis and migraine preventative therapy  Continue verapamil 120 mg once daily for migraine preventative therapy as well  -We will be discussing with epileptologist team if the patient is currently safe to drive  Patient has a history of abnormal routine EEGs, however, she reports that she has never had a seizure and she is well controlled on her Topamax dose for a significant amount of time now  Patient Instructions:    Headache Calendar  Please maintain a headache calendar  Consider using phone applications such as Migraine Buddy or Migraine Diary    Headache/migraine treatment:     Rescue medications (for immediate treatment of a headache): It is ok to take ibuprofen, acetaminophen or naproxen (Advil, Tylenol, Aleve, Excedrin) if they help your headaches you should limit these to No more than 3 times a week to avoid medication overuse/rebound headaches  For your more moderate to severe migraines take this medication early   Eletriptan 40mg tabs - take one at the onset of headache  May repeat one time after 2 hours if pain has not resolved  (Max 2 a day and 9 a month)   Promethazine 25 mg as needed every 6 hours for nausea    Toradol 10 mg as needed every 6 hours for breaking a migraine cycle or mild pain relief    Prescription preventive medications for headaches/migraines   (to take every day to help prevent headaches - not to take at the time of headache):  [x] topiramate 200 mg twice daily, verapamil 120 mg once daily    *Typically these types of medications take time until you see the benefit, although some may see improvement in days, often it may take weeks, especially if the medication is being titrated up to a beneficial level  Please contact us if there are any concerns or questions regarding the medication  Over the counter preventive supplements for headaches/migraines (if you try, try for 3 months straight)  (to take every day to help prevent headaches - not to take at the time of headache): There are combo pills online of these - none of which regulated by FDA and double check dosing - take appropriate dose only once a day- prevent a migraine, migravent, mind ease, migrelief   [] Magnesium 400mg daily (If any diarrhea or upset stomach, decrease dose  as tolerated)  [x] Riboflavin (Vitamin B2) 400mg daily (may make your urine bright/neon yellow)      Lifestyle Recommendations:  [x] SLEEP - Maintain a regular sleep schedule: Adults need at least 7-8 hours of uninterrupted a night  Maintain good sleep hygiene:  Going to bed and waking up at consistent times, avoiding excessive daytime naps, avoiding caffeinated beverages in the evening, avoid excessive stimulation in the evening and generally using bed primarily for sleeping  One hour before bedtime would recommend turning lights down lower, decreasing your activity (may read quietly, listen to music at a low volume)  When you get into bed, should eliminate all technology (no texting, emailing, playing with your phone, iPad or tablet in bed)  [x] HYDRATION - Maintain good hydration  Drink  2L of fluid a day (4 typical small water bottles)  [x] DIET - Maintain good nutrition   In particular don't skip meals and try and eat healthy balanced meals regularly  [x] TRIGGERS - Look for other triggers and avoid them: Limit caffeine to 1-2 cups a day or less  Avoid dietary triggers that you have noticed bring on your headaches (this could include aged cheese, peanuts, MSG, aspartame and nitrates)  [x] EXERCISE - physical exercise as we all know is good for you in many ways, and not only is good for your heart, but also is beneficial for your mental health, cognitive health and  chronic pain/headaches  I would encourage at the least 5 days of physical exercise weekly for at least 30 minutes  Education and Follow-up  [x] Please call with any questions or concerns  Of course if any new concerning symptoms go to the emergency department  [x] Follow up 6 months with Mark Greenwood PA-C       History of Present Illness: We had the pleasure of evaluating Walter Giron in neurological hospital follow up  today for headaches  As you know, she is a [place] [address] 89 y o  female with a past medical history of chronic migraines  Patient sees Dr Chetna Simons for control of her chronic migraines  At this time it appears that Dr Fredy Solis is out of the network so she is following up with me regarding her most recent hospitalization  Patient presented to the hospital with status migrainous  Patient's current headache started on 11/20/2022 and is similar to her prior migraine features with no new symptoms  She did not respond to any treatments at home  When she came into the ED she had a 5 out of 10 pain, left side more severe than the right, throbbing and associated with nausea, photophobia, phonophobia  Current medical illnesses:    History Tobacco use: none    What medications do you take or have you taken for your headaches?      Current Preventive:   Topamax 200 mg BID, Verapamil 120 mg    Current Abortive:   Eletriptan, promethazine    Prior Preventive:   Pamelor, tizanidine, gabapentin, hydroxyzine    Prior Abortive:   Imitrex, Quillian Shirming, Zomig, Rizatriptan, indomethacin    Interval updates as of 2/2/2023:    Patient not following with Dr Ewa Payne anymore for migraine care now  When the patient gets migraines she will get them very badly  When she starts to get facial numbness, that's how she knows to go to the ER  Thought she had a sinus infection at first  Patient has a history of seizures as well  She is currently taking Topamax 200 mg twice daily for seizure prophylaxis  She is currently taking verapamil 120 mg once daily at this point for migraine preventative therapy  She was told by her previous neurologist that she can not drive due to epileptiform activity that was found on past routine EEGs  However, patient has never had a seizure to her knowledge and she has been compliant with taking her topiramate for seizure prophylaxis  What is your current pain level ?  0  How often do the headaches occur? Once or twice a year  Are you ever headache free? yes    Headache history with updates:  Headache are worse if the patient: no changes with Valsalva unless already having it  Positional changes: none  Headache triggers: none that she knows of    Aura/warning? No auras that's reported at this moment    What time of the day do the headaches start? Random time when it does happen    How long do the headaches last?   One or two days to a week  Describe your usual headache ? Stabbing, throbbing, pressure, liughtheadedness    Where is your headache located? Will occur anywhere  Mostly in the front of the head at the frontalis and the temporalis region  Sometimes one side and sometimes both  What is the intensity of pain?    Mild/moderate: 1 or 2/10  Severe: 10/10    Associated symptoms:   - nausea  - Photophobia, phonophobia,   - Problem with concentration  - Blurred vision and double vision  -     light-headed or dizzy, stiff or sore neck,   - Numbness of your face last time, prefer to be alone and in a dark room, unable to work    Number of days missed per month because of headaches:  Work (or school) days: a week of work   Social or Family activities: will miss family events    What time of the year do headaches occur more frequently? no seasonal or weather changes  Have you seen someone else for headaches or pain? Yes, Dr Kenney Player  Have you had trigger point injection performed and how often? No  Have you had Botox injection performed and how often? No   Have you had epidural injections or transforaminal injections performed? No, epidural with facet joints  Have you used CBD or THC for your headaches and how often? No    Are you current pregnant or planning on getting pregnant? No    Have you ever had any Brain imaging? yes MRI in the hospital in November    Reviewed old notes from physician seen in the past- see above HPI for summary of previous encounters  Lifestyle:    Sleep: 4 hours of sleep on average  Up constantly throughout the night  Not sleeping through the night  Hydration: 3, 16 9 ounce water bottles per day  One can or two of soda in the morning  Anxiety/stress: goes through periods where she feels more stressed than other times         Past Medical History:   Diagnosis Date   • Asthma    • Diabetes mellitus ([facility] [address] )     type 2   • Diverticulitis    • EEG abnormality without seizure    • Gastroparesis    • GERD (gastroesophageal reflux disease)    • Headache, migraine    • Hiatal hernia    • IBS (irritable bowel syndrome)    • Migraines    • Sarcoidosis        Patient Active Problem List   Diagnosis   • Arthritis of left acromioclavicular joint   • Left arm pain   • Hypertension, essential   • Hypokalemia   • Hiatal hernia   • Partial symptomatic epilepsy with complex partial seizures, intractable, without status epilepticus (HCC)   • Chronic migraine without aura, intractable, with status migrainosus   • Chest pressure   • Radiculopathy, cervical region   • Asthma   • Chronic migraine without aura, with status migrainosus   • Medication overuse headache   • Thrombophilia ([facility])   • Subjective visual disturbance   • Right knee pain   • Problems at work   • Noncompliance with medication regimen   • Intractable headache caused by drug   • Intractable chronic migraine without aura   • Osteoarthritis   • Abdominal pain   • Cervicocranial syndrome   • Complex partial seizures with consciousness impaired ([facility])   • Dizziness and giddiness   • Type 2 diabetes mellitus, without long-term current use of insulin ([facility])       Medications:      Current Outpatient Medications   Medication Sig Dispense Refill   • acetaminophen (TYLENOL) 500 mg tablet Take 500 mg by mouth every 6 (six) hours as needed for mild pain     • albuterol (PROVENTIL HFA,VENTOLIN HFA) 90 mcg/act inhaler Inhale 2 puffs every 6 (six) hours as needed for wheezing     • ascorbic acid (VITAMIN C) 500 mg tablet Take 500 mg by mouth daily     • B Complex-C (B-COMPLEX WITH VITAMIN C) tablet Take 1 tablet by mouth daily     • Cholecalciferol (VITAMIN D3) 2000 units TABS Take 1 tablet by mouth daily     • ciclopirox (LOPROX) 0 77 % cream Apply topically 2 (two) times a day To affected areas of rash for 4 weeks   90 g 0   • dicyclomine (BENTYL) 20 mg tablet Take 20 mg by mouth as needed       • eletriptan (RELPAX) 40 MG tablet eletriptan 40 mg tablet   take one tablet at onset of migraine; may repeat once in 12 hrs if needed for return of headache     • Empagliflozin 25 MG TABS Jardiance 25 mg tablet     • esomeprazole (NexIUM) 40 MG capsule Take 40 mg by mouth daily     • fluticasone (FLONASE) 50 mcg/act nasal spray fluticasone propionate 50 mcg/actuation nasal spray,suspension     • hydrOXYzine HCL (ATARAX) 25 mg tablet 25 mg 3 (three) times a day as needed     • HYOSCYAMINE SULFATE PO Place 1 tablet under the tongue every 6 (six) hours as needed       • meclizine (ANTIVERT) 25 mg tablet meclizine 25 mg tablet     • metFORMIN (GLUCOPHAGE) 500 mg tablet 500 mg 2 (two) times a day with meals     • mometasone 220 mcg/actuation inhaler Asmanex Twisthaler 220 mcg/actuation(60 doses) breath activated inhalr     • multivitamin-iron-minerals-folic acid (CENTRUM) chewable tablet Chew 1 tablet daily     • pravastatin (PRAVACHOL) 80 mg tablet 80 mg daily     • promethazine (PHENERGAN) 25 mg tablet Take 25 mg by mouth every 6 (six) hours as needed for nausea or vomiting     • sodium chloride (OCEAN) 0 65 % nasal spray 2 sprays into each nostril 2 (two) times a day     • topiramate (TOPAMAX) 200 MG tablet Take 200 mg by mouth 2 (two) times a day     • verapamil (CALAN-SR) 120 mg CR tablet Take 120 mg by mouth in the morning       No current facility-administered medications for this visit  Allergies:       Allergies   Allergen Reactions   • Other Shortness Of Breath     Nuts, coconut   • Talwin [Pentazocine] Shortness Of Breath and Other (See Comments)     dizziness       Family History:     Family History   Problem Relation Age of Onset   • Diabetes Mother    • Heart disease Mother    • Cancer Father    • Liver cancer Father    • Brain cancer Father    • Arthritis Sister        Social History:     Social History     Socioeconomic History   • Marital status: /Civil Union     Spouse name: Not on file   • Number of children: Not on file   • Years of education: Not on file   • Highest education level: Not on file   Occupational History   • Not on file   Tobacco Use   • Smoking status: Never   • Smokeless tobacco: Never   Vaping Use   • Vaping Use: Never used   Substance and Sexual Activity   • Alcohol use: Yes     Comment: socially   • Drug use: No   • Sexual activity: Not on file   Other Topics Concern   • Not on file   Social History Narrative   • Not on file     Social Determinants of Health     Financial Resource Strain: Not on file   Food Insecurity: Not on file   Transportation Needs: Not on file   Physical Activity: Not on file   Stress: Not on file   Social Connections: Not on file   Intimate Partner Violence: Not on file   Housing Stability: Not on file         Objective:     Physical Exam:                                                                 Vitals:            Constitutional:    /64 (BP Location: Right arm, Patient Position: Sitting, Cuff Size: Large)   Pulse 88   Temp (!) 97 4 °F (36 3 °C) (Temporal)   Resp 16   Wt 82 kg (180 lb 12 8 oz)   SpO2 99%   BMI 33 07 kg/m²   BP Readings from Last 3 Encounters:   02/02/23 120/64   12/09/22 145/71   12/01/22 105/60     Pulse Readings from Last 3 Encounters:   02/02/23 88   12/09/22 92   12/01/22 79         Well developed, well nourished, well groomed  No dysmorphic features  Psychiatric:  Normal behavior and appropriate affect       Neurological Examination:     Mental status/cognitive function:   Orientated to time, place and person  Recent and remote memory intact  Attention span and concentration as well as fund of knowledge are appropriate for age  Normal language and spontaneous speech  Cranial Nerves:  II-visual fields full  III, IV, VI-Pupils were equal, round, and reactive to light and accomodation  Extraocular movements were full and conjugate without nystagmus  Conjugate gaze, normal smooth pursuits, normal saccades   V-facial sensation symmetric  VII-facial expression symmetric, intact forehead wrinkle, strong eye closure, symmetric smile    VIII-hearing grossly intact bilaterally   IX, X-palate elevation symmetric, no dysarthria  XI-shoulder shrug strength intact    XII-tongue protrusion midline  Motor Exam: symmetric bulk and tone throughout, no pronator drift  Power/strength 5/5 bilateral upper and lower extremities, no atrophy, fasciculations or abnormal movements noted  Sensory: grossly intact light touch in all extremities     Reflexes: brachioradialis 2+, biceps 2+, knee 2+ of left knee, knee 0 on the right side (knee replacement)  Coordination: Finger nose finger intact bilaterally, no apparent dysmetria, ataxia or tremor noted  Gait: steady casual and tandem gait  Review of Systems:     Review of Systems   Constitutional: Negative  Negative for appetite change and fever  HENT: Negative  Negative for hearing loss, tinnitus, trouble swallowing and voice change  Eyes: Negative  Negative for photophobia, pain and visual disturbance  Respiratory: Negative  Negative for shortness of breath  Cardiovascular: Negative  Negative for palpitations  Gastrointestinal: Negative  Negative for nausea and vomiting  Endocrine: Negative  Negative for cold intolerance  Genitourinary: Negative  Negative for dysuria, frequency and urgency  Musculoskeletal: Negative  Negative for gait problem, myalgias and neck pain  Skin: Negative  Negative for rash  Allergic/Immunologic: Negative  Neurological: Positive for dizziness and light-headedness  Negative for tremors, seizures, syncope, facial asymmetry, speech difficulty, weakness, numbness and headaches  Hematological: Negative  Does not bruise/bleed easily  Psychiatric/Behavioral: Negative  Negative for confusion, hallucinations and sleep disturbance  I personally reviewed the ROS entered by the MA    I spent 75 minutes in total time for this visit      Author:  Montana Davalos PA-C 2/2/2023 1:44 PM

## 2023-02-02 NOTE — PATIENT INSTRUCTIONS
Chronic Migraine:   -Patient is to continue taking Eletriptan 40 mg, 1 tablet as needed for migraine abortive therapy  Patient may repeat this dose after 2 hours if still having a migraine  Do not take more than 2 tablets in 1 day and do not take more than 9 tablets in a month   -Starting Toradol 10 mg as needed every 6 hours for mild headache pain and to break a migraine  Patient is to only take this medication when she experiences one of her migraine bouts  This will be taken in hopes to break her current migraine cycle  She should be taking this along with promethazine and her triptan medication at the same time   -Starting tizanidine 2 mg once daily at bedtime  May help the patient sleep a little bit better at night and also help with muscle stiffness, neck stiffness that may trigger some of her chronic migraines   -Continue Topamax 200 mg twice daily for seizure prophylaxis and migraine preventative therapy  Continue verapamil 120 mg once daily for migraine preventative therapy as well  -We will be discussing with epileptologist team if the patient is currently safe to drive  Patient has a history of abnormal routine EEGs, however, she reports that she has never had a seizure and she is well controlled on her Topamax dose for a significant amount of time now  Patient Instructions:    Headache Calendar  Please maintain a headache calendar  Consider using phone applications such as Migraine Buddy or Migraine Diary    Headache/migraine treatment:     Rescue medications (for immediate treatment of a headache): It is ok to take ibuprofen, acetaminophen or naproxen (Advil, Tylenol,  Aleve, Excedrin) if they help your headaches you should limit these to No more than 3 times a week to avoid medication overuse/rebound headaches  For your more moderate to severe migraines take this medication early   Eletriptan 40mg tabs - take one at the onset of headache   May repeat one time after 2 hours if pain has not resolved  (Max 2 a day and 9 a month)   Promethazine 25 mg as needed every 6 hours for nausea  Toradol 10 mg as needed every 6 hours for breaking a migraine cycle or mild pain relief    Prescription preventive medications for headaches/migraines   (to take every day to help prevent headaches - not to take at the time of headache):  [x] topiramate 200 mg twice daily, verapamil 120 mg once daily    *Typically these types of medications take time until you see the benefit, although some may see improvement in days, often it may take weeks, especially if the medication is being titrated up to a beneficial level  Please contact us if there are any concerns or questions regarding the medication  Over the counter preventive supplements for headaches/migraines (if you try, try for 3 months straight)  (to take every day to help prevent headaches - not to take at the time of headache): There are combo pills online of these - none of which regulated by FDA and double check dosing - take appropriate dose only once a day- prevent a migraine, migravent, mind ease, migrelief   [] Magnesium 400mg daily (If any diarrhea or upset stomach, decrease dose  as tolerated)  [x] Riboflavin (Vitamin B2) 400mg daily (may make your urine bright/neon yellow)      Lifestyle Recommendations:  [x] SLEEP - Maintain a regular sleep schedule: Adults need at least 7-8 hours of uninterrupted a night  Maintain good sleep hygiene:  Going to bed and waking up at consistent times, avoiding excessive daytime naps, avoiding caffeinated beverages in the evening, avoid excessive stimulation in the evening and generally using bed primarily for sleeping  One hour before bedtime would recommend turning lights down lower, decreasing your activity (may read quietly, listen to music at a low volume)  When you get into bed, should eliminate all technology (no texting, emailing, playing with your phone, iPad or tablet in bed)    [x] HYDRATION - Maintain good hydration  Drink  2L of fluid a day (4 typical small water bottles)  [x] DIET - Maintain good nutrition  In particular don't skip meals and try and eat healthy balanced meals regularly  [x] TRIGGERS - Look for other triggers and avoid them: Limit caffeine to 1-2 cups a day or less  Avoid dietary triggers that you have noticed bring on your headaches (this could include aged cheese, peanuts, MSG, aspartame and nitrates)  [x] EXERCISE - physical exercise as we all know is good for you in many ways, and not only is good for your heart, but also is beneficial for your mental health, cognitive health and  chronic pain/headaches  I would encourage at the least 5 days of physical exercise weekly for at least 30 minutes  Education and Follow-up  [x] Please call with any questions or concerns  Of course if any new concerning symptoms go to the emergency department    [x] Follow up 6 months with Lady Wilks PA-C

## 2023-02-02 NOTE — PROGRESS NOTES
Review of Systems   Constitutional: Negative  Negative for appetite change and fever  HENT: Negative  Negative for hearing loss, tinnitus, trouble swallowing and voice change  Eyes: Negative  Negative for photophobia, pain and visual disturbance  Respiratory: Negative  Negative for shortness of breath  Cardiovascular: Negative  Negative for palpitations  Gastrointestinal: Negative  Negative for nausea and vomiting  Endocrine: Negative  Negative for cold intolerance  Genitourinary: Negative  Negative for dysuria, frequency and urgency  Musculoskeletal: Negative  Negative for gait problem, myalgias and neck pain  Skin: Negative  Negative for rash  Allergic/Immunologic: Negative  Neurological: Positive for dizziness and light-headedness  Negative for tremors, seizures, syncope, facial asymmetry, speech difficulty, weakness, numbness and headaches  Hematological: Negative  Does not bruise/bleed easily  Psychiatric/Behavioral: Negative  Negative for confusion, hallucinations and sleep disturbance

## 2023-04-22 ENCOUNTER — HOSPITAL ENCOUNTER (OUTPATIENT)
Dept: RADIOLOGY | Facility: HOSPITAL | Age: 63
Discharge: HOME | End: 2023-04-22

## 2023-04-22 DIAGNOSIS — R10.32 LEFT LOWER QUADRANT ABDOMINAL PAIN: ICD-10-CM

## 2023-04-22 RX ADMIN — IOHEXOL 100 ML: 350 INJECTION, SOLUTION INTRAVENOUS at 10:58

## 2023-05-01 ENCOUNTER — TELEPHONE (OUTPATIENT)
Dept: OTHER | Facility: OTHER | Age: 63
End: 2023-05-01

## 2023-05-01 NOTE — TELEPHONE ENCOUNTER
Patient requesting a call back to discuss test results         Ordering Provider:  Venkatesh Andres  Date Completed:  04/22/2023    Lab []  MRI []  X-Ray []  CT [x]  Colonoscopy []  EGD []  Biopsy []  Other []

## 2023-05-02 NOTE — TELEPHONE ENCOUNTER
Task opened under results notes, I made 2nd attempt to reach patient I advised she may review [facility]

## 2023-05-13 ENCOUNTER — APPOINTMENT (OUTPATIENT)
Dept: LAB | Facility: CLINIC | Age: 63
End: 2023-05-13

## 2023-05-13 DIAGNOSIS — Z00.8 HEALTH EXAMINATION IN POPULATION SURVEY: ICD-10-CM

## 2023-05-13 DIAGNOSIS — N39.0 URINARY TRACT INFECTION WITHOUT HEMATURIA, SITE UNSPECIFIED: ICD-10-CM

## 2023-05-13 LAB
CHOLEST SERPL-MCNC: 180 MG/DL
EST. AVERAGE GLUCOSE BLD GHB EST-MCNC: 131 MG/DL
HBA1C MFR BLD: 6.2 %
HDLC SERPL-MCNC: 61 MG/DL
LDLC SERPL CALC-MCNC: 94 MG/DL (ref 0–100)
NONHDLC SERPL-MCNC: 119 MG/DL
TRIGL SERPL-MCNC: 127 MG/DL

## 2023-05-14 LAB — BACTERIA UR CULT: NORMAL

## 2023-06-22 ENCOUNTER — ANESTHESIA EVENT (OUTPATIENT)
Dept: GASTROENTEROLOGY | Facility: HOSPITAL | Age: 63
End: 2023-06-22

## 2023-06-22 ENCOUNTER — ANESTHESIA (OUTPATIENT)
Dept: GASTROENTEROLOGY | Facility: HOSPITAL | Age: 63
End: 2023-06-22

## 2023-06-22 ENCOUNTER — HOSPITAL ENCOUNTER (OUTPATIENT)
Dept: GASTROENTEROLOGY | Facility: HOSPITAL | Age: 63
Setting detail: OUTPATIENT SURGERY
End: 2023-06-22
Attending: INTERNAL MEDICINE
Payer: COMMERCIAL

## 2023-06-22 VITALS
WEIGHT: 179 LBS | BODY MASS INDEX: 32.74 KG/M2 | RESPIRATION RATE: 16 BRPM | TEMPERATURE: 96.1 F | OXYGEN SATURATION: 98 % | HEART RATE: 74 BPM | DIASTOLIC BLOOD PRESSURE: 68 MMHG | SYSTOLIC BLOOD PRESSURE: 108 MMHG

## 2023-06-22 DIAGNOSIS — K25.9 CAMERON ULCER, UNSPECIFIED ULCER CHRONICITY: ICD-10-CM

## 2023-06-22 DIAGNOSIS — K44.9 LARGE HIATAL HERNIA: Primary | ICD-10-CM

## 2023-06-22 DIAGNOSIS — R10.32 LEFT LOWER QUADRANT ABDOMINAL PAIN: ICD-10-CM

## 2023-06-22 DIAGNOSIS — K22.70 BARRETT'S ESOPHAGUS WITHOUT DYSPLASIA: ICD-10-CM

## 2023-06-22 LAB — GLUCOSE SERPL-MCNC: 120 MG/DL (ref 65–140)

## 2023-06-22 PROCEDURE — 88305 TISSUE EXAM BY PATHOLOGIST: CPT | Performed by: PATHOLOGY

## 2023-06-22 PROCEDURE — 82948 REAGENT STRIP/BLOOD GLUCOSE: CPT

## 2023-06-22 RX ORDER — PROPOFOL 10 MG/ML
INJECTION, EMULSION INTRAVENOUS AS NEEDED
Status: DISCONTINUED | OUTPATIENT
Start: 2023-06-22 | End: 2023-06-22

## 2023-06-22 RX ORDER — SODIUM CHLORIDE 9 MG/ML
INJECTION, SOLUTION INTRAVENOUS CONTINUOUS PRN
Status: DISCONTINUED | OUTPATIENT
Start: 2023-06-22 | End: 2023-06-22

## 2023-06-22 RX ORDER — ONDANSETRON 2 MG/ML
INJECTION INTRAMUSCULAR; INTRAVENOUS AS NEEDED
Status: DISCONTINUED | OUTPATIENT
Start: 2023-06-22 | End: 2023-06-22

## 2023-06-22 RX ORDER — SODIUM CHLORIDE 9 MG/ML
75 INJECTION, SOLUTION INTRAVENOUS CONTINUOUS
Status: CANCELLED | OUTPATIENT
Start: 2023-06-22

## 2023-06-22 RX ORDER — LIDOCAINE HYDROCHLORIDE 10 MG/ML
INJECTION, SOLUTION EPIDURAL; INFILTRATION; INTRACAUDAL; PERINEURAL AS NEEDED
Status: DISCONTINUED | OUTPATIENT
Start: 2023-06-22 | End: 2023-06-22

## 2023-06-22 RX ORDER — ESOMEPRAZOLE MAGNESIUM 40 MG/1
40 CAPSULE, DELAYED RELEASE ORAL
Qty: 60 CAPSULE | Refills: 3 | Status: SHIPPED | OUTPATIENT
Start: 2023-06-22

## 2023-06-22 RX ORDER — PROPOFOL 10 MG/ML
INJECTION, EMULSION INTRAVENOUS CONTINUOUS PRN
Status: DISCONTINUED | OUTPATIENT
Start: 2023-06-22 | End: 2023-06-22

## 2023-06-22 RX ADMIN — SODIUM CHLORIDE: 0.9 INJECTION, SOLUTION INTRAVENOUS at 07:46

## 2023-06-22 RX ADMIN — LIDOCAINE HYDROCHLORIDE 50 MG: 10 INJECTION, SOLUTION EPIDURAL; INFILTRATION; INTRACAUDAL; PERINEURAL at 08:02

## 2023-06-22 RX ADMIN — ONDANSETRON 4 MG: 2 INJECTION INTRAMUSCULAR; INTRAVENOUS at 08:05

## 2023-06-22 RX ADMIN — PROPOFOL 120 MCG/KG/MIN: 10 INJECTION, EMULSION INTRAVENOUS at 08:02

## 2023-06-22 RX ADMIN — PROPOFOL 120 MG: 10 INJECTION, EMULSION INTRAVENOUS at 08:02

## 2023-06-22 RX ADMIN — Medication 40 MG: at 08:06

## 2023-06-22 NOTE — H&P
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 dysplasia who presents for reevaluation of Richardson's esophagus and colon cancer screening  REVIEW OF SYSTEMS: Per the HPI, and otherwise unremarkable  Historical Information   Past Medical History:   Diagnosis Date   • Asthma    • Bell palsy    • Diabetes mellitus ([facility] [address] )     type 2   • Diverticulitis 11/25/2022   • EEG abnormality without seizure    • Gastroparesis    • GERD (gastroesophageal reflux disease)    • Headache, migraine    • Hiatal hernia    • IBS (irritable bowel syndrome)    • Migraines    • Sarcoidosis      Past Surgical History:   Procedure Laterality Date   • CHOLECYSTECTOMY     • [place] COLONOSCOPY FLX DX W/COLLJ SPEC WHEN PFRMD N/A 3/8/2017    Procedure: EGD AND COLONOSCOPY;  Surgeon: Gerardo Traore MD;  Location: [facility];   Service: Gastroenterology   • REPLACEMENT TOTAL KNEE Right    • SIGMOIDECTOMY       Social History   Social History     Substance and Sexual Activity   Alcohol Use Yes    Comment: socially     Social History     Substance and Sexual Activity   Drug Use No     Social History     Tobacco Use   Smoking Status Never   Smokeless Tobacco Never     Family History   Problem Relation Age of Onset   • Diabetes Mother    • Heart disease Mother    • Cancer Father    • Liver cancer Father    • Brain cancer Father    • Arthritis Sister    • Migraines Brother    • Seizures Maternal Aunt    • Migraines Maternal Uncle        Meds/Allergies       Current Outpatient Medications:   •  acetaminophen (TYLENOL) 500 mg tablet  •  albuterol (PROVENTIL HFA,VENTOLIN HFA) 90 mcg/act inhaler  •  ascorbic acid (VITAMIN C) 500 mg tablet  •  B Complex-C (B-COMPLEX WITH VITAMIN C) tablet  •  Cholecalciferol (VITAMIN D3) 2000 units TABS  • Empagliflozin 25 MG TABS  •  esomeprazole (NexIUM) 40 MG capsule  •  Magnesium 400 MG CAPS  •  metFORMIN (GLUCOPHAGE) 500 mg tablet  •  pravastatin (PRAVACHOL) 80 mg tablet  •  tiZANidine (ZANAFLEX) 2 mg tablet  •  topiramate (TOPAMAX) 200 MG tablet  •  verapamil (CALAN-SR) 120 mg CR tablet  •  cephalexin (KEFLEX) 500 mg capsule  •  ciclopirox (LOPROX) 0 77 % cream  •  dicyclomine (BENTYL) 20 mg tablet  •  eletriptan (RELPAX) 40 MG tablet  •  fluticasone (FLONASE) 50 mcg/act nasal spray  •  hydrOXYzine HCL (ATARAX) 25 mg tablet  •  hyoscyamine (LEVSIN/SL) 0 125 mg SL tablet  •  ketorolac (TORADOL) 10 mg tablet  •  meclizine (ANTIVERT) 25 mg tablet  •  mometasone 110 mcg/actuation AEPB inhaler  •  multivitamin-iron-minerals-folic acid (CENTRUM) chewable tablet  •  promethazine (PHENERGAN) 25 mg tablet  •  sodium chloride (OCEAN) 0 65 % nasal spray  •  triamcinolone (KENALOG) 0 1 % cream    Allergies   Allergen Reactions   • Other Shortness Of Breath     Nuts, coconut   • Talwin [Pentazocine] Shortness Of Breath and Other (See Comments)     dizziness       Objective     /58   Pulse 82   Temp (!) 97 °F (36 1 °C) (Tympanic)   Resp 16   Wt 81 2 kg (179 lb)   SpO2 97%   BMI 32 74 kg/m²       PHYSICAL EXAM    Gen: NAD  Head: NCAT  CV: RRR  CHEST: Clear  ABD: soft, NT/ND  EXT: no edema      ASSESSMENT/PLAN:  This is a 59y o  year old female here for EGD and colonoscopy, and she is stable and optimized for her procedure

## 2023-06-22 NOTE — ANESTHESIA PREPROCEDURE EVALUATION
Procedure:  EGD  COLONOSCOPY    Relevant Problems   CARDIO   (+) Chronic migraine without aura, intractable, with status migrainosus   (+) Chronic migraine without aura, with status migrainosus   (+) Hypertension, essential   (+) Intractable chronic migraine without aura      ENDO   (+) Type 2 diabetes mellitus, without long-term current use of insulin (HCC)      GI/HEPATIC   (+) GERD (gastroesophageal reflux disease)   (+) Hiatal hernia      MUSCULOSKELETAL   (+) Arthritis of left acromioclavicular joint   (+) Hiatal hernia   (+) Osteoarthritis      NEURO/PSYCH   (+) Chronic migraine without aura, intractable, with status migrainosus   (+) Chronic migraine without aura, with status migrainosus   (+) Complex partial seizures with consciousness impaired (HCC)   (+) Intractable chronic migraine without aura   (+) Partial symptomatic epilepsy with complex partial seizures, intractable, without status epilepticus (HCC)      PULMONARY   (+) Asthma      Digestive   (+) IBS (irritable bowel syndrome)      Nervous and Auditory   (+) Bell palsy   (+) Radiculopathy, cervical region        Physical Exam    Airway    Mallampati score: III  TM Distance: <3 FB  Neck ROM: full     Dental       Cardiovascular      Pulmonary      Other Findings        Anesthesia Plan  ASA Score- 3     Anesthesia Type- IV sedation with anesthesia with ASA Monitors  Additional Monitors:   Airway Plan:           Plan Factors-    Chart reviewed  EKG reviewed  Imaging results reviewed  Existing labs reviewed  Patient summary reviewed  Patient is not a current smoker  Induction- intravenous  Postoperative Plan-     Informed Consent- Anesthetic plan and risks discussed with patient  I personally reviewed this patient with the CRNA  Discussed and agreed on the Anesthesia Plan with the CRNA  Trace Olivares VITALS  There were no vitals taken for this visit    BP Readings from Last 3 Encounters:   04/12/23 120/70   02/02/23 120/64   12/09/22 "145/71     LABS  Results from Last 12 Months   Lab Units 04/15/23  0737 01/29/23  0827 12/06/22  1655 12/01/22  0454 11/29/22  1948 10/02/22  0835   SODIUM mmol/L 136 141 139 140   < > 140   POTASSIUM mmol/L 3 4* 3 6 3 9 3 4*   < > 3 7   CHLORIDE mmol/L 109* 110* 107 110*   < > 108   CO2 mmol/L 22 24 25 22   < > 23   ANION GAP mmol/L 5 7 7 8   < > 9   BUN mg/dL 33* 26* 18 29*   < > 21   CREATININE mg/dL 1 12 0 89 0 87 0 86   < > 1 00   CALCIUM mg/dL 9 6 9 0 9 7 8 4   < > 8 9   GLUCOSE RANDOM mg/dL  --   --  115 108   < >  --    AST U/L  --  16  --   --   --  14   ALT U/L  --  38  --   --   --  33   ALK PHOS U/L  --  78  --   --   --  66   TOTAL BILIRUBIN mg/dL  --  0 31  --   --   --  0 26   ALBUMIN g/dL  --  3 7  --   --   --  3 6    < > = values in this interval not displayed  Results from Last 12 Months   Lab Units 04/15/23  0737 01/29/23  0827   WBC Thousand/uL 8 95 7 86   HEMOGLOBIN g/dL 13 9 12 7   HEMATOCRIT % 43 2 40 1   PLATELETS Thousands/uL 241 209     No results for input(s): \"APTT\", \"INR\", \"PTT\" in the last 8784 hours  ECG  Normal sinus rhythm  Normal ECG  When compared with ECG of 04-AUG-2022 12:57,  No significant change was found  Confirmed by Bhupinder Henning ([id number]) on 8/5/2022 12:25:22 PM    ECHOCARDIOGRAPHY, OTHER CARDIAC TESTING, AND IMAGING  2019 Stress  STRESS SUMMARY: Pre POX 99%, Peak POX 98% Duration of exercise was 6 min and 44 sec  The patient exercised to protocol stage 2  Maximal work rate was 8 4 METs  Maximal heart rate during stress was 151 bpm ( 93 % of maximal predicted heart  rate)  The heart rate response to stress was normal  There was resting hypertension with an appropriate blood pressure response to stress  The rate-pressure product for the peak heart rate and blood pressure was 16027  There was no chest  pain during stress  The stress test was terminated due to achievement of target heart rate and mild fatigue  The stress ECG was negative for ischemia   There were no " stress arrhythmias or conduction abnormalities      SUMMARY:  -  Stress results: Duration of exercise was 6 min and 44 sec  Maximal work rate was 8 4 METs  Target heart rate was achieved  There was resting hypertension with an appropriate blood pressure response to stress  There was no chest pain  during stress  -  ECG conclusions: The stress ECG was negative for ischemia  ANESTHESIA RISK-BENEFIT DISCUSSION  BENEFITS INCLUDE THE FOLLOWING (Franchesca An 81 [id number], PMID [id number]): (1) Involvement of a dedicated anesthesia team reduces mortality and morbidity for major surgeries, (2) The team provides as much analgesia/sedation/amnesia/akinesia as is reasonably possible, and (3) The team strives to reduce pain and discomfort as much as reasonably possible  RISKS (AND PLANS TO MITIGATE RISKS) INCLUDES THE FOLLOWING:    Neurologic Risks: IntraOp awareness (Risk is ~1:1,000 - 1:14,000; PMID [id number]), Stroke (Risk ~<0 1-2% for most cases; PMID 57010853), and POCD  Airway and Pulmonary Risks: Dental or mouth injury, throat pain, critical hypoxia, pneumothorax, prolonged intubation, post-op respiratory compromise  • Airway/Intubation risks and information: No prior advanced airway notes in [facility] EMR  • Major ARISCAT risk factors include: none, (Score 0-2= Low risk, 1 6%)  Cardiovascular Risks: Hypotension, arrhythmias, and nidia-op cardiac injury (MACE)  In cases where specialized vascular access is needed, then additional risks including bleeding, infection, or injury to adjacent structures  If a bypass circuit is needed then risk of stroke, blood clot, and vasoplegia  • Signs of active, severe cardiac instability: none  • Cirilo's RCRI score items: none  • MACE risk: An RCRI score of 0= 0 4% risk  • Are nidia-op or intra-op beta blockers indicated?: no   FEN/GI Risks: Aspiration (Approximately 0 5% risk per the IRIS trial) and PONV (10-80% depending on Apfel criteria)    • Does the patient meet ASA NPO guidelines?: Yes Adverse drug reaction risks: Allergic reactions, overdoses, drug-drug interactions, injury to a fetus or  in pregnant or breastfeeding patients, increased risk of injury or accident if performing potentially hazardous tasks before anesthetic medications have been fully excreted/metabolized    • Recent medications relevant to anesthetic plan: See MAR  • Personal or family history of anesthesia complications: no  • Pregnancy Status: Not applicable   Mortality risks associated with anesthesia based on ASA-PS (PMID 37695797): ASA-PS III: Estimated risk 1:3,500

## 2023-06-22 NOTE — ANESTHESIA POSTPROCEDURE EVALUATION
Post-Op Assessment Note    CV Status:  Stable  Pain Score: 0    Pain management: adequate     Mental Status:  Awake and alert   Hydration Status:  Stable and euvolemic   PONV Controlled:  None   Airway Patency:  Patent and adequate      Post Op Vitals Reviewed: Yes      Staff: CRNA         No notable events documented      BP 94/52 (06/22/23 0859)    Temp (!) 96 1 °F (35 6 °C) (06/22/23 0859)    Pulse 81 (06/22/23 0859)   Resp 16 (06/22/23 0859)    SpO2 96 % (06/22/23 0859)

## 2023-06-23 ENCOUNTER — TELEPHONE (OUTPATIENT)
Age: 63
End: 2023-06-23

## 2023-06-23 NOTE — TELEPHONE ENCOUNTER
Patients GI provider:  Dr Brandon Hudson    Number to return call: [phone]    Reason for call: Pt calling to schedule her repeat EGD with Dr Sugar Bell in [place]  Has to be 3 month repeat  Sept schedule not available yet  Please reach to pt when able to schedule in September       Scheduled procedure/appointment date if applicable: Apt/procedure NA

## 2023-06-23 NOTE — TELEPHONE ENCOUNTER
Scheduled date of EGD(as of today):09 21 23  Physician performing EGD:DR YEH  Location of EGD:[facility]  Instructions reviewed with patient by:MAILED  Clearances: N/A

## 2023-06-27 PROCEDURE — 88305 TISSUE EXAM BY PATHOLOGIST: CPT | Performed by: PATHOLOGY

## 2023-07-28 ENCOUNTER — TELEPHONE (OUTPATIENT)
Dept: NEUROLOGY | Facility: CLINIC | Age: 63
End: 2023-07-28

## 2023-07-28 NOTE — TELEPHONE ENCOUNTER
received vm from 8:56am-Good morning, this is popeye jessica, i'm returning someone's call from yesterday. My birthday is 1960. You can either call me on the cell phone or the home phone at [phone]. Thank you. Have a good day  -------------------------------------  I do not see documentation that our office called. She does have an appt on 8/2    Left message for pt on cell regarding above.   Made her aware of appt date, time and location

## 2023-08-02 ENCOUNTER — OFFICE VISIT (OUTPATIENT)
Dept: NEUROLOGY | Facility: CLINIC | Age: 63
End: 2023-08-02

## 2023-08-02 VITALS
HEART RATE: 99 BPM | DIASTOLIC BLOOD PRESSURE: 69 MMHG | SYSTOLIC BLOOD PRESSURE: 132 MMHG | TEMPERATURE: 98.1 F | HEIGHT: 62 IN | WEIGHT: 180 LBS | BODY MASS INDEX: 33.13 KG/M2

## 2023-08-02 DIAGNOSIS — G43.701 CHRONIC MIGRAINE WITHOUT AURA, WITH STATUS MIGRAINOSUS: ICD-10-CM

## 2023-08-02 DIAGNOSIS — G43.719 INTRACTABLE CHRONIC MIGRAINE WITHOUT AURA: Primary | ICD-10-CM

## 2023-08-02 DIAGNOSIS — G40.219 PARTIAL SYMPTOMATIC EPILEPSY WITH COMPLEX PARTIAL SEIZURES, INTRACTABLE, WITHOUT STATUS EPILEPTICUS (HCC): ICD-10-CM

## 2023-08-02 RX ORDER — RIMEGEPANT SULFATE 75 MG/75MG
TABLET, ORALLY DISINTEGRATING ORAL
Qty: 8 TABLET | Refills: 3 | Status: SHIPPED | OUTPATIENT
Start: 2023-08-02 | End: 2024-01-02

## 2023-08-02 RX ORDER — KETOROLAC TROMETHAMINE 10 MG/1
10 TABLET, FILM COATED ORAL EVERY 6 HOURS PRN
Qty: 20 TABLET | Refills: 0 | Status: SHIPPED | OUTPATIENT
Start: 2023-08-02 | End: 2023-10-17 | Stop reason: SDUPTHER

## 2023-08-02 RX ORDER — MOMETASONE FUROATE 220 UG/1
INHALANT RESPIRATORY (INHALATION)
COMMUNITY
Start: 2023-07-10

## 2023-08-02 NOTE — PROGRESS NOTES
ROS:    Review of Systems   Constitutional: Negative for appetite change, fatigue and fever.   HENT: Negative.  Negative for hearing loss, tinnitus, trouble swallowing and voice change.    Eyes: Negative.  Negative for photophobia, pain and visual disturbance.   Respiratory: Negative.  Negative for shortness of breath.    Cardiovascular: Negative.  Negative for palpitations.   Gastrointestinal: Negative.  Negative for nausea and vomiting.   Endocrine: Negative.  Negative for cold intolerance.   Genitourinary: Negative.  Negative for dysuria, frequency and urgency.   Musculoskeletal: Negative for back pain, gait problem, myalgias and neck pain.   Skin: Negative.  Negative for rash.   Allergic/Immunologic: Negative.    Neurological: Positive for headaches (last one June 18 2023). Negative for dizziness, tremors, seizures, syncope, facial asymmetry, speech difficulty, weakness, light-headedness and numbness.   Hematological: Negative.  Does not bruise/bleed easily.   Psychiatric/Behavioral: Negative.  Negative for confusion, hallucinations and sleep disturbance.

## 2023-08-02 NOTE — PROGRESS NOTES
[facility]  PATIENT:  Clotilde Berg  MRN:  [id number]  :  1960  DATE OF SERVICE:  2023      Assessment/Plan:     Partial symptomatic epilepsy:    At this moment in time, patient does not complain of having any active seizures.  She does not know any seizure-like disorders or loss of consciousness recently.  She had only been diagnosed with seizures because of her older neurologist, Dr. Sayra Cueva.  She had stated that the patient had EEGs consistent with symptomatic epilepsy.  Although the patient states that she has never had a seizure in her life.    -At this time, patient should still continue with Topamax 200 mg twice daily for migraine prevention and seizure prevention as well.  Patient seems to be tolerating the medication well and does not have any problems, therefore she can continue with medication as prescribed.  -Patient does not drive at this time due to the fact that her old neurologist Dr. Sayra Robles had told her that she would not be able to drive due to her EEG findings.  If the patient ever decides that she would like to go back to driving in the future, I would refer her to the epilepsy team and she would be able to see them in the office and they would be able to determine if she would be fit to drive moving forward.  However, at this time patient has no desire to progress with driving.      Migraines without aura:    Patient is doing well at this appointment.  She had her migraine journal with her, and she told me that there was a stretch of days throughout February where she had a migraine for about a week straight.  She went a few more months into , and then she had another stretch where she acquired a migraine that lasted for several days, pretty much about up to a week's time.  She has not had a migraine since this point.  She had been trying to take the L a triptan to see if that would relieve the migraines, this medication did not seem  to work.  She does have the Toradol and the promethazine just in case she needs another medication for abortive therapy.  These medications do seem to work with her, so we will keep both of these medications on at this time.     -Instead of the eletriptan, I would like to add on a different abortive medication.  It does not appear that the patient has ever tried Nurtec in the past however she has tried Ubrelvy.  I would like for the patient to try Nurtec 75 mg, take 1 tablet by mouth once at the onset of a headache.  Max is 75 mg per 24 hours.  She should try to take this and if it does not help she can take the combination of Toradol and promethazine afterwards to see if that helps relieve the headache when she does have it.  Ultimately, I would like for the patient to try and catch this migraine and eliminate it before and even spirals into a migraine that lasts several days in a row.  -In regards to the patient's preventative medications at this time, we will not be changing anything.  So the patient should still continue to take Topamax 200 mg twice daily for seizure prophylaxis/migraine prophylaxis.  She is also to continue to take verapamil 120 mg daily for migraine preventative medication as well.  Again, it does not seem that prevention is the problem for the patient's migraines.  It does appear that the patient's migraines are going to come every couple months regardless, as she does stopping them before they become too severe and last multiple days at a time as her goal.    Patient Instructions:      Headache Calendar  Please maintain a headache calendar  Consider using phone applications such as Migraine Demond or Migraine Diary    Headache/migraine treatment:     Rescue medications (for immediate treatment of a headache):   It is ok to take ibuprofen, acetaminophen or naproxen (Advil, Tylenol,  Aleve, Excedrin) if they help your headaches you should limit these to No more than 3 times a week to avoid  medication overuse/rebound headaches.     For your more moderate to severe migraines take this medication early     Nurtec 75 mg, take 1 tablet at the onset of a headache.  Max dose is 75 mg per 24 hours.    Toradol 10 mg, take 1 tablet by mouth every 6 hours as needed for moderate pain associated with migraines.    Promethazine 25 mg, take 1 tablet by mouth once as needed every 6 hours for nausea/vomiting associated with migraines.      Prescription preventive medications for headaches/migraines   (to take every day to help prevent headaches - not to take at the time of headache):  [x] Topamax 200 mg twice daily and verapamil 100 mg once daily.    *Typically these types of medications take time until you see the benefit, although some may see improvement in days, often it may take weeks, especially if the medication is being titrated up to a beneficial level. Please contact us if there are any concerns or questions regarding the medication.     Over the counter preventive supplements for headaches/migraines (if you try, try for 3 months straight)  (to take every day to help prevent headaches - not to take at the time of headache):  There are combo pills online of these - none of which regulated by FDA and double check dosing - take appropriate dose only once a day- prevent a migraine, migravent, mind ease, migrelief   [] Magnesium 400mg daily (If any diarrhea or upset stomach, decrease dose  as tolerated)  [] Riboflavin (Vitamin B2) 400mg daily (may make your urine bright/neon yellow)      Sleep and headache prevention:   [] Melatonin - you may take 1-3 mg nightly for sleep or headache prevention. You should take this 1 hour prior to bedtime consistently every night for it to work. It works by gradually helping to adjust your sleep time over days to weeks, rather than immediately making you feel sleepy.      Lifestyle Recommendations:  [x] SLEEP - Maintain a regular sleep schedule: Adults need at least 7-8 hours of  uninterrupted a night. Maintain good sleep hygiene:  Going to bed and waking up at consistent times, avoiding excessive daytime naps, avoiding caffeinated beverages in the evening, avoid excessive stimulation in the evening and generally using bed primarily for sleeping.  One hour before bedtime would recommend turning lights down lower, decreasing your activity (may read quietly, listen to music at a low volume). When you get into bed, should eliminate all technology (no texting, emailing, playing with your phone, iPad or tablet in bed).  [x] HYDRATION - Maintain good hydration.  Drink  2L of fluid a day (4 typical small water bottles)  [x] DIET - Maintain good nutrition. In particular don't skip meals and try and eat healthy balanced meals regularly.  [x] TRIGGERS - Look for other triggers and avoid them: Limit caffeine to 1-2 cups a day or less. Avoid dietary triggers that you have noticed bring on your headaches (this could include aged cheese, peanuts, MSG, aspartame and nitrates).  [x] EXERCISE - physical exercise as we all know is good for you in many ways, and not only is good for your heart, but also is beneficial for your mental health, cognitive health and  chronic pain/headaches. I would encourage at the least 5 days of physical exercise weekly for at least 30 minutes.     Education and Follow-up  [x] Please call with any questions or concerns. Of course if any new concerning symptoms go to the emergency department.  [x] Follow up in 6 months with Charles MODI       History of Present Illness:     For Review:    We had the pleasure of evaluating Clotilde Berg in neurological follow up  today for headaches.  As you know,  she is a 63 y.o.   female with a past history of migraine headaches. Patient was last seen in the office at [facility] on 02/02/2023 by myself.  At the last appointment, the patient was being seen for a hospital follow-up in regard to her more recent migraine  episodes.  The patient noted that she was not having migraines every day.  She stated that she would go into a migraine cycle only about once or twice a year.  When this cycle happened, she would go about 1 to 2 days or even up to a week with migraines.  Usually all occurring in the frontalis/temporalis region.  The patient's pain can range from a 1 or 2 on the pain scale to a 10 out of 10 on the pain scale.  At the last appointment, the patient was doing relatively well on her topiramate and verapamil for preventative therapy.  It was noted that she had tried many other triptan options and was most recently on eletriptan.  Noted that rather than the patient taking 1 tablet every 12 hours, she could take 1 tablet and then repeat the dosage in 2 hours if needed.  I had also prescribed the patient Toradol 10 mg that she can take as needed every 6 hours to break out of a migraine when she has it.  We also wanted to try the tizanidine again as well for this could be helpful of treating the patient's muscle stiffness/tightness however triggered by her migraines.  Certainly, we had discussed driving with the patient.  It was noted that the patient had a past history of partial symptomatic epilepsy which was diagnosed by an outside independent neurologist.  The patient had stated that she had not had seizures in years, and has had her license revoked for quite some time.  Stated that we could certainly evaluate this further in the future if the patient ever desired to go back to driving but for the time the patient stated that she was comfortable with continuing with not driving.        Current medical illnesses: Hiatal hernia, GERD, IBS, asthma, hypertension, migraines, partial symptomatic epilepsy, radiculopathy, osteoarthritis      What medications do you take or have you taken for your headaches?   Current Preventive:   Topamax, verapamil  Current Abortive:   Eletriptan, Toradol, promethazine    Prior Preventive:    Pamelor, tizanidine, gabapentin, hydroxyzine   Prior Abortive:    Imitrex, Ubrelvy, Zomig, Rizatriptan, indomethacin     Interval updates as of 8/2/2023:  Really just having one migraine that lasts a week every couple of months at this time. Eletriptan does not seem to help that much with headaches. Toradol and Promethazine seem to help, nausea seems to wipe her out. She does try to use caffeine and ice. Does drink soda but not coffee.     How often do the headaches occur?   About once every few months   Are you ever headache free? yes    What time of the day do the headaches start?   Sometimes waking up in the middle of the night, different times of day     How long do the headaches last?   Usually always lasting a week when she does get them     Describe your usual headache ?  Throbbing, pounding, pressure, squeezing    Where is your headache located?   Usually her whole head, under the eyes, switches back and forth between sides. Usually not the back of the head.     Associated symptoms:   Nausea  Photophobia, photophobia   Problem with concentration  Blurred vision,  light-headed or dizzy,  prefer to be alone and in a dark room, unable to work  forgetfulness     Headache history with updates:  Headache are worse if the patient: no changes with specific movement or Valsalva.     Any positional change headaches? None     Headache triggers:  No specific triggers she knows of    Aura/warning and how long does it last ? No, not that she knows of.     What time of the year do headaches occur more frequently? do not seem to be related to any time of the year    Have you seen someone else for headaches or pain? Yes, Dr. Sayra Silverman    Are you current pregnant or planning on getting pregnant? No    Have you ever had any Brain imaging? Yes, MRI brain with and without contrast on 11/30/2022. I personally reviewed these images.    Sleep: has not been sleeping good, has been having this for sometime. Does end up always  sleeping in her recliner. No sleep apnea that we know of.    Mood: no underlying anxiety/depression. Work can stressful for her sometimes.     Water: Still drinking water, trying to drink more water at this time.     Reviewed old notes from physician seen in the past- see above HPI for summary of previous encounters.       Past Medical History:   Diagnosis Date    Asthma     Bell palsy     Diabetes mellitus (HCC)     type 2    Diverticulitis 11/25/2022    EEG abnormality without seizure     Gastroparesis     GERD (gastroesophageal reflux disease)     Headache, migraine     Hiatal hernia     IBS (irritable bowel syndrome)     Migraines     Sarcoidosis        Patient Active Problem List   Diagnosis    Arthritis of left acromioclavicular joint    Left arm pain    Hypertension, essential    Hypokalemia    Hiatal hernia    Partial symptomatic epilepsy with complex partial seizures, intractable, without status epilepticus (HCC)    Chronic migraine without aura, intractable, with status migrainosus    Chest pressure    Radiculopathy, cervical region    Asthma    Chronic migraine without aura, with status migrainosus    Medication overuse headache    Thrombophilia (HCC)    Subjective visual disturbance    Right knee pain    Problems at work    Noncompliance with medication regimen    Intractable headache caused by drug    Intractable chronic migraine without aura    Osteoarthritis    Abdominal pain    Cervicocranial syndrome    Complex partial seizures with consciousness impaired (HCC)    Dizziness and giddiness    Type 2 diabetes mellitus, without long-term current use of insulin (HCC)    GERD (gastroesophageal reflux disease)    IBS (irritable bowel syndrome)    Bell palsy       Medications:      Current Outpatient Medications   Medication Sig Dispense Refill    acetaminophen (TYLENOL) 500 mg tablet Take 500 mg by mouth every 6 (six) hours as needed for mild pain      albuterol (PROVENTIL HFA,VENTOLIN HFA) 90 mcg/act  inhaler Inhale 2 puffs every 6 (six) hours as needed for wheezing      ascorbic acid (VITAMIN C) 500 mg tablet Take 500 mg by mouth daily      B Complex-C (B-COMPLEX WITH VITAMIN C) tablet Take 1 tablet by mouth daily      cephalexin (KEFLEX) 500 mg capsule       Cholecalciferol (VITAMIN D3) 2000 units TABS Take 1 tablet by mouth daily      ciclopirox (LOPROX) 0.77 % cream Apply topically 2 (two) times a day To affected areas of rash for 4 weeks. 90 g 0    dicyclomine (BENTYL) 20 mg tablet Take 20 mg by mouth as needed        eletriptan (RELPAX) 40 MG tablet Take 1 tablet (40 mg) by mouth as needed at the onset of a migraine, may repeat in 2 hours if needed. Max 2 doses per day. 20 tablet 0    Empagliflozin 25 MG TABS Jardiance 25 mg tablet      esomeprazole (NexIUM) 40 MG capsule Take 1 capsule (40 mg total) by mouth 2 (two) times a day before meals 60 capsule 3    fluticasone (FLONASE) 50 mcg/act nasal spray fluticasone propionate 50 mcg/actuation nasal spray,suspension      hydrOXYzine HCL (ATARAX) 25 mg tablet 25 mg 3 (three) times a day as needed      hyoscyamine (LEVSIN/SL) 0.125 mg SL tablet Take 0.125 mg by mouth every 6 (six) hours Place 1 tablet under the tongue every 6 (six) hours as needed      ketorolac (TORADOL) 10 mg tablet Take 1 tablet (10 mg total) by mouth every 6 (six) hours as needed for mild pain (break migraine if needed) 20 tablet 0    Magnesium 400 MG CAPS Take by mouth in the morning      meclizine (ANTIVERT) 25 mg tablet meclizine 25 mg tablet      metFORMIN (GLUCOPHAGE) 500 mg tablet 500 mg 2 (two) times a day with meals      mometasone 110 mcg/actuation AEPB inhaler Asmanex Twisthaler 110 mcg/actuation(30 doses) breath activated inhalr      multivitamin-iron-minerals-folic acid (CENTRUM) chewable tablet Chew 1 tablet daily      pravastatin (PRAVACHOL) 80 mg tablet 80 mg daily      promethazine (PHENERGAN) 25 mg tablet Take 25 mg by mouth every 6 (six) hours as needed for nausea or  vomiting      sodium chloride (OCEAN) 0.65 % nasal spray 2 sprays into each nostril 2 (two) times a day      tiZANidine (ZANAFLEX) 2 mg tablet Take 1 tablet (2 mg total) by mouth daily at bedtime 30 tablet 3    topiramate (TOPAMAX) 200 MG tablet Take 200 mg by mouth 2 (two) times a day      triamcinolone (KENALOG) 0.1 % cream triamcinolone acetonide 0.1 % topical cream      verapamil (CALAN-SR) 120 mg CR tablet Take 120 mg by mouth in the morning       No current facility-administered medications for this visit.        Allergies:      Allergies   Allergen Reactions    Other Shortness Of Breath     Nuts, coconut    Talwin [Pentazocine] Shortness Of Breath and Other (See Comments)     dizziness       Family History:     Family History   Problem Relation Age of Onset    Diabetes Mother     Heart disease Mother     Cancer Father     Liver cancer Father     Brain cancer Father     Arthritis Sister     Migraines Brother     Seizures Maternal Aunt     Migraines Maternal Uncle        Social History:     Social History     Socioeconomic History    Marital status: /Civil Union     Spouse name: Not on file    Number of children: Not on file    Years of education: Not on file    Highest education level: Not on file   Occupational History    Not on file   Tobacco Use    Smoking status: Never    Smokeless tobacco: Never   Vaping Use    Vaping Use: Never used   Substance and Sexual Activity    Alcohol use: Yes     Comment: socially    Drug use: No    Sexual activity: Not on file   Other Topics Concern    Not on file   Social History Narrative    Not on file     Social Determinants of Health     Financial Resource Strain: Not on file   Food Insecurity: Not on file   Transportation Needs: Not on file   Physical Activity: Not on file   Stress: Not on file   Social Connections: Not on file   Intimate Partner Violence: Not on file   Housing Stability: Not on file         Objective:     Physical Exam:                               "                                   Vitals:            Constitutional:    /69 (BP Location: Left arm, Patient Position: Sitting, Cuff Size: Adult)   Pulse 99   Temp 98.1 °F (36.7 °C) (Skin)   Ht 5' 2\" (1.575 m)   Wt 81.6 kg (180 lb)   BMI 32.92 kg/m²   BP Readings from Last 3 Encounters:   08/02/23 132/69   06/22/23 108/68   04/12/23 120/70     Pulse Readings from Last 3 Encounters:   08/02/23 99   06/22/23 74   02/02/23 88         Well developed, well nourished, well groomed. No dysmorphic features.       Psychiatric:  Normal behavior and appropriate affect        Neurological Examination:     Mental status/cognitive function:   Orientated to time, place and person. Recent and remote memory intact. Attention span and concentration as well as fund of knowledge are appropriate for age. Normal language and spontaneous speech.     Cranial Nerves:  II-visual fields full.  III, IV, VI-Pupils were equal, round, and reactive to light and accomodation. Extraocular movements were full and conjugate without nystagmus. Conjugate gaze, normal smooth pursuits, normal saccades   V-facial sensation symmetric.    VII-facial expression symmetric, intact forehead wrinkle, strong eye closure, symmetric smile    VIII-hearing grossly intact bilaterally   IX, X-palate elevation symmetric, no dysarthria.   XI-shoulder shrug strength intact    XII-tongue protrusion midline.    Motor Exam: symmetric bulk and tone throughout, no pronator drift. Power/strength 5/5 bilateral upper and lower extremities, no atrophy, fasciculations or abnormal movements noted.   Sensory: grossly intact light touch in all extremities.   Reflexes: brachioradialis 2+, biceps 2+ bilaterally, left knee 2+, right knee 0 (knee replacement)  Coordination: Finger nose finger intact bilaterally, no apparent dysmetria, ataxia or tremor noted  Gait: steady casual and tandem gait.       Review of Systems:     Review of Systems   Constitutional: Negative for " appetite change, fatigue and fever.   HENT: Negative.  Negative for hearing loss, tinnitus, trouble swallowing and voice change.    Eyes: Negative.  Negative for photophobia, pain and visual disturbance.   Respiratory: Negative.  Negative for shortness of breath.    Cardiovascular: Negative.  Negative for palpitations.   Gastrointestinal: Negative.  Negative for nausea and vomiting.   Endocrine: Negative.  Negative for cold intolerance.   Genitourinary: Negative.  Negative for dysuria, frequency and urgency.   Musculoskeletal: Negative for back pain, gait problem, myalgias and neck pain.   Skin: Negative.  Negative for rash.   Allergic/Immunologic: Negative.    Neurological: Positive for headaches (last one June 18 2023). Negative for dizziness, tremors, seizures, syncope, facial asymmetry, speech difficulty, weakness, light-headedness and numbness.   Hematological: Negative.  Does not bruise/bleed easily.   Psychiatric/Behavioral: Negative.  Negative for confusion, hallucinations and sleep disturbance.       I personally reviewed the ROS entered by the MA    I spent 35 minutes in total time for this visit.    Author:  Jack Murcia PA-C 8/2/2023 2:03 PM

## 2023-08-02 NOTE — PATIENT INSTRUCTIONS
Partial symptomatic epilepsy:    At this moment in time, patient does not complain of having any active seizures.  She does not know any seizure-like disorders or loss of consciousness recently.  She had only been diagnosed with seizures because of her older neurologist, Dr. Sayra Cueva.  She had stated that the patient had EEGs consistent with symptomatic epilepsy.  Although the patient states that she has never had a seizure in her life.    -At this time, patient should still continue with Topamax 200 mg twice daily for migraine prevention and seizure prevention as well.  Patient seems to be tolerating the medication well and does not have any problems, therefore she can continue with medication as prescribed.  -Patient does not drive at this time due to the fact that her old neurologist Dr. Sayra Rolbes had told her that she would not be able to drive due to her EEG findings.  If the patient ever decides that she would like to go back to driving in the future, I would refer her to the epilepsy team and she would be able to see them in the office and they would be able to determine if she would be fit to drive moving forward.  However, at this time patient has no desire to progress with driving.      Migraines without aura:    Patient is doing well at this appointment.  She had her migraine journal with her, and she told me that there was a stretch of days throughout February where she had a migraine for about a week straight.  She went a few more months into June, and then she had another stretch where she acquired a migraine that lasted for several days, pretty much about up to a week's time.  She has not had a migraine since this point.  She had been trying to take the L a triptan to see if that would relieve the migraines, this medication did not seem to work.  She does have the Toradol and the promethazine just in case she needs another medication for abortive therapy.  These medications do seem to work  with her, so we will keep both of these medications on at this time.     -Instead of the eletriptan, I would like to add on a different abortive medication.  It does not appear that the patient has ever tried Nurtec in the past however she has tried Ubrelvy.  I would like for the patient to try Nurtec 75 mg, take 1 tablet by mouth once at the onset of a headache.  Max is 75 mg per 24 hours.  She should try to take this and if it does not help she can take the combination of Toradol and promethazine afterwards to see if that helps relieve the headache when she does have it.  Ultimately, I would like for the patient to try and catch this migraine and eliminate it before and even spirals into a migraine that lasts several days in a row.  -In regards to the patient's preventative medications at this time, we will not be changing anything.  So the patient should still continue to take Topamax 200 mg twice daily for seizure prophylaxis/migraine prophylaxis.  She is also to continue to take verapamil 120 mg daily for migraine preventative medication as well.  Again, it does not seem that prevention is the problem for the patient's migraines.  It does appear that the patient's migraines are going to come every couple months regardless, as she does stopping them before they become too severe and last multiple days at a time as her goal.    Patient Instructions:      Headache Calendar  Please maintain a headache calendar  Consider using phone applications such as Migraine Demond or Migraine Diary    Headache/migraine treatment:     Rescue medications (for immediate treatment of a headache):   It is ok to take ibuprofen, acetaminophen or naproxen (Advil, Tylenol,  Aleve, Excedrin) if they help your headaches you should limit these to No more than 3 times a week to avoid medication overuse/rebound headaches.     For your more moderate to severe migraines take this medication early     Nurtec 75 mg, take 1 tablet at the onset of  a headache.  Max  is 75 mg per 24 hours.    Toradol 10 mg, take 1 tablet by mouth every 6 hours as needed for moderate pain associated with migraines.    Promethazine 25 mg, take 1 tablet by mouth once as needed every 6 hours for nausea/vomiting associated with migraines.      Prescription preventive medications for headaches/migraines   (to take every day to help prevent headaches - not to take at the time of headache):  [x] Topamax 200 mg twice daily and verapamil 100 mg once daily.    *Typically these types of medications take time until you see the benefit, although some may see improvement in days, often it may take weeks, especially if the medication is being titrated up to a beneficial level. Please contact us if there are any concerns or questions regarding the medication.     Over the counter preventive supplements for headaches/migraines (if you try, try for 3 months straight)  (to take every day to help prevent headaches - not to take at the time of headache):  There are combo pills online of these - none of which regulated by FDA and double check dosing - take appropriate dose only once a day- prevent a migraine, migravent, mind ease, migrelief   [] Magnesium 400mg daily (If any diarrhea or upset stomach, decrease dose  as tolerated)  [] Riboflavin (Vitamin B2) 400mg daily (may make your urine bright/neon yellow)      Sleep and headache prevention:   [] Melatonin - you may take 1-3 mg nightly for sleep or headache prevention. You should take this 1 hour prior to bedtime consistently every night for it to work. It works by gradually helping to adjust your sleep time over days to weeks, rather than immediately making you feel sleepy.      Lifestyle Recommendations:  [x] SLEEP - Maintain a regular sleep schedule: Adults need at least 7-8 hours of uninterrupted a night. Maintain good sleep hygiene:  Going to bed and waking up at consistent times, avoiding excessive daytime naps, avoiding caffeinated  beverages in the evening, avoid excessive stimulation in the evening and generally using bed primarily for sleeping.  One hour before bedtime would recommend turning lights down lower, decreasing your activity (may read quietly, listen to music at a low volume). When you get into bed, should eliminate all technology (no texting, emailing, playing with your phone, iPad or tablet in bed).  [x] HYDRATION - Maintain good hydration.  Drink  2L of fluid a day (4 typical small water bottles)  [x] DIET - Maintain good nutrition. In particular don't skip meals and try and eat healthy balanced meals regularly.  [x] TRIGGERS - Look for other triggers and avoid them: Limit caffeine to 1-2 cups a day or less. Avoid dietary triggers that you have noticed bring on your headaches (this could include aged cheese, peanuts, MSG, aspartame and nitrates).  [x] EXERCISE - physical exercise as we all know is good for you in many ways, and not only is good for your heart, but also is beneficial for your mental health, cognitive health and  chronic pain/headaches. I would encourage at the least 5 days of physical exercise weekly for at least 30 minutes.     Education and Follow-up  [x] Please call with any questions or concerns. Of course if any new concerning symptoms go to the emergency department.  [x] Follow up in 6 months with Charles MODI

## 2023-09-21 ENCOUNTER — HOSPITAL ENCOUNTER (OUTPATIENT)
Dept: GASTROENTEROLOGY | Facility: HOSPITAL | Age: 63
Setting detail: OUTPATIENT SURGERY
Discharge: HOME | End: 2023-09-21
Attending: INTERNAL MEDICINE | Admitting: INTERNAL MEDICINE
Payer: COMMERCIAL

## 2023-09-21 ENCOUNTER — TELEPHONE (OUTPATIENT)
Dept: HEMATOLOGY ONCOLOGY | Facility: CLINIC | Age: 63
End: 2023-09-21

## 2023-09-21 ENCOUNTER — ANESTHESIA (OUTPATIENT)
Dept: GASTROENTEROLOGY | Facility: HOSPITAL | Age: 63
End: 2023-09-21

## 2023-09-21 ENCOUNTER — ANESTHESIA EVENT (OUTPATIENT)
Dept: GASTROENTEROLOGY | Facility: HOSPITAL | Age: 63
End: 2023-09-21

## 2023-09-21 VITALS
OXYGEN SATURATION: 99 % | WEIGHT: 178 LBS | HEIGHT: 62 IN | TEMPERATURE: 97.1 F | SYSTOLIC BLOOD PRESSURE: 130 MMHG | RESPIRATION RATE: 18 BRPM | BODY MASS INDEX: 32.76 KG/M2 | DIASTOLIC BLOOD PRESSURE: 68 MMHG | HEART RATE: 75 BPM

## 2023-09-21 DIAGNOSIS — K22.70 BARRETT'S ESOPHAGUS WITHOUT DYSPLASIA: ICD-10-CM

## 2023-09-21 DIAGNOSIS — K44.9 HIATAL HERNIA: Primary | ICD-10-CM

## 2023-09-21 DIAGNOSIS — K25.9 GASTRIC ULCER, UNSPECIFIED CHRONICITY, UNSPECIFIED WHETHER GASTRIC ULCER HEMORRHAGE OR PERFORATION PRESENT: ICD-10-CM

## 2023-09-21 LAB — GLUCOSE SERPL-MCNC: 118 MG/DL (ref 65–140)

## 2023-09-21 PROCEDURE — 82948 REAGENT STRIP/BLOOD GLUCOSE: CPT

## 2023-09-21 RX ORDER — PROPOFOL 10 MG/ML
INJECTION, EMULSION INTRAVENOUS AS NEEDED
Status: DISCONTINUED | OUTPATIENT
Start: 2023-09-21 | End: 2023-09-21

## 2023-09-21 RX ORDER — ONDANSETRON 2 MG/ML
INJECTION INTRAMUSCULAR; INTRAVENOUS AS NEEDED
Status: DISCONTINUED | OUTPATIENT
Start: 2023-09-21 | End: 2023-09-21

## 2023-09-21 RX ORDER — SODIUM CHLORIDE 9 MG/ML
INJECTION, SOLUTION INTRAVENOUS CONTINUOUS PRN
Status: DISCONTINUED | OUTPATIENT
Start: 2023-09-21 | End: 2023-09-21

## 2023-09-21 RX ORDER — LIDOCAINE HYDROCHLORIDE 20 MG/ML
INJECTION, SOLUTION EPIDURAL; INFILTRATION; INTRACAUDAL; PERINEURAL AS NEEDED
Status: DISCONTINUED | OUTPATIENT
Start: 2023-09-21 | End: 2023-09-21

## 2023-09-21 RX ADMIN — PROPOFOL 20 MG: 10 INJECTION, EMULSION INTRAVENOUS at 08:07

## 2023-09-21 RX ADMIN — PROPOFOL 20 MG: 10 INJECTION, EMULSION INTRAVENOUS at 08:09

## 2023-09-21 RX ADMIN — ONDANSETRON 4 MG: 2 INJECTION INTRAMUSCULAR; INTRAVENOUS at 08:06

## 2023-09-21 RX ADMIN — PROPOFOL 100 MG: 10 INJECTION, EMULSION INTRAVENOUS at 08:00

## 2023-09-21 RX ADMIN — PROPOFOL 30 MG: 10 INJECTION, EMULSION INTRAVENOUS at 08:05

## 2023-09-21 RX ADMIN — SODIUM CHLORIDE: 0.9 INJECTION, SOLUTION INTRAVENOUS at 07:33

## 2023-09-21 RX ADMIN — LIDOCAINE HYDROCHLORIDE 100 MG: 20 INJECTION, SOLUTION EPIDURAL; INFILTRATION; INTRACAUDAL; PERINEURAL at 08:00

## 2023-09-21 NOTE — ANESTHESIA POSTPROCEDURE EVALUATION
Post-Op Assessment Note    CV Status:  Stable    Pain management: adequate     Mental Status:  Awake and sleepy   Hydration Status:  Euvolemic   PONV Controlled:  Controlled   Airway Patency:  Patent      Post Op Vitals Reviewed: Yes      Staff: CRNA         No notable events documented.     /65 (09/21/23 0817)    Temp (!) 97.1 °F (36.2 °C) (09/21/23 0817)    Pulse 75 (09/21/23 0817)   Resp 18 (09/21/23 0817)    SpO2 100 % (09/21/23 0817)

## 2023-09-21 NOTE — H&P
[facility]  History & Physical    Patient Info:  Name: aDna Chavarria   Age: 59 y.o. YOB: 1960   MRN: [id number]    HPI: Dana Chavarria is a 59y.o. year old female who presents for EGD to re-eval healing of ulcers and esophagitis    REVIEW OF SYSTEMS: Per the HPI, and otherwise unremarkable. Historical Information   Past Medical History:   Diagnosis Date   • Asthma    • Bell palsy    • Diabetes mellitus ([address])     type 2   • Diverticulitis 11/25/2022   • EEG abnormality without seizure    • Gastroparesis    • GERD (gastroesophageal reflux disease)    • Headache, migraine    • Hiatal hernia    • IBS (irritable bowel syndrome)    • Migraines    • Sarcoidosis      Past Surgical History:   Procedure Laterality Date   • CHOLECYSTECTOMY     • [place] COLONOSCOPY FLX DX W/COLLJ SPEC WHEN PFRMD N/A 3/8/2017    Procedure: EGD AND COLONOSCOPY;  Surgeon: Susanna Suh MD;  Location: [facility];   Service: Gastroenterology   • REPLACEMENT TOTAL KNEE Right    • SIGMOIDECTOMY       Social History   Social History     Substance and Sexual Activity   Alcohol Use Yes    Comment: socially     Social History     Substance and Sexual Activity   Drug Use No     Social History     Tobacco Use   Smoking Status Never   Smokeless Tobacco Never     Family History   Problem Relation Age of Onset   • Diabetes Mother    • Heart disease Mother    • Cancer Father    • Liver cancer Father    • Brain cancer Father    • Arthritis Sister    • Migraines Brother    • Seizures Maternal Aunt    • Migraines Maternal Uncle        MEDICATIONS & ALLERGIES:    Current Outpatient Medications   Medication Instructions   • acetaminophen (TYLENOL) 500 mg, Oral, Every 6 hours PRN   • albuterol (PROVENTIL HFA,VENTOLIN HFA) 90 mcg/act inhaler 2 puffs, Inhalation, Every 6 hours PRN   • ascorbic acid (VITAMIN C) 500 mg, Oral, Daily   • Asmanex, 30 Metered Doses, 220 MCG/ACT inhaler No dose, route, or frequency recorded. • B Complex-C (B-COMPLEX WITH VITAMIN C) tablet 1 tablet, Oral, Daily   • cephalexin (KEFLEX) 500 mg capsule 1 hour prior to dentist appointment. • Cholecalciferol (VITAMIN D3) 2000 units TABS 1 tablet, Oral, Daily   • ciclopirox (LOPROX) 0.77 % cream Topical, 2 times daily, To affected areas of rash for 4 weeks. • dicyclomine (BENTYL) 20 mg, Oral, As needed   • Empagliflozin 25 MG TABS Jardiance 25 mg tablet   • esomeprazole (NEXIUM) 40 mg, Oral, 2 times daily before meals   • fluticasone (FLONASE) 50 mcg/act nasal spray fluticasone propionate 50 mcg/actuation nasal spray,suspension   • hydrOXYzine HCL (ATARAX) 25 mg, 3 times daily PRN   • hyoscyamine (LEVSIN/SL) 0.125 mg, Oral, Every 6 hours, Place 1 tablet under the tongue every 6 (six) hours as needed   • ketorolac (TORADOL) 10 mg, Oral, Every 6 hours PRN   • Magnesium 400 MG CAPS Oral, Daily   • meclizine (ANTIVERT) 25 mg tablet meclizine 25 mg tablet   • metFORMIN (GLUCOPHAGE) 500 mg, 2 times daily with meals   • mometasone 110 mcg/actuation AEPB inhaler Asmanex Twisthaler 110 mcg/actuation(30 doses) breath activated inhalr   • multivitamin-iron-minerals-folic acid (CENTRUM) chewable tablet 1 tablet, Oral, Daily   • pravastatin (PRAVACHOL) 80 mg, Daily   • promethazine (PHENERGAN) 25 mg, Oral, Every 6 hours PRN   • rimegepant sulfate (Nurtec) 75 mg TBDP Take 1 tablet (75 mg) by mouth once at the onset of a headache.  Max dose: 75 mg/24 hrs   • SALINE NASAL SPRAY NA 2 sprays, Nasal, 2 times daily   • tiZANidine (ZANAFLEX) 2 mg, Oral, Daily at bedtime   • topiramate (TOPAMAX) 200 mg, Oral, 2 times daily   • triamcinolone (KENALOG) 0.1 % cream triamcinolone acetonide 0.1 % topical cream   • verapamil (CALAN-SR) 120 mg, Oral, Daily     Allergies   Allergen Reactions   • Other Shortness Of Breath     Nuts, coconut   • Talwin [Pentazocine] Shortness Of Breath and Other (See Comments)     dizziness       PHYSICAL EXAM:    Objective   Blood pressure 130/64, pulse 78, temperature 98 °F (36.7 °C), temperature source Tympanic, resp. rate 17, height 5' 2" (1.575 m), weight 80.7 kg (178 lb), SpO2 99 %. Body mass index is 32.56 kg/m². Gen: NAD  CV: RRR  CHEST: Clear  ABD: Soft, NT/ND  EXT: No edema    ASSESSMENT AND PLAN:  This is a 59y.o. year old female here for EGD, and she is stable and optimized for her procedure. Martín Carolina D.O. Gastroenterology Fellow  [address] [facility]  Division of Gastroenterology & Hepatology  Available on [employer]    ** Please Note: This note is constructed using a voice recognition dictation system.  **

## 2023-09-21 NOTE — ANESTHESIA PREPROCEDURE EVALUATION
Procedure:  EGD    Relevant Problems   CARDIO   (+) Chronic migraine without aura, intractable, with status migrainosus   (+) Hypertension, essential      ENDO   (+) Type 2 diabetes mellitus, without long-term current use of insulin (HCC)      GI/HEPATIC   (+) GERD (gastroesophageal reflux disease)   (+) Hiatal hernia      MUSCULOSKELETAL   (+) Hiatal hernia      NEURO/PSYCH   (+) Chronic migraine without aura, intractable, with status migrainosus   (+) Complex partial seizures with consciousness impaired (HCC)      PULMONARY   (+) Asthma      Nervous and Auditory   (+) Radiculopathy, cervical region      Hematopoietic and Hemostatic   (+) Thrombophilia (HCC)      Sarcoidosis (in right arm?) - no steroid therapy  Gastroparesis    Physical Exam    Airway    Mallampati score: III  TM Distance: <3 FB  Neck ROM: full     Dental   Comment: None loose, No notable dental hx     Cardiovascular      Pulmonary      Other Findings        Anesthesia Plan  ASA Score- 3     Anesthesia Type- IV sedation with anesthesia with ASA Monitors. Additional Monitors:   Airway Plan:     Comment: NPO after    Patient educated on the possibility for awareness under sedation and of the possibility of airway intervention in the event of an airway or procedural emergency  . Plan Factors-Exercise tolerance (METS): <4 METS. Chart reviewed. Patient summary reviewed. Patient is not a current smoker. Induction- intravenous. Postoperative Plan-     Informed Consent- Anesthetic plan and risks discussed with patient. I personally reviewed this patient with the CRNA. Discussed and agreed on the Anesthesia Plan with the CRNA. Georgia Philip

## 2023-09-25 ENCOUNTER — TELEPHONE (OUTPATIENT)
Dept: HEMATOLOGY ONCOLOGY | Facility: CLINIC | Age: 63
End: 2023-09-25

## 2023-09-25 NOTE — TELEPHONE ENCOUNTER
Appointment Schedule   Who are you speaking with? Patient   If it is not the patient, are they listed on an active communication consent form? N/A   Which provider is the appointment scheduled with? Dr. Ilana Vázquez   At which location is the appointment scheduled for? [place]   When is the appointment scheduled? Please list date and time 10/4/23 3pm   What is the reason for this appointment? consult   Did patient voice understanding of the details of this appointment? Yes   Was the no show policy reviewed with patient?  Yes

## 2023-09-25 NOTE — TELEPHONE ENCOUNTER
I called [facility] in response to a referral that was received for patient to establish care with Thoracic Surgery. Outreach was made to schedule a consultation. I left a voicemail explaining the reason for my call and advised patient to call [facility] [place] at [phone]. Another attempt will be made to contact patient.

## 2023-10-04 ENCOUNTER — OFFICE VISIT (OUTPATIENT)
Dept: CARDIAC SURGERY | Facility: CLINIC | Age: 63
End: 2023-10-04
Payer: COMMERCIAL

## 2023-10-04 VITALS
BODY MASS INDEX: 32.86 KG/M2 | TEMPERATURE: 98.3 F | SYSTOLIC BLOOD PRESSURE: 130 MMHG | DIASTOLIC BLOOD PRESSURE: 80 MMHG | WEIGHT: 178.57 LBS | HEIGHT: 62 IN | OXYGEN SATURATION: 99 % | HEART RATE: 80 BPM | RESPIRATION RATE: 14 BRPM

## 2023-10-04 DIAGNOSIS — K44.9 HIATAL HERNIA: ICD-10-CM

## 2023-10-04 DIAGNOSIS — K22.70 BARRETT'S ESOPHAGUS WITHOUT DYSPLASIA: ICD-10-CM

## 2023-10-04 DIAGNOSIS — K21.00 GASTROESOPHAGEAL REFLUX DISEASE WITH ESOPHAGITIS WITHOUT HEMORRHAGE: Primary | ICD-10-CM

## 2023-10-04 PROCEDURE — 99245 OFF/OP CONSLTJ NEW/EST HI 55: CPT | Performed by: THORACIC SURGERY (CARDIOTHORACIC VASCULAR SURGERY)

## 2023-10-04 NOTE — ASSESSMENT & PLAN NOTE
Ms Lana Marcano has a 6cm type III paraesophageal hernia with significant GERD and Richardson's esophagus. Her symptoms are uncontrolled despite Nexium, Pepcid and Tums. We explained that Richardson's esophagus is a pre-cursor to esophageal cancer. Dr Joya Sainz is recommending surgical intervention with hernia repair and anti-reflux procedure. We will obtain esophageal manometry to assess her esophageal motility for surgical planning. We will see her back after this is complete to discuss surgical intervention.

## 2023-10-04 NOTE — PROGRESS NOTES
Thoracic Consult  Assessment/Plan:    Richardson's esophagus without dysplasia  Ms Capri Weiner has a 6cm type III paraesophageal hernia with significant GERD and Richardson's esophagus. Her symptoms are uncontrolled despite Nexium, Pepcid and Tums. We explained that Richardson's esophagus is a pre-cursor to esophageal cancer. Dr Elise Dawson is recommending surgical intervention with hernia repair and anti-reflux procedure. We will obtain esophageal manometry to assess her esophageal motility for surgical planning. We will see her back after this is complete to discuss surgical intervention. Diagnoses and all orders for this visit:    Gastroesophageal reflux disease with esophagitis without hemorrhage    Hiatal hernia  -     Ambulatory Referral to Thoracic Surgery  -     Esophageal manometry; Future    Richardson's esophagus without dysplasia  -     Ambulatory Referral to Thoracic Surgery  -     Esophageal manometry; Future    Other orders  -     betamethasone valerate (VALISONE) 0.1 % ointment          Thoracic History   Diagnosis: Richardson's esophagus, type III paraesophageal hernia   Procedures/Surgeries:    Pathology:    Adjuvant Therapy:       Subjective:    Patient ID: Tiarra Valenzuela is a 59 y.o. female. HPI   ECOG 0    Ms Capri Weiner is a 59year old female with PMH DMII, HTN, migraines, and obesity (BMI 32.5) who was referred to our office by Dr. Srikanth Garsia for evaluation of a type III paraesophageal hernia and Richardson's esophagus. CT CAP on 4/22/2023 was personally reviewed by myself in PACS and showed a moderate to large hiatal hernia. PSH: Sigmoidectomy October 2003, cholecystectomy January 1989    EGD on 9/21/2023 showed a 6cm hiatal hernia, with clinical evidence of Richardson's esophagus with evidence of gastritis tissue biopsy of esophagus was consistent with Richardson's esophagus.      They are on no AC/AP     On discussion she has been dealing with reflux symptoms for at least 20 years and  she also struggled with gastroparesis and IBS for the last 10 years. She is currently on Nexium 40mg daily and Pepcid 20mg once a day. Despite these medications she still has heartburn that occurs daily nd she takes Tums multiple times a day. When she is eating spicy foods she feels they burn when swallowing but no dysphagia. She sleeps on a recliner sitting up. She has nausea associated with migraines. No vomiting. She has left lower quadrant pain and diarrhea she associates with her IBS. GERD HRQL 23  The following portions of the patient's history were reviewed and updated as appropriate: allergies, current medications, past family history, past medical history, past social history, past surgical history and problem list.    Past Medical History:   Diagnosis Date   • Asthma    • Bell palsy    • Diabetes mellitus ([address])     type 2   • Diverticulitis 11/25/2022   • EEG abnormality without seizure    • Gastroparesis    • GERD (gastroesophageal reflux disease)    • Headache, migraine    • Hiatal hernia    • IBS (irritable bowel syndrome)    • Migraines    • Sarcoidosis       Past Surgical History:   Procedure Laterality Date   • CHOLECYSTECTOMY     • [place] COLONOSCOPY FLX DX W/COLLJ SPEC WHEN PFRMD N/A 3/8/2017    Procedure: EGD AND COLONOSCOPY;  Surgeon: Harry Chappell MD;  Location: [facility];   Service: Gastroenterology   • REPLACEMENT TOTAL KNEE Right    • SIGMOIDECTOMY        Family History   Problem Relation Age of Onset   • Diabetes Mother    • Heart disease Mother    • Cancer Father    • Liver cancer Father    • Brain cancer Father    • Arthritis Sister    • Migraines Brother    • Seizures Maternal Aunt    • Migraines Maternal Uncle       Social History     Socioeconomic History   • Marital status: /Civil Union     Spouse name: Not on file   • Number of children: Not on file   • Years of education: Not on file   • Highest education level: Not on file   Occupational History   • Not on file   Tobacco Use   • Smoking status: Never   • Smokeless tobacco: Never   Vaping Use   • Vaping Use: Never used   Substance and Sexual Activity   • Alcohol use: Yes     Comment: socially   • Drug use: No   • Sexual activity: Not on file   Other Topics Concern   • Not on file   Social History Narrative   • Not on file     Social Determinants of Health     Financial Resource Strain: Not on file   Food Insecurity: Not on file   Transportation Needs: Not on file   Physical Activity: Not on file   Stress: Not on file   Social Connections: Not on file   Intimate Partner Violence: Not on file   Housing Stability: Not on file      Review of Systems   Constitutional: Negative for chills, diaphoresis and unexpected weight change. HENT: Negative. Eyes: Negative. Respiratory: Negative for cough, shortness of breath and wheezing. Cardiovascular: Negative for chest pain and leg swelling. Gastrointestinal: Negative for abdominal pain, diarrhea and nausea. Endocrine: Negative. Genitourinary: Negative. Musculoskeletal: Negative. Skin: Negative. Allergic/Immunologic: Negative. Neurological: Negative. Hematological: Negative for adenopathy. Does not bruise/bleed easily. Psychiatric/Behavioral: Negative. All other systems reviewed and are negative. Objective:   Physical Exam  Vitals reviewed. Constitutional:       General: She is not in acute distress. Appearance: Normal appearance. She is not ill-appearing. HENT:      Head: Normocephalic. Nose: Nose normal.      Mouth/Throat:      Mouth: Mucous membranes are moist.   Eyes:      Pupils: Pupils are equal, round, and reactive to light. Cardiovascular:      Rate and Rhythm: Normal rate and regular rhythm. Heart sounds: Normal heart sounds. Pulmonary:      Effort: Pulmonary effort is normal.      Breath sounds: Normal breath sounds. No wheezing, rhonchi or rales. Abdominal:      Palpations: Abdomen is soft.           Comments: Surgical scars from cholecystectomy and sigmoidectomy notes   Musculoskeletal:         General: No swelling. Normal range of motion. Lymphadenopathy:      Cervical: No cervical adenopathy. Skin:     General: Skin is warm. Neurological:      General: No focal deficit present. Mental Status: She is alert and oriented to person, place, and time. Psychiatric:         Mood and Affect: Mood normal.     /80 (BP Location: Left arm, Patient Position: Sitting, Cuff Size: Standard)   Pulse 80   Temp 98.3 °F (36.8 °C) (Temporal)   Resp 14   Ht 5' 2" (1.575 m)   Wt 81 kg (178 lb 9.2 oz)   SpO2 99%   BMI 32.66 kg/m²     No Chest XR results available for this patient. No CT Chest results available for this patient. No CT Chest,Abdomen,Pelvis results available for this patient. No NM PET CT results available for this patient. No Barium Swallow results available for this patient.

## 2023-10-05 ENCOUNTER — TELEPHONE (OUTPATIENT)
Dept: GASTROENTEROLOGY | Facility: HOSPITAL | Age: 63
End: 2023-10-05

## 2023-10-09 ENCOUNTER — TELEPHONE (OUTPATIENT)
Dept: GASTROENTEROLOGY | Facility: HOSPITAL | Age: 63
End: 2023-10-09

## 2023-10-16 ENCOUNTER — HOSPITAL ENCOUNTER (OUTPATIENT)
Dept: GASTROENTEROLOGY | Facility: HOSPITAL | Age: 63
Discharge: HOME | End: 2023-10-16
Payer: COMMERCIAL

## 2023-10-16 VITALS
RESPIRATION RATE: 16 BRPM | OXYGEN SATURATION: 98 % | DIASTOLIC BLOOD PRESSURE: 79 MMHG | TEMPERATURE: 97.5 F | SYSTOLIC BLOOD PRESSURE: 138 MMHG

## 2023-10-16 DIAGNOSIS — K44.9 HIATAL HERNIA: ICD-10-CM

## 2023-10-16 DIAGNOSIS — K22.70 BARRETT'S ESOPHAGUS WITHOUT DYSPLASIA: ICD-10-CM

## 2023-10-16 PROCEDURE — 91010 ESOPHAGUS MOTILITY STUDY: CPT

## 2023-10-16 RX ADMIN — TOPICAL ANESTHETIC 2 SPRAY: 200 SPRAY DENTAL; PERIODONTAL at 11:30

## 2023-10-16 NOTE — PERIOPERATIVE NURSING NOTE
Patient brought in the room and explained the esophageal manometry procedure. After the confirmation of allergies, hurricaine one spray given via oral cavity and  a transnasal insertion of the High Resolution esophageal manometry catheter was inserted via left nostril. Patient given water to drink during the insertion and once the catheter inserted pressure bands of both Upper esophageal sphincter  (UES) and Lower esophageal sphincter ( LES) were observed on the color contour. Patient instructed to take a deep breath to verify placement of the catheter, diaphragmatic pinch noted on inspiration. Catheter was secured to left cheek. Patient was assisted to supine position . Patient was instructed to relax  while acclimating the catheter for about 5 minutes. A 30 second baseline resting pressure was obtained to identify the UES and LES followed by a series of 10 liquid swallows using 5 cc room temperature normal saline to assess esophageal motility and bolus transit. Patient administered 10 viscous swallows using 5 cc viscous solution, 1 multiple rapid drink swallow using 2 cc room temperature normal saline given a total of 5 drinks. Patient instructed to sit up at the edge of the stretcher and given 5 upright liquid swallows using 5 cc room temperature normal saline and 1 rapid drink challenge using 200 cc room temperature water. At the end of the procedure the high resolution esophageal manometry catheter was removed from the nostril intact. Patient given discharge instructions and patient left in stable condition.

## 2023-10-17 DIAGNOSIS — G43.701 CHRONIC MIGRAINE WITHOUT AURA, WITH STATUS MIGRAINOSUS: ICD-10-CM

## 2023-10-17 NOTE — TELEPHONE ENCOUNTER
VM rcvd 10/17/23 at 9:18 am:  This is Dattch. My birthday is [birth year]. I need a refill of Toradol 10 milligrams, and it goes to [address] at [facility] my phone number [phone]. Thank you.  _________________________________________________________________  Kian Hussein pt. Made her aware the refill request was received, and will be sent to the provider for review. Last OV was 8/2/23.

## 2023-10-18 RX ORDER — KETOROLAC TROMETHAMINE 10 MG/1
10 TABLET, FILM COATED ORAL EVERY 6 HOURS PRN
Qty: 20 TABLET | Refills: 0 | Status: SHIPPED | OUTPATIENT
Start: 2023-10-18

## 2023-10-25 ENCOUNTER — OFFICE VISIT (OUTPATIENT)
Dept: CARDIAC SURGERY | Facility: CLINIC | Age: 63
End: 2023-10-25
Payer: COMMERCIAL

## 2023-10-25 VITALS
BODY MASS INDEX: 32.5 KG/M2 | RESPIRATION RATE: 14 BRPM | TEMPERATURE: 98 F | HEART RATE: 94 BPM | SYSTOLIC BLOOD PRESSURE: 119 MMHG | WEIGHT: 176.59 LBS | HEIGHT: 62 IN | DIASTOLIC BLOOD PRESSURE: 63 MMHG | OXYGEN SATURATION: 96 %

## 2023-10-25 DIAGNOSIS — K44.9 HIATAL HERNIA: Primary | ICD-10-CM

## 2023-10-25 DIAGNOSIS — K21.9 GASTROESOPHAGEAL REFLUX DISEASE WITHOUT ESOPHAGITIS: ICD-10-CM

## 2023-10-25 PROCEDURE — 99214 OFFICE O/P EST MOD 30 MIN: CPT | Performed by: THORACIC SURGERY (CARDIOTHORACIC VASCULAR SURGERY)

## 2023-10-25 NOTE — ASSESSMENT & PLAN NOTE
We had a discussion regarding her manometry, which was within normal limits, with the exception of an 8 cm paraesophageal hernia. She is symptomatic from this and therefore, Dr. Esthela Reed is recommending an EGD, robotic assisted paraesophageal hernia repair with possible mesh vs. Linx placement. Both procedures, possible risks, and post operative course were explained and all questions were answered. The patient would like to undergo surgery in January after the holidays. She signed consent today and was given a diet sheet. She will return for an updated H and P and would like to proceed with the Linx procedure.

## 2023-10-25 NOTE — PROGRESS NOTES
Assessment/Plan:    Hiatal hernia  We had a discussion regarding her manometry, which was within normal limits, with the exception of an 8 cm paraesophageal hernia. She is symptomatic from this and therefore, Dr. Arslan Wiggins is recommending an EGD, robotic assisted paraesophageal hernia repair with possible mesh vs. Linx placement. Both procedures, possible risks, and post operative course were explained and all questions were answered. The patient would like to undergo surgery in January after the holidays. She signed consent today and was given a diet sheet. She will return for an updated H and P and would like to proceed with the Linx procedure. Diagnoses and all orders for this visit:    Hiatal hernia    Gastroesophageal reflux disease without esophagitis          Thoracic History   Cancer Staging   No matching staging information was found for the patient. Oncology History    No history exists. Patient ID: Nathanael Branham is a 59 y.o. female. ECOG 0    HPI    Kvng Goodman is a 58 yo female who we evaluated for Richardson's esophagus without dysplasia for a type III paraesophageal hernia with significant GERD and Richardson's esophagus. Her symptoms were uncontrolled with Nexium, Pepcid, and TUMS. We recommended esophageal manometry. She returns today after undergoing testing on 10/16/23. This revealed 10/10 swallow with normal esophageal contractility, with a mean DCI of 2481 mmHg. s.cm, IRP is 5. GE junction to LES is 8.5 cm. Rapid swallow index within normal limits. [place] classification is normal esophageal motility with a large hiatal hernia. On discussion, she did have a previous open cholecystectomy and a partial LLQ partial bowel resection. She has a lot of heartburn and left abdominal pain. She denies nausea, vomiting, early satiety, liquid/ solid regurgitation  She has migraines. Her IBS is bothering her.      The following portions of the patient's history were reviewed and updated as appropriate: allergies, current medications, past family history, past medical history, past social history, past surgical history and problem list.    Review of Systems   Constitutional:  Negative for chills, diaphoresis and unexpected weight change. HENT: Negative. Eyes: Negative. Respiratory:  Negative for cough, shortness of breath and wheezing. Cardiovascular:  Positive for chest pain (heartburn). Negative for leg swelling. Gastrointestinal:  Negative for abdominal pain, diarrhea and nausea. Endocrine: Negative. Genitourinary: Negative. Musculoskeletal:  Negative for back pain, gait problem and neck pain. Skin: Negative. Allergic/Immunologic: Negative. Neurological:  Positive for headaches. Hematological:  Negative for adenopathy. Does not bruise/bleed easily. Psychiatric/Behavioral:  Negative for agitation, behavioral problems and confusion. All other systems reviewed and are negative. Objective:   Physical Exam  Vitals reviewed. Constitutional:       General: She is not in acute distress. Appearance: Normal appearance. She is well-developed. She is not diaphoretic. HENT:      Head: Normocephalic and atraumatic. Nose: Nose normal.   Eyes:      General: No scleral icterus. Extraocular Movements: Extraocular movements intact. Neck:      Trachea: No tracheal deviation. Cardiovascular:      Rate and Rhythm: Normal rate and regular rhythm. Pulses: Normal pulses. Heart sounds: Normal heart sounds. No murmur heard. Pulmonary:      Effort: Pulmonary effort is normal. No respiratory distress. Breath sounds: Normal breath sounds. No wheezing. Abdominal:      General: Bowel sounds are normal. There is no distension. Palpations: Abdomen is soft. Musculoskeletal:         General: Normal range of motion. Cervical back: Normal range of motion and neck supple. Right lower leg: No edema. Left lower leg: No edema. Lymphadenopathy:      Cervical: No cervical adenopathy. Skin:     General: Skin is warm and dry. Neurological:      Mental Status: She is alert and oriented to person, place, and time. Cranial Nerves: No cranial nerve deficit. Psychiatric:         Mood and Affect: Mood normal.         Behavior: Behavior normal.         Thought Content:  Thought content normal.     /63 (BP Location: Right arm, Patient Position: Sitting, Cuff Size: Large)   Pulse 94   Temp 98 °F (36.7 °C) (Temporal)   Resp 14   Ht 5' 2" (1.575 m)   Wt 80.1 kg (176 lb 9.4 oz)   SpO2 96%   BMI 32.30 kg/m²

## 2023-12-11 ENCOUNTER — OFFICE VISIT (OUTPATIENT)
Dept: CARDIAC SURGERY | Facility: CLINIC | Age: 63
End: 2023-12-11
Payer: COMMERCIAL

## 2023-12-11 ENCOUNTER — TELEPHONE (OUTPATIENT)
Dept: HEMATOLOGY ONCOLOGY | Facility: CLINIC | Age: 63
End: 2023-12-11

## 2023-12-11 VITALS
WEIGHT: 174.38 LBS | HEIGHT: 62 IN | HEART RATE: 97 BPM | DIASTOLIC BLOOD PRESSURE: 70 MMHG | BODY MASS INDEX: 32.09 KG/M2 | OXYGEN SATURATION: 94 % | RESPIRATION RATE: 15 BRPM | SYSTOLIC BLOOD PRESSURE: 137 MMHG | TEMPERATURE: 98 F

## 2023-12-11 DIAGNOSIS — K44.9 HIATAL HERNIA: Primary | ICD-10-CM

## 2023-12-11 DIAGNOSIS — K21.00 GASTROESOPHAGEAL REFLUX DISEASE WITH ESOPHAGITIS WITHOUT HEMORRHAGE: ICD-10-CM

## 2023-12-11 PROCEDURE — 99213 OFFICE O/P EST LOW 20 MIN: CPT

## 2023-12-11 RX ORDER — HEPARIN SODIUM 5000 [USP'U]/ML
5000 INJECTION, SOLUTION INTRAVENOUS; SUBCUTANEOUS
OUTPATIENT
Start: 2023-12-12 | End: 2023-12-13

## 2023-12-11 RX ORDER — CEFAZOLIN SODIUM 2 G/50ML
2000 SOLUTION INTRAVENOUS ONCE
OUTPATIENT
Start: 2023-12-11 | End: 2023-12-11

## 2023-12-11 NOTE — TELEPHONE ENCOUNTER
Patient calling to see if lab orders were fasting or not. I informed patient labs will most likely have one fasting test and one not fasting test.  Patient verbalized her understanding.

## 2023-12-11 NOTE — H&P (VIEW-ONLY)
Thoracic Follow-Up  Assessment/Plan:    Hiatal hernia  Ms. Berg returns to the office for an updated H&P prior to EGD, robotic assisted laparoscopic paraesophageal hernia repair with partial fundoplication versus LINX device placement, possible mesh.  Consent signed at last office visit. Pre-operative labs ordered. Surgical scrub provided at last visit. All questions answered. Patient in agreement with plan.        Diagnoses and all orders for this visit:    Hiatal hernia  -     Case request operating room: REPAIR HERNIA PARAESOPHAGEAL LAPAROSCOPIC W ROBOTICS, possible partial fundoplication vs LINX device placement, possible mesh, ESOPHAGOGASTRODUODENOSCOPY (EGD); Standing  -     Type and screen; Future  -     Basic metabolic panel; Future  -     CBC and Platelet; Future  -     APTT; Future  -     Protime-INR; Future  -     Case request operating room: REPAIR HERNIA PARAESOPHAGEAL LAPAROSCOPIC W ROBOTICS, possible partial fundoplication vs LINX device placement, possible mesh, ESOPHAGOGASTRODUODENOSCOPY (EGD)    Gastroesophageal reflux disease with esophagitis without hemorrhage    Other orders  -     Diet NPO; Sips with meds; Standing  -     Void on call to OR; Standing  -     Insert peripheral IV; Standing  -     Place sequential compression device; Standing  -     Shower/scrub; Standing  -     ceFAZolin (ANCEF) IVPB (premix in dextrose) 2,000 mg 50 mL  -     heparin (porcine) subcutaneous injection 5,000 Units          Thoracic History     Diagnosis: hiatal hernia     Subjective:    Patient ID: Clotilde Berg is a 63 y.o. female. ECOG 0    HPI    Clotilde Berg is a 63 year old female with PMH significant for DM2, asthma, migraines, and BMI 32 who presents today for updated H&P prior to EGD, robotic assisted laparoscopic paraesophageal hernia repair with partial fundoplication versus LINX device placement.  At last office visit, there esophageal manometry results were reviewed and there was a  discussion of the procedure, risks, and postoperative course.    Esophageal manometry performed on 10/16/23 demonstrated normal esophageal motility with a large hiatal hernia.    On discussion today, patient reports continued heartburn. Occassional dysphagia with thicker foods such as bread. Intermittent LLQ abdominal pain, ongoing since last November 2022. Denies nausea, vomiting, regurgitation, weight loss. Reports current sore throat and dry cough at night. Past surgical history significant for open cholecystectomy (1989), sigmoid colon resection for diverticulitis (2003). Not on anticoagulation.       The following portions of the patient's history were reviewed and updated as appropriate: allergies, current medications, past family history, past medical history, past social history, past surgical history, and problem list.    Review of Systems   Constitutional:  Negative for chills, fever and unexpected weight change.   HENT:  Positive for sore throat and trouble swallowing (intermittent, with thick foods).    Respiratory:  Positive for cough (at night, dry cough). Negative for shortness of breath.    Cardiovascular:  Negative for chest pain.   Gastrointestinal:  Positive for abdominal pain (intermittent LLQ). Negative for nausea and vomiting.   Neurological:  Positive for headaches (migraines, last one was last week).         Objective:   Physical Exam  Constitutional:       General: She is not in acute distress.  HENT:      Head: Normocephalic and atraumatic.      Nose: Nose normal.   Eyes:      Extraocular Movements: Extraocular movements intact.      Comments: Wears glasses     Cardiovascular:      Rate and Rhythm: Normal rate and regular rhythm.   Pulmonary:      Effort: Pulmonary effort is normal.      Breath sounds: Normal breath sounds.   Abdominal:      Palpations: Abdomen is soft.      Tenderness: There is no abdominal tenderness.      Comments: Scars present from previous open cholecystectomy and  "approximately 2.5 inch scar in left lower quadrant from previous sigmoid colon resection.   Musculoskeletal:      Right lower leg: No edema.      Left lower leg: No edema.   Skin:     General: Skin is warm and dry.     /70 (BP Location: Left arm, Patient Position: Sitting, Cuff Size: Standard)   Pulse 97   Temp 98 °F (36.7 °C) (Temporal)   Resp 15   Ht 5' 2\" (1.575 m)   Wt 79.1 kg (174 lb 6.1 oz)   SpO2 94%   BMI 31.90 kg/m²     No Chest XR results available for this patient.   No CT Chest results available for this patient.  No CT Chest,Abdomen,Pelvis results available for this patient.   No NM PET CT results available for this patient.   No Barium Swallow results available for this patient.    "

## 2023-12-11 NOTE — PROGRESS NOTES
Thoracic Follow-Up  Assessment/Plan:    Hiatal hernia  Ms. Carlo Padgett returns to the office for an updated H&P prior to EGD, robotic assisted laparoscopic paraesophageal hernia repair with partial fundoplication versus LINX device placement, possible mesh. Consent signed at last office visit. Pre-operative labs ordered. Surgical scrub provided at last visit. All questions answered. Patient in agreement with plan. Diagnoses and all orders for this visit:    Hiatal hernia  -     Case request operating room: REPAIR HERNIA PARAESOPHAGEAL LAPAROSCOPIC W ROBOTICS, possible partial fundoplication vs LINX device placement, possible mesh, ESOPHAGOGASTRODUODENOSCOPY (EGD); Standing  -     Type and screen; Future  -     Basic metabolic panel; Future  -     CBC and Platelet; Future  -     APTT; Future  -     Protime-INR; Future  -     Case request operating room: REPAIR HERNIA PARAESOPHAGEAL LAPAROSCOPIC W ROBOTICS, possible partial fundoplication vs LINX device placement, possible mesh, ESOPHAGOGASTRODUODENOSCOPY (EGD)    Gastroesophageal reflux disease with esophagitis without hemorrhage    Other orders  -     Diet NPO; Sips with meds; Standing  -     Void on call to OR; Standing  -     Insert peripheral IV; Standing  -     Place sequential compression device; Standing  -     Shower/scrub; Standing  -     ceFAZolin (ANCEF) IVPB (premix in dextrose) 2,000 mg 50 mL  -     heparin (porcine) subcutaneous injection 5,000 Units          Thoracic History     Diagnosis: hiatal hernia     Subjective:    Patient ID: Jazzy Skinner is a 59 y.o. female. ECOG 0    HPI    Jeevan Dawson is a 59year old female with PMH significant for DM2, asthma, migraines, and BMI 32 who presents today for updated H&P prior to EGD, robotic assisted laparoscopic paraesophageal hernia repair with partial fundoplication versus LINX device placement.   At last office visit, there esophageal manometry results were reviewed and there was a discussion of the procedure, risks, and postoperative course. Esophageal manometry performed on 10/16/23 demonstrated normal esophageal motility with a large hiatal hernia. On discussion today, patient reports continued heartburn. Occassional dysphagia with thicker foods such as bread. Intermittent LLQ abdominal pain, ongoing since last November 2022. Denies nausea, vomiting, regurgitation, weight loss. Reports current sore throat and dry cough at night. Past surgical history significant for open cholecystectomy (1989), sigmoid colon resection for diverticulitis (2003). Not on anticoagulation. The following portions of the patient's history were reviewed and updated as appropriate: allergies, current medications, past family history, past medical history, past social history, past surgical history, and problem list.    Review of Systems   Constitutional:  Negative for chills, fever and unexpected weight change. HENT:  Positive for sore throat and trouble swallowing (intermittent, with thick foods). Respiratory:  Positive for cough (at night, dry cough). Negative for shortness of breath. Cardiovascular:  Negative for chest pain. Gastrointestinal:  Positive for abdominal pain (intermittent LLQ). Negative for nausea and vomiting. Neurological:  Positive for headaches (migraines, last one was last week). Objective:   Physical Exam  Constitutional:       General: She is not in acute distress. HENT:      Head: Normocephalic and atraumatic. Nose: Nose normal.   Eyes:      Extraocular Movements: Extraocular movements intact. Comments: Wears glasses     Cardiovascular:      Rate and Rhythm: Normal rate and regular rhythm. Pulmonary:      Effort: Pulmonary effort is normal.      Breath sounds: Normal breath sounds. Abdominal:      Palpations: Abdomen is soft. Tenderness: There is no abdominal tenderness.       Comments: Scars present from previous open cholecystectomy and approximately 2.5 inch scar in left lower quadrant from previous sigmoid colon resection. Musculoskeletal:      Right lower leg: No edema. Left lower leg: No edema. Skin:     General: Skin is warm and dry. /70 (BP Location: Left arm, Patient Position: Sitting, Cuff Size: Standard)   Pulse 97   Temp 98 °F (36.7 °C) (Temporal)   Resp 15   Ht 5' 2" (1.575 m)   Wt 79.1 kg (174 lb 6.1 oz)   SpO2 94%   BMI 31.90 kg/m²     No Chest XR results available for this patient. No CT Chest results available for this patient. No CT Chest,Abdomen,Pelvis results available for this patient. No NM PET CT results available for this patient. No Barium Swallow results available for this patient.

## 2023-12-12 ENCOUNTER — TELEPHONE (OUTPATIENT)
Dept: CARDIAC SURGERY | Facility: CLINIC | Age: 63
End: 2023-12-12

## 2023-12-12 NOTE — TELEPHONE ENCOUNTER
I called and spoke with pts daughter.  I left my direct number for her mom to give me a call back to make sure she does not have any questions before her upcoming surgery with Dr. Montez Yoon

## 2023-12-12 NOTE — TELEPHONE ENCOUNTER
Pt returned my call I went over presurgery instructions with her. She will call if she needs anything.

## 2023-12-13 ENCOUNTER — TELEPHONE (OUTPATIENT)
Dept: HEMATOLOGY ONCOLOGY | Facility: CLINIC | Age: 63
End: 2023-12-13

## 2023-12-13 NOTE — TELEPHONE ENCOUNTER
Appointment Confirmation   Who are you speaking with? Patient   If it is not the patient, are they listed on an active communication consent form? N/A   Which provider is the appointment scheduled with? Dr. Kojo Munson   When is the appointment scheduled? Please list date and time 1/24/24 220   At which location is the appointment scheduled to take place? [place]   Did caller verbalize understanding of appointment details?  Yes

## 2023-12-17 ENCOUNTER — APPOINTMENT (OUTPATIENT)
Dept: LAB | Facility: CLINIC | Age: 63
End: 2023-12-17
Payer: COMMERCIAL

## 2023-12-17 DIAGNOSIS — K44.9 HIATAL HERNIA: ICD-10-CM

## 2023-12-17 LAB
ABO GROUP BLD: NORMAL
ANION GAP SERPL CALCULATED.3IONS-SCNC: 7 MMOL/L
APTT PPP: 37 SECONDS (ref 23–37)
BLD GP AB SCN SERPL QL: NEGATIVE
BUN SERPL-MCNC: 21 MG/DL (ref 5–25)
CALCIUM SERPL-MCNC: 9.3 MG/DL (ref 8.4–10.2)
CHLORIDE SERPL-SCNC: 109 MMOL/L (ref 96–108)
CO2 SERPL-SCNC: 26 MMOL/L (ref 21–32)
CREAT SERPL-MCNC: 0.87 MG/DL (ref 0.6–1.3)
ERYTHROCYTE [DISTWIDTH] IN BLOOD BY AUTOMATED COUNT: 13.6 % (ref 11.6–15.1)
GFR SERPL CREATININE-BSD FRML MDRD: 71 ML/MIN/1.73SQ M
GLUCOSE P FAST SERPL-MCNC: 116 MG/DL (ref 65–99)
HCT VFR BLD AUTO: 39.3 % (ref 34.8–46.1)
HGB BLD-MCNC: 12.1 G/DL (ref 11.5–15.4)
INR PPP: 1.02 (ref 0.84–1.19)
MCH RBC QN AUTO: 27.4 PG (ref 26.8–34.3)
MCHC RBC AUTO-ENTMCNC: 30.8 G/DL (ref 31.4–37.4)
MCV RBC AUTO: 89 FL (ref 82–98)
PLATELET # BLD AUTO: 250 THOUSANDS/UL (ref 149–390)
PMV BLD AUTO: 10.7 FL (ref 8.9–12.7)
POTASSIUM SERPL-SCNC: 3.9 MMOL/L (ref 3.5–5.3)
PROTHROMBIN TIME: 13.3 SECONDS (ref 11.6–14.5)
RBC # BLD AUTO: 4.42 MILLION/UL (ref 3.81–5.12)
RH BLD: POSITIVE
SODIUM SERPL-SCNC: 142 MMOL/L (ref 135–147)
SPECIMEN EXPIRATION DATE: NORMAL
WBC # BLD AUTO: 8.53 THOUSAND/UL (ref 4.31–10.16)

## 2023-12-17 PROCEDURE — 85610 PROTHROMBIN TIME: CPT

## 2023-12-17 PROCEDURE — 86900 BLOOD TYPING SEROLOGIC ABO: CPT

## 2023-12-17 PROCEDURE — 85730 THROMBOPLASTIN TIME PARTIAL: CPT

## 2023-12-17 PROCEDURE — 36415 COLL VENOUS BLD VENIPUNCTURE: CPT

## 2023-12-17 PROCEDURE — 80048 BASIC METABOLIC PNL TOTAL CA: CPT

## 2023-12-17 PROCEDURE — 86850 RBC ANTIBODY SCREEN: CPT

## 2023-12-17 PROCEDURE — 85027 COMPLETE CBC AUTOMATED: CPT

## 2023-12-17 PROCEDURE — 86901 BLOOD TYPING SEROLOGIC RH(D): CPT

## 2023-12-21 ENCOUNTER — ANESTHESIA EVENT (OUTPATIENT)
Dept: PERIOP | Facility: HOSPITAL | Age: 63
End: 2023-12-21
Payer: COMMERCIAL

## 2024-01-02 RX ORDER — GUAIFENESIN 600 MG/1
1200 TABLET, EXTENDED RELEASE ORAL DAILY PRN
COMMUNITY

## 2024-01-02 RX ORDER — PSEUDOEPHEDRINE HCL 30 MG
60 TABLET ORAL EVERY 4 HOURS PRN
COMMUNITY

## 2024-01-02 RX ORDER — FAMOTIDINE 20 MG/1
20 TABLET, FILM COATED ORAL
COMMUNITY

## 2024-01-02 RX ORDER — IBUPROFEN 200 MG
TABLET ORAL EVERY 6 HOURS PRN
COMMUNITY

## 2024-01-02 NOTE — PRE-PROCEDURE INSTRUCTIONS
Pre-Surgery Instructions:   Medication Instructions    acetaminophen (TYLENOL) 500 mg tablet Take Day of Surgery; Continue to take as prescribed including DOS with a small sip of water, unless usually taken at night     albuterol (PROVENTIL HFA,VENTOLIN HFA) 90 mcg/act inhaler Continue as prescribed    ascorbic acid (VITAMIN C) 500 mg tablet Avoid 1 week prior to surgery      Asmanex, 30 Metered Doses, 220 MCG/ACT inhaler Continue as prescribed    B Complex-C (B-COMPLEX WITH VITAMIN C) tablet Avoid 1 week prior to surgery      Cholecalciferol (VITAMIN D3) 2000 units TABS Avoid 1 week prior to surgery      dicyclomine (BENTYL) 20 mg tablet Take Day of Surgery; Continue to take as prescribed including DOS with a small sip of water, unless usually taken at night     Empagliflozin 25 MG TABS Hold for 4 days    esomeprazole (NexIUM) 40 MG capsule Take Day of Surgery; Continue to take as prescribed including DOS with a small sip of water, unless usually taken at night     famotidine (PEPCID) 20 mg tablet Take Day of Surgery; Continue to take as prescribed including DOS with a small sip of water, unless usually taken at night     guaiFENesin (MUCINEX) 600 mg 12 hr tablet Do not take day of surgery; continue as prescribed excluding DOS     hydrOXYzine HCL (ATARAX) 25 mg tablet Take Day of Surgery; Continue to take as prescribed including DOS with a small sip of water, unless usually taken at night     hyoscyamine (LEVSIN/SL) 0.125 mg SL tablet Take Day of Surgery; Continue to take as prescribed including DOS with a small sip of water, unless usually taken at night     ibuprofen (MOTRIN) 200 mg tablet Hold for at least 3 days    ketorolac (TORADOL) 10 mg tablet Hold for at least 3 days    Magnesium 400 MG CAPS Avoid 1 week prior to surgery      meclizine (ANTIVERT) 25 mg tablet Take Day of Surgery; Continue to take as prescribed including DOS with a small sip of water, unless usually taken at night     metFORMIN (GLUCOPHAGE)  500 mg tablet Do not take day of surgery; continue as prescribed excluding DOS     multivitamin-iron-minerals-folic acid (CENTRUM) chewable tablet Avoid 1 week prior to surgery      pravastatin (PRAVACHOL) 80 mg tablet Take Day of Surgery; Continue to take as prescribed including DOS with a small sip of water, unless usually taken at night     promethazine (PHENERGAN) 25 mg tablet Take Day of Surgery; Continue to take as prescribed including DOS with a small sip of water, unless usually taken at night     pseudoephedrine (SUDAFED) 30 mg tablet Do not take day of surgery; continue as prescribed excluding DOS     SALINE NASAL SPRAY NA Continue as prescribed    tiZANidine (ZANAFLEX) 2 mg tablet Take Day of Surgery; Continue to take as prescribed including DOS with a small sip of water, unless usually taken at night     topiramate (TOPAMAX) 200 MG tablet Take Day of Surgery; Continue to take as prescribed including DOS with a small sip of water, unless usually taken at night     verapamil (CALAN-SR) 120 mg CR tablet Take Day of Surgery; Continue to take as prescribed including DOS with a small sip of water, unless usually taken at night     Medication instructions for day surgery reviewed. Please use only a sip of water to take your instructed medications. Avoid all over the counter vitamins, supplements and NSAIDS for one week prior to surgery per anesthesia guidelines. Tylenol is ok to take as needed.     You will receive a call one business day prior to surgery with an arrival time and hospital directions. If your surgery is scheduled on a Monday, the hospital will be calling you on the Friday prior to your surgery. If you have not heard from anyone by 8pm, please call the hospital supervisor through the hospital  at [phone]. (Terrence [phone]).    Do not eat or drink anything after midnight the night before your surgery, including candy, mints, lifesavers, or chewing gum. Do not drink alcohol 24hrs  before your surgery. Try not to smoke at least 24hrs before your surgery.       Follow the pre surgery showering instructions as listed in the “My Surgical Experience Booklet” or otherwise provided by your surgeon's office. Do not use a blade to shave the surgical area 1 week before surgery. It is okay to use a clean electric clippers up to 24 hours before surgery. Do not apply any lotions, creams, including makeup, cologne, deodorant, or perfumes after showering on the day of your surgery. Do not use dry shampoo, hair spray, hair gel, or any type of hair products.     No contact lenses, eye make-up, or artificial eyelashes. Remove nail polish, including gel polish, and any artificial, gel, or acrylic nails if possible. Remove all jewelry including rings and body piercing jewelry.     Wear causal clothing that is easy to take on and off. Consider your type of surgery.    Keep any valuables, jewelry, piercings at home. Please bring any specially ordered equipment (sling, braces) if indicated.    Arrange for a responsible person to drive you to and from the hospital on the day of your surgery. Visitor Guidelines discussed.     Call the surgeon's office with any new illnesses, exposures, or additional questions prior to surgery.    Please reference your “My Surgical Experience Booklet” for additional information to prepare for your upcoming surgery.

## 2024-01-05 ENCOUNTER — HOSPITAL ENCOUNTER (OUTPATIENT)
Facility: HOSPITAL | Age: 64
Setting detail: OUTPATIENT SURGERY
Discharge: HOME | End: 2024-01-06
Attending: THORACIC SURGERY (CARDIOTHORACIC VASCULAR SURGERY) | Admitting: THORACIC SURGERY (CARDIOTHORACIC VASCULAR SURGERY)
Payer: COMMERCIAL

## 2024-01-05 ENCOUNTER — APPOINTMENT (OUTPATIENT)
Dept: RADIOLOGY | Facility: HOSPITAL | Age: 64
End: 2024-01-05
Payer: COMMERCIAL

## 2024-01-05 ENCOUNTER — ANESTHESIA (OUTPATIENT)
Dept: PERIOP | Facility: HOSPITAL | Age: 64
End: 2024-01-05
Payer: COMMERCIAL

## 2024-01-05 DIAGNOSIS — K44.9 HIATAL HERNIA: ICD-10-CM

## 2024-01-05 PROBLEM — Z98.890 PONV (POSTOPERATIVE NAUSEA AND VOMITING): Status: ACTIVE | Noted: 2024-01-05

## 2024-01-05 PROBLEM — R11.2 PONV (POSTOPERATIVE NAUSEA AND VOMITING): Status: ACTIVE | Noted: 2024-01-05

## 2024-01-05 LAB
GLUCOSE SERPL-MCNC: 133 MG/DL (ref 65–140)
GLUCOSE SERPL-MCNC: 163 MG/DL (ref 65–140)
GLUCOSE SERPL-MCNC: 183 MG/DL (ref 65–140)
GLUCOSE SERPL-MCNC: 260 MG/DL (ref 65–140)

## 2024-01-05 PROCEDURE — 43281 LAP PARAESOPHAG HERN REPAIR: CPT

## 2024-01-05 PROCEDURE — 94664 DEMO&/EVAL PT USE INHALER: CPT

## 2024-01-05 PROCEDURE — C1889 IMPLANT/INSERT DEVICE, NOC: HCPCS | Performed by: THORACIC SURGERY (CARDIOTHORACIC VASCULAR SURGERY)

## 2024-01-05 PROCEDURE — C9113 INJ PANTOPRAZOLE SODIUM, VIA: HCPCS

## 2024-01-05 PROCEDURE — 94760 N-INVAS EAR/PLS OXIMETRY 1: CPT

## 2024-01-05 PROCEDURE — S2900 ROBOTIC SURGICAL SYSTEM: HCPCS | Performed by: THORACIC SURGERY (CARDIOTHORACIC VASCULAR SURGERY)

## 2024-01-05 PROCEDURE — 82948 REAGENT STRIP/BLOOD GLUCOSE: CPT

## 2024-01-05 PROCEDURE — 71045 X-RAY EXAM CHEST 1 VIEW: CPT

## 2024-01-05 PROCEDURE — 43281 LAP PARAESOPHAG HERN REPAIR: CPT | Performed by: THORACIC SURGERY (CARDIOTHORACIC VASCULAR SURGERY)

## 2024-01-05 PROCEDURE — C1781 MESH (IMPLANTABLE): HCPCS | Performed by: THORACIC SURGERY (CARDIOTHORACIC VASCULAR SURGERY)

## 2024-01-05 PROCEDURE — 88305 TISSUE EXAM BY PATHOLOGIST: CPT | Performed by: PATHOLOGY

## 2024-01-05 PROCEDURE — 88302 TISSUE EXAM BY PATHOLOGIST: CPT | Performed by: PATHOLOGY

## 2024-01-05 DEVICE — IMPLANTABLE DEVICE: Type: IMPLANTABLE DEVICE | Site: ESOPHAGUS | Status: FUNCTIONAL

## 2024-01-05 DEVICE — MESH BIO-A MESH GORE: Type: IMPLANTABLE DEVICE | Site: ESOPHAGUS | Status: FUNCTIONAL

## 2024-01-05 RX ORDER — FENTANYL CITRATE 50 UG/ML
INJECTION, SOLUTION INTRAMUSCULAR; INTRAVENOUS AS NEEDED
Status: DISCONTINUED | OUTPATIENT
Start: 2024-01-05 | End: 2024-01-05

## 2024-01-05 RX ORDER — TIZANIDINE 2 MG/1
2 TABLET ORAL
Status: DISCONTINUED | OUTPATIENT
Start: 2024-01-05 | End: 2024-01-06 | Stop reason: HOSPADM

## 2024-01-05 RX ORDER — SODIUM CHLORIDE 9 MG/ML
INJECTION, SOLUTION INTRAVENOUS CONTINUOUS PRN
Status: DISCONTINUED | OUTPATIENT
Start: 2024-01-05 | End: 2024-01-05

## 2024-01-05 RX ORDER — SODIUM CHLORIDE, SODIUM LACTATE, POTASSIUM CHLORIDE, CALCIUM CHLORIDE 600; 310; 30; 20 MG/100ML; MG/100ML; MG/100ML; MG/100ML
50 INJECTION, SOLUTION INTRAVENOUS CONTINUOUS
Status: DISCONTINUED | OUTPATIENT
Start: 2024-01-05 | End: 2024-01-06

## 2024-01-05 RX ORDER — ALBUTEROL SULFATE 90 UG/1
2 AEROSOL, METERED RESPIRATORY (INHALATION) EVERY 6 HOURS PRN
Status: DISCONTINUED | OUTPATIENT
Start: 2024-01-05 | End: 2024-01-06 | Stop reason: HOSPADM

## 2024-01-05 RX ORDER — SODIUM CHLORIDE, SODIUM LACTATE, POTASSIUM CHLORIDE, CALCIUM CHLORIDE 600; 310; 30; 20 MG/100ML; MG/100ML; MG/100ML; MG/100ML
INJECTION, SOLUTION INTRAVENOUS CONTINUOUS PRN
Status: DISCONTINUED | OUTPATIENT
Start: 2024-01-05 | End: 2024-01-05

## 2024-01-05 RX ORDER — METHOCARBAMOL 500 MG/1
500 TABLET, FILM COATED ORAL EVERY 6 HOURS PRN
Status: DISCONTINUED | OUTPATIENT
Start: 2024-01-05 | End: 2024-01-06 | Stop reason: HOSPADM

## 2024-01-05 RX ORDER — BUPIVACAINE HYDROCHLORIDE 2.5 MG/ML
INJECTION, SOLUTION EPIDURAL; INFILTRATION; INTRACAUDAL AS NEEDED
Status: DISCONTINUED | OUTPATIENT
Start: 2024-01-05 | End: 2024-01-05 | Stop reason: HOSPADM

## 2024-01-05 RX ORDER — OXYCODONE HCL 5 MG/5 ML
2.5 SOLUTION, ORAL ORAL EVERY 4 HOURS PRN
Status: DISCONTINUED | OUTPATIENT
Start: 2024-01-05 | End: 2024-01-06 | Stop reason: HOSPADM

## 2024-01-05 RX ORDER — PRAVASTATIN SODIUM 80 MG/1
80 TABLET ORAL DAILY
Status: DISCONTINUED | OUTPATIENT
Start: 2024-01-05 | End: 2024-01-06 | Stop reason: HOSPADM

## 2024-01-05 RX ORDER — ONDANSETRON 2 MG/ML
4 INJECTION INTRAMUSCULAR; INTRAVENOUS EVERY 6 HOURS PRN
Status: DISCONTINUED | OUTPATIENT
Start: 2024-01-05 | End: 2024-01-06 | Stop reason: HOSPADM

## 2024-01-05 RX ORDER — LIDOCAINE HYDROCHLORIDE 10 MG/ML
INJECTION, SOLUTION EPIDURAL; INFILTRATION; INTRACAUDAL; PERINEURAL AS NEEDED
Status: DISCONTINUED | OUTPATIENT
Start: 2024-01-05 | End: 2024-01-05

## 2024-01-05 RX ORDER — ACETAMINOPHEN 325 MG/1
975 TABLET ORAL EVERY 6 HOURS
Status: DISCONTINUED | OUTPATIENT
Start: 2024-01-05 | End: 2024-01-06 | Stop reason: HOSPADM

## 2024-01-05 RX ORDER — SODIUM CHLORIDE, SODIUM LACTATE, POTASSIUM CHLORIDE, CALCIUM CHLORIDE 600; 310; 30; 20 MG/100ML; MG/100ML; MG/100ML; MG/100ML
75 INJECTION, SOLUTION INTRAVENOUS CONTINUOUS
Status: DISCONTINUED | OUTPATIENT
Start: 2024-01-05 | End: 2024-01-06

## 2024-01-05 RX ORDER — DIPHENHYDRAMINE HYDROCHLORIDE 50 MG/ML
INJECTION INTRAMUSCULAR; INTRAVENOUS AS NEEDED
Status: DISCONTINUED | OUTPATIENT
Start: 2024-01-05 | End: 2024-01-05

## 2024-01-05 RX ORDER — HYDROXYZINE HYDROCHLORIDE 25 MG/1
25 TABLET, FILM COATED ORAL 3 TIMES DAILY PRN
Status: DISCONTINUED | OUTPATIENT
Start: 2024-01-05 | End: 2024-01-06 | Stop reason: HOSPADM

## 2024-01-05 RX ORDER — HYDROMORPHONE HCL/PF 1 MG/ML
0.5 SYRINGE (ML) INJECTION
Status: DISCONTINUED | OUTPATIENT
Start: 2024-01-05 | End: 2024-01-05 | Stop reason: HOSPADM

## 2024-01-05 RX ORDER — ENOXAPARIN SODIUM 100 MG/ML
40 INJECTION SUBCUTANEOUS DAILY
Status: DISCONTINUED | OUTPATIENT
Start: 2024-01-06 | End: 2024-01-06 | Stop reason: HOSPADM

## 2024-01-05 RX ORDER — DEXAMETHASONE SODIUM PHOSPHATE 10 MG/ML
INJECTION, SOLUTION INTRAMUSCULAR; INTRAVENOUS AS NEEDED
Status: DISCONTINUED | OUTPATIENT
Start: 2024-01-05 | End: 2024-01-05

## 2024-01-05 RX ORDER — METRONIDAZOLE 500 MG/100ML
INJECTION, SOLUTION INTRAVENOUS CONTINUOUS PRN
Status: DISCONTINUED | OUTPATIENT
Start: 2024-01-05 | End: 2024-01-05

## 2024-01-05 RX ORDER — PROMETHAZINE HYDROCHLORIDE 25 MG/ML
12.5 INJECTION, SOLUTION INTRAMUSCULAR; INTRAVENOUS ONCE
Status: COMPLETED | OUTPATIENT
Start: 2024-01-05 | End: 2024-01-05

## 2024-01-05 RX ORDER — ONDANSETRON 2 MG/ML
4 INJECTION INTRAMUSCULAR; INTRAVENOUS ONCE AS NEEDED
Status: COMPLETED | OUTPATIENT
Start: 2024-01-05 | End: 2024-01-05

## 2024-01-05 RX ORDER — PROPOFOL 10 MG/ML
INJECTION, EMULSION INTRAVENOUS AS NEEDED
Status: DISCONTINUED | OUTPATIENT
Start: 2024-01-05 | End: 2024-01-05

## 2024-01-05 RX ORDER — DICYCLOMINE HCL 20 MG
20 TABLET ORAL 3 TIMES DAILY PRN
Status: DISCONTINUED | OUTPATIENT
Start: 2024-01-05 | End: 2024-01-06 | Stop reason: HOSPADM

## 2024-01-05 RX ORDER — HEPARIN SODIUM 5000 [USP'U]/ML
5000 INJECTION, SOLUTION INTRAVENOUS; SUBCUTANEOUS
Status: COMPLETED | OUTPATIENT
Start: 2024-01-05 | End: 2024-01-05

## 2024-01-05 RX ORDER — HYDROMORPHONE HCL/PF 1 MG/ML
0.5 SYRINGE (ML) INJECTION
Status: DISCONTINUED | OUTPATIENT
Start: 2024-01-05 | End: 2024-01-06 | Stop reason: HOSPADM

## 2024-01-05 RX ORDER — OXYCODONE HCL 5 MG/5 ML
5 SOLUTION, ORAL ORAL EVERY 4 HOURS PRN
Status: DISCONTINUED | OUTPATIENT
Start: 2024-01-05 | End: 2024-01-06 | Stop reason: HOSPADM

## 2024-01-05 RX ORDER — SODIUM CHLORIDE, SODIUM LACTATE, POTASSIUM CHLORIDE, CALCIUM CHLORIDE 600; 310; 30; 20 MG/100ML; MG/100ML; MG/100ML; MG/100ML
125 INJECTION, SOLUTION INTRAVENOUS CONTINUOUS
Status: DISCONTINUED | OUTPATIENT
Start: 2024-01-05 | End: 2024-01-05

## 2024-01-05 RX ORDER — MAGNESIUM HYDROXIDE 1200 MG/15ML
LIQUID ORAL AS NEEDED
Status: DISCONTINUED | OUTPATIENT
Start: 2024-01-05 | End: 2024-01-05 | Stop reason: HOSPADM

## 2024-01-05 RX ORDER — PANTOPRAZOLE SODIUM 40 MG/10ML
40 INJECTION, POWDER, LYOPHILIZED, FOR SOLUTION INTRAVENOUS DAILY
Status: DISCONTINUED | OUTPATIENT
Start: 2024-01-05 | End: 2024-01-06 | Stop reason: HOSPADM

## 2024-01-05 RX ORDER — INSULIN LISPRO 100 [IU]/ML
1-6 INJECTION, SOLUTION INTRAVENOUS; SUBCUTANEOUS
Status: DISCONTINUED | OUTPATIENT
Start: 2024-01-05 | End: 2024-01-06

## 2024-01-05 RX ORDER — ONDANSETRON 2 MG/ML
INJECTION INTRAMUSCULAR; INTRAVENOUS AS NEEDED
Status: DISCONTINUED | OUTPATIENT
Start: 2024-01-05 | End: 2024-01-05

## 2024-01-05 RX ORDER — CEFAZOLIN SODIUM 2 G/50ML
2000 SOLUTION INTRAVENOUS ONCE
Status: COMPLETED | OUTPATIENT
Start: 2024-01-05 | End: 2024-01-05

## 2024-01-05 RX ORDER — FENTANYL CITRATE/PF 50 MCG/ML
25 SYRINGE (ML) INJECTION
Status: COMPLETED | OUTPATIENT
Start: 2024-01-05 | End: 2024-01-05

## 2024-01-05 RX ORDER — ROCURONIUM BROMIDE 10 MG/ML
INJECTION, SOLUTION INTRAVENOUS AS NEEDED
Status: DISCONTINUED | OUTPATIENT
Start: 2024-01-05 | End: 2024-01-05

## 2024-01-05 RX ORDER — MIDAZOLAM HYDROCHLORIDE 2 MG/2ML
INJECTION, SOLUTION INTRAMUSCULAR; INTRAVENOUS AS NEEDED
Status: DISCONTINUED | OUTPATIENT
Start: 2024-01-05 | End: 2024-01-05

## 2024-01-05 RX ORDER — TOPIRAMATE 100 MG/1
200 TABLET, FILM COATED ORAL 2 TIMES DAILY
Status: DISCONTINUED | OUTPATIENT
Start: 2024-01-05 | End: 2024-01-06 | Stop reason: HOSPADM

## 2024-01-05 RX ADMIN — TOPIRAMATE 200 MG: 100 TABLET, FILM COATED ORAL at 17:08

## 2024-01-05 RX ADMIN — ACETAMINOPHEN 975 MG: 325 TABLET, FILM COATED ORAL at 19:57

## 2024-01-05 RX ADMIN — OXYCODONE HYDROCHLORIDE 5 MG: 5 SOLUTION ORAL at 18:42

## 2024-01-05 RX ADMIN — FENTANYL CITRATE 25 MCG: 50 INJECTION INTRAMUSCULAR; INTRAVENOUS at 15:48

## 2024-01-05 RX ADMIN — FENTANYL CITRATE 25 MCG: 50 INJECTION, SOLUTION INTRAMUSCULAR; INTRAVENOUS at 11:52

## 2024-01-05 RX ADMIN — FENTANYL CITRATE 25 MCG: 50 INJECTION INTRAMUSCULAR; INTRAVENOUS at 14:29

## 2024-01-05 RX ADMIN — DIPHENHYDRAMINE HYDROCHLORIDE 12.5 MG: 50 INJECTION INTRAMUSCULAR; INTRAVENOUS at 11:22

## 2024-01-05 RX ADMIN — FENTANYL CITRATE 25 MCG: 50 INJECTION, SOLUTION INTRAMUSCULAR; INTRAVENOUS at 11:31

## 2024-01-05 RX ADMIN — CEFAZOLIN SODIUM 2000 MG: 2 SOLUTION INTRAVENOUS at 10:54

## 2024-01-05 RX ADMIN — TIZANIDINE 2 MG: 2 TABLET ORAL at 21:41

## 2024-01-05 RX ADMIN — SODIUM CHLORIDE, SODIUM LACTATE, POTASSIUM CHLORIDE, CALCIUM CHLORIDE: 600; 310; 30; 20 INJECTION, SOLUTION INTRAVENOUS at 10:54

## 2024-01-05 RX ADMIN — PROPOFOL 150 MG: 10 INJECTION, EMULSION INTRAVENOUS at 10:58

## 2024-01-05 RX ADMIN — ROCURONIUM BROMIDE 20 MG: 10 INJECTION, SOLUTION INTRAVENOUS at 12:38

## 2024-01-05 RX ADMIN — METRONIDAZOLE: 500 INJECTION, SOLUTION INTRAVENOUS at 11:30

## 2024-01-05 RX ADMIN — SODIUM CHLORIDE, SODIUM LACTATE, POTASSIUM CHLORIDE, AND CALCIUM CHLORIDE 125 ML/HR: .6; .31; .03; .02 INJECTION, SOLUTION INTRAVENOUS at 09:50

## 2024-01-05 RX ADMIN — INSULIN HUMAN 4 UNITS: 100 INJECTION, SOLUTION PARENTERAL at 14:20

## 2024-01-05 RX ADMIN — PROMETHAZINE HYDROCHLORIDE 12.5 MG: 25 INJECTION INTRAMUSCULAR; INTRAVENOUS at 14:29

## 2024-01-05 RX ADMIN — MIDAZOLAM HYDROCHLORIDE 2 MG: 2 INJECTION, SOLUTION INTRAMUSCULAR; INTRAVENOUS at 10:54

## 2024-01-05 RX ADMIN — FENTANYL CITRATE 25 MCG: 50 INJECTION INTRAMUSCULAR; INTRAVENOUS at 14:24

## 2024-01-05 RX ADMIN — ONDANSETRON 4 MG: 2 INJECTION INTRAMUSCULAR; INTRAVENOUS at 18:42

## 2024-01-05 RX ADMIN — PANTOPRAZOLE SODIUM 40 MG: 40 INJECTION, POWDER, FOR SOLUTION INTRAVENOUS at 17:00

## 2024-01-05 RX ADMIN — ROCURONIUM BROMIDE 20 MG: 10 INJECTION, SOLUTION INTRAVENOUS at 11:49

## 2024-01-05 RX ADMIN — ONDANSETRON 4 MG: 2 INJECTION INTRAMUSCULAR; INTRAVENOUS at 13:57

## 2024-01-05 RX ADMIN — ONDANSETRON 4 MG: 2 INJECTION INTRAMUSCULAR; INTRAVENOUS at 13:25

## 2024-01-05 RX ADMIN — FENTANYL CITRATE 50 MCG: 50 INJECTION, SOLUTION INTRAMUSCULAR; INTRAVENOUS at 10:58

## 2024-01-05 RX ADMIN — SODIUM CHLORIDE: 9 INJECTION, SOLUTION INTRAVENOUS at 10:54

## 2024-01-05 RX ADMIN — ROCURONIUM BROMIDE 50 MG: 10 INJECTION, SOLUTION INTRAVENOUS at 11:01

## 2024-01-05 RX ADMIN — HYDROMORPHONE HYDROCHLORIDE 0.5 MG: 1 INJECTION, SOLUTION INTRAMUSCULAR; INTRAVENOUS; SUBCUTANEOUS at 17:00

## 2024-01-05 RX ADMIN — FENTANYL CITRATE 25 MCG: 50 INJECTION INTRAMUSCULAR; INTRAVENOUS at 15:43

## 2024-01-05 RX ADMIN — LIDOCAINE HYDROCHLORIDE 50 MG: 10 INJECTION, SOLUTION EPIDURAL; INFILTRATION; INTRACAUDAL; PERINEURAL at 10:58

## 2024-01-05 RX ADMIN — HEPARIN SODIUM 5000 UNITS: 5000 INJECTION INTRAVENOUS; SUBCUTANEOUS at 09:58

## 2024-01-05 RX ADMIN — DEXAMETHASONE SODIUM PHOSPHATE 10 MG: 10 INJECTION, SOLUTION INTRAMUSCULAR; INTRAVENOUS at 10:58

## 2024-01-05 NOTE — QUICK NOTE
"Post Op Check  Clotilde Berg 63 y.o. female MRN: [id number]  Unit/Bed#: [facility] 410-01 Encounter: [id number]    Assessment:  63 y.o. female post op day 0 s/p robotic PEHR with Linx placement secondary to hiatal hernia.    Vital signs stable, afebrile    Post op CXR: Expected postsurgical changes of LINX device placement with bilateral upper chest/lower neck subcutaneous emphysema.       Plan:  Clear liquid diet  Continue IVF  Plan for esophagram tomorrow with diet advancement pending results  DVT ppx: Lovenox  Pain/ nausea control PRN  OOB/ ambulation  Incentive Spirometry      Subjective/Objective     Subjective:   Patient seen and examined at bedside on arrival to the floor post operatively. Patient's pain is well controlled with current pain regimen. Denies nausea or vomiting.having some bilateral shoulder pain.    Objective:   Vitals:Blood pressure 153/74, pulse 87, temperature 98.6 °F (37 °C), temperature source Oral, resp. rate 18, height 5' 2\" (1.575 m), weight 78.9 kg (174 lb), SpO2 98%.  Temp (24hrs), Av °F (36.7 °C), Min:97.5 °F (36.4 °C), Max:98.6 °F (37 °C)        Intake/Output Summary (Last 24 hours) at 2024 1715  Last data filed at 2024 1653  Gross per 24 hour   Intake 1550 ml   Output 400 ml   Net 1150 ml       Invasive Devices       Peripheral Intravenous Line  Duration             Peripheral IV 24 Distal;Dorsal (posterior);Left Forearm <1 day    Peripheral IV 24 Right Forearm <1 day                    Physical Exam:  General: No acute distress, alert and oriented  CV: Well perfused, regular rate and rhythm  Lungs: Normal work of breathing, no increased respiratory effort   Abdomen: Soft, appropriately tender, non-distended. Incisions clean, dry and intact.  Extremities: No edema, clubbing or cyanosis  Skin: Warm, dry    Lab Results: BMP/CMP: No results found for: \"SODIUM\", \"K\", \"CL\", \"CO2\", \"ANIONGAP\", \"BUN\", \"CREATININE\", \"GLUCOSE\", \"CALCIUM\", \"AST\", \"ALT\", \"ALKPHOS\", " "\"PROT\", \"BILITOT\", \"EGFR\" and CBC: No results found for: \"WBC\", \"HGB\", \"HCT\", \"MCV\", \"PLT\", \"ADJUSTEDWBC\", \"RBC\", \"MCH\", \"MCHC\", \"RDW\", \"MPV\", \"NRBC\"  VTE Prophylaxis: Sequential compression device (Venodyne)  and Enoxaparin (Lovenox)    Preethi Sanon MD  1/5/2024    "

## 2024-01-05 NOTE — ANESTHESIA POSTPROCEDURE EVALUATION
Post-Op Assessment Note    CV Status:  Stable  Pain Score: 0    Pain management: adequate       Mental Status:  Alert and awake   Hydration Status:  Euvolemic   PONV Controlled:  Controlled   Airway Patency:  Patent  Two or more mitigation strategies used for obstructive sleep apnea   Post Op Vitals Reviewed: Yes      Staff: CRNA               BP   148/68   Temp   98.3 F    Pulse  101   Resp   20   SpO2   97% 2 L NC

## 2024-01-05 NOTE — ANESTHESIA PREPROCEDURE EVALUATION
Procedure:  robotic assisted paraesophageal hernia repair with partial fundoplication, possible mesh (Abdomen)  ESOPHAGOGASTRODUODENOSCOPY (EGD) (Esophagus)  possible linx placement (Abdomen)    Relevant Problems   ANESTHESIA   (+) PONV (postoperative nausea and vomiting)      CARDIO   (+) Chronic migraine without aura, intractable, with status migrainosus   (+) Chronic migraine without aura, with status migrainosus   (+) Hypertension, essential   (+) Intractable chronic migraine without aura      ENDO   (+) Type 2 diabetes mellitus, without long-term current use of insulin (HCC)      GI/HEPATIC   (+) GERD (gastroesophageal reflux disease)   (+) Hiatal hernia      MUSCULOSKELETAL   (+) Arthritis of left acromioclavicular joint   (+) Hiatal hernia   (+) Osteoarthritis      NEURO/PSYCH   (+) Chronic migraine without aura, intractable, with status migrainosus   (+) Chronic migraine without aura, with status migrainosus   (+) Complex partial seizures with consciousness impaired (HCC)   (+) Intractable chronic migraine without aura   (+) Intractable headache caused by drug   (+) Medication overuse headache   (+) Partial symptomatic epilepsy with complex partial seizures, intractable, without status epilepticus (HCC)   (+) Subjective visual disturbance      PULMONARY   (+) Asthma        Physical Exam    Airway    Mallampati score: III  TM Distance: <3 FB  Neck ROM: full     Dental       Cardiovascular  Rate: normal    Pulmonary  Pulmonary exam normal     Other Findings  Per pt denies anything remaining that is loose or removeablepost-pubertal.      Anesthesia Plan  ASA Score- 3     Anesthesia Type- general with ASA Monitors.         Additional Monitors:     Airway Plan: ETT.           Plan Factors-Exercise tolerance (METS): >4 METS.    Chart reviewed.    Patient summary reviewed.    Patient is not a current smoker.              Induction- intravenous.    Postoperative Plan- Plan for postoperative opioid use.     Informed  Consent- Anesthetic plan and risks discussed with patient.  I personally reviewed this patient with the CRNA. Discussed and agreed on the Anesthesia Plan with the CRNA..

## 2024-01-05 NOTE — OP NOTE
OPERATIVE REPORT  PATIENT NAME: Clotilde Berg    :  1960  MRN: [id number]  Pt Location: [facility] OR ROOM 14    SURGERY DATE: 2024    Surgeons and Role:     * Mark Hernandez MD - Primary     * Donna Manrique PA-C - Assisting    Preop Diagnosis:  Hiatal hernia [K44.9]    Post-Op Diagnosis Codes:     * Hiatal hernia [K44.9]    Procedure(s):  Robotic type III paraesophageal hernia repair with mesh, linx device placement  EGD    Specimen(s):  ID Type Source Tests Collected by Time Destination   1 : lymph node - level 8 Tissue Lymph Node TISSUE EXAM Mark Hernandez MD 2024 1224    2 : paraesophageal hernia sac Tissue Other TISSUE EXAM aMrk Hernandez MD 2024 1233        Estimated Blood Loss:   Minimal    Drains:  * No LDAs found *    Anesthesia Type:   General    Operative Indications:  Hiatal hernia [K44.9]  Type III paraesophageal hernia and severe GERD    Operative Findings:  Moderate sized paraesophageal hernia with 20% of her stomach in her chest.  GE junction initially at 34 cm from the incisors.  At the conclusion 39 cm from the incisors.    Complications:   None    Procedure and Technique:  After obtaining informed consent from the patient, they were transferred to the operating room and placed supine on the operating table. General anesthesia was then induced and a single-lumen endotracheal tube was placed without incident. A footboard was placed at the base of the bed and the patients feet, legs and thighs were secured to the bed. All bony prominences were padded and checked.  The operating room table was placed in full reverse Trendelenburg to check for any patient movement and adjustments were made as needed.     Next a formal time-out was called verifying patient , date of birth, procedure, consent, antibiotics, beta-blocker as indicated, anticipated specimens with handling, SCD use and other special considerations.      Next and EGD was performed.  The adult endoscope was  inserted into the patient's oropharynx and directed down into their esophagus. Here we encountered a moderate-sized paraesophageal hernia.  The gastroesophageal junction was located at 34cm  from the incisors.  The GE junction showed mild esophagitis, LA grade B esophagitis.  There were no gastric masses seen or identified.  There were no ulcers.      The abdomen was then prepped with ChloraPrep and draped in standard surgical fashion. An 8 mm incision was made at Sorenson's point, 2 finger breaths below the left costal margin in the midclavicular line. The Veress needle was inserted through the abdominal wall and into the abdominal cavity using the water drop technique. Next the abdomen was then insufflated without issue.  A supraumbilical 8 mm incision was made and a robotic trocar was placed. The 30 degree robotic camera was inserted and the abdomen was thoroughly inspected. There was no sign Veress needle or trocar injury. All other ports were placed under direct visualization using the robotic camera.  3mL of 0.25% Marcaine was used to anesthetize the peritoneum for all additional port placements. Next a left upper quadrant 8mm lateral robotic port was placed. An robotic 8mm port was placed through the Veress incision.  An 8mm RUQ robotic port was placed just lateral to the falciform, at the same level as the LUQ port. A RLQ 12mm assistant port was placed between the camera and the RUQ robotic port.  Finally a 5 mm incision was made just to the left of the xiphoid process.  We had to take down a few adhesions in the right upper quadrant.  This was done with cautery and sharp dissection.  A Eduard liver retractor was placed through here and secured to the bedside with a sterile farfan. The patient was then placed in full reverse trendeleburg.     The elastic.io Xi robot was docked at this time. A tip-up fenestrated grasper, caudier grasper and robotic vessel sealer were inserted and tested. I un-scrubbed at this  time and went to the robotic console.      The hiatus was inspected at this time revealing a moderate-sized hernia with her GE junction and fundus in her chest.. We started our dissection along the lesser curvature, entering the gastro-hepatic ligament. The robotic vessel sealer was used to continue this dissection towards the right crura. Once this was identified we continued this dissection along the crura, being careful to protect the peritoneal lining of the muscle. We slowly began to reduce the hernia sac, keeping this with our stomach. We continued our dissection into the mediastinum,  all mediastinal attachments and taking care to avoid the pleura. We specifically looked for and identified the bilateral vagus nerves in the chest, taking care to stay well away from these with our dissection. The mediastinal dissection was continued around to the left lateral aspect of the esophagus and down onto the left crura.      We then transitioned to the fundus of the stomach.  The fundus was retracted inferiorly and we opened up the gastrophrenic ligament.  At this point, we went back to the lesser curvature and dissected the inferior aspect of the gastro-esophageal junction off of the crura. A window was made in this retroesophageal space and a penrose drain was passed through this space.  This Penrose drain was then passed through a slit in itself and tightened to help with downward retraction on the gastroesophageal junction. The bedside assistant helped to grasp this penrose, providing counter traction. With the help with this retraction we went back up into the mediastinum and fully mobilized the hernia sac.  The mediastinal attachments to the esophagus were freed.  Again care was taken in this area to identify the bilateral vagus nerves and preserved these.   We continued this dissection up into the chest approximately to the level of the inferior pulmonary veins until all circumferential mediastinal  attachments were freed.  Repeat inspection showed the GE junction approximately 4 cm in the abdomen.  We then dissected off the hernia sac.  This was removed and sent for permanent pathology.  During our dissection we encountered a large level 8 lymph node that was removed and sent for permanent pathology   once satisfied with this dissection we performed a repeat EGD.       Repeat EGD again showed a fully reduced hiatal hernia. The gastroesophageal junction was located at 39 cm from the incisors.  There was no evidence of gastric masses, polyps or ulcers.  The duodenum was entered and there was no evidence ulcers or masses. Retroflexion view of the GE junction did not show any abnormalities and showed a fully reduced hiatal hernia.  The GE junction appeared to be a Hill grade B  lower esophageal sphincter at this point.  Next a 52 Fr bougie was placed without issue.      Next we performed our crural repair. Simple, interrupted 0 Ethibond sutures were used to reapproximate the crura posteriorly.  A total of 3 sutures were placed posteriorly.  When finished the esophagus without any bougie or EGD had a few mm of room circumferentially around the crura.  Next a Bio A Eglon semi-biologic mesh was placed at the hiatus. This was positioned in place behind the liver and underneath the gastroesophageal junction.     We then turned to our LINX procedure, magnetic sphincter augmentation.  The posterior vagus nerve was identified 1 cm proximal to the GE junction.  The thin tissue surrounding vagus was gently grasped and a window was created between the posterior vagus nerve in the esophagus.  A small Penrose drain was placed through this window.  Satisfied with this position in relation to the GE junction we removed the robotic caudier instrument and undocked arm 1. A sizing device was placed through the robotic port and we sized our linx device 3 separate times.  All 3 sizing measurements came out to be a 17 bead device with  the magnet popping off at 14. We verified sizing with our circulating nurse and a 17 bead LINX device was opened.  The robotic arm was read docked and cautery was introduced through this.  The caudier was passed through the window sparing the posterior vagus nerve.  The the LINX was then introduced into the abdomen through the assistant port and passed to the cautery a grasper.  This was pulled behind the esophagus through this vagus window.  Once fully through this window an all tissue was  off of the linx device the clasp were grasped and oriented towards the anterior aspect of the esophagus.  The clasps were carefully put together and secured.  We we verified that there is only 1 visible line/window in the linx device confirming a fully close device.  The sutures were cut off at this time.     The abdomen was then inspected and thoroughly aspirated dry.  There was no active bleeding identified. The robot was undocked at this time and all robotic instruments were removed.  The fascia for the 12 mm assistant port site was then closed using a percutaneous closure device and an 0 vicryl suture. The skin and soft tissue was closed with interrupted 3 0 Vicryl and 4 Monocryl.  Surgical glue was applied to all incisions.     Sponge, needle and instrument counts were correct at the conclusion of the procedure. The patient was extubated without incident in the operating room and was transported to the PACU in stable condition.     DENEEN Manrique was scrubbed as a bedside assistant for the entirety of the procedure as no other qualified assistant or general surgery resident was available. She was critical to the performance of this operation as she was the bedside robotic assistant. In doing so, she aided with retraction, needle insertion/removal, irrigation, suctioning and instrument exchange.      I was present for the entire procedure., A qualified resident physician was not available., and A physician  assistant was required during the procedure for retraction, tissue handling, dissection and suturing.    Patient Disposition:  PACU         SIGNATURE: Mark Hernandez MD  DATE: January 5, 2024  TIME: 1:39 PM

## 2024-01-06 ENCOUNTER — APPOINTMENT (OUTPATIENT)
Dept: RADIOLOGY | Facility: HOSPITAL | Age: 64
End: 2024-01-06
Payer: COMMERCIAL

## 2024-01-06 VITALS
HEART RATE: 82 BPM | SYSTOLIC BLOOD PRESSURE: 139 MMHG | OXYGEN SATURATION: 99 % | BODY MASS INDEX: 32.01 KG/M2 | TEMPERATURE: 98.4 F | RESPIRATION RATE: 20 BRPM | DIASTOLIC BLOOD PRESSURE: 70 MMHG | WEIGHT: 173.94 LBS | HEIGHT: 62 IN

## 2024-01-06 LAB
ANION GAP SERPL CALCULATED.3IONS-SCNC: 11 MMOL/L
BUN SERPL-MCNC: 12 MG/DL (ref 5–25)
CALCIUM SERPL-MCNC: 8.9 MG/DL (ref 8.4–10.2)
CHLORIDE SERPL-SCNC: 105 MMOL/L (ref 96–108)
CO2 SERPL-SCNC: 22 MMOL/L (ref 21–32)
CREAT SERPL-MCNC: 0.81 MG/DL (ref 0.6–1.3)
ERYTHROCYTE [DISTWIDTH] IN BLOOD BY AUTOMATED COUNT: 13.2 % (ref 11.6–15.1)
GFR SERPL CREATININE-BSD FRML MDRD: 78 ML/MIN/1.73SQ M
GLUCOSE SERPL-MCNC: 131 MG/DL (ref 65–140)
GLUCOSE SERPL-MCNC: 137 MG/DL (ref 65–140)
GLUCOSE SERPL-MCNC: 137 MG/DL (ref 65–140)
GLUCOSE SERPL-MCNC: 166 MG/DL (ref 65–140)
HCT VFR BLD AUTO: 35.9 % (ref 34.8–46.1)
HGB BLD-MCNC: 11.2 G/DL (ref 11.5–15.4)
MAGNESIUM SERPL-MCNC: 1.6 MG/DL (ref 1.9–2.7)
MCH RBC QN AUTO: 27.5 PG (ref 26.8–34.3)
MCHC RBC AUTO-ENTMCNC: 31.2 G/DL (ref 31.4–37.4)
MCV RBC AUTO: 88 FL (ref 82–98)
PLATELET # BLD AUTO: 183 THOUSANDS/UL (ref 149–390)
PMV BLD AUTO: 10.8 FL (ref 8.9–12.7)
POTASSIUM SERPL-SCNC: 3.6 MMOL/L (ref 3.5–5.3)
RBC # BLD AUTO: 4.07 MILLION/UL (ref 3.81–5.12)
SODIUM SERPL-SCNC: 138 MMOL/L (ref 135–147)
WBC # BLD AUTO: 12.17 THOUSAND/UL (ref 4.31–10.16)

## 2024-01-06 PROCEDURE — 94664 DEMO&/EVAL PT USE INHALER: CPT

## 2024-01-06 PROCEDURE — C9113 INJ PANTOPRAZOLE SODIUM, VIA: HCPCS

## 2024-01-06 PROCEDURE — 85027 COMPLETE CBC AUTOMATED: CPT

## 2024-01-06 PROCEDURE — 94760 N-INVAS EAR/PLS OXIMETRY 1: CPT

## 2024-01-06 PROCEDURE — 97163 PT EVAL HIGH COMPLEX 45 MIN: CPT

## 2024-01-06 PROCEDURE — 83735 ASSAY OF MAGNESIUM: CPT

## 2024-01-06 PROCEDURE — 74220 X-RAY XM ESOPHAGUS 1CNTRST: CPT

## 2024-01-06 PROCEDURE — 82948 REAGENT STRIP/BLOOD GLUCOSE: CPT

## 2024-01-06 PROCEDURE — 99024 POSTOP FOLLOW-UP VISIT: CPT | Performed by: SURGERY

## 2024-01-06 PROCEDURE — 97166 OT EVAL MOD COMPLEX 45 MIN: CPT

## 2024-01-06 PROCEDURE — 80048 BASIC METABOLIC PNL TOTAL CA: CPT

## 2024-01-06 RX ORDER — OXYCODONE HYDROCHLORIDE 5 MG/1
5 TABLET ORAL EVERY 4 HOURS PRN
Qty: 20 TABLET | Refills: 0 | Status: SHIPPED | OUTPATIENT
Start: 2024-01-06 | End: 2024-01-16

## 2024-01-06 RX ORDER — INSULIN LISPRO 100 [IU]/ML
1-6 INJECTION, SOLUTION INTRAVENOUS; SUBCUTANEOUS
Status: DISCONTINUED | OUTPATIENT
Start: 2024-01-06 | End: 2024-01-06 | Stop reason: HOSPADM

## 2024-01-06 RX ADMIN — ENOXAPARIN SODIUM 40 MG: 40 INJECTION SUBCUTANEOUS at 08:50

## 2024-01-06 RX ADMIN — SODIUM CHLORIDE, SODIUM LACTATE, POTASSIUM CHLORIDE, AND CALCIUM CHLORIDE 75 ML/HR: .6; .31; .03; .02 INJECTION, SOLUTION INTRAVENOUS at 05:56

## 2024-01-06 RX ADMIN — ACETAMINOPHEN 975 MG: 325 TABLET, FILM COATED ORAL at 08:50

## 2024-01-06 RX ADMIN — HYDROMORPHONE HYDROCHLORIDE 0.5 MG: 1 INJECTION, SOLUTION INTRAMUSCULAR; INTRAVENOUS; SUBCUTANEOUS at 08:47

## 2024-01-06 RX ADMIN — ACETAMINOPHEN 975 MG: 325 TABLET, FILM COATED ORAL at 01:45

## 2024-01-06 RX ADMIN — IOHEXOL 100 ML: 350 INJECTION, SOLUTION INTRAVENOUS at 10:06

## 2024-01-06 RX ADMIN — PRAVASTATIN SODIUM 80 MG: 80 TABLET ORAL at 08:50

## 2024-01-06 RX ADMIN — OXYCODONE HYDROCHLORIDE 5 MG: 5 SOLUTION ORAL at 05:46

## 2024-01-06 RX ADMIN — OXYCODONE HYDROCHLORIDE 5 MG: 5 SOLUTION ORAL at 01:45

## 2024-01-06 RX ADMIN — VERAPAMIL HYDROCHLORIDE 120 MG: 120 TABLET, FILM COATED, EXTENDED RELEASE ORAL at 08:50

## 2024-01-06 RX ADMIN — PANTOPRAZOLE SODIUM 40 MG: 40 INJECTION, POWDER, FOR SOLUTION INTRAVENOUS at 08:50

## 2024-01-06 RX ADMIN — ONDANSETRON 4 MG: 2 INJECTION INTRAMUSCULAR; INTRAVENOUS at 08:47

## 2024-01-06 RX ADMIN — INSULIN LISPRO 1 UNITS: 100 INJECTION, SOLUTION INTRAVENOUS; SUBCUTANEOUS at 11:22

## 2024-01-06 RX ADMIN — TOPIRAMATE 200 MG: 100 TABLET, FILM COATED ORAL at 08:50

## 2024-01-06 RX ADMIN — ACETAMINOPHEN 975 MG: 325 TABLET, FILM COATED ORAL at 13:34

## 2024-01-06 NOTE — RESPIRATORY THERAPY NOTE
"RT Protocol Note  Clotilde Berg 63 y.o. female MRN: [id number]  Unit/Bed#: [facility] 410-01 Encounter: [id number]    Assessment    Principal Problem:    Hiatal hernia  Active Problems:    PONV (postoperative nausea and vomiting)      Home Pulmonary Medications:  Albuterol MDI       Past Medical History:   Diagnosis Date    Asthma     Bell palsy     Diabetes mellitus (HCC)     type 2    Diverticulitis 11/25/2022    EEG abnormality without seizure     Gastroparesis     GERD (gastroesophageal reflux disease)     Headache, migraine     Hiatal hernia     Hyperlipidemia     IBS (irritable bowel syndrome)     Migraines     PONV (postoperative nausea and vomiting)     Sarcoidosis     Sleep apnea     no cpap     Social History     Socioeconomic History    Marital status: /Civil Union     Spouse name: None    Number of children: None    Years of education: None    Highest education level: None   Occupational History    None   Tobacco Use    Smoking status: Never    Smokeless tobacco: Never   Vaping Use    Vaping status: Never Used   Substance and Sexual Activity    Alcohol use: Yes     Comment: socially    Drug use: No    Sexual activity: None   Other Topics Concern    None   Social History Narrative    None     Social Determinants of Health     Financial Resource Strain: Not on file   Food Insecurity: Not on file   Transportation Needs: Not on file   Physical Activity: Not on file   Stress: Not on file   Social Connections: Not on file   Intimate Partner Violence: Not on file   Housing Stability: Not on file       Subjective         Objective    Physical Exam:   Assessment Type: Assess only  General Appearance: Awake  Respiratory Pattern: Spontaneous, Normal  Chest Assessment: Chest expansion symmetrical  Bilateral Breath Sounds: Clear  Cough: None    Vitals:  Blood pressure 153/74, pulse 87, temperature 98.6 °F (37 °C), temperature source Oral, resp. rate 18, height 5' 2\" (1.575 m), weight 78.9 kg (174 lb), SpO2 " 98%.          Imaging and other studies: I have personally reviewed pertinent reports.            Plan       Airway Clearance Plan: Incentive Spirometer     Resp Comments: Resting on room air, SpO2 98%, VS stable, no distress. I/S performed well and independently. Assessed for Respiratory protocol due to hx of asthma and home bronchodilator use -Home Bronchodilator Patient pathway initiated.

## 2024-01-06 NOTE — DISCHARGE INSTR - AVS FIRST PAGE
Gently wash your incisions daily with soap and water, do not soak in a tub. Do not apply any lotions, creams, or ointments to incisions. No lifting over 10 lbs or strenuous exercise. No driving until seen at your post operative visit. [facility] facility within 3 days of your follow up visit.      Please call the office first if you have any questions or concerns during your post op period, prior to going to the emergency room or urgent care.     Please follow-up as previously scheduled with Dr. Hernandez on 1/24    Activity:    Do not lift more than 10 pounds (a gallon of milk) for 1-2 weeks post-operatively                 Walking is encouraged  Normal daily activities including climbing steps are okay  Do not engage in strenuous activity or contact sports for 4-6 weeks post-operatively    Return to work:   Return to work to be discussed at first post-operative visit    Diet:   Please maintain a soft diet until you follow-up in clinic    Wound Care:  Your wound is closed with absorbable sutures and skin glue  It is okay to shower. Wash incision gently with soap and water and pat dry. Do not soak incisions in bath water or swim for two weeks. Do not apply any creams or ointments.    Pain Medication:   Please take as directed  No driving while taking narcotic pain medications    Other:  If you have questions after discharge please call the office.    If you have increased pain, fever >101.5, increased drainage, redness or a bad smell at your surgery site, please call us immediately or come directly to the Emergency Room

## 2024-01-06 NOTE — PROGRESS NOTES
"Thoracic Surgery  Progress Note   Clotilde Berg 63 y.o. female MRN: [id number]  Unit/Bed#: [facility] 410-01 Encounter: [id number]      Assessment:  63 y.o. female post op day 1 s/p robotic PEHR with Linx placement secondary to hiatal hernia.     Vital signs stable, afebrile. 98% on room air.      Plan:  Clear liquid diet  Continue IVF  Esophagram planned for today, diet advancement pending results  DVT ppx: Lovenox  Pain/ nausea control PRN  OOB/ ambulation  Incentive Spirometry      Subjective/Objective   Subjective:   Patient seen and examined at bedside, in no acute distress. No acute events overnight. Patient's pain is well controlled and pain in shoulders is improving. She is having nausea that subsides with nausea medication, denies vomiting, chest pain or shortness of breath. Denies passing flatus and having bowel movements.    Objective:   Vitals:Blood pressure 135/58, pulse 78, temperature 98.4 °F (36.9 °C), temperature source Oral, resp. rate 16, height 5' 2\" (1.575 m), weight 78.9 kg (173 lb 15.1 oz), SpO2 96%.,Body mass index is 31.81 kg/m².   Temp (24hrs), Av.1 °F (36.7 °C), Min:97.5 °F (36.4 °C), Max:98.6 °F (37 °C)        Intake/Output Summary (Last 24 hours) at 2024 0628  Last data filed at 2024 0405  Gross per 24 hour   Intake 1550 ml   Output 2150 ml   Net -600 ml       Invasive Devices       Peripheral Intravenous Line  Duration             Peripheral IV 24 Distal;Dorsal (posterior);Left Forearm <1 day    Peripheral IV 24 Right Forearm <1 day                    Physical Exam:  General: No acute distress, alert and oriented  CV: Well perfused, regular rate and rhythm  Lungs: Normal work of breathing, no increased respiratory effort on room air  Chest: Incisions c/d/I. Chest tube insertion site c/d/i  Abdomen: Soft, non-tender, non-distended. Incision/s clean, dry and intact.  Extremities: No edema, clubbing or cyanosis  Skin: Warm, dry    Lab Results: Results: I have " personally reviewed all pertinent laboratory/tests results  VTE Prophylaxis: Enoxaparin (Lovenox)    Cyndi Finley, MS4  General Surgery
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
## 2024-01-06 NOTE — DISCHARGE SUMMARY
Discharge Summary    Clotilde Berg 63 y.o. female MRN: [id number]    Unit/Bed#: [facility] 410-01 Encounter: [id number]    Admission Date: 1/5/2024     Discharge Date:/1/6/2024    Attending: David  Operative Surgeon: David    Consultants: None    Admitting Diagnosis: Hiatal hernia [K44.9]    Principle Diagnosis: Paraesophageal hernia    Secondary Diagnosis:  Past Medical History:   Diagnosis Date    Asthma     Bell palsy     Diabetes mellitus (HCC)     type 2    Diverticulitis 11/25/2022    EEG abnormality without seizure     Gastroparesis     GERD (gastroesophageal reflux disease)     Headache, migraine     Hiatal hernia     Hyperlipidemia     IBS (irritable bowel syndrome)     Migraines     PONV (postoperative nausea and vomiting)     Sarcoidosis     Sleep apnea     no cpap     Past Surgical History:   Procedure Laterality Date    CHOLECYSTECTOMY      OK COLONOSCOPY FLX DX W/COLLJ SPEC WHEN PFRMD N/A 3/8/2017    Procedure: EGD AND COLONOSCOPY;  Surgeon: Rad Romero MD;  Location:  [facility];  Service: Gastroenterology    REPLACEMENT TOTAL KNEE Right     SIGMOIDECTOMY          Procedures Performed: Procedure(s):  robotic assisted paraesophageal hernia repair  ESOPHAGOGASTRODUODENOSCOPY (EGD)  linx placement    Imaging:Procedure: FL esophagram complete    Result Date: 1/6/2024  Narrative: BARIUM SWALLOW-ESOPHAGRAM INDICATION:   s/p PEHR. COMPARISON:  None IMAGES:  10 FLUOROSCOPY TIME:   57 seconds TECHNIQUE: The patient was given water-soluble Omnipaque 350 by mouth and images of the esophagus were obtained. FINDINGS: Metallic LINX device projects over the gastroesophageal junction. The esophagus is normal in caliber.  Esophageal motility is normal and emptying of contrast from the esophagus is prompt. No evidence of contrast leak or extravasation. Gastroesophageal reflux was not observed. There is no hiatal hernia.     Impression: Unremarkable esophagram. No evidence of leak. Workstation  performed: [id number]     Procedure: XR chest portable    Result Date: 1/5/2024  Narrative: CHEST INDICATION: Status post paraesophageal hernia repair with LINX device placement. COMPARISON: Chest radiograph 08/04/2022, CT abdomen pelvis 04/22/2023 EXAM PERFORMED/VIEWS:  XR CHEST PORTABLE AP semierect Images:  1 FINDINGS: Monitoring leads and clips project over the chest. LINX device projecting over the left epigastric area. Cholecystectomy clips. Cardiomediastinal silhouette appears unremarkable. The lungs are clear. No pneumothorax or pleural effusion. Osseous structures appear within normal limits for patient age. Subcutaneous emphysema within the bilateral upper chest/lower neck.     Impression: No acute cardiopulmonary disease. No pneumothorax. Expected postsurgical changes of LINX device placement with bilateral upper chest/lower neck subcutaneous emphysema. Resident: CHUY STEVEN I, the attending radiologist, have reviewed the images and agree with the final report above. Workstation performed: [id number]       Discharge Medications:  See after visit summary for reconciled discharge medications provided to patient and family.      Brief HPI (per H/P): Clotilde Berg is a 63 year old female with PMH significant for DM2, asthma, migraines, and BMI 32 who presents today for updated H&P prior to EGD, robotic assisted laparoscopic paraesophageal hernia repair with partial fundoplication versus LINX device placement.  At last office visit, there esophageal manometry results were reviewed and there was a discussion of the procedure, risks, and postoperative course.     Esophageal manometry performed on 10/16/23 demonstrated normal esophageal motility with a large hiatal hernia.     On discussion today, patient reports continued heartburn. Occassional dysphagia with thicker foods such as bread. Intermittent LLQ abdominal pain, ongoing since last November 2022. Denies nausea, vomiting, regurgitation, weight  loss. Reports current sore throat and dry cough at night. Past surgical history significant for open cholecystectomy (1989), sigmoid colon resection for diverticulitis (2003). Not on anticoagulation.     Hospital Course: Clotilde Berg is a 63 y.o. female who presented 1/5/2024 per HPI and was taken to the OR for above procedure. Intraoperative findings included the following from operative report: Moderate sized paraesophageal hernia with 20% of her stomach in her chest.  GE junction initially at 34 cm from the incisors.  At the conclusion 39 cm from the incisors. .     Pt hospital course was uncomplicated. Patient was discharged on HD2/POD1. On the day of discharge, the patient was voiding spontaneously, ambulating at baseline, and pain was well controlled. The patient was sent home with prescriptions for  pain and on home medications. She understood all instructions for discharge. She was also given the names and numbers of the providers as well as instructions for follow up appointments.     Condition at Discharge: stable     Provisions for Follow-Up Care:  See after visit summary for information related to follow-up care and any pertinent home health orders.      Disposition: See After Visit Summary for discharge disposition information.    Discharge instructions/Information to patient and family:   See after visit summary for information provided to patient and family.    Planned Readmission: No    Discharge Statement   I spent 30 minutes discharging the patient. This time was spent on the day of discharge. I had direct contact with the patient on the day of discharge. Additional documentation is required if more than 30 minutes were spent on discharge.

## 2024-01-06 NOTE — PHYSICAL THERAPY NOTE
Pt is 63 y.o. female seen for PT evaluation s/p admit to [facility] on 1/5/2024  w/ Hiatal hernia. Pt underwent Robotic type III paraesophageal hernia repair with mesh, linx device placement and EGD on 1/5/23. PT consulted for mobility and to assist w/ d/c planning. Pt   has a past medical history of Asthma, Bell palsy, Diabetes mellitus (HCC), Diverticulitis, EEG abnormality without seizure, Gastroparesis, GERD (gastroesophageal reflux disease), Headache, migraine, Hiatal hernia, Hyperlipidemia, IBS (irritable bowel syndrome), Migraines, PONV (postoperative nausea and vomiting), Sarcoidosis, and Sleep apnea.    Due to acute medical issues, ongoing medical workup for primary dx; pain, fall risk, increased reliance on more restrictive AD compared to baseline;  decreased activity tolerance compared to baseline, increased assistance needed from caregiver at current time, continuous monitoring, trending labs;  multiple lines, decline in overall functional mobility status; health management issues; note unstable clinical picture (high complexity).  At baseline, pt was ***. Currently,  pt  is requiring *** A for bed skills; *** for functional transfers and *** for ambulation w/ ***.   Pt presents functioning below baseline and currently w/ overall mobility deficits 2* to: decreased LE strength/AROM; limited flexibility;  generalized weakness/ deconditioning; decreased endurance; decreased activity tolerance; decreased coordination; impaired balance; gait deviations; decreased safety awareness; SOB/CASE; fatigue; impaired safety and judgement; limited insight into current deficits; bed/ chair alarms; multiple lines; hearing impairment/ visual impairments;   ***.   : {klodysystem:[id number]}.  Pt currently at risk for falls.  (Please find additional objective findings from PT assessment regarding body systems outlined above.) Pt will continue to benefit from skilled PT interventions to address stated impairments; to  maximize functional potential; for ongoing pt/ family training; and DME needs.  PT is recommending PT Resource Intensity Level  *** on d/c.      PT will follow for STG as outlined on eval. Pt/ family agreeable to PT POC and goals as stated.

## 2024-01-06 NOTE — RESTORATIVE TECHNICIAN NOTE
Restorative Technician Note      Patient Name: Clotilde Berg     Restorative Tech Visit Date: 01/06/24  Note Type: Mobility  Patient Position Upon Consult: Bedside chair  Activity Performed: Ambulated  Assistive Device: Other (Comment) (held onto IV pole)  Patient Position at End of Consult: All needs within reach; Other (comment) (in the bathroom)

## 2024-01-08 ENCOUNTER — DOCUMENTATION (OUTPATIENT)
Dept: CARDIAC SURGERY | Facility: CLINIC | Age: 64
End: 2024-01-08

## 2024-01-08 DIAGNOSIS — R11.0 NAUSEA: Primary | ICD-10-CM

## 2024-01-08 RX ORDER — ONDANSETRON 4 MG/1
4 TABLET, ORALLY DISINTEGRATING ORAL EVERY 6 HOURS PRN
Qty: 20 TABLET | Refills: 0 | Status: SHIPPED | OUTPATIENT
Start: 2024-01-08

## 2024-01-08 NOTE — PROGRESS NOTES
Surgeon/Procedure/Date: [place] 1/5/24  Post op appt: 1/24/2024          1. Diet: (full liquid diet for 4-5 days following discharge/transition to soft foods on day 5)           (continue to take PPI until follow up visit. Sit upright for 4 hours after eating, prior to going to bed)           (nausea, vomiting, difficulty or pain with swallowing?)  Patient having some nausea despite using Phenergan. Switched to Zofran to see if more effective. No vomiting. Occasional small amount of pain with swallowing.  Advised patient to call if she begins vomiting or pain increases.    2. Constipation: (Yes: colace 100 bid, senokot 2 tabs qhs or senokot S 2 tabs qhs. No BM: Dulcolax/Miralax/suppository)  Denies      3. Pain Management: (Tylenol 650 mg q6 hrs, Oxycodone 5-10 mg q4-6 hrs prn, +/- Advil 600 mg TID prn)  Patient using Oxycodone and tylenol with relief. She still has some shoulder pain.      4. Fever/Chills/Cough/Shortness of breath: (Call for temps over 100.4)  Denies fever, chills or SOB. She does have a cough but had one prior to surgery.    5. Incisions: (Redness/warmth/drainage? Shower okay, no baths)    Clean and dry.    6. Activity: (Walking/stationary biking. No lifting over 10-15 lbs) Walking at home.   Reviewed post-op appointment date and time.

## 2024-01-11 PROCEDURE — 88302 TISSUE EXAM BY PATHOLOGIST: CPT | Performed by: PATHOLOGY

## 2024-01-11 PROCEDURE — 88305 TISSUE EXAM BY PATHOLOGIST: CPT | Performed by: PATHOLOGY

## 2024-01-24 ENCOUNTER — OFFICE VISIT (OUTPATIENT)
Dept: CARDIAC SURGERY | Facility: CLINIC | Age: 64
End: 2024-01-24

## 2024-01-24 ENCOUNTER — HOSPITAL ENCOUNTER (OUTPATIENT)
Dept: RADIOLOGY | Facility: HOSPITAL | Age: 64
Discharge: HOME | End: 2024-01-24
Payer: COMMERCIAL

## 2024-01-24 VITALS
HEART RATE: 77 BPM | TEMPERATURE: 98 F | WEIGHT: 166.89 LBS | SYSTOLIC BLOOD PRESSURE: 139 MMHG | DIASTOLIC BLOOD PRESSURE: 76 MMHG | BODY MASS INDEX: 30.71 KG/M2 | HEIGHT: 62 IN | RESPIRATION RATE: 13 BRPM | OXYGEN SATURATION: 98 %

## 2024-01-24 DIAGNOSIS — K44.9 HIATAL HERNIA: ICD-10-CM

## 2024-01-24 DIAGNOSIS — K44.9 HIATAL HERNIA: Primary | ICD-10-CM

## 2024-01-24 PROCEDURE — 99024 POSTOP FOLLOW-UP VISIT: CPT | Performed by: THORACIC SURGERY (CARDIOTHORACIC VASCULAR SURGERY)

## 2024-01-24 PROCEDURE — 71046 X-RAY EXAM CHEST 2 VIEWS: CPT

## 2024-01-24 RX ORDER — ONDANSETRON 4 MG/1
4 TABLET, FILM COATED ORAL EVERY 8 HOURS PRN
Qty: 30 TABLET | Refills: 1 | Status: SHIPPED | OUTPATIENT
Start: 2024-01-24

## 2024-01-24 RX ORDER — IBUPROFEN 800 MG/1
800 TABLET ORAL EVERY 8 HOURS PRN
Qty: 60 TABLET | Refills: 0 | Status: SHIPPED | OUTPATIENT
Start: 2024-01-24

## 2024-01-24 NOTE — PROGRESS NOTES
Thoracic Follow-Up  Assessment/Plan:   63-year-old female with a large paraesophageal hernia and symptomatic GERD status post 1/5/2024 robotic hernia repair with mesh and Linx device placement.    Flow is doing okay 2 and half weeks out from surgery.  She is having some minor issues that I would like to look into further.  For her cough I will check a chest x-ray.  Will order this now and get back to her afterwards.  She is having some intermittent nausea.  I suspect this is just due to a GI operation.  I suspect this will resolve with time.  In the interim I will prescribe her a short term of Zofran.  Will also prescribe her some more ibuprofen for her abdominal pain.  I have advised her to continue eating.  She still needs to eat 1 bite every 3 hours.  She should stop her acid medication at this time.    We will see her back in our office in 3 weeks.  She should postpone returning to work for now until at least Monday, February 12.  Will see her back on Wednesday, February 14.  She will call us sooner if she continues to have issues.    Mark [place]      Diagnoses and all orders for this visit:    Hiatal hernia  -     ondansetron (ZOFRAN) 4 mg tablet; Take 1 tablet (4 mg total) by mouth every 8 (eight) hours as needed for nausea or vomiting  -     ibuprofen (MOTRIN) 800 mg tablet; Take 1 tablet (800 mg total) by mouth every 8 (eight) hours as needed for mild pain  -     XR chest pa & lateral; Future          Thoracic History   Problem: Large type III paraesophageal hernia and severe GERD    Procedure: 1/5/2024 robotic type III paraesophageal hernia repair with mesh and Linx device placement        Subjective:    Patient ID: Clotilde Berg is a 63 y.o. female.    HPI  Jimmie comes back to our office 2+ weeks out from robotic paraesophageal hernia repair and Lynx device placement.  Her hospital course was uncomplicated.  She was discharged to home on postoperative day #1.  Since being at home she has had  some issues with coughing, nausea, and left upper quadrant abdominal pain.  She gets coughing spells for 5 minutes.  This is nonproductive.  She also has intermittent nausea.  This comes in waves.  This occurs with sitting upright for extended periods of time.  This gets better with eating.  She has had some moderate left upper quadrant shooting abdominal pain.  No other major complaints.  She denies any heartburn.  She is tolerating most foods including hamburger.  She denies any dysphagia.  The following portions of the patient's history were reviewed and updated as appropriate: allergies, current medications, past family history, past medical history, past social history, past surgical history and problem list.    Review of Systems   Constitutional:  Positive for fatigue. Negative for activity change, appetite change, chills, diaphoresis, fever and unexpected weight change.   HENT: Negative.     Eyes: Negative.    Respiratory:  Positive for cough. Negative for apnea, chest tightness, shortness of breath, wheezing and stridor.    Cardiovascular:  Negative for chest pain, palpitations and leg swelling.   Gastrointestinal:  Positive for abdominal pain and nausea. Negative for abdominal distention, blood in stool, constipation, diarrhea and vomiting.   Endocrine: Negative.    Genitourinary: Negative.    Musculoskeletal: Negative.    Skin: Negative.    Allergic/Immunologic: Negative.    Neurological:  Positive for weakness.   Hematological: Negative.    Psychiatric/Behavioral: Negative.           Objective:   Physical Exam  Vitals and nursing note reviewed.   Constitutional:       General: She is not in acute distress.     Appearance: Normal appearance. She is well-developed. She is not diaphoretic.   HENT:      Head: Normocephalic and atraumatic.      Mouth/Throat:      Pharynx: No oropharyngeal exudate.   Eyes:      General: No scleral icterus.     Conjunctiva/sclera: Conjunctivae normal.      Pupils: Pupils are  "equal, round, and reactive to light.   Neck:      Thyroid: No thyromegaly.      Vascular: No JVD.      Trachea: No tracheal deviation.   Cardiovascular:      Rate and Rhythm: Normal rate and regular rhythm.      Heart sounds: Normal heart sounds. No murmur heard.     No friction rub. No gallop.   Pulmonary:      Effort: Pulmonary effort is normal. No respiratory distress.      Breath sounds: Normal breath sounds. No wheezing or rales.      Comments: Normal work of breathing on room air.  Lungs clear to auscultation bilaterally.  Chest:      Chest wall: No tenderness.   Abdominal:      General: Bowel sounds are normal. There is no distension.      Palpations: Abdomen is soft. There is no mass.      Tenderness: There is no abdominal tenderness. There is no guarding or rebound.      Comments: Abdominal incisions x 6 are seen and well-healed.  No erythema or drainage.  No major distention.  Minor left upper quadrant tenderness to palpation.   Musculoskeletal:         General: No tenderness or deformity. Normal range of motion.      Cervical back: Normal range of motion and neck supple.   Skin:     General: Skin is warm and dry.      Coloration: Skin is not pale.      Findings: No erythema or rash.   Neurological:      General: No focal deficit present.      Mental Status: She is alert and oriented to person, place, and time.   Psychiatric:         Mood and Affect: Mood normal.         Behavior: Behavior normal.         Thought Content: Thought content normal.         Judgment: Judgment normal.     /76 (BP Location: Left arm, Patient Position: Sitting, Cuff Size: Standard)   Pulse 77   Temp 98 °F (36.7 °C) (Temporal)   Resp 13   Ht 5' 2\" (1.575 m)   Wt 75.7 kg (166 lb 14.2 oz)   SpO2 98%   BMI 30.52 kg/m²     No Chest XR results available for this patient.   No CT Chest results available for this patient.  No CT Chest,Abdomen,Pelvis results available for this patient.   No NM PET CT results available for " this patient.   FL esophagram complete    Result Date: 1/6/2024  Narrative BARIUM SWALLOW-ESOPHAGRAM INDICATION:   s/p PEHR. COMPARISON:  None IMAGES:  10 FLUOROSCOPY TIME:   57 seconds TECHNIQUE: The patient was given water-soluble Omnipaque 350 by mouth and images of the esophagus were obtained. FINDINGS: Metallic LINX device projects over the gastroesophageal junction. The esophagus is normal in caliber.  Esophageal motility is normal and emptying of contrast from the esophagus is prompt. No evidence of contrast leak or extravasation. Gastroesophageal reflux was not observed. There is no hiatal hernia.     Impression Unremarkable esophagram. No evidence of leak. Workstation performed: [id number]         Mark Hernandez MD  Thoracic Surgeon    (Available by Tiger Text)

## 2024-01-24 NOTE — LETTER
January 24, 2024     Patient: Clotilde Breg  YOB: 1960  Date of Visit: 1/24/2024      To Whom it May Concern:    Clotilde Berg is under my professional care. Clotilde was seen in my office on 1/24/2024. Clotilde is still recovering from surgery. She may may return to work on Monday February 12th, 2024 .    If you have any questions or concerns, please don't hesitate to call.         Sincerely,          Mark Hernandez MD        CC: No Recipients

## 2024-01-29 ENCOUNTER — TELEPHONE (OUTPATIENT)
Dept: NEUROLOGY | Facility: CLINIC | Age: 64
End: 2024-01-29

## 2024-02-03 ENCOUNTER — TRANSCRIBE ORDERS (OUTPATIENT)
Dept: LAB | Facility: CLINIC | Age: 64
End: 2024-02-03

## 2024-02-03 ENCOUNTER — APPOINTMENT (OUTPATIENT)
Dept: LAB | Facility: CLINIC | Age: 64
End: 2024-02-03
Payer: COMMERCIAL

## 2024-02-03 DIAGNOSIS — E78.5 HYPERLIPIDEMIA, UNSPECIFIED HYPERLIPIDEMIA TYPE: ICD-10-CM

## 2024-02-03 DIAGNOSIS — E11.69 TYPE 2 DIABETES MELLITUS WITH OTHER SPECIFIED COMPLICATION, UNSPECIFIED WHETHER LONG TERM INSULIN USE (HCC): Primary | ICD-10-CM

## 2024-02-03 DIAGNOSIS — E11.69 TYPE 2 DIABETES MELLITUS WITH OTHER SPECIFIED COMPLICATION, UNSPECIFIED WHETHER LONG TERM INSULIN USE (HCC): ICD-10-CM

## 2024-02-03 LAB
ALBUMIN SERPL BCP-MCNC: 4.4 G/DL (ref 3.5–5)
ALP SERPL-CCNC: 65 U/L (ref 34–104)
ALT SERPL W P-5'-P-CCNC: 14 U/L (ref 7–52)
ANION GAP SERPL CALCULATED.3IONS-SCNC: 10 MMOL/L
AST SERPL W P-5'-P-CCNC: 15 U/L (ref 13–39)
BILIRUB SERPL-MCNC: 0.29 MG/DL (ref 0.2–1)
BUN SERPL-MCNC: 15 MG/DL (ref 5–25)
CALCIUM SERPL-MCNC: 9.4 MG/DL (ref 8.4–10.2)
CHLORIDE SERPL-SCNC: 107 MMOL/L (ref 96–108)
CO2 SERPL-SCNC: 24 MMOL/L (ref 21–32)
CREAT SERPL-MCNC: 0.75 MG/DL (ref 0.6–1.3)
EST. AVERAGE GLUCOSE BLD GHB EST-MCNC: 143 MG/DL
GFR SERPL CREATININE-BSD FRML MDRD: 85 ML/MIN/1.73SQ M
GLUCOSE P FAST SERPL-MCNC: 99 MG/DL (ref 65–99)
HBA1C MFR BLD: 6.6 %
LDLC SERPL DIRECT ASSAY-MCNC: 95 MG/DL (ref 0–100)
POTASSIUM SERPL-SCNC: 4.1 MMOL/L (ref 3.5–5.3)
PROT SERPL-MCNC: 7.9 G/DL (ref 6.4–8.4)
SODIUM SERPL-SCNC: 141 MMOL/L (ref 135–147)

## 2024-02-03 PROCEDURE — 36415 COLL VENOUS BLD VENIPUNCTURE: CPT

## 2024-02-03 PROCEDURE — 83036 HEMOGLOBIN GLYCOSYLATED A1C: CPT

## 2024-02-03 PROCEDURE — 83721 ASSAY OF BLOOD LIPOPROTEIN: CPT

## 2024-02-03 PROCEDURE — 80053 COMPREHEN METABOLIC PANEL: CPT

## 2024-02-05 ENCOUNTER — OFFICE VISIT (OUTPATIENT)
Dept: NEUROLOGY | Facility: CLINIC | Age: 64
End: 2024-02-05
Payer: COMMERCIAL

## 2024-02-05 VITALS
HEART RATE: 102 BPM | BODY MASS INDEX: 30.53 KG/M2 | TEMPERATURE: 96 F | WEIGHT: 166.9 LBS | DIASTOLIC BLOOD PRESSURE: 82 MMHG | OXYGEN SATURATION: 98 % | SYSTOLIC BLOOD PRESSURE: 130 MMHG

## 2024-02-05 DIAGNOSIS — G40.219 PARTIAL SYMPTOMATIC EPILEPSY WITH COMPLEX PARTIAL SEIZURES, INTRACTABLE, WITHOUT STATUS EPILEPTICUS (HCC): ICD-10-CM

## 2024-02-05 DIAGNOSIS — G43.711 INTRACTABLE CHRONIC MIGRAINE WITHOUT AURA AND WITH STATUS MIGRAINOSUS: Primary | ICD-10-CM

## 2024-02-05 DIAGNOSIS — G43.701 CHRONIC MIGRAINE WITHOUT AURA, WITH STATUS MIGRAINOSUS: ICD-10-CM

## 2024-02-05 PROCEDURE — 99214 OFFICE O/P EST MOD 30 MIN: CPT

## 2024-02-05 RX ORDER — KETOROLAC TROMETHAMINE 10 MG/1
10 TABLET, FILM COATED ORAL EVERY 6 HOURS PRN
Qty: 20 TABLET | Refills: 1 | Status: SHIPPED | OUTPATIENT
Start: 2024-02-05 | End: 2024-05-19

## 2024-02-05 NOTE — PATIENT INSTRUCTIONS
Migraines without aura:    -Instead of the eletriptan, I would like to add on a different abortive medication.  It does not appear that the patient has ever tried Nurtec in the past however she has tried Ubrelvy.  I would like for the patient to try Nurtec 75 mg, take 1 tablet by mouth once at the onset of a headache.  Max is 75 mg per 24 hours.  We would like to try and prescribe Nurtec again at this time, not quite sure why the medication was denied or what happened the first time.  Would like to have this for better abortive therapy compared to something like Eletriptan or Toradol.  If the patient needed, she could even combine Nurtec with Toradol and promethazine as needed for when she does have a significant migraine headache or get into a migraine cycle.  -In regards to the patient's preventative medications at this time, we will not be changing anything.  So the patient should still continue to take Topamax 200 mg twice daily for seizure prophylaxis/migraine prophylaxis.  She is also to continue to take verapamil 120 mg daily for migraine preventative medication as well.      Patient Instructions:    Headache Calendar  Please maintain a headache calendar  Consider using phone applications such as Migraine Buddy or Migraine Diary    Headache/migraine treatment:     Rescue medications (for immediate treatment of a headache):   It is ok to take ibuprofen, acetaminophen or naproxen (Advil, Tylenol,  Aleve, Excedrin) if they help your headaches you should limit these to No more than 3 times a week to avoid medication overuse/rebound headaches.     For your more moderate to severe migraines take this medication early     - Start Nurtec 75 mg, take 1 tablet at the onset of a headache.  Max dose  is 75 mg per 24 hours.    - Continue Toradol 10 mg, take 1 tablet by mouth every 6 hours as needed for moderate pain associated with migraines.    - Continue Promethazine 25 mg, take 1 tablet by mouth once as needed every  6 hours for nausea/vomiting associated with migraines.      Prescription preventive medications for headaches/migraines   (to take every day to help prevent headaches - not to take at the time of headache):  [x] Topamax 200 mg twice daily and verapamil 100 mg once daily.    *Typically these types of medications take time until you see the benefit, although some may see improvement in days, often it may take weeks, especially if the medication is being titrated up to a beneficial level. Please contact us if there are any concerns or questions regarding the medication.     Over the counter preventive supplements for headaches/migraines (if you try, try for 3 months straight)  (to take every day to help prevent headaches - not to take at the time of headache):  There are combo pills online of these - none of which regulated by FDA and double check dosing - take appropriate dose only once a day- prevent a migraine, migravent, mind ease, migrelief   [] Magnesium 400mg daily (If any diarrhea or upset stomach, decrease dose  as tolerated)  [] Riboflavin (Vitamin B2) 400mg daily (may make your urine bright/neon yellow)    Lifestyle Recommendations:  [x] SLEEP - Maintain a regular sleep schedule: Adults need at least 7-8 hours of uninterrupted a night. Maintain good sleep hygiene:  Going to bed and waking up at consistent times, avoiding excessive daytime naps, avoiding caffeinated beverages in the evening, avoid excessive stimulation in the evening and generally using bed primarily for sleeping.  One hour before bedtime would recommend turning lights down lower, decreasing your activity (may read quietly, listen to music at a low volume). When you get into bed, should eliminate all technology (no texting, emailing, playing with your phone, iPad or tablet in bed).  [x] HYDRATION - Maintain good hydration.  Drink  2L of fluid a day (4 typical small water bottles)  [x] DIET - Maintain good nutrition. In particular don't skip  meals and try and eat healthy balanced meals regularly.  [x] TRIGGERS - Look for other triggers and avoid them: Limit caffeine to 1-2 cups a day or less. Avoid dietary triggers that you have noticed bring on your headaches (this could include aged cheese, peanuts, MSG, aspartame and nitrates).  [x] EXERCISE - physical exercise as we all know is good for you in many ways, and not only is good for your heart, but also is beneficial for your mental health, cognitive health and  chronic pain/headaches. I would encourage at the least 5 days of physical exercise weekly for at least 30 minutes.     Education and Follow-up  [x] Please call with any questions or concerns. Of course if any new concerning symptoms go to the emergency department.  [x] Follow up in 6 months with Charles MODI

## 2024-02-05 NOTE — PROGRESS NOTES
[facility]  PATIENT:  Clotilde Berg  MRN:  [id number]  :  1960  DATE OF SERVICE:  2024      Assessment/Plan:     Migraines without aura:    I had the pleasure of seeing Jimmie today in the office at [facility] in [place].  She was presenting today for an office visit follow-up in regard to her migraine headaches.  The patient reported that since the last appointment she was unable to receive the Nurtec, advised the patient that we would certainly try to run this through the a prior authorization again.  Patient noted that she was taking Toradol to help with abortive therapy but ultimately decided it would not be best for the patient to continue with long-term use of Toradol in the future.  Her migraine headaches were significantly limited and she noticed her last significant migraine headache on 2023.  Noted that this migraine headache only lasted for about a day.  Patient was still doing well with preventative therapy.  She was still continuing to take Topamax 200 mg twice daily for seizure prophylaxis/migraine prophylaxis.  She was still continuing with verapamil 120 mg daily.  Patient reports no new seizure-like activity since last appointment.  Patient has no other concerns or questions at this time.  Advised that we would start Nurtec 75 mg daily.  She was to continue with Toradol and promethazine as needed for abortive therapy.    - Instead of the eletriptan, I would like to add on a different abortive medication.  It does not appear that the patient has ever tried Nurtec in the past however she has tried Ubrelvy.  I would like for the patient to try Nurtec 75 mg, take 1 tablet by mouth once at the onset of a headache.  Max is 75 mg per 24 hours.  We would like to try and prescribe Nurtec again at this time, not quite sure why the medication was denied or what happened the first time.  Would like to have this for better abortive therapy  compared to something like Eletriptan or Toradol.  If the patient needed, she could even combine Nurtec with Toradol and promethazine as needed for when she does have a significant migraine headache or get into a migraine cycle.  - In regards to the patient's preventative medications at this time, we will not be changing anything.  So the patient should still continue to take Topamax 200 mg twice daily for seizure prophylaxis/migraine prophylaxis.  She is also to continue to take verapamil 120 mg daily for migraine preventative medication as well.      Patient Instructions:    Headache Calendar  Please maintain a headache calendar  Consider using phone applications such as Migraine Buddy or Migraine Diary    Headache/migraine treatment:     Rescue medications (for immediate treatment of a headache):   It is ok to take ibuprofen, acetaminophen or naproxen (Advil, Tylenol,  Aleve, Excedrin) if they help your headaches you should limit these to No more than 3 times a week to avoid medication overuse/rebound headaches.     For your more moderate to severe migraines take this medication early     - Start Nurtec 75 mg, take 1 tablet at the onset of a headache.  Max dose  is 75 mg per 24 hours.    - Continue Toradol 10 mg, take 1 tablet by mouth every 6 hours as needed for moderate pain associated with migraines.    - Continue Promethazine 25 mg, take 1 tablet by mouth once as needed every 6 hours for nausea/vomiting associated with migraines.      Prescription preventive medications for headaches/migraines   (to take every day to help prevent headaches - not to take at the time of headache):  [x] Topamax 200 mg twice daily and verapamil 120 mg once daily.    *Typically these types of medications take time until you see the benefit, although some may see improvement in days, often it may take weeks, especially if the medication is being titrated up to a beneficial level. Please contact us if there are any concerns or  questions regarding the medication.     Over the counter preventive supplements for headaches/migraines (if you try, try for 3 months straight)  (to take every day to help prevent headaches - not to take at the time of headache):  There are combo pills online of these - none of which regulated by FDA and double check dosing - take appropriate dose only once a day- prevent a migraine, migravent, mind ease, migrelief   [] Magnesium 400mg daily (If any diarrhea or upset stomach, decrease dose  as tolerated)  [] Riboflavin (Vitamin B2) 400mg daily (may make your urine bright/neon yellow)    Lifestyle Recommendations:  [x] SLEEP - Maintain a regular sleep schedule: Adults need at least 7-8 hours of uninterrupted a night. Maintain good sleep hygiene:  Going to bed and waking up at consistent times, avoiding excessive daytime naps, avoiding caffeinated beverages in the evening, avoid excessive stimulation in the evening and generally using bed primarily for sleeping.  One hour before bedtime would recommend turning lights down lower, decreasing your activity (may read quietly, listen to music at a low volume). When you get into bed, should eliminate all technology (no texting, emailing, playing with your phone, iPad or tablet in bed).  [x] HYDRATION - Maintain good hydration.  Drink  2L of fluid a day (4 typical small water bottles)  [x] DIET - Maintain good nutrition. In particular don't skip meals and try and eat healthy balanced meals regularly.  [x] TRIGGERS - Look for other triggers and avoid them: Limit caffeine to 1-2 cups a day or less. Avoid dietary triggers that you have noticed bring on your headaches (this could include aged cheese, peanuts, MSG, aspartame and nitrates).  [x] EXERCISE - physical exercise as we all know is good for you in many ways, and not only is good for your heart, but also is beneficial for your mental health, cognitive health and  chronic pain/headaches. I would encourage at the least 5  days of physical exercise weekly for at least 30 minutes.     Education and Follow-up  [x] Please call with any questions or concerns. Of course if any new concerning symptoms go to the emergency department.  [x] Follow up in 6 months with Charles MODI       History of Present Illness:     For Review:    We had the pleasure of evaluating Clotilde Berg in neurological follow up  today for headaches.  As you know,  she is a 63 y.o.   female with a past history of migraine headaches. Patient was last seen in the office at [facility] on 08/02/2023 by myself.  The patient was following up in regard to her migraine headaches and also due to partial symptomatic epilepsy.  It was noted that in regard to the patient's epilepsy she had not complained of any active seizures and did not endorse any seizure-like activity or loss of consciousness at the last appointment.  It was noted that the patient used to follow with Dr. Sayra Silverman as her neurologist in the past who diagnosed her with seizures.  It was noted that in the past the patient had a EEG is consistent with symptomatic epilepsy.  It was noted that the patient was still taking Topamax 200 mg twice daily for migraine prevention and also seizure prevention.  It was noted that the patient did not drive and was recommended by her previous neurologist that she not drive moving forward.  Advised the patient that if there was ever any need for her to drive in the future she had any desire to drive we could refer her to the epilepsy team and she could be evaluated and see if she would be able to reobtain her license in the future.  In regard to migraine headaches it was noted that the patient was doing well and journaling her migraine headaches.  She had a stretch of days throughout February where she had migraines for about a week straight.  She had noted another another stretch where she had a migraine up to several days out of the week.  It was  noted that the patient was trying to take Eletriptan for abortive therapy at the last appointment.  She had not noticed any significant relief from the medication.  She did have Toradol and promethazine as well to take for abortive therapy.  At the last appointment, I had suggested looking into Nurtec for abortive therapy rather than Eletriptan.  Advised that the patient could still try to take the combination of Toradol and promethazine if Nurtec did not help.  In regard to preventative therapy the patient was to still continue with Topamax 200 mg twice daily for seizure prophylaxis/migraine prophylaxis.  She was also to continue with verapamil 120 mg daily for migraine preventative therapy as well.      Current medical illnesses:  Hiatal hernia, GERD, IBS, asthma, hypertension, migraines, partial symptomatic epilepsy, radiculopathy, osteoarthritis     Current Preventive:   Topamax, verapamil  Current Abortive:   Eletriptan, Toradol, promethazine     Prior Preventive:   Pamelor, tizanidine, gabapentin, hydroxyzine   Prior Abortive:    Imitrex, Ubrelvy, Zomig, Rizatriptan, indomethacin       Interval updates as of 2/5/2024:    12/07/2023 was her last migraine and seemed to only last for the day. She took the Toradol for the migraine and it seemed to work well for her. Still able to take her Toradol after her hiatal hernia repair. She ended up taking Toradol for pain instead of the eletriptan, which was previously ineffective for her. Not able to receive Nurtec since the last appointment. Still taking the Topiramate and verapamil every day.     She had another migraine in late August, she ended up with TIA like symptoms in September. She had dizziness and blurred vision that one time, did not last very long she stated. Had one or two more migraines with all of the testing leading up to her hiatal hernia repair.    No new-seizure like activity at this time.  Still taking topiramate for seizure prophylaxis at this  time.    Reviewed old notes from physician seen in the past- see above HPI for summary of previous encounters.       Past Medical History:   Diagnosis Date    Asthma     Bell palsy     Diabetes mellitus (HCC)     type 2    Diverticulitis 11/25/2022    EEG abnormality without seizure     Gastroparesis     GERD (gastroesophageal reflux disease)     Headache, migraine     Hiatal hernia     Hyperlipidemia     IBS (irritable bowel syndrome)     Migraines     PONV (postoperative nausea and vomiting)     Sarcoidosis     Sleep apnea     no cpap       Patient Active Problem List   Diagnosis    Arthritis of left acromioclavicular joint    Left arm pain    Hypertension, essential    Hypokalemia    Hiatal hernia    Partial symptomatic epilepsy with complex partial seizures, intractable, without status epilepticus (HCC)    Chronic migraine without aura, intractable, with status migrainosus    Chest pressure    Radiculopathy, cervical region    Asthma    Chronic migraine without aura, with status migrainosus    Medication overuse headache    Thrombophilia ([facility])    Subjective visual disturbance    Right knee pain    Problems at work    Noncompliance with medication regimen    Intractable headache caused by drug    Intractable chronic migraine without aura    Osteoarthritis    Abdominal pain    Cervicocranial syndrome    Complex partial seizures with consciousness impaired ([facility])    Dizziness and giddiness    Type 2 diabetes mellitus, without long-term current use of insulin (HCC)    GERD (gastroesophageal reflux disease)    IBS (irritable bowel syndrome)    Bell palsy    Richardson's esophagus without dysplasia    PONV (postoperative nausea and vomiting)       Medications:      Current Outpatient Medications   Medication Sig Dispense Refill    acetaminophen (TYLENOL) 500 mg tablet Take 500 mg by mouth every 6 (six) hours as needed for mild pain      albuterol (PROVENTIL HFA,VENTOLIN HFA) 90 mcg/act inhaler Inhale 2 puffs every 6 (six)  hours as needed for wheezing      ascorbic acid (VITAMIN C) 500 mg tablet Take 500 mg by mouth daily      Asmanex, 30 Metered Doses, 220 MCG/ACT inhaler       B Complex-C (B-COMPLEX WITH VITAMIN C) tablet Take 1 tablet by mouth daily      Cholecalciferol (VITAMIN D3) 2000 units TABS Take 1 tablet by mouth daily      dicyclomine (BENTYL) 20 mg tablet Take 20 mg by mouth as needed        Empagliflozin 25 MG TABS Jardiance 25 mg tablet      esomeprazole (NexIUM) 40 MG capsule Take 1 capsule (40 mg total) by mouth 2 (two) times a day before meals 60 capsule 3    hydrOXYzine HCL (ATARAX) 25 mg tablet 25 mg 3 (three) times a day as needed      hyoscyamine (LEVSIN/SL) 0.125 mg SL tablet Take 0.125 mg by mouth every 6 (six) hours Place 1 tablet under the tongue every 6 (six) hours as needed      ibuprofen (MOTRIN) 800 mg tablet Take 1 tablet (800 mg total) by mouth every 8 (eight) hours as needed for mild pain 60 tablet 0    ketorolac (TORADOL) 10 mg tablet Take 1 tablet (10 mg total) by mouth every 6 (six) hours as needed for mild pain (break migraine if needed) 20 tablet 0    Magnesium 400 MG CAPS Take by mouth in the morning      meclizine (ANTIVERT) 25 mg tablet as needed      metFORMIN (GLUCOPHAGE) 500 mg tablet 500 mg 2 (two) times a day with meals      multivitamin-iron-minerals-folic acid (CENTRUM) chewable tablet Chew 1 tablet daily      ondansetron (ZOFRAN) 4 mg tablet Take 1 tablet (4 mg total) by mouth every 8 (eight) hours as needed for nausea or vomiting 30 tablet 1    ondansetron (ZOFRAN-ODT) 4 mg disintegrating tablet Take 1 tablet (4 mg total) by mouth every 6 (six) hours as needed for nausea or vomiting 20 tablet 0    pravastatin (PRAVACHOL) 80 mg tablet 80 mg daily      promethazine (PHENERGAN) 25 mg tablet Take 25 mg by mouth every 6 (six) hours as needed for nausea or vomiting      pseudoephedrine (SUDAFED) 30 mg tablet Take 60 mg by mouth every 4 (four) hours as needed for congestion      SALINE NASAL  SPRAY NA 2 sprays into each nostril 2 (two) times a day      tiZANidine (ZANAFLEX) 2 mg tablet Take 1 tablet (2 mg total) by mouth daily at bedtime 30 tablet 3    topiramate (TOPAMAX) 200 MG tablet Take 200 mg by mouth 2 (two) times a day      verapamil (CALAN-SR) 120 mg CR tablet Take 120 mg by mouth in the morning      famotidine (PEPCID) 20 mg tablet Take 20 mg by mouth daily at bedtime (Patient not taking: Reported on 2/5/2024)      guaiFENesin (MUCINEX) 600 mg 12 hr tablet Take 1,200 mg by mouth daily as needed (Patient not taking: Reported on 2/5/2024)       No current facility-administered medications for this visit.        Allergies:      Allergies   Allergen Reactions    Coconut Flavor - Food Allergy Shortness Of Breath     Asthma exacerbation     Fish Allergy - Food Allergy Shortness Of Breath     Asthma exacerbation- no issues with CT dye    Nuts - Food Allergy Shortness Of Breath     Asthma exacerbation     Other Shortness Of Breath     Nuts, coconut    Talwin [Pentazocine] Shortness Of Breath and Other (See Comments)     dizziness       Family History:     Family History   Problem Relation Age of Onset    Diabetes Mother     Heart disease Mother     Cancer Father     Liver cancer Father     Brain cancer Father     Arthritis Sister     Migraines Brother     Seizures Maternal Aunt     Migraines Maternal Uncle        Social History:     Social History     Socioeconomic History    Marital status: /Civil Union     Spouse name: Not on file    Number of children: Not on file    Years of education: Not on file    Highest education level: Not on file   Occupational History    Not on file   Tobacco Use    Smoking status: Never    Smokeless tobacco: Never   Vaping Use    Vaping status: Never Used   Substance and Sexual Activity    Alcohol use: Yes     Comment: socially    Drug use: No    Sexual activity: Not on file   Other Topics Concern    Not on file   Social History Narrative    Not on file     Social  Determinants of Health     Financial Resource Strain: Not on file   Food Insecurity: Not on file   Transportation Needs: Not on file   Physical Activity: Not on file   Stress: Not on file   Social Connections: Not on file   Intimate Partner Violence: Not on file   Housing Stability: Not on file         Objective:     Physical Exam:                                                                 Vitals:            Constitutional:    There were no vitals taken for this visit.  BP Readings from Last 3 Encounters:   01/24/24 139/76   01/06/24 139/70   12/11/23 137/70     Pulse Readings from Last 3 Encounters:   01/24/24 77   01/06/24 82   12/11/23 97         Well developed, well nourished, well groomed. No dysmorphic features.       Psychiatric:  Normal behavior and appropriate affect        Neurological Examination:     Mental status/cognitive function:   Orientated to time, place and person. Recent and remote memory intact. Attention span and concentration as well as fund of knowledge are appropriate for age. Normal language and spontaneous speech.     Cranial Nerves:  II-visual fields full.   III, IV, VI-Pupils were equal, round, and reactive to light and accomodation. Extraocular movements were full and conjugate without nystagmus. Conjugate gaze, normal smooth pursuits, normal saccades   V-facial sensation symmetric.    VII-facial expression symmetric, intact forehead wrinkle, strong eye closure, symmetric smile    VIII-hearing grossly intact bilaterally   IX, X-palate elevation symmetric, no dysarthria.   XI-shoulder shrug strength intact    XII-tongue protrusion midline.    Motor Exam: symmetric bulk and tone throughout, no pronator drift. Power/strength 5/5 bilateral upper and lower extremities, no atrophy, fasciculations or abnormal movements noted.   Sensory: grossly intact light touch in all extremities.   Reflexes: brachioradialis 2+, biceps 2+, left knee 2+ right knee 0 (knee replacement)   Coordination:  Finger nose finger intact bilaterally, no apparent dysmetria, ataxia or tremor noted  Gait: steady casual and tandem gait.       Review of Systems:     Review of Systems   Constitutional:  Negative for appetite change, fatigue and fever.   HENT: Negative.  Negative for hearing loss, tinnitus, trouble swallowing and voice change.    Eyes: Negative.  Negative for photophobia, pain and visual disturbance.   Respiratory: Negative.  Negative for shortness of breath.    Cardiovascular: Negative.  Negative for palpitations.   Gastrointestinal: Negative.  Negative for nausea and vomiting.   Endocrine: Negative.  Negative for cold intolerance.   Genitourinary: Negative.  Negative for dysuria, frequency and urgency.   Musculoskeletal:  Negative for back pain, gait problem, myalgias, neck pain and neck stiffness.   Skin: Negative.  Negative for rash.   Allergic/Immunologic: Negative.    Neurological:  Positive for headaches. Negative for dizziness, tremors, seizures, syncope, facial asymmetry, speech difficulty, weakness, light-headedness and numbness.   Hematological: Negative.  Does not bruise/bleed easily.   Psychiatric/Behavioral: Negative.  Negative for confusion, hallucinations and sleep disturbance.    All other systems reviewed and are negative.    I personally reviewed the ROS entered by the MA    I spent 30 minutes in total time for this visit.    Author:  Jack Murcia PA-C 2/5/2024 1:20 PM

## 2024-02-08 ENCOUNTER — TELEPHONE (OUTPATIENT)
Dept: NEUROLOGY | Facility: CLINIC | Age: 64
End: 2024-02-08

## 2024-02-08 NOTE — TELEPHONE ENCOUNTER
Pt called in because the pharmacy told her she needs authorization for Nurtec 75mg from Jack Murcia.  The phone number is: [facility]; [phone]  Member # [id number]    Please assist.  Thank you.

## 2024-02-13 ENCOUNTER — TELEPHONE (OUTPATIENT)
Dept: CARDIAC SURGERY | Facility: CLINIC | Age: 64
End: 2024-02-13

## 2024-02-13 ENCOUNTER — TELEPHONE (OUTPATIENT)
Dept: HEMATOLOGY ONCOLOGY | Facility: CLINIC | Age: 64
End: 2024-02-13

## 2024-02-13 NOTE — TELEPHONE ENCOUNTER
LVM for pt to see if she will like to come in tomorrow 02/14 @ 8:40 am. Pt is scheduled for 4:20 pm. I advised the pt to please call the office back if she will prefer to come in earlier.

## 2024-02-13 NOTE — TELEPHONE ENCOUNTER
I spoke with pt in regards to appointment with Dr. Hernandez tomorrow. I informed pt that she can just keep her 4:20 pm slot being she can't make the 8:40 am appt due to work. She states she gets out at 3 pm. Pt will like a call if anyone cancels or reschedules. I told her I will call her if anyone does. Pt was appreciative of the call.

## 2024-02-13 NOTE — TELEPHONE ENCOUNTER
Patient Call    Who are you speaking with? Patient    If it is not the patient, are they listed on an active communication consent form? N/A   What is the reason for this call? Pt cannot come in at 840 but she can come in at 330   Does this require a call back? Yes   If a call back is required, please list [facility] call back number [phone]    If a call back is required, advise that a message will be forwarded to their care team and someone will return their call as soon as possible.   Did you relay this information to the patient? Yes

## 2024-02-14 ENCOUNTER — OFFICE VISIT (OUTPATIENT)
Dept: CARDIAC SURGERY | Facility: CLINIC | Age: 64
End: 2024-02-14

## 2024-02-14 VITALS
HEART RATE: 82 BPM | RESPIRATION RATE: 14 BRPM | WEIGHT: 163.8 LBS | OXYGEN SATURATION: 98 % | BODY MASS INDEX: 30.14 KG/M2 | SYSTOLIC BLOOD PRESSURE: 140 MMHG | TEMPERATURE: 98 F | HEIGHT: 62 IN | DIASTOLIC BLOOD PRESSURE: 79 MMHG

## 2024-02-14 DIAGNOSIS — K21.9 GASTROESOPHAGEAL REFLUX DISEASE WITHOUT ESOPHAGITIS: Primary | ICD-10-CM

## 2024-02-14 PROCEDURE — 99024 POSTOP FOLLOW-UP VISIT: CPT | Performed by: THORACIC SURGERY (CARDIOTHORACIC VASCULAR SURGERY)

## 2024-02-15 NOTE — PROGRESS NOTES
Thoracic Follow-Up  Assessment/Plan:   63-year-old female with history of a large type III paraesophageal hernia and significant GERD now status post robotic hernia repair and Linx device placement doing well    Jimmie continues to slowly improve 6 weeks out from surgery.  I think she is right on track.  I have encouraged her to stay off of her acid suppression medication.  She should continue to eat solid food every 3 hours to help the Linx device opening closed.  She can slowly start to introduce normal foods.  I would like to do a virtual visit with her in 1 month to see how she is doing.  Long-term I like to see her in person in July 6 months out from surgery.  She voiced understanding and appreciation    [facility]      There are no diagnoses linked to this encounter.      Thoracic History   Problem: Large type III paraesophageal hernia and severe GERD     Procedure: 1/5/2024 robotic type III paraesophageal hernia repair with mesh and Linx device placement             Subjective:    Patient ID: Clotilde Berg is a 63 y.o. female.    HPI  Jimmie comes back to our office for second postop appointment status post 1/5/2024 robotic type III paraesophageal hernia repair with mesh and Linx device placement.  She has been doing better over the past few weeks.  Her nausea is decreasing.  She still has occasional coughing.  She denies any episodes of heartburn and is off her acid suppression medication.  She is slowly increasing her p.o. intake.  She does note that she has difficulty still with toast and thicker breads.    The following portions of the patient's history were reviewed and updated as appropriate: allergies, current medications, past family history, past medical history, past social history, past surgical history and problem list.    Review of Systems   Constitutional:  Negative for activity change, appetite change, chills, diaphoresis, fatigue, fever and unexpected weight change.   HENT:  Positive  for trouble swallowing.    Eyes: Negative.    Respiratory:  Negative for apnea, cough, chest tightness, shortness of breath, wheezing and stridor.    Cardiovascular:  Negative for chest pain, palpitations and leg swelling.   Gastrointestinal:  Negative for abdominal distention, abdominal pain, blood in stool, constipation, diarrhea, nausea and vomiting.   Endocrine: Negative.    Genitourinary: Negative.    Musculoskeletal: Negative.    Skin: Negative.    Allergic/Immunologic: Negative.    Neurological: Negative.    Hematological: Negative.    Psychiatric/Behavioral: Negative.           Objective:   Physical Exam  Vitals and nursing note reviewed.   Constitutional:       General: She is not in acute distress.     Appearance: She is well-developed. She is not diaphoretic.   HENT:      Head: Normocephalic and atraumatic.      Mouth/Throat:      Pharynx: No oropharyngeal exudate.   Eyes:      General: No scleral icterus.     Conjunctiva/sclera: Conjunctivae normal.      Pupils: Pupils are equal, round, and reactive to light.   Neck:      Thyroid: No thyromegaly.      Vascular: No JVD.      Trachea: No tracheal deviation.   Cardiovascular:      Rate and Rhythm: Normal rate and regular rhythm.      Heart sounds: Normal heart sounds. No murmur heard.     No friction rub. No gallop.   Pulmonary:      Effort: Pulmonary effort is normal. No respiratory distress.      Breath sounds: Normal breath sounds. No wheezing or rales.   Chest:      Chest wall: No tenderness.   Abdominal:      General: Bowel sounds are normal. There is no distension.      Palpations: Abdomen is soft. There is no mass.      Tenderness: There is no abdominal tenderness. There is no guarding or rebound.      Comments: Abdominal incisions are well-healed.   Musculoskeletal:         General: No tenderness or deformity. Normal range of motion.      Cervical back: Normal range of motion and neck supple.   Skin:     General: Skin is warm and dry.       "Coloration: Skin is not pale.      Findings: No erythema or rash.   Neurological:      Mental Status: She is alert and oriented to person, place, and time.   Psychiatric:         Behavior: Behavior normal.         Thought Content: Thought content normal.         Judgment: Judgment normal.     /79 (BP Location: Left arm, Patient Position: Sitting, Cuff Size: Standard)   Pulse 82   Temp 98 °F (36.7 °C) (Temporal)   Resp 14   Ht 5' 2\" (1.575 m)   Wt 74.3 kg (163 lb 12.8 oz)   SpO2 98%   BMI 29.96 kg/m²     XR chest pa & lateral    Result Date: 1/24/2024  Impression No acute cardiopulmonary disease, nor change in appearance from 12/5/2024. Electronically signed: 01/24/2024 06:36 PM Gustavo Brown MD     No CT Chest results available for this patient.  No CT Chest,Abdomen,Pelvis results available for this patient.   No NM PET CT results available for this patient.   FL esophagram complete    Result Date: 1/6/2024  Narrative BARIUM SWALLOW-ESOPHAGRAM INDICATION:   s/p PEHR. COMPARISON:  None IMAGES:  10 FLUOROSCOPY TIME:   57 seconds TECHNIQUE: The patient was given water-soluble Omnipaque 350 by mouth and images of the esophagus were obtained. FINDINGS: Metallic LINX device projects over the gastroesophageal junction. The esophagus is normal in caliber.  Esophageal motility is normal and emptying of contrast from the esophagus is prompt. No evidence of contrast leak or extravasation. Gastroesophageal reflux was not observed. There is no hiatal hernia.     Impression Unremarkable esophagram. No evidence of leak. Workstation performed: [id number]         Mark Hernandez MD  Thoracic Surgeon    (Available by Tiger Text)    "

## 2024-03-20 ENCOUNTER — TELEMEDICINE (OUTPATIENT)
Dept: CARDIAC SURGERY | Facility: CLINIC | Age: 64
End: 2024-03-20
Payer: COMMERCIAL

## 2024-03-20 DIAGNOSIS — K21.00 GASTROESOPHAGEAL REFLUX DISEASE WITH ESOPHAGITIS WITHOUT HEMORRHAGE: ICD-10-CM

## 2024-03-20 DIAGNOSIS — K44.9 HIATAL HERNIA: Primary | ICD-10-CM

## 2024-03-20 DIAGNOSIS — K22.70 BARRETT'S ESOPHAGUS WITHOUT DYSPLASIA: ICD-10-CM

## 2024-03-20 PROCEDURE — 99213 OFFICE O/P EST LOW 20 MIN: CPT | Performed by: PHYSICIAN ASSISTANT

## 2024-03-20 NOTE — PROGRESS NOTES
Virtual Regular Visit    Verification of patient location:    Patient is located at Home in the following state in which I hold an active license [place]      Assessment/Plan:    Problem List Items Addressed This Visit          Respiratory    Hiatal hernia - Primary       Digestive    GERD (gastroesophageal reflux disease)     Ms Peña is doing well about 2.5 months out from a robotic-assisted type III paraesophageal hernia repair with LINX device placement. She is recovering well. She still has some dysphagia with thicker breads which she avoids. Otherwise she is tolerating a regular diet. She is off her Pepcid, but did restart her PPI given she had heartburn when she stopped it. We will see her for one last visit in June to ensure continued recovery. All questions were answered and she is in agreement with this plan.          Richardson's esophagus without dysplasia            Reason for visit is   Chief Complaint   Patient presents with    Virtual Regular Visit          Encounter provider Thu Shine PA-C    Provider located at [facility] [place]  [address] [facility] [address] [phone]      Recent Visits  No visits were found meeting these conditions.  Showing recent visits within past 7 days and meeting all other requirements  Today's Visits  Date Type Provider Dept   03/20/24 [facility]   Showing today's visits and meeting all other requirements  Future Appointments  No visits were found meeting these conditions.  Showing future appointments within next 150 days and meeting all other requirements       The patient was identified by name and date of birth. Clotilde RIVERA tOis was informed that this is a telemedicine visit and that the visit is being conducted through the Epic Embedded platform. She agrees to proceed..  My office door was closed. No one else was in the room.  She  acknowledged consent and understanding of privacy and security of the video platform. The patient has agreed to participate and understands they can discontinue the visit at any time.    Patient is aware this is a billable service.     Subjective  Clotilde Berg is a 63 y.o. female who is s/p a robotic-assisted Type III paraesophageal hernia repair and LINX device placement on 1/5/2024. She was last seen in our office on 2/14/24 at which point she had some difficulty swallowing thicker breads. She now presents for routine follow-up.    On discussion she continues to feel well. She did have heartburn when she came off PPI and thus she has restarted it but now heartburn is controlled. She is eating a regular diet, but still avoids breads as they feel like they get stuck. She has no residual pain. She is overall very happy with her outcome.       HPI     Past Medical History:   Diagnosis Date    Asthma     Bell palsy     Diabetes mellitus (HCC)     type 2    Diverticulitis 11/25/2022    EEG abnormality without seizure     Gastroparesis     GERD (gastroesophageal reflux disease)     Headache, migraine     Hiatal hernia     Hyperlipidemia     IBS (irritable bowel syndrome)     Migraines     PONV (postoperative nausea and vomiting)     Sarcoidosis     Sleep apnea     no cpap       Past Surgical History:   Procedure Laterality Date    CHOLECYSTECTOMY      PLACEMENT ESOPHAGEAL SPHINCTER AUGMENTATION DEVICE W/ ROBOTICS N/A 1/5/2024    Procedure: linx placement;  Surgeon: Mark Hernandez MD;  Location: [facility];  Service: Thoracic    NJ COLONOSCOPY FLX DX W/COLLJ SPEC WHEN PFRMD N/A 3/8/2017    Procedure: EGD AND COLONOSCOPY;  Surgeon: Rad Romero MD;  Location: [facility];  Service: Gastroenterology    NJ ESOPHAGOGASTRODUODENOSCOPY TRANSORAL DIAGNOSTIC N/A 1/5/2024    Procedure: ESOPHAGOGASTRODUODENOSCOPY (EGD);  Surgeon: Mark Hernandez MD;  Location: [facility];  Service: Thoracic    NJ LAPS RPR  PARAESPHGL HRNA INCL FUNDPLSTY W/O MESH N/A 1/5/2024    Procedure: robotic assisted paraesophageal hernia repair;  Surgeon: Mark Hernandez MD;  Location: [facility];  Service: Thoracic    REPLACEMENT TOTAL KNEE Right     SIGMOIDECTOMY         Current Outpatient Medications   Medication Sig Dispense Refill    acetaminophen (TYLENOL) 500 mg tablet Take 500 mg by mouth every 6 (six) hours as needed for mild pain      albuterol (PROVENTIL HFA,VENTOLIN HFA) 90 mcg/act inhaler Inhale 2 puffs every 6 (six) hours as needed for wheezing      ascorbic acid (VITAMIN C) 500 mg tablet Take 500 mg by mouth daily      Asmanex, 30 Metered Doses, 220 MCG/ACT inhaler       B Complex-C (B-COMPLEX WITH VITAMIN C) tablet Take 1 tablet by mouth daily      Cholecalciferol (VITAMIN D3) 2000 units TABS Take 1 tablet by mouth daily      dicyclomine (BENTYL) 20 mg tablet Take 20 mg by mouth as needed        Empagliflozin 25 MG TABS Jardiance 25 mg tablet      esomeprazole (NexIUM) 40 MG capsule Take 1 capsule (40 mg total) by mouth 2 (two) times a day before meals 60 capsule 3    famotidine (PEPCID) 20 mg tablet Take 20 mg by mouth daily at bedtime (Patient not taking: Reported on 2/5/2024)      guaiFENesin (MUCINEX) 600 mg 12 hr tablet Take 1,200 mg by mouth daily as needed (Patient not taking: Reported on 2/5/2024)      hydrOXYzine HCL (ATARAX) 25 mg tablet 25 mg 3 (three) times a day as needed      hyoscyamine (LEVSIN/SL) 0.125 mg SL tablet Take 0.125 mg by mouth every 6 (six) hours Place 1 tablet under the tongue every 6 (six) hours as needed      ibuprofen (MOTRIN) 800 mg tablet Take 1 tablet (800 mg total) by mouth every 8 (eight) hours as needed for mild pain 60 tablet 0    ketorolac (TORADOL) 10 mg tablet Take 1 tablet (10 mg total) by mouth every 6 (six) hours as needed for mild pain (break migraine if needed) 20 tablet 1    Magnesium 400 MG CAPS Take by mouth in the morning      meclizine (ANTIVERT) 25 mg tablet as needed       metFORMIN (GLUCOPHAGE) 500 mg tablet 500 mg 2 (two) times a day with meals      multivitamin-iron-minerals-folic acid (CENTRUM) chewable tablet Chew 1 tablet daily      ondansetron (ZOFRAN) 4 mg tablet Take 1 tablet (4 mg total) by mouth every 8 (eight) hours as needed for nausea or vomiting 30 tablet 1    ondansetron (ZOFRAN-ODT) 4 mg disintegrating tablet Take 1 tablet (4 mg total) by mouth every 6 (six) hours as needed for nausea or vomiting 20 tablet 0    pravastatin (PRAVACHOL) 80 mg tablet 80 mg daily      promethazine (PHENERGAN) 25 mg tablet Take 25 mg by mouth every 6 (six) hours as needed for nausea or vomiting      pseudoephedrine (SUDAFED) 30 mg tablet Take 60 mg by mouth every 4 (four) hours as needed for congestion      rimegepant sulfate (NURTEC) 75 mg TBDP Take 1 tablet (75 mg) by mouth once at the onset of a migraine headache. Max dose: 75 mg/day 16 tablet 3    SALINE NASAL SPRAY NA 2 sprays into each nostril 2 (two) times a day      tiZANidine (ZANAFLEX) 2 mg tablet Take 1 tablet (2 mg total) by mouth daily at bedtime 30 tablet 3    topiramate (TOPAMAX) 200 MG tablet Take 200 mg by mouth 2 (two) times a day      verapamil (CALAN-SR) 120 mg CR tablet Take 120 mg by mouth in the morning       No current facility-administered medications for this visit.        Allergies   Allergen Reactions    Coconut Flavor - Food Allergy Shortness Of Breath     Asthma exacerbation     Fish Allergy - Food Allergy Shortness Of Breath     Asthma exacerbation- no issues with CT dye    Nuts - Food Allergy Shortness Of Breath     Asthma exacerbation     Other Shortness Of Breath     Nuts, coconut    Talwin [Pentazocine] Shortness Of Breath and Other (See Comments)     dizziness       Review of Systems   Constitutional:  Negative for chills, diaphoresis and unexpected weight change.   HENT: Negative.     Eyes: Negative.    Respiratory:  Negative for cough, shortness of breath and wheezing.    Cardiovascular:  Negative for  chest pain and leg swelling.   Gastrointestinal:  Negative for abdominal pain, diarrhea and nausea.   Endocrine: Negative.    Genitourinary: Negative.    Musculoskeletal: Negative.    Skin: Negative.    Allergic/Immunologic: Negative.    Neurological: Negative.    Hematological:  Negative for adenopathy. Does not bruise/bleed easily.   Psychiatric/Behavioral: Negative.     All other systems reviewed and are negative.      Video Exam    There were no vitals filed for this visit.    Physical Exam  Constitutional:       Appearance: Normal appearance.   Pulmonary:      Effort: Pulmonary effort is normal.   Musculoskeletal:      Cervical back: Normal range of motion and neck supple.   Neurological:      Mental Status: She is alert and oriented to person, place, and time. Mental status is at baseline.   Psychiatric:         Mood and Affect: Mood normal.         Behavior: Behavior normal.          Visit Time  Total Visit Duration: 10

## 2024-03-20 NOTE — ASSESSMENT & PLAN NOTE
Ms Peña is doing well about 2.5 months out from a robotic-assisted type III paraesophageal hernia repair with LINX device placement. She is recovering well. She still has some dysphagia with thicker breads which she avoids. Otherwise she is tolerating a regular diet. She is off her Pepcid, but did restart her PPI given she had heartburn when she stopped it. We will see her for one last visit in June to ensure continued recovery. All questions were answered and she is in agreement with this plan.

## 2024-05-11 ENCOUNTER — APPOINTMENT (OUTPATIENT)
Dept: LAB | Facility: CLINIC | Age: 64
End: 2024-05-11
Payer: COMMERCIAL

## 2024-05-11 ENCOUNTER — TRANSCRIBE ORDERS (OUTPATIENT)
Dept: LAB | Facility: CLINIC | Age: 64
End: 2024-05-11

## 2024-05-11 DIAGNOSIS — J45.909 ASTHMATIC BRONCHITIS WITHOUT COMPLICATION, UNSPECIFIED ASTHMA SEVERITY, UNSPECIFIED WHETHER PERSISTENT: ICD-10-CM

## 2024-05-11 DIAGNOSIS — E11.9 DIABETES MELLITUS WITHOUT COMPLICATION (HCC): Primary | ICD-10-CM

## 2024-05-11 DIAGNOSIS — G43.909 MIGRAINE WITHOUT STATUS MIGRAINOSUS, NOT INTRACTABLE, UNSPECIFIED MIGRAINE TYPE: ICD-10-CM

## 2024-05-11 DIAGNOSIS — E11.9 DIABETES MELLITUS WITHOUT COMPLICATION (HCC): ICD-10-CM

## 2024-05-11 LAB
ALBUMIN SERPL BCP-MCNC: 4.1 G/DL (ref 3.5–5)
ALP SERPL-CCNC: 62 U/L (ref 34–104)
ALT SERPL W P-5'-P-CCNC: 13 U/L (ref 7–52)
ANION GAP SERPL CALCULATED.3IONS-SCNC: 9 MMOL/L (ref 4–13)
AST SERPL W P-5'-P-CCNC: 13 U/L (ref 13–39)
BILIRUB SERPL-MCNC: 0.22 MG/DL (ref 0.2–1)
BUN SERPL-MCNC: 14 MG/DL (ref 5–25)
CALCIUM SERPL-MCNC: 8.9 MG/DL (ref 8.4–10.2)
CHLORIDE SERPL-SCNC: 107 MMOL/L (ref 96–108)
CO2 SERPL-SCNC: 24 MMOL/L (ref 21–32)
CREAT SERPL-MCNC: 0.86 MG/DL (ref 0.6–1.3)
ERYTHROCYTE [DISTWIDTH] IN BLOOD BY AUTOMATED COUNT: 15.7 % (ref 11.6–15.1)
EST. AVERAGE GLUCOSE BLD GHB EST-MCNC: 134 MG/DL
GFR SERPL CREATININE-BSD FRML MDRD: 72 ML/MIN/1.73SQ M
GLUCOSE P FAST SERPL-MCNC: 112 MG/DL (ref 65–99)
HBA1C MFR BLD: 6.3 %
HCT VFR BLD AUTO: 35.3 % (ref 34.8–46.1)
HGB BLD-MCNC: 10.3 G/DL (ref 11.5–15.4)
MCH RBC QN AUTO: 24.6 PG (ref 26.8–34.3)
MCHC RBC AUTO-ENTMCNC: 29.2 G/DL (ref 31.4–37.4)
MCV RBC AUTO: 84 FL (ref 82–98)
PLATELET # BLD AUTO: 220 THOUSANDS/UL (ref 149–390)
PMV BLD AUTO: 10.7 FL (ref 8.9–12.7)
POTASSIUM SERPL-SCNC: 4 MMOL/L (ref 3.5–5.3)
PROT SERPL-MCNC: 7.2 G/DL (ref 6.4–8.4)
RBC # BLD AUTO: 4.19 MILLION/UL (ref 3.81–5.12)
SODIUM SERPL-SCNC: 140 MMOL/L (ref 135–147)
WBC # BLD AUTO: 6.23 THOUSAND/UL (ref 4.31–10.16)

## 2024-05-11 PROCEDURE — 80053 COMPREHEN METABOLIC PANEL: CPT

## 2024-05-11 PROCEDURE — 85027 COMPLETE CBC AUTOMATED: CPT

## 2024-05-11 PROCEDURE — 83036 HEMOGLOBIN GLYCOSYLATED A1C: CPT

## 2024-05-11 PROCEDURE — 36415 COLL VENOUS BLD VENIPUNCTURE: CPT

## 2024-05-18 ENCOUNTER — APPOINTMENT (OUTPATIENT)
Dept: LAB | Facility: CLINIC | Age: 64
End: 2024-05-18
Payer: COMMERCIAL

## 2024-05-18 DIAGNOSIS — D64.9 ANEMIA, UNSPECIFIED TYPE: ICD-10-CM

## 2024-05-18 DIAGNOSIS — D51.9 ANEMIA DUE TO VITAMIN B12 DEFICIENCY, UNSPECIFIED B12 DEFICIENCY TYPE: ICD-10-CM

## 2024-05-18 DIAGNOSIS — G43.701 CHRONIC MIGRAINE WITHOUT AURA, WITH STATUS MIGRAINOSUS: ICD-10-CM

## 2024-05-18 LAB
BASOPHILS # BLD AUTO: 0.05 THOUSANDS/ÂΜL (ref 0–0.1)
BASOPHILS NFR BLD AUTO: 1 % (ref 0–1)
EOSINOPHIL # BLD AUTO: 0.25 THOUSAND/ÂΜL (ref 0–0.61)
EOSINOPHIL NFR BLD AUTO: 3 % (ref 0–6)
ERYTHROCYTE [DISTWIDTH] IN BLOOD BY AUTOMATED COUNT: 15.6 % (ref 11.6–15.1)
FERRITIN SERPL-MCNC: 4 NG/ML (ref 11–307)
FOLATE SERPL-MCNC: >22.3 NG/ML
HCT VFR BLD AUTO: 37.9 % (ref 34.8–46.1)
HGB BLD-MCNC: 11.2 G/DL (ref 11.5–15.4)
IMM GRANULOCYTES # BLD AUTO: 0.01 THOUSAND/UL (ref 0–0.2)
IMM GRANULOCYTES NFR BLD AUTO: 0 % (ref 0–2)
IRON SERPL-MCNC: 202 UG/DL (ref 50–212)
LYMPHOCYTES # BLD AUTO: 1.79 THOUSANDS/ÂΜL (ref 0.6–4.47)
LYMPHOCYTES NFR BLD AUTO: 23 % (ref 14–44)
MCH RBC QN AUTO: 24.6 PG (ref 26.8–34.3)
MCHC RBC AUTO-ENTMCNC: 29.6 G/DL (ref 31.4–37.4)
MCV RBC AUTO: 83 FL (ref 82–98)
MONOCYTES # BLD AUTO: 0.49 THOUSAND/ÂΜL (ref 0.17–1.22)
MONOCYTES NFR BLD AUTO: 6 % (ref 4–12)
NEUTROPHILS # BLD AUTO: 5.09 THOUSANDS/ÂΜL (ref 1.85–7.62)
NEUTS SEG NFR BLD AUTO: 67 % (ref 43–75)
NRBC BLD AUTO-RTO: 0 /100 WBCS
PLATELET # BLD AUTO: 239 THOUSANDS/UL (ref 149–390)
PMV BLD AUTO: 10.7 FL (ref 8.9–12.7)
RBC # BLD AUTO: 4.55 MILLION/UL (ref 3.81–5.12)
RETICS # AUTO: NORMAL 10*3/UL (ref 14097–95744)
RETICS # CALC: 1.19 % (ref 0.37–1.87)
VIT B12 SERPL-MCNC: 713 PG/ML (ref 180–914)
WBC # BLD AUTO: 7.68 THOUSAND/UL (ref 4.31–10.16)

## 2024-05-18 PROCEDURE — 82728 ASSAY OF FERRITIN: CPT

## 2024-05-18 PROCEDURE — 83540 ASSAY OF IRON: CPT

## 2024-05-18 PROCEDURE — 85045 AUTOMATED RETICULOCYTE COUNT: CPT

## 2024-05-18 PROCEDURE — 82746 ASSAY OF FOLIC ACID SERUM: CPT

## 2024-05-18 PROCEDURE — 82607 VITAMIN B-12: CPT

## 2024-05-18 PROCEDURE — 36415 COLL VENOUS BLD VENIPUNCTURE: CPT

## 2024-05-18 PROCEDURE — 85025 COMPLETE CBC W/AUTO DIFF WBC: CPT

## 2024-05-19 RX ORDER — KETOROLAC TROMETHAMINE 10 MG/1
10 TABLET, FILM COATED ORAL EVERY 6 HOURS PRN
Qty: 20 TABLET | Refills: 0 | Status: SHIPPED | OUTPATIENT
Start: 2024-05-19

## 2024-06-12 ENCOUNTER — HOSPITAL ENCOUNTER (OUTPATIENT)
Dept: RADIOLOGY | Age: 64
Discharge: HOME | End: 2024-06-12
Payer: COMMERCIAL

## 2024-06-12 ENCOUNTER — APPOINTMENT (OUTPATIENT)
Dept: LAB | Age: 64
End: 2024-06-12
Payer: COMMERCIAL

## 2024-06-12 DIAGNOSIS — G43.E11 INTRACTABLE CHRONIC MIGRAINE WITH AURA WITH STATUS MIGRAINOSUS: ICD-10-CM

## 2024-06-12 DIAGNOSIS — G43.701 CHRONIC MIGRAINE WITHOUT AURA, WITH STATUS MIGRAINOSUS: ICD-10-CM

## 2024-06-12 DIAGNOSIS — D69.9 FAMILIAL HEMORRHAGIC DIATHESIS (HCC): ICD-10-CM

## 2024-06-12 LAB
ERYTHROCYTE [DISTWIDTH] IN BLOOD BY AUTOMATED COUNT: 16 % (ref 11.6–15.1)
FERRITIN SERPL-MCNC: 7 NG/ML (ref 11–307)
HCT VFR BLD AUTO: 37.3 % (ref 34.8–46.1)
HGB BLD-MCNC: 11.1 G/DL (ref 11.5–15.4)
IRON SERPL-MCNC: 25 UG/DL (ref 50–212)
MCH RBC QN AUTO: 24.8 PG (ref 26.8–34.3)
MCHC RBC AUTO-ENTMCNC: 29.8 G/DL (ref 31.4–37.4)
MCV RBC AUTO: 83 FL (ref 82–98)
PLATELET # BLD AUTO: 257 THOUSANDS/UL (ref 149–390)
PMV BLD AUTO: 11.4 FL (ref 8.9–12.7)
RBC # BLD AUTO: 4.48 MILLION/UL (ref 3.81–5.12)
WBC # BLD AUTO: 8.22 THOUSAND/UL (ref 4.31–10.16)

## 2024-06-12 PROCEDURE — 70470 CT HEAD/BRAIN W/O & W/DYE: CPT

## 2024-06-12 PROCEDURE — 85027 COMPLETE CBC AUTOMATED: CPT

## 2024-06-12 PROCEDURE — 36415 COLL VENOUS BLD VENIPUNCTURE: CPT

## 2024-06-12 PROCEDURE — 82728 ASSAY OF FERRITIN: CPT

## 2024-06-12 PROCEDURE — 83540 ASSAY OF IRON: CPT

## 2024-06-12 RX ORDER — KETOROLAC TROMETHAMINE 10 MG/1
10 TABLET, FILM COATED ORAL EVERY 6 HOURS PRN
Qty: 20 TABLET | Refills: 0 | Status: SHIPPED | OUTPATIENT
Start: 2024-06-12

## 2024-06-12 RX ADMIN — IOHEXOL 85 ML: 350 INJECTION, SOLUTION INTRAVENOUS at 14:23

## 2024-06-14 ENCOUNTER — NURSE TRIAGE (OUTPATIENT)
Age: 64
End: 2024-06-14

## 2024-06-14 ENCOUNTER — TELEPHONE (OUTPATIENT)
Age: 64
End: 2024-06-14

## 2024-06-14 NOTE — TELEPHONE ENCOUNTER
Patients GI provider:  Dr. Benavides    Number to return call: [phone]    Reason for call: Pt calling stating PCP wants her seen asap due to Low Iron and recent Diverticulitis.    Transferred to triage nurse    Scheduled procedure/appointment date if applicable: 9/21/23

## 2024-06-14 NOTE — TELEPHONE ENCOUNTER
"LOV 04/12/23, EGD 09/21/23, Esoph Man 10/16/23, Colonoscopy 06/22/23      Pt transferred to myself by Doris.    Pt with history of IBS, Richardson's, GERD reports recent lab work ordered by PCP reveals iron level dropped from 202 - 25 in approximately 1 month. PCP advised pt to requests urgent OV with GI. Pt states she has been having intermittent, loose, diarrhea for several weeks after eating any type of food. Episodes are urgent and occur 3 - 7 times per day along with intermittent lower abdominal pain. Intermittent nausea and dizziness. Pt has history of knee replacement and uses keflex for every dental appointment; last used early May. Denies vomiting, fever, black/tarry stool, blood with stool. Hydrating with  2 - 3 bottles of water daily. OV scheduled with RIAN Medina, 06/28/24.      Reason for Disposition   Diarrhea is a chronic symptom (recurrent or ongoing AND lasting > 4 weeks)    Answer Assessment - Initial Assessment Questions  1. DIARRHEA SEVERITY: \"How bad is the diarrhea?\" \"How many extra stools have you had in the past 24 hours than normal?\"     - NO DIARRHEA (SCALE 0)    - MILD (SCALE 1-3): Few loose or mushy BMs; increase of 1-3 stools over normal daily number of stools; mild increase in ostomy output.    -  MODERATE (SCALE 4-7): Increase of 4-6 stools daily over normal; moderate increase in ostomy output.  * SEVERE (SCALE 8-10; OR 'WORST POSSIBLE'): Increase of 7 or more stools daily over normal; moderate increase in ostomy output; incontinence.      3- 7  2. ONSET: \"When did the diarrhea begin?\"       Beginning of May  3. BM CONSISTENCY: \"How loose or watery is the diarrhea?\"       Loose, brown  4. VOMITING: \"Are you also vomiting?\" If Yes, ask: \"How many times in the past 24 hours?\"       denies  5. ABDOMINAL PAIN: \"Are you having any abdominal pain?\" If Yes, ask: \"What does it feel like?\" (e.g., crampy, dull, intermittent, constant)       Lower abdominal  6. ABDOMINAL PAIN SEVERITY: If " "present, ask: \"How bad is the pain?\"  (e.g., Scale 1-10; mild, moderate, or severe)    - MILD (1-3): doesn't interfere with normal activities, abdomen soft and not tender to touch     - MODERATE (4-7): interferes with normal activities or awakens from sleep, tender to touch     - SEVERE (8-10): excruciating pain, doubled over, unable to do any normal activities        moderate  7. ORAL INTAKE: If vomiting, \"Have you been able to drink liquids?\" \"How much fluids have you had in the past 24 hours?\"      2 - 3 bottles per day  8. HYDRATION: \"Any signs of dehydration?\" (e.g., dry mouth [not just dry lips], too weak to stand, dizziness, new weight loss) \"When did you last urinate?\"      Denies   9. EXPOSURE: \"Have you traveled to a foreign country recently?\" \"Have you been exposed to anyone with diarrhea?\" \"Could you have eaten any food that was spoiled?\"      N/a  10. ANTIBIOTIC USE: \"Are you taking antibiotics now or have you taken antibiotics in the past 2 months?\"        May 3rd Keflex  11. OTHER SYMPTOMS: \"Do you have any other symptoms?\" (e.g., fever, blood in stool)        migraine  12. PREGNANCY: \"Is there any chance you are pregnant?\" \"When was your last menstrual period?\"        N/a    Protocols used: Diarrhea-ADULT-[place]    "

## 2024-06-26 ENCOUNTER — OFFICE VISIT (OUTPATIENT)
Dept: CARDIAC SURGERY | Facility: CLINIC | Age: 64
End: 2024-06-26
Payer: COMMERCIAL

## 2024-06-26 VITALS
DIASTOLIC BLOOD PRESSURE: 77 MMHG | RESPIRATION RATE: 14 BRPM | BODY MASS INDEX: 30.18 KG/M2 | TEMPERATURE: 98.1 F | HEART RATE: 94 BPM | OXYGEN SATURATION: 94 % | WEIGHT: 164.02 LBS | HEIGHT: 62 IN | SYSTOLIC BLOOD PRESSURE: 141 MMHG

## 2024-06-26 DIAGNOSIS — Z98.890 HISTORY OF REPAIR OF HIATAL HERNIA: Primary | ICD-10-CM

## 2024-06-26 DIAGNOSIS — Z87.19 HISTORY OF REPAIR OF HIATAL HERNIA: Primary | ICD-10-CM

## 2024-06-26 PROCEDURE — 99213 OFFICE O/P EST LOW 20 MIN: CPT | Performed by: THORACIC SURGERY (CARDIOTHORACIC VASCULAR SURGERY)

## 2024-06-28 ENCOUNTER — OFFICE VISIT (OUTPATIENT)
Dept: GASTROENTEROLOGY | Facility: CLINIC | Age: 64
End: 2024-06-28
Payer: COMMERCIAL

## 2024-06-28 VITALS
SYSTOLIC BLOOD PRESSURE: 122 MMHG | TEMPERATURE: 98.9 F | HEIGHT: 62 IN | WEIGHT: 162.6 LBS | DIASTOLIC BLOOD PRESSURE: 74 MMHG | BODY MASS INDEX: 29.92 KG/M2

## 2024-06-28 DIAGNOSIS — Z79.1 NSAID LONG-TERM USE: ICD-10-CM

## 2024-06-28 DIAGNOSIS — K58.0 IRRITABLE BOWEL SYNDROME WITH DIARRHEA: ICD-10-CM

## 2024-06-28 DIAGNOSIS — R19.7 DIARRHEA, UNSPECIFIED TYPE: ICD-10-CM

## 2024-06-28 DIAGNOSIS — K21.9 GASTROESOPHAGEAL REFLUX DISEASE WITHOUT ESOPHAGITIS: ICD-10-CM

## 2024-06-28 DIAGNOSIS — E61.1 IRON DEFICIENCY: Primary | ICD-10-CM

## 2024-06-28 DIAGNOSIS — K22.70 BARRETT'S ESOPHAGUS WITHOUT DYSPLASIA: ICD-10-CM

## 2024-06-28 DIAGNOSIS — Z90.49 S/P PARTIAL COLECTOMY: ICD-10-CM

## 2024-06-28 PROCEDURE — 99214 OFFICE O/P EST MOD 30 MIN: CPT | Performed by: PHYSICIAN ASSISTANT

## 2024-06-28 RX ORDER — MONTELUKAST SODIUM 4 MG/1
1 TABLET, CHEWABLE ORAL 2 TIMES DAILY
Qty: 60 TABLET | Refills: 2 | Status: SHIPPED | OUTPATIENT
Start: 2024-06-28

## 2024-06-28 NOTE — PROGRESS NOTES
[facility] - Outpatient Follow-up Note  Clotilde Berg 64 y.o. female MRN: [id number]  Encounter: [id number]          ASSESSMENT AND PLAN:      Jimmie is a 63 y/o female who is s/p cholecystectomy and is s/p partial colon resection 2003 for diverticulitis, with GERD, Marte's esophagus, HTN, HLD, DM2, seizure disorder who presents for follow-up.    Iron deficiency  S/P partial colectomy    NSAID use  Serum iron fell form 202 to 25 from May to this month; ferritin went from 4 to 7 in the last month.Hgb stable around 11 since January of this year, though it was WNL prior to this. Folate and B12 WNL. BUN and retic count also WNL. Last EGD was in September of this past year without any concern for bleeding and colonoscopy was in June of 2023 without any source of bleeding, though she was not anemic during these procedures. She admits to using advil 1-2 times/day for the last 1-2 months due to migraines.   -hematology referral placed   -EGD and colonoscopy ordered to discuss infusions   -I obtained informed consent from the patient. The risks/benefits/alternatives of the procedure were discussed with the patient. Risks included, but not limited to, infection, bleeding, perforation, injury to organs in the abdomen, missed lesion and incomplete procedure were discussed. Patient was agreeable and electronic signature was obtained.   -NSAID cessation: pt says she has follow-up with Dr. Perez today to discuss migraine treatment     4. Marte's esophagus   5. GERD S/P paraesophageal hernia repair with LINX 1/2024   Pt underwent robotic paraesophageal hernia repair with LINX by the CT surgery team in June 2024. Her last EGD was in September prior to this, which noted C0M1 marte's on endoscopy. She says anytime she ha a migraine, she gets nauseous but otherwise does not usually have problems with this. She says she is doing well on nexium 40 mg daily.   -continue nexium 40 mg daily  -continue  "zofran PRN nausea while you and your other providers continue to work on migraine treatment     6. IBS-D  Pt says her IBS flares when she has migraines, and bentyl does alleviate any spasms for her. She says she and her PCP have been trying to find a good regimen to take for her migraines but in the meantime, she would like something sent in to be taken as needed for the diarrhea  -as she is s/p surjit, start colestipol 1 g BID PRN diarrhea     ______________________________________________________________________    SUBJECTIVE:  Jimmie is a 63 y/o female with GERD, Richardson's esophagus, HTN, HLD, DM2, seizure disorder who presents for follow-up. Pt denies family hx of colon cancer, unintentional weight loss, fevers, chills, night sweats, abdominal pain, bloody or black BM. Pt says her IBS flares when she has migraines, and bentyl does alleviate any spasms for her. She says she and her PCP have been trying to find a good regimen to take for her migraines but in the meantime, she would like something sent in to be taken as needed for the diarrhea. She says anytime she ha a migraine, she gets nauseous but otherwise does not usually have problems with this. She says she is doing well on nexium 40 mg daily and has not had reflux. She says she only gets trouble swallowing if it is something \"very dry.\" She denies new meds or supplements prior to this except for advil: . She admits to using advil 1-2 times/day for the last 1-2 months due to migraines. She has prior abdominal surgical hx of GB removal and partial colectomy due to diverticulitis. She is not on blood thinners.       REVIEW OF SYSTEMS IS OTHERWISE NEGATIVE.      Historical Information   Past Medical History:   Diagnosis Date    Asthma     Bell palsy     Diabetes mellitus (HCC)     type 2    Diverticulitis 11/25/2022    EEG abnormality without seizure     Gastroparesis     GERD (gastroesophageal reflux disease)     Headache, migraine     Hiatal hernia     " Hyperlipidemia     IBS (irritable bowel syndrome)     Migraines     PONV (postoperative nausea and vomiting)     Sarcoidosis     Sleep apnea     no cpap     Past Surgical History:   Procedure Laterality Date    CHOLECYSTECTOMY      PLACEMENT ESOPHAGEAL SPHINCTER AUGMENTATION DEVICE W/ ROBOTICS N/A 1/5/2024    Procedure: linx placement;  Surgeon: Mark Hernandez MD;  Location: [facility];  Service: Thoracic    RI COLONOSCOPY FLX DX W/COLLJ SPEC WHEN PFRMD N/A 3/8/2017    Procedure: EGD AND COLONOSCOPY;  Surgeon: Rad Romero MD;  Location: [facility];  Service: Gastroenterology    RI ESOPHAGOGASTRODUODENOSCOPY TRANSORAL DIAGNOSTIC N/A 1/5/2024    Procedure: ESOPHAGOGASTRODUODENOSCOPY (EGD);  Surgeon: Mark Hernandez MD;  Location: [facility];  Service: Thoracic    RI LAPS RPR PARAESPHGL HRNA INCL FUNDPLSTY W/O MESH N/A 1/5/2024    Procedure: robotic assisted paraesophageal hernia repair;  Surgeon: Mark Hernandez MD;  Location: [facility];  Service: Thoracic    REPLACEMENT TOTAL KNEE Right     SIGMOIDECTOMY       Social History   Social History     Substance and Sexual Activity   Alcohol Use Yes    Comment: socially     Social History     Substance and Sexual Activity   Drug Use No     Social History     Tobacco Use   Smoking Status Never   Smokeless Tobacco Never     Family History   Problem Relation Age of Onset    Diabetes Mother     Heart disease Mother     Cancer Father     Liver cancer Father     Brain cancer Father     Arthritis Sister     Migraines Brother     Seizures Maternal Aunt     Migraines Maternal Uncle        Meds/Allergies       Current Outpatient Medications:     acetaminophen (TYLENOL) 500 mg tablet    albuterol (PROVENTIL HFA,VENTOLIN HFA) 90 mcg/act inhaler    ascorbic acid (VITAMIN C) 500 mg tablet    Asmanex, 30 Metered Doses, 220 MCG/ACT inhaler    B Complex-C (B-COMPLEX WITH VITAMIN C) tablet    Cholecalciferol (VITAMIN D3) 2000 units TABS    dicyclomine (BENTYL) 20 mg  tablet    Empagliflozin 25 MG TABS    esomeprazole (NexIUM) 40 MG capsule    famotidine (PEPCID) 20 mg tablet    guaiFENesin (MUCINEX) 600 mg 12 hr tablet    hydrOXYzine HCL (ATARAX) 25 mg tablet    hyoscyamine (LEVSIN/SL) 0.125 mg SL tablet    ibuprofen (MOTRIN) 800 mg tablet    ketorolac (TORADOL) 10 mg tablet    Magnesium 400 MG CAPS    meclizine (ANTIVERT) 25 mg tablet    metFORMIN (GLUCOPHAGE) 500 mg tablet    multivitamin-iron-minerals-folic acid (CENTRUM) chewable tablet    ondansetron (ZOFRAN) 4 mg tablet    ondansetron (ZOFRAN-ODT) 4 mg disintegrating tablet    pravastatin (PRAVACHOL) 80 mg tablet    promethazine (PHENERGAN) 25 mg tablet    pseudoephedrine (SUDAFED) 30 mg tablet    rimegepant sulfate (NURTEC) 75 mg TBDP    SALINE NASAL SPRAY NA    tiZANidine (ZANAFLEX) 2 mg tablet    topiramate (TOPAMAX) 200 MG tablet    verapamil (CALAN-SR) 120 mg CR tablet    Allergies   Allergen Reactions    Coconut Flavor - Food Allergy Shortness Of Breath     Asthma exacerbation     Fish Allergy - Food Allergy Shortness Of Breath     Asthma exacerbation- no issues with CT dye    Nuts - Food Allergy Shortness Of Breath     Asthma exacerbation     Other Shortness Of Breath     Nuts, coconut    Talwin [Pentazocine] Shortness Of Breath and Other (See Comments)     dizziness           Objective     There were no vitals taken for this visit. There is no height or weight on file to calculate BMI.      PHYSICAL EXAM:      General Appearance:   Alert, cooperative, no distress   HEENT:   Normocephalic, atraumatic, anicteric.     Neck:  Supple, symmetrical, trachea midline   Lungs:   Clear to auscultation bilaterally; no rales, rhonchi or wheezing; respirations unlabored    Heart::   Regular rate and rhythm; no murmur, rub, or gallop.   Abdomen:   Soft, non-tender, non-distended; normal bowel sounds; no masses, no organomegaly    Genitalia:   Deferred    Rectal:   Deferred    Extremities:  No cyanosis, clubbing or edema     Pulses:  2+ and symmetric    Skin:  No jaundice, rashes, or lesions    Lymph nodes:  No palpable cervical lymphadenopathy        Lab Results:   No visits with results within 1 Day(s) from this visit.   Latest known visit with results is:   Appointment on 06/12/2024   Component Date Value    WBC 06/12/2024 8.22     RBC 06/12/2024 4.48     Hemoglobin 06/12/2024 11.1 (L)     Hematocrit 06/12/2024 37.3     MCV 06/12/2024 83     MCH 06/12/2024 24.8 (L)     MCHC 06/12/2024 29.8 (L)     RDW 06/12/2024 16.0 (H)     Platelets 06/12/2024 257     MPV 06/12/2024 11.4     Ferritin 06/12/2024 7 (L)     Iron 06/12/2024 25 (L)          Radiology Results:   CT head w wo contrast    Result Date: 6/20/2024  Narrative: CT BRAIN - WITH AND WITHOUT CONTRAST INDICATION:   G43.E11: Chronic migraine with aura, intractable, with status migrainosus. COMPARISON: 11/30/2022 and 1/14/2021 TECHNIQUE:  CT examination of the brain was performed both prior to and after the administration of intravenous contrast.  Multiplanar 2D reformatted images were created from the source data. Radiation dose length product (DLP) for this visit:  1500 mGy-cm .  This examination, like all CT scans performed in the [facility], was performed utilizing techniques to minimize radiation dose exposure, including the use of iterative reconstruction and automated exposure control. IV Contrast:  85 mL of iohexol (OMNIPAQUE) IMAGE QUALITY:  Diagnostic. FINDINGS: PARENCHYMA:  No intracranial mass, mass effect or midline shift. No CT signs of acute infarction.  No acute parenchymal hemorrhage. There is atherosclerotic intracranial calcification. Post contrast imaging of the brain is normal. VENTRICLES AND EXTRA-AXIAL SPACES:  Normal for patient's age. VISUALIZED ORBITS: Normal visualized orbits. PARANASAL SINUSES: Normal visualized paranasal sinuses. CALVARIUM:  Normal.     Impression: No mass effect, acute intracranial hemorrhage or evidence of recent  infarction. No abnormal parenchymal or leptomeningeal enhancement identified. Workstation performed: [id number]

## 2024-06-28 NOTE — PATIENT INSTRUCTIONS
Scheduled date of EGD/colonoscopy (as of today): 9/26/24  Physician performing EGD/colonoscopy: Dr. Benavides  Location of EGD/colonoscopy: [place]  Desired bowel prep reviewed with patient: Golytely  Instructions reviewed with patient by:Katie in office  Clearances:  N/A

## 2024-06-29 PROBLEM — Z87.19 HISTORY OF REPAIR OF HIATAL HERNIA: Status: ACTIVE | Noted: 2018-03-27

## 2024-06-29 PROBLEM — Z98.890 HISTORY OF REPAIR OF HIATAL HERNIA: Status: ACTIVE | Noted: 2018-03-27

## 2024-06-29 NOTE — ASSESSMENT & PLAN NOTE
64-year-old female status post 1/5/2024 robotic type III paraesophageal hernia repair with absorbable mesh and Linx device placement for significant reflux.    Overall Jimmie is doing well now almost 6 months out from surgery.  She is having no major problems with dysphagia.  She is off her PPI.  She is scheduled to get an endoscopy shortly.  She understands that due to her history of suspected Richardson's she has to have continued surveillance endoscopies at least every 3 years.  From a thoracic surgery standpoint overall she is doing well with no major issues    I would recommend continue ongoing surveillance endoscopies at least every 3 years ongoing.  She is scheduled with these with GI.  No plans for ongoing thoracic surgical follow-up.  I have asked her to call us with any dysphagia or other issues.  Otherwise follow-up as needed.    Mark Hernandez

## 2024-06-29 NOTE — PROGRESS NOTES
Ambulatory Visit  Name: Clotilde Berg      : 1960      MRN: [id number]  Encounter Provider: Mark Hernandez MD  Encounter Date: 2024   Encounter department: [facility]    Assessment & Plan   1. History of repair of hiatal hernia  Assessment & Plan:  64-year-old female status post 2024 robotic type III paraesophageal hernia repair with absorbable mesh and Linx device placement for significant reflux.    Overall Jimmie is doing well now almost 6 months out from surgery.  She is having no major problems with dysphagia.  She is off her PPI.  She is scheduled to get an endoscopy shortly.  She understands that due to her history of suspected Richardson's she has to have continued surveillance endoscopies at least every 3 years.  From a thoracic surgery standpoint overall she is doing well with no major issues    I would recommend continue ongoing surveillance endoscopies at least every 3 years ongoing.  She is scheduled with these with GI.  No plans for ongoing thoracic surgical follow-up.  I have asked her to call us with any dysphagia or other issues.  Otherwise follow-up as needed.    Mark Hernandez        History of Present Illness     Clotilde Berg is a 64 y.o. female who presents almost 6 months out from robotic type III paraesophageal hernia repair with mesh and Linx device placement.  Overall she is doing well.  She has no heartburn or reflux.  She does have occasional problems with food getting stuck about once every other week.  Otherwise no major dysphagia.  No abdominal incisional pain.    Review of Systems   Constitutional:  Negative for activity change, appetite change, chills, diaphoresis, fatigue, fever and unexpected weight change.   HENT: Negative.     Eyes: Negative.    Respiratory:  Negative for apnea, cough, chest tightness, shortness of breath, wheezing and stridor.    Cardiovascular:  Negative for chest pain, palpitations and leg  "swelling.   Gastrointestinal:  Negative for abdominal distention, abdominal pain, blood in stool, constipation, diarrhea, nausea and vomiting.   Endocrine: Negative.    Genitourinary: Negative.    Musculoskeletal: Negative.    Skin: Negative.    Allergic/Immunologic: Negative.    Neurological: Negative.    Hematological: Negative.    Psychiatric/Behavioral: Negative.         Objective     /77 (BP Location: Left arm, Patient Position: Sitting, Cuff Size: Standard)   Pulse 94   Temp 98.1 °F (36.7 °C) (Temporal)   Resp 14   Ht 5' 2\" (1.575 m)   Wt 74.4 kg (164 lb 0.4 oz)   SpO2 94%   BMI 30.00 kg/m²     Physical Exam  Vitals and nursing note reviewed.   Constitutional:       General: She is not in acute distress.     Appearance: She is well-developed. She is not diaphoretic.   HENT:      Head: Normocephalic and atraumatic.      Mouth/Throat:      Pharynx: No oropharyngeal exudate.   Eyes:      General: No scleral icterus.     Conjunctiva/sclera: Conjunctivae normal.      Pupils: Pupils are equal, round, and reactive to light.   Neck:      Thyroid: No thyromegaly.      Vascular: No JVD.      Trachea: No tracheal deviation.   Cardiovascular:      Rate and Rhythm: Normal rate and regular rhythm.      Heart sounds: Normal heart sounds. No murmur heard.     No friction rub. No gallop.   Pulmonary:      Effort: Pulmonary effort is normal. No respiratory distress.      Breath sounds: Normal breath sounds. No wheezing or rales.   Chest:      Chest wall: No tenderness.   Abdominal:      General: Bowel sounds are normal. There is no distension.      Palpations: Abdomen is soft. There is no mass.      Tenderness: There is no abdominal tenderness. There is no guarding or rebound.      Comments: Robotic abdominal incisions are well-healed.  No erythema.   Musculoskeletal:         General: No tenderness or deformity. Normal range of motion.      Cervical back: Normal range of motion and neck supple.   Skin:     General: " Skin is warm and dry.      Coloration: Skin is not pale.      Findings: No erythema or rash.   Neurological:      Mental Status: She is alert and oriented to person, place, and time.   Psychiatric:         Behavior: Behavior normal.         Thought Content: Thought content normal.         Judgment: Judgment normal.       Administrative Statements

## 2024-08-06 ENCOUNTER — OFFICE VISIT (OUTPATIENT)
Dept: NEUROLOGY | Facility: CLINIC | Age: 64
End: 2024-08-06
Payer: COMMERCIAL

## 2024-08-06 VITALS
SYSTOLIC BLOOD PRESSURE: 116 MMHG | WEIGHT: 165 LBS | DIASTOLIC BLOOD PRESSURE: 60 MMHG | OXYGEN SATURATION: 98 % | HEIGHT: 62 IN | BODY MASS INDEX: 30.36 KG/M2 | TEMPERATURE: 97.6 F | HEART RATE: 105 BPM

## 2024-08-06 DIAGNOSIS — G43.711 INTRACTABLE CHRONIC MIGRAINE WITHOUT AURA AND WITH STATUS MIGRAINOSUS: Primary | ICD-10-CM

## 2024-08-06 DIAGNOSIS — G40.219 PARTIAL SYMPTOMATIC EPILEPSY WITH COMPLEX PARTIAL SEIZURES, INTRACTABLE, WITHOUT STATUS EPILEPTICUS (HCC): ICD-10-CM

## 2024-08-06 PROCEDURE — 99214 OFFICE O/P EST MOD 30 MIN: CPT

## 2024-08-06 RX ORDER — TOPIRAMATE 100 MG/1
100 TABLET, FILM COATED ORAL 2 TIMES DAILY
Qty: 60 TABLET | Refills: 3 | Status: SHIPPED | OUTPATIENT
Start: 2024-08-06

## 2024-08-06 RX ORDER — TOPIRAMATE 100 MG/1
TABLET, FILM COATED ORAL
Qty: 70 TABLET | Refills: 0 | Status: SHIPPED | OUTPATIENT
Start: 2024-08-06 | End: 2024-09-03

## 2024-08-06 NOTE — PROGRESS NOTES
Review of Systems   Constitutional: Negative.    HENT: Negative.     Eyes: Negative.    Respiratory: Negative.     Cardiovascular: Negative.    Gastrointestinal: Negative.    Endocrine: Negative.    Genitourinary: Negative.    Musculoskeletal: Negative.    Skin: Negative.    Allergic/Immunologic: Negative.    Neurological:  Positive for headaches (Had is for 2 months and now ok).   Hematological: Negative.    Psychiatric/Behavioral: Negative.

## 2024-08-06 NOTE — PATIENT INSTRUCTIONS
Patient Instructions:    Headache Calendar  Please maintain a headache calendar  Consider using phone applications such as Migraine Buddy or Migraine Diary    Headache/migraine treatment:     Rescue medications (for immediate treatment of a headache):   It is ok to take ibuprofen, acetaminophen or naproxen (Advil, Tylenol,  Aleve, Excedrin) if they help your headaches you should limit these to No more than 3 times a week to avoid medication overuse/rebound headaches.     For your more moderate to severe migraines take this medication early     - Continue Nurtec 75 mg, take 1 tablet at the onset of a headache.  Max dose  is 75 mg per 24 hours.    - Patient is free to continue with Toradol 10 mg as needed and promethazine 25 mg as needed for migraine abortive therapy.  However, I would encourage the patient to try Nurtec over the Toradol since she has not tried the medication yet for migraine abortive therapy.  While her low iron and slightly low hemoglobin is being investigated, we will try to limit the amount of Toradol that she is taking.  It is found that she has some type of GI bleed or GI ulcer then we will have to discontinue the Toradol altogether in the future.      Prescription preventive medications for headaches/migraines   - Continue with verapamil 120 mg once daily for migraine prevention.    - We will be adjusting the patient's Topamax and trying to see if we can come down off the medication as she is on quite a high dosage at the time being.  She will start a Topamax taper with Topamax 100 mg, she will start with the first week by taking 1.5 tablets twice daily for 14 days and then she will transition to Topamax 100 mg, taking 1 tablet by mouth twice daily for another 14 days.  As long as she is doing okay and she is feeling fine, then she can just proceed with the Topamax 100 mg twice daily moving forward for migraine prevention and also to see if the patient can stay on this medication for seizure  prophylaxis and make sure that she does not have any breakthrough seizure related events.    *Typically these types of medications take time until you see the benefit, although some may see improvement in days, often it may take weeks, especially if the medication is being titrated up to a beneficial level. Please contact us if there are any concerns or questions regarding the medication.     Over the counter preventive supplements for headaches/migraines (if you try, try for 3 months straight)  (to take every day to help prevent headaches - not to take at the time of headache):  There are combo pills online of these - none of which regulated by FDA and double check dosing - take appropriate dose only once a day- prevent a migraine, migravent, mind ease, migrelief   [] Magnesium 400mg daily (If any diarrhea or upset stomach, decrease dose  as tolerated)  [] Riboflavin (Vitamin B2) 400mg daily (may make your urine bright/neon yellow)    Lifestyle Recommendations:  [x] SLEEP - Maintain a regular sleep schedule: Adults need at least 7-8 hours of uninterrupted a night. Maintain good sleep hygiene:  Going to bed and waking up at consistent times, avoiding excessive daytime naps, avoiding caffeinated beverages in the evening, avoid excessive stimulation in the evening and generally using bed primarily for sleeping.  One hour before bedtime would recommend turning lights down lower, decreasing your activity (may read quietly, listen to music at a low volume). When you get into bed, should eliminate all technology (no texting, emailing, playing with your phone, iPad or tablet in bed).  [x] HYDRATION - Maintain good hydration.  Drink  2L of fluid a day (4 typical small water bottles)  [x] DIET - Maintain good nutrition. In particular don't skip meals and try and eat healthy balanced meals regularly.  [x] TRIGGERS - Look for other triggers and avoid them: Limit caffeine to 1-2 cups a day or less. Avoid dietary triggers that  you have noticed bring on your headaches (this could include aged cheese, peanuts, MSG, aspartame and nitrates).  [x] EXERCISE - physical exercise as we all know is good for you in many ways, and not only is good for your heart, but also is beneficial for your mental health, cognitive health and  chronic pain/headaches. I would encourage at the least 5 days of physical exercise weekly for at least 30 minutes.     Education and Follow-up  [x] Please call with any questions or concerns. Of course if any new concerning symptoms go to the emergency department.  [x] Follow up in 6 months with Charles MODI

## 2024-08-06 NOTE — PROGRESS NOTES
[facility]  PATIENT:  Clotilde Berg  MRN:  [id number]  :  1960  DATE OF SERVICE:  2024      Assessment/Plan:     Migraines without aura:    I had the pleasure of seeing Jimmie today in the office at [facility] in [place].  She is presenting today for a follow-up appointment in regard to her migraine headaches.  The patient states that since her last appointment she had done fairly well with her migraines.  However, she did note that she did have a migraine that presented around May 6, 2024 and persisted all the way until July.  She noted that she could not get any significant relief, although she did not try to take Nurtec as she thought it was potentially too late to try and take the medication.  She had been taking the Toradol 10 mg and promethazine 25 mg as needed for migraine abortive therapy during the time but this did not seem to break the headache.  The patient stated that she has been dealing with low iron and slightly low hemoglobin that is currently being investigated.  At this time, I advised her she did have any migraine headaches in the future that she tried to use the Nurtec first before resulting to the Toradol and promethazine as her global iron level is being investigated.  Eventually, would hope to completely discontinue the Toradol altogether, or if the patient is found to have any source of GI bleeding we will certainly discontinue the medication altogether at the time.  The patient was still taking verapamil 120 mg once daily for migraine prevention.  The patient was interested in the idea of potentially coming off of such a high dosage of Topamax.  The patient reports no new seizure-like activity at all, and she has been taking a rather high dosage of Topamax 200 mg twice daily for migraine prevention and seizure prevention.  Since the concern of the patient actually having seizures is low, recommended that we would try to titrate  down off of the Topamax very slowly and try to see if we can get her to a more suitable dose of Topamax 100 mg twice daily instead.  Instructed the patient on how to taper the medication and advised the patient to reach out if she has any questions or concerns while titrating down on the medication or if she cannot tolerate to titrate down.  Advised that eventually would like to get her on a lower dosage of Topamax which will also help control her migraine headaches as well.  The patient had no other questions or concerns, advised that she can follow-up in about 6 months time again for further evaluation.    Patient Instructions:    Headache Calendar  Please maintain a headache calendar  Consider using phone applications such as Migraine Buddy or Migraine Diary    Headache/migraine treatment:     Rescue medications (for immediate treatment of a headache):   It is ok to take ibuprofen, acetaminophen or naproxen (Advil, Tylenol,  Aleve, Excedrin) if they help your headaches you should limit these to No more than 3 times a week to avoid medication overuse/rebound headaches.     For your more moderate to severe migraines take this medication early     - Continue Nurtec 75 mg, take 1 tablet at the onset of a headache.  Max dose  is 75 mg per 24 hours.    - Patient is free to continue with Toradol 10 mg as needed and promethazine 25 mg as needed for migraine abortive therapy.  However, I would encourage the patient to try Nurtec over the Toradol since she has not tried the medication yet for migraine abortive therapy.  While her low iron and slightly low hemoglobin is being investigated, we will try to limit the amount of Toradol that she is taking.  It is found that she has some type of GI bleed or GI ulcer then we will have to discontinue the Toradol altogether in the future.      Prescription preventive medications for headaches/migraines   - Continue with verapamil 120 mg once daily for migraine prevention.    - We  will be adjusting the patient's Topamax and trying to see if we can come down off the medication as she is on quite a high dosage at the time being.  She will start a Topamax taper with Topamax 100 mg, she will start with the first week by taking 1.5 tablets twice daily for 14 days and then she will transition to Topamax 100 mg, taking 1 tablet by mouth twice daily for another 14 days.  As long as she is doing okay and she is feeling fine, then she can just proceed with the Topamax 100 mg twice daily moving forward for migraine prevention and also to see if the patient can stay on this medication for seizure prophylaxis and make sure that she does not have any breakthrough seizure related events.    *Typically these types of medications take time until you see the benefit, although some may see improvement in days, often it may take weeks, especially if the medication is being titrated up to a beneficial level. Please contact us if there are any concerns or questions regarding the medication.     Over the counter preventive supplements for headaches/migraines (if you try, try for 3 months straight)  (to take every day to help prevent headaches - not to take at the time of headache):  There are combo pills online of these - none of which regulated by FDA and double check dosing - take appropriate dose only once a day- prevent a migraine, migravent, mind ease, migrelief   [] Magnesium 400mg daily (If any diarrhea or upset stomach, decrease dose  as tolerated)  [] Riboflavin (Vitamin B2) 400mg daily (may make your urine bright/neon yellow)    Lifestyle Recommendations:  [x] SLEEP - Maintain a regular sleep schedule: Adults need at least 7-8 hours of uninterrupted a night. Maintain good sleep hygiene:  Going to bed and waking up at consistent times, avoiding excessive daytime naps, avoiding caffeinated beverages in the evening, avoid excessive stimulation in the evening and generally using bed primarily for sleeping.   One hour before bedtime would recommend turning lights down lower, decreasing your activity (may read quietly, listen to music at a low volume). When you get into bed, should eliminate all technology (no texting, emailing, playing with your phone, iPad or tablet in bed).  [x] HYDRATION - Maintain good hydration.  Drink  2L of fluid a day (4 typical small water bottles)  [x] DIET - Maintain good nutrition. In particular don't skip meals and try and eat healthy balanced meals regularly.  [x] TRIGGERS - Look for other triggers and avoid them: Limit caffeine to 1-2 cups a day or less. Avoid dietary triggers that you have noticed bring on your headaches (this could include aged cheese, peanuts, MSG, aspartame and nitrates).  [x] EXERCISE - physical exercise as we all know is good for you in many ways, and not only is good for your heart, but also is beneficial for your mental health, cognitive health and  chronic pain/headaches. I would encourage at the least 5 days of physical exercise weekly for at least 30 minutes.     Education and Follow-up  [x] Please call with any questions or concerns. Of course if any new concerning symptoms go to the emergency department.  [x] Follow up in 6 months with Charles MODI     History of Present Illness:     For Review:    We had the pleasure of evaluating Clotilde Berg in neurological follow up  today for headaches.  As you know,  she is a 64 y.o.   female with a past history of migraine headaches. Patient was last seen in the office at [facility] on 02/5/2024 by myself.  At the last visit the patient was presenting for a follow-up appointment in regard to her migraine headaches.  The patient reported that at the last appointment she was unable to receive the Nurtec and had advised the patient that we would certainly try to run another prior authorization to see if she could acquire the medication.  The patient was noted to have been taking Toradol to help  with abortive therapy at the last visit but we decided that Toradol was not the best option moving forward and would rather have her try a CGRP medication such as Nurtec instead.  The patient noted that her migraine headaches were significantly limited at her previous appointment.  It was noted that her last migraine headache was on 12/7/2023.  Noted that the migraine headache only lasted for a day.  Patient was still doing well on her preventative therapy.  She was taking Topamax 200 mg twice daily for seizure prophylaxis/migraine prophylaxis.  She was continuing with verapamil 120 mg once daily.  Patient reported no new seizure-like activity at the last appointment.  She had no other questions or concerns.  Advised that we would try to start Nurtec 75 mg, although if the medication was unsuccessful at first she could certainly try Toradol and promethazine after she needed to abort a migraine headache.      Current medical illnesses: Hiatal hernia, GERD, IBS, asthma, hypertension, migraines, partial symptomatic epilepsy, radiculopathy, osteoarthritis       What medications do you take or have you taken for your headaches?     Current Preventive:   Topamax, verapamil    Current Abortive:   Nurtec (did not try yet), Toradol, promethazine     Prior Preventive:   Pamelor, tizanidine, gabapentin, hydroxyzine     Prior Abortive:   Imitrex, Ubrelvy, Zomig, Rizatriptan, indomethacin, Eletriptan    Interval updates as of 8/6/2024:    On 05/06/2024 the patient stated that she had a migraine that started and lasted till the beginning of July she stated. A continuous headache which persisted multiple months.  The patient stated that she did not end up trying the Nurtec, she was fearful that the medication would not work because the migraine cycle had already started and she did not take it right in the beginning. She had been taking the Toradol and promethazine over the course of those few months. Other than this, she only had  a handful of migraines from the time of February of the last appointment up until the beginning of May. She is watching what she is eating and paying attention to her triggers. She was dealing with low iron and low hemoglobin as well for the last few months. She has and endoscopy and colonoscopy scheduled, also waiting to see hematology and oncology as well.  The patient had not yet been instructed to hold Toradol, although they do not know as to why she has low iron and low hemoglobin.  Advised the patient to be cautious with taking Toradol at this time until there is an explanation found for her low iron and hemoglobin.  The patient is still continuing to take verapamil for preventative therapy and still taking Topamax for preventative therapy, although the patient states that she is interested in trying to taper down off of the Topamax.  Patient still reports no new seizure-like activity and would be willing to try and taper down on the Topamax and see how she does.    Reviewed old notes from physician seen in the past- see above HPI for summary of previous encounters.       Past Medical History:   Diagnosis Date    Asthma     Bell palsy     Diabetes mellitus (HCC)     type 2    Diverticulitis 11/25/2022    EEG abnormality without seizure     Gastroparesis     GERD (gastroesophageal reflux disease)     Headache, migraine     Hiatal hernia     Hyperlipidemia     IBS (irritable bowel syndrome)     Migraines     PONV (postoperative nausea and vomiting)     Sarcoidosis     Sleep apnea     no cpap       Patient Active Problem List   Diagnosis    Arthritis of left acromioclavicular joint    Left arm pain    Hypertension, essential    Hypokalemia    History of repair of hiatal hernia    Partial symptomatic epilepsy with complex partial seizures, intractable, without status epilepticus (HCC)    Chronic migraine without aura, intractable, with status migrainosus    Chest pressure    Radiculopathy, cervical region     Asthma    Chronic migraine without aura, with status migrainosus    Medication overuse headache    Thrombophilia (HCC)    Subjective visual disturbance    Right knee pain    Problems at work    Noncompliance with medication regimen    Intractable headache caused by drug    Intractable chronic migraine without aura    Osteoarthritis    Abdominal pain    Cervicocranial syndrome    Complex partial seizures with consciousness impaired (HCC)    Dizziness and giddiness    Type 2 diabetes mellitus, without long-term current use of insulin ([facility])    GERD (gastroesophageal reflux disease)    IBS (irritable bowel syndrome)    Bell palsy    Richardson's esophagus without dysplasia    PONV (postoperative nausea and vomiting)       Medications:      Current Outpatient Medications   Medication Sig Dispense Refill    acetaminophen (TYLENOL) 500 mg tablet Take 500 mg by mouth every 6 (six) hours as needed for mild pain      albuterol (PROVENTIL HFA,VENTOLIN HFA) 90 mcg/act inhaler Inhale 2 puffs every 6 (six) hours as needed for wheezing      ascorbic acid (VITAMIN C) 500 mg tablet Take 500 mg by mouth daily      Asmanex, 30 Metered Doses, 220 MCG/ACT inhaler       B Complex-C (B-COMPLEX WITH VITAMIN C) tablet Take 1 tablet by mouth daily      Cholecalciferol (VITAMIN D3) 2000 units TABS Take 1 tablet by mouth daily      colestipol (COLESTID) 1 g tablet Take 1 tablet (1 g total) by mouth 2 (two) times a day With meals as needed for diarrhea 60 tablet 2    dicyclomine (BENTYL) 20 mg tablet Take 20 mg by mouth as needed        Empagliflozin 25 MG TABS Jardiance 25 mg tablet      esomeprazole (NexIUM) 40 MG capsule Take 1 capsule (40 mg total) by mouth 2 (two) times a day before meals 60 capsule 3    hydrOXYzine HCL (ATARAX) 25 mg tablet 25 mg 3 (three) times a day as needed      hyoscyamine (LEVSIN/SL) 0.125 mg SL tablet Take 0.125 mg by mouth every 6 (six) hours Place 1 tablet under the tongue every 6 (six) hours as needed       ibuprofen (MOTRIN) 800 mg tablet Take 1 tablet (800 mg total) by mouth every 8 (eight) hours as needed for mild pain 60 tablet 0    ketorolac (TORADOL) 10 mg tablet Take 1 tablet (10 mg total) by mouth every 6 (six) hours as needed for mild pain (break migraine if needed) 20 tablet 0    Magnesium 400 MG CAPS Take by mouth in the morning      meclizine (ANTIVERT) 25 mg tablet as needed      metFORMIN (GLUCOPHAGE) 500 mg tablet 500 mg 2 (two) times a day with meals      multivitamin-iron-minerals-folic acid (CENTRUM) chewable tablet Chew 1 tablet daily      pravastatin (PRAVACHOL) 80 mg tablet 80 mg daily      rimegepant sulfate (NURTEC) 75 mg TBDP Take 1 tablet (75 mg) by mouth once at the onset of a migraine headache. Max dose: 75 mg/day 16 tablet 3    SALINE NASAL SPRAY NA 2 sprays into each nostril 2 (two) times a day      tiZANidine (ZANAFLEX) 2 mg tablet Take 1 tablet (2 mg total) by mouth daily at bedtime 30 tablet 3    topiramate (TOPAMAX) 200 MG tablet Take 200 mg by mouth 2 (two) times a day      verapamil (CALAN-SR) 120 mg CR tablet Take 120 mg by mouth in the morning      famotidine (PEPCID) 20 mg tablet Take 20 mg by mouth daily at bedtime (Patient not taking: Reported on 2/5/2024)      guaiFENesin (MUCINEX) 600 mg 12 hr tablet Take 1,200 mg by mouth daily as needed (Patient not taking: Reported on 2/5/2024)      ondansetron (ZOFRAN) 4 mg tablet Take 1 tablet (4 mg total) by mouth every 8 (eight) hours as needed for nausea or vomiting (Patient not taking: Reported on 6/26/2024) 30 tablet 1    ondansetron (ZOFRAN-ODT) 4 mg disintegrating tablet Take 1 tablet (4 mg total) by mouth every 6 (six) hours as needed for nausea or vomiting (Patient not taking: Reported on 6/26/2024) 20 tablet 0    polyethylene glycol (GOLYTELY) 4000 mL solution Take 4,000 mL by mouth once for 1 dose (Patient not taking: Reported on 8/6/2024) 4000 mL 0    promethazine (PHENERGAN) 25 mg tablet Take 25 mg by mouth every 6 (six) hours  as needed for nausea or vomiting (Patient not taking: Reported on 8/6/2024)      pseudoephedrine (SUDAFED) 30 mg tablet Take 60 mg by mouth every 4 (four) hours as needed for congestion       No current facility-administered medications for this visit.        Allergies:      Allergies   Allergen Reactions    Coconut Flavor - Food Allergy Shortness Of Breath     Asthma exacerbation     Fish Allergy - Food Allergy Shortness Of Breath     Asthma exacerbation- no issues with CT dye    Nuts - Food Allergy Shortness Of Breath     Asthma exacerbation     Other Shortness Of Breath     Nuts, coconut    Talwin [Pentazocine] Shortness Of Breath and Other (See Comments)     dizziness       Family History:     Family History   Problem Relation Age of Onset    Diabetes Mother     Heart disease Mother     Cancer Father     Liver cancer Father     Brain cancer Father     Arthritis Sister     Migraines Brother     Seizures Maternal Aunt     Migraines Maternal Uncle        Social History:     Social History     Socioeconomic History    Marital status: /Civil Union     Spouse name: Not on file    Number of children: Not on file    Years of education: Not on file    Highest education level: Not on file   Occupational History    Not on file   Tobacco Use    Smoking status: Never    Smokeless tobacco: Never   Vaping Use    Vaping status: Never Used   Substance and Sexual Activity    Alcohol use: Yes     Comment: socially    Drug use: No    Sexual activity: Not on file   Other Topics Concern    Not on file   Social History Narrative    Not on file     Social Determinants of Health     Financial Resource Strain: Not on file   Food Insecurity: Not on file   Transportation Needs: Not on file   Physical Activity: Not on file   Stress: Not on file   Social Connections: Not on file   Intimate Partner Violence: Not on file   Housing Stability: Not on file         Objective:     Physical Exam:                                                 "                 Vitals:            Constitutional:    /60 (BP Location: Left arm, Patient Position: Sitting, Cuff Size: Large)   Pulse 105   Temp 97.6 °F (36.4 °C) (Temporal)   Ht 5' 2\" (1.575 m)   Wt 74.8 kg (165 lb)   SpO2 98%   BMI 30.18 kg/m²   BP Readings from Last 3 Encounters:   08/06/24 116/60   06/28/24 122/74   06/26/24 141/77     Pulse Readings from Last 3 Encounters:   08/06/24 105   06/26/24 94   02/14/24 82         Well developed, well nourished, well groomed. No dysmorphic features.       Psychiatric:  Normal behavior and appropriate affect        Neurological Examination:     Mental status/cognitive function:   Orientated to time, place and person. Recent and remote memory intact. Attention span and concentration as well as fund of knowledge are appropriate for age. Normal language and spontaneous speech.     Cranial Nerves:  II-visual fields full.   Fundi poorly visualized due to pupillary constriction  III, IV, VI-Pupils were equal, round, and reactive to light and accomodation. Extraocular movements were full and conjugate without nystagmus. Conjugate gaze, normal smooth pursuits, normal saccades   V-facial sensation symmetric.    VII-facial expression symmetric, intact forehead wrinkle, strong eye closure, symmetric smile    VIII-hearing grossly intact bilaterally   IX, X-palate elevation symmetric, no dysarthria.   XI-shoulder shrug strength intact    XII-tongue protrusion midline.    Motor Exam: symmetric bulk and tone throughout, no pronator drift. Power/strength 5/5 bilateral upper and lower extremities, no atrophy, fasciculations or abnormal movements noted.   Sensory: grossly intact light touch in all extremities.   Reflexes: brachioradialis 2+, biceps 2+, left knee 2+ right knee 0 (knee replacement)   Coordination: Finger nose finger intact bilaterally, no apparent dysmetria, ataxia or tremor noted  Gait: steady casual and tandem gait.       Review of Systems:     Review of " Systems   Constitutional: Negative.    HENT: Negative.     Eyes: Negative.    Respiratory: Negative.     Cardiovascular: Negative.    Gastrointestinal: Negative.    Endocrine: Negative.    Genitourinary: Negative.    Musculoskeletal: Negative.    Skin: Negative.    Allergic/Immunologic: Negative.    Neurological:  Positive for headaches (Had is for 2 months and now ok).   Hematological: Negative.    Psychiatric/Behavioral: Negative.       I personally reviewed the ROS entered by the MA    I spent 25 minutes in total time for this visit.    Author:  Jack Murcia PA-C 8/6/2024 1:49 PM

## 2024-08-12 ENCOUNTER — APPOINTMENT (OUTPATIENT)
Dept: LAB | Facility: CLINIC | Age: 64
End: 2024-08-12
Payer: COMMERCIAL

## 2024-08-12 ENCOUNTER — OFFICE VISIT (OUTPATIENT)
Dept: HEMATOLOGY ONCOLOGY | Facility: CLINIC | Age: 64
End: 2024-08-12
Payer: COMMERCIAL

## 2024-08-12 ENCOUNTER — TELEPHONE (OUTPATIENT)
Dept: HEMATOLOGY ONCOLOGY | Facility: CLINIC | Age: 64
End: 2024-08-12

## 2024-08-12 VITALS
DIASTOLIC BLOOD PRESSURE: 78 MMHG | SYSTOLIC BLOOD PRESSURE: 142 MMHG | HEART RATE: 76 BPM | WEIGHT: 165 LBS | HEIGHT: 62 IN | RESPIRATION RATE: 17 BRPM | BODY MASS INDEX: 30.36 KG/M2 | OXYGEN SATURATION: 98 % | TEMPERATURE: 97.4 F

## 2024-08-12 DIAGNOSIS — D64.9 ANEMIA, UNSPECIFIED TYPE: ICD-10-CM

## 2024-08-12 DIAGNOSIS — E61.1 IRON DEFICIENCY: ICD-10-CM

## 2024-08-12 DIAGNOSIS — D50.8 OTHER IRON DEFICIENCY ANEMIA: Primary | ICD-10-CM

## 2024-08-12 PROBLEM — D50.9 IRON DEFICIENCY ANEMIA: Status: ACTIVE | Noted: 2024-08-12

## 2024-08-12 LAB
BASOPHILS # BLD AUTO: 0.05 THOUSANDS/ÂΜL (ref 0–0.1)
BASOPHILS NFR BLD AUTO: 1 % (ref 0–1)
EOSINOPHIL # BLD AUTO: 0.25 THOUSAND/ÂΜL (ref 0–0.61)
EOSINOPHIL NFR BLD AUTO: 3 % (ref 0–6)
ERYTHROCYTE [DISTWIDTH] IN BLOOD BY AUTOMATED COUNT: 14.5 % (ref 11.6–15.1)
FERRITIN SERPL-MCNC: 6 NG/ML (ref 11–307)
HCT VFR BLD AUTO: 39.5 % (ref 34.8–46.1)
HGB BLD-MCNC: 11.6 G/DL (ref 11.5–15.4)
IMM GRANULOCYTES # BLD AUTO: 0.03 THOUSAND/UL (ref 0–0.2)
IMM GRANULOCYTES NFR BLD AUTO: 0 % (ref 0–2)
IRON SERPL-MCNC: 28 UG/DL (ref 50–212)
LYMPHOCYTES # BLD AUTO: 2.49 THOUSANDS/ÂΜL (ref 0.6–4.47)
LYMPHOCYTES NFR BLD AUTO: 26 % (ref 14–44)
MCH RBC QN AUTO: 24.2 PG (ref 26.8–34.3)
MCHC RBC AUTO-ENTMCNC: 29.4 G/DL (ref 31.4–37.4)
MCV RBC AUTO: 82 FL (ref 82–98)
MONOCYTES # BLD AUTO: 0.59 THOUSAND/ÂΜL (ref 0.17–1.22)
MONOCYTES NFR BLD AUTO: 6 % (ref 4–12)
NEUTROPHILS # BLD AUTO: 6.24 THOUSANDS/ÂΜL (ref 1.85–7.62)
NEUTS SEG NFR BLD AUTO: 64 % (ref 43–75)
NRBC BLD AUTO-RTO: 0 /100 WBCS
PLATELET # BLD AUTO: 295 THOUSANDS/UL (ref 149–390)
PMV BLD AUTO: 10.8 FL (ref 8.9–12.7)
RBC # BLD AUTO: 4.8 MILLION/UL (ref 3.81–5.12)
WBC # BLD AUTO: 9.65 THOUSAND/UL (ref 4.31–10.16)

## 2024-08-12 PROCEDURE — 85025 COMPLETE CBC W/AUTO DIFF WBC: CPT

## 2024-08-12 PROCEDURE — 82728 ASSAY OF FERRITIN: CPT

## 2024-08-12 PROCEDURE — 36415 COLL VENOUS BLD VENIPUNCTURE: CPT

## 2024-08-12 PROCEDURE — 83540 ASSAY OF IRON: CPT

## 2024-08-12 PROCEDURE — 99243 OFF/OP CNSLTJ NEW/EST LOW 30: CPT | Performed by: PHYSICIAN ASSISTANT

## 2024-08-12 RX ORDER — SODIUM CHLORIDE 9 MG/ML
20 INJECTION, SOLUTION INTRAVENOUS ONCE
OUTPATIENT
Start: 2024-08-23

## 2024-08-13 NOTE — TELEPHONE ENCOUNTER
Pt was seen less than 24 hrs ago.  Note will be completed by end of the day today.  Please schedule infusion orders as ordered.

## 2024-08-13 NOTE — TELEPHONE ENCOUNTER
1st attempt to call pt to schedule for infusions. LM with [facility] infusion center for pt to return call

## 2024-08-14 NOTE — PROGRESS NOTES
Hematology/Oncology Outpatient Follow-up  Clotilde Berg 64 y.o. female 1960 [id number]    Date:  8/14/2024  Assessment and Plan:    1. Iron deficiency  We reviewed iron deficiency is not a primary hematological problem.  We reviewed causes of iron deficiency to include:   Blood loss (GI bleed, menses, frequent blood donation, frequent epistaxis)  Malabsorption (Christel-en Y or sleeve gastrectomy, active or recent H. pylori, chronic prolonged PPI use)  Insufficient dietary intake      Based on labs, recommend IV iron Venofer 200 mg IV weekly x 6.    Follow up in 3-4 months with labs.     - CBC and differential; Future  - Ferritin; Future  - CBC and differential; Future  - Ferritin; Future    HPI:  64 year old female presents for consult for iron def anemia.     Ferritin remains low, less than 10.     She has already seen GI and EGD/colonoscopy have been ordered and scheduled.     ROS: Review of Systems   Constitutional:  Positive for fatigue. Negative for appetite change, chills, fever and unexpected weight change.   Respiratory:  Negative for cough and shortness of breath.    Cardiovascular:  Negative for chest pain, palpitations and leg swelling.   Gastrointestinal:  Negative for abdominal pain, constipation, diarrhea, nausea and vomiting.   Genitourinary:  Negative for difficulty urinating, dysuria and hematuria.   Musculoskeletal:  Negative for arthralgias.   Skin: Negative.    Neurological:  Negative for dizziness, weakness, light-headedness, numbness and headaches.   Hematological: Negative.    Psychiatric/Behavioral: Negative.         Past Medical History:   Diagnosis Date    Asthma     Bell palsy     Diabetes mellitus (HCC)     type 2    Diverticulitis 11/25/2022    EEG abnormality without seizure     Gastroparesis     GERD (gastroesophageal reflux disease)     Headache, migraine     Hiatal hernia     Hyperlipidemia     IBS (irritable bowel syndrome)     Migraines     PONV (postoperative nausea and  vomiting)     Sarcoidosis     Sleep apnea     no cpap       Past Surgical History:   Procedure Laterality Date    CHOLECYSTECTOMY      PLACEMENT ESOPHAGEAL SPHINCTER AUGMENTATION DEVICE W/ ROBOTICS N/A 1/5/2024    Procedure: linx placement;  Surgeon: Mark Hernandez MD;  Location: [facility];  Service: Thoracic    NE COLONOSCOPY FLX DX W/COLLJ SPEC WHEN PFRMD N/A 3/8/2017    Procedure: EGD AND COLONOSCOPY;  Surgeon: Rad Romero MD;  Location: [facility];  Service: Gastroenterology    NE ESOPHAGOGASTRODUODENOSCOPY TRANSORAL DIAGNOSTIC N/A 1/5/2024    Procedure: ESOPHAGOGASTRODUODENOSCOPY (EGD);  Surgeon: Mark Hernandez MD;  Location: [facility];  Service: Thoracic    NE LAPS RPR PARAESPHGL HRNA INCL FUNDPLSTY W/O MESH N/A 1/5/2024    Procedure: robotic assisted paraesophageal hernia repair;  Surgeon: Mark Hernandez MD;  Location: [facility];  Service: Thoracic    REPLACEMENT TOTAL KNEE Right     SIGMOIDECTOMY         Social History     Socioeconomic History    Marital status: /Civil Union     Spouse name: None    Number of children: None    Years of education: None    Highest education level: None   Occupational History    None   Tobacco Use    Smoking status: Never    Smokeless tobacco: Never   Vaping Use    Vaping status: Never Used   Substance and Sexual Activity    Alcohol use: Yes     Comment: socially    Drug use: No    Sexual activity: None   Other Topics Concern    None   Social History Narrative    None     Social Determinants of Health     Financial Resource Strain: Not on file   Food Insecurity: Not on file   Transportation Needs: Not on file   Physical Activity: Not on file   Stress: Not on file   Social Connections: Not on file   Intimate Partner Violence: Not on file   Housing Stability: Not on file       Family History   Problem Relation Age of Onset    Diabetes Mother     Heart disease Mother     Cancer Father     Liver cancer Father     Brain cancer Father     Arthritis  Sister     Migraines Brother     Seizures Maternal Aunt     Migraines Maternal Uncle        Allergies   Allergen Reactions    Coconut Flavor - Food Allergy Shortness Of Breath     Asthma exacerbation     Fish Allergy - Food Allergy Shortness Of Breath     Asthma exacerbation- no issues with CT dye    Nuts - Food Allergy Shortness Of Breath     Asthma exacerbation     Other Shortness Of Breath     Nuts, coconut    Talwin [Pentazocine] Shortness Of Breath and Other (See Comments)     dizziness         Current Outpatient Medications:     acetaminophen (TYLENOL) 500 mg tablet, Take 500 mg by mouth every 6 (six) hours as needed for mild pain, Disp: , Rfl:     albuterol (PROVENTIL HFA,VENTOLIN HFA) 90 mcg/act inhaler, Inhale 2 puffs every 6 (six) hours as needed for wheezing, Disp: , Rfl:     ascorbic acid (VITAMIN C) 500 mg tablet, Take 500 mg by mouth daily, Disp: , Rfl:     Asmanex, 30 Metered Doses, 220 MCG/ACT inhaler, , Disp: , Rfl:     B Complex-C (B-COMPLEX WITH VITAMIN C) tablet, Take 1 tablet by mouth daily, Disp: , Rfl:     Cholecalciferol (VITAMIN D3) 2000 units TABS, Take 1 tablet by mouth daily, Disp: , Rfl:     dicyclomine (BENTYL) 20 mg tablet, Take 20 mg by mouth as needed  , Disp: , Rfl:     Empagliflozin 25 MG TABS, Jardiance 25 mg tablet, Disp: , Rfl:     esomeprazole (NexIUM) 40 MG capsule, Take 1 capsule (40 mg total) by mouth 2 (two) times a day before meals, Disp: 60 capsule, Rfl: 3    hydrOXYzine HCL (ATARAX) 25 mg tablet, 25 mg 3 (three) times a day as needed, Disp: , Rfl:     hyoscyamine (LEVSIN/SL) 0.125 mg SL tablet, Take 0.125 mg by mouth every 6 (six) hours Place 1 tablet under the tongue every 6 (six) hours as needed, Disp: , Rfl:     ibuprofen (MOTRIN) 800 mg tablet, Take 1 tablet (800 mg total) by mouth every 8 (eight) hours as needed for mild pain, Disp: 60 tablet, Rfl: 0    ketorolac (TORADOL) 10 mg tablet, Take 1 tablet (10 mg total) by mouth every 6 (six) hours as needed for mild pain  (break migraine if needed), Disp: 20 tablet, Rfl: 0    Magnesium 400 MG CAPS, Take by mouth in the morning, Disp: , Rfl:     meclizine (ANTIVERT) 25 mg tablet, as needed, Disp: , Rfl:     metFORMIN (GLUCOPHAGE) 500 mg tablet, 500 mg 2 (two) times a day with meals, Disp: , Rfl:     multivitamin-iron-minerals-folic acid (CENTRUM) chewable tablet, Chew 1 tablet daily, Disp: , Rfl:     pravastatin (PRAVACHOL) 80 mg tablet, 80 mg daily, Disp: , Rfl:     promethazine (PHENERGAN) 25 mg tablet, Take 25 mg by mouth every 6 (six) hours as needed for nausea or vomiting, Disp: , Rfl:     SALINE NASAL SPRAY NA, 2 sprays into each nostril 2 (two) times a day, Disp: , Rfl:     tiZANidine (ZANAFLEX) 2 mg tablet, Take 1 tablet (2 mg total) by mouth daily at bedtime, Disp: 30 tablet, Rfl: 3    topiramate (Topamax) 100 mg tablet, Take 1 tablet (100 mg total) by mouth 2 (two) times a day (Patient taking differently: Take 150 mg by mouth 2 (two) times a day), Disp: 60 tablet, Rfl: 3    verapamil (CALAN-SR) 120 mg CR tablet, Take 120 mg by mouth in the morning, Disp: , Rfl:     colestipol (COLESTID) 1 g tablet, Take 1 tablet (1 g total) by mouth 2 (two) times a day With meals as needed for diarrhea, Disp: 60 tablet, Rfl: 2    famotidine (PEPCID) 20 mg tablet, Take 20 mg by mouth daily at bedtime (Patient not taking: Reported on 2/5/2024), Disp: , Rfl:     guaiFENesin (MUCINEX) 600 mg 12 hr tablet, Take 1,200 mg by mouth daily as needed (Patient not taking: Reported on 2/5/2024), Disp: , Rfl:     ondansetron (ZOFRAN) 4 mg tablet, Take 1 tablet (4 mg total) by mouth every 8 (eight) hours as needed for nausea or vomiting (Patient not taking: Reported on 6/26/2024), Disp: 30 tablet, Rfl: 1    ondansetron (ZOFRAN-ODT) 4 mg disintegrating tablet, Take 1 tablet (4 mg total) by mouth every 6 (six) hours as needed for nausea or vomiting (Patient not taking: Reported on 6/26/2024), Disp: 20 tablet, Rfl: 0    polyethylene glycol (GOLYTELY) 4000 mL  "solution, Take 4,000 mL by mouth once for 1 dose, Disp: 4000 mL, Rfl: 0    pseudoephedrine (SUDAFED) 30 mg tablet, Take 60 mg by mouth every 4 (four) hours as needed for congestion, Disp: , Rfl:     rimegepant sulfate (NURTEC) 75 mg TBDP, Take 1 tablet (75 mg) by mouth once at the onset of a migraine headache. Max dose: 75 mg/day (Patient not taking: Reported on 8/12/2024), Disp: 16 tablet, Rfl: 3    topiramate (Topamax) 100 mg tablet, Take 1.5 tablets (150 mg total) by mouth 2 (two) times a day for 14 days, THEN 1 tablet (100 mg total) 2 (two) times a day for 14 days. (Patient not taking: Reported on 8/12/2024), Disp: 70 tablet, Rfl: 0    Physical Exam:  /78 (BP Location: Left arm, Patient Position: Sitting, Cuff Size: Adult)   Pulse 76   Temp (!) 97.4 °F (36.3 °C)   Resp 17   Ht 5' 2\" (1.575 m)   Wt 74.8 kg (165 lb)   SpO2 98%   BMI 30.18 kg/m²     Physical Exam  Vitals reviewed.   Constitutional:       General: She is not in acute distress.     Appearance: She is well-developed. She is obese. She is not ill-appearing.   HENT:      Head: Normocephalic and atraumatic.   Eyes:      General: No scleral icterus.     Conjunctiva/sclera: Conjunctivae normal.   Cardiovascular:      Rate and Rhythm: Normal rate and regular rhythm.      Heart sounds: Normal heart sounds. No murmur heard.  Pulmonary:      Effort: Pulmonary effort is normal. No respiratory distress.      Breath sounds: Normal breath sounds.   Abdominal:      Palpations: Abdomen is soft.      Tenderness: There is no abdominal tenderness.   Musculoskeletal:         General: No tenderness. Normal range of motion.      Cervical back: Normal range of motion and neck supple.      Right lower leg: No edema.      Left lower leg: No edema.   Lymphadenopathy:      Cervical: No cervical adenopathy.   Skin:     General: Skin is warm and dry.   Neurological:      Mental Status: She is alert and oriented to person, place, and time.      Cranial Nerves: No " cranial nerve deficit.   Psychiatric:         Mood and Affect: Mood normal.         Behavior: Behavior normal.       Labs:  Lab Results   Component Value Date    WBC 9.65 08/12/2024    HGB 11.6 08/12/2024    HCT 39.5 08/12/2024    MCV 82 08/12/2024     08/12/2024       I have spent 20 minutes with Patient  today in which greater than 50% of this time was spent in counseling/coordination of care regarding Diagnostic results, Risks and benefits of tx options, Instructions for management, Patient and family education, Impressions, Reviewing / ordering tests, medicine, procedures  , and Obtaining or reviewing history  .     Patient voiced understanding and agreement in the above discussion. Aware to contact our office with questions/symptoms in the interim.     This note has been generated by voice recognition software system.  Therefore, there may be spelling, grammar, and or syntax errors. Please contact if questions arise.

## 2024-09-03 ENCOUNTER — PATIENT MESSAGE (OUTPATIENT)
Dept: GASTROENTEROLOGY | Facility: CLINIC | Age: 64
End: 2024-09-03

## 2024-09-12 ENCOUNTER — TELEPHONE (OUTPATIENT)
Dept: INFUSION CENTER | Facility: HOSPITAL | Age: 64
End: 2024-09-12

## 2024-09-13 ENCOUNTER — HOSPITAL ENCOUNTER (OUTPATIENT)
Dept: INFUSION CENTER | Facility: HOSPITAL | Age: 64
End: 2024-09-13
Attending: INTERNAL MEDICINE
Payer: COMMERCIAL

## 2024-09-13 VITALS
DIASTOLIC BLOOD PRESSURE: 75 MMHG | TEMPERATURE: 97.5 F | SYSTOLIC BLOOD PRESSURE: 130 MMHG | HEART RATE: 82 BPM | RESPIRATION RATE: 16 BRPM

## 2024-09-13 DIAGNOSIS — D50.8 OTHER IRON DEFICIENCY ANEMIA: Primary | ICD-10-CM

## 2024-09-13 PROCEDURE — 96365 THER/PROPH/DIAG IV INF INIT: CPT

## 2024-09-13 RX ORDER — SODIUM CHLORIDE 9 MG/ML
20 INJECTION, SOLUTION INTRAVENOUS ONCE
Status: COMPLETED | OUTPATIENT
Start: 2024-09-13 | End: 2024-09-13

## 2024-09-13 RX ORDER — SODIUM CHLORIDE 9 MG/ML
20 INJECTION, SOLUTION INTRAVENOUS ONCE
Status: CANCELLED | OUTPATIENT
Start: 2024-09-20

## 2024-09-13 RX ADMIN — SODIUM CHLORIDE 20 ML/HR: 0.9 INJECTION, SOLUTION INTRAVENOUS at 13:11

## 2024-09-13 RX ADMIN — IRON SUCROSE 200 MG: 20 INJECTION, SOLUTION INTRAVENOUS at 13:12

## 2024-09-13 NOTE — PROGRESS NOTES
Pt here for Venofer infusion. IV started and is infusing with NSS @ K[facility]. Intake assessment completed and all infusion medication reviewed with patient. Pt states that she took PO iron this AM. Valeria WEEKS aware and OK to proceed with treatment. Pt made aware to stop taking PO iron while receiving IV treatments. Pt verbalized understanding. Pt resting comfortably and has no further questions or concerns. Call bell with in reach.

## 2024-09-13 NOTE — PROGRESS NOTES
Pt tolerated treatment well with no complications. IV removed and gauze dressing applied. Pt aware of future appt on 9/20/24 at 1430 pm. AVS declined.

## 2024-09-20 ENCOUNTER — HOSPITAL ENCOUNTER (OUTPATIENT)
Dept: INFUSION CENTER | Facility: HOSPITAL | Age: 64
End: 2024-09-20
Attending: INTERNAL MEDICINE
Payer: COMMERCIAL

## 2024-09-20 VITALS
SYSTOLIC BLOOD PRESSURE: 128 MMHG | RESPIRATION RATE: 18 BRPM | TEMPERATURE: 96.5 F | HEART RATE: 82 BPM | DIASTOLIC BLOOD PRESSURE: 73 MMHG

## 2024-09-20 DIAGNOSIS — D50.8 OTHER IRON DEFICIENCY ANEMIA: Primary | ICD-10-CM

## 2024-09-20 PROCEDURE — 96365 THER/PROPH/DIAG IV INF INIT: CPT

## 2024-09-20 RX ORDER — SODIUM CHLORIDE 9 MG/ML
20 INJECTION, SOLUTION INTRAVENOUS ONCE
Status: CANCELLED | OUTPATIENT
Start: 2024-09-27

## 2024-09-20 RX ORDER — SODIUM CHLORIDE 9 MG/ML
20 INJECTION, SOLUTION INTRAVENOUS ONCE
Status: COMPLETED | OUTPATIENT
Start: 2024-09-20 | End: 2024-09-20

## 2024-09-20 RX ADMIN — SODIUM CHLORIDE 20 ML/HR: 0.9 INJECTION, SOLUTION INTRAVENOUS at 13:05

## 2024-09-20 RX ADMIN — IRON SUCROSE 200 MG: 20 INJECTION, SOLUTION INTRAVENOUS at 13:05

## 2024-09-20 NOTE — PROGRESS NOTES
Clotilde Berg  tolerated treatment well with no complications.      Clotilde Berg is aware of future appt on 9/27 at 230pm.     AVS printed and given to Clotilde Berg:    No (Declined by Clotilde Berg)

## 2024-09-26 ENCOUNTER — ANESTHESIA EVENT (OUTPATIENT)
Dept: GASTROENTEROLOGY | Facility: HOSPITAL | Age: 64
End: 2024-09-26
Payer: COMMERCIAL

## 2024-09-26 ENCOUNTER — HOSPITAL ENCOUNTER (OUTPATIENT)
Dept: GASTROENTEROLOGY | Facility: HOSPITAL | Age: 64
Setting detail: OUTPATIENT SURGERY
End: 2024-09-26
Attending: INTERNAL MEDICINE
Payer: COMMERCIAL

## 2024-09-26 ENCOUNTER — ANESTHESIA (OUTPATIENT)
Dept: GASTROENTEROLOGY | Facility: HOSPITAL | Age: 64
End: 2024-09-26
Payer: COMMERCIAL

## 2024-09-26 VITALS
SYSTOLIC BLOOD PRESSURE: 117 MMHG | WEIGHT: 161 LBS | RESPIRATION RATE: 16 BRPM | BODY MASS INDEX: 29.63 KG/M2 | DIASTOLIC BLOOD PRESSURE: 58 MMHG | OXYGEN SATURATION: 98 % | HEIGHT: 62 IN | TEMPERATURE: 96.1 F | HEART RATE: 66 BPM

## 2024-09-26 DIAGNOSIS — E61.1 IRON DEFICIENCY: ICD-10-CM

## 2024-09-26 LAB — GLUCOSE SERPL-MCNC: 102 MG/DL (ref 65–140)

## 2024-09-26 PROCEDURE — 82948 REAGENT STRIP/BLOOD GLUCOSE: CPT

## 2024-09-26 PROCEDURE — 45378 DIAGNOSTIC COLONOSCOPY: CPT | Performed by: INTERNAL MEDICINE

## 2024-09-26 PROCEDURE — 43239 EGD BIOPSY SINGLE/MULTIPLE: CPT | Performed by: INTERNAL MEDICINE

## 2024-09-26 PROCEDURE — 88305 TISSUE EXAM BY PATHOLOGIST: CPT | Performed by: PATHOLOGY

## 2024-09-26 RX ORDER — SODIUM CHLORIDE 9 MG/ML
INJECTION, SOLUTION INTRAVENOUS CONTINUOUS PRN
Status: DISCONTINUED | OUTPATIENT
Start: 2024-09-26 | End: 2024-09-26

## 2024-09-26 RX ORDER — FENTANYL CITRATE 50 UG/ML
INJECTION, SOLUTION INTRAMUSCULAR; INTRAVENOUS AS NEEDED
Status: DISCONTINUED | OUTPATIENT
Start: 2024-09-26 | End: 2024-09-26

## 2024-09-26 RX ORDER — PROPOFOL 10 MG/ML
INJECTION, EMULSION INTRAVENOUS AS NEEDED
Status: DISCONTINUED | OUTPATIENT
Start: 2024-09-26 | End: 2024-09-26

## 2024-09-26 RX ORDER — LIDOCAINE HYDROCHLORIDE 20 MG/ML
INJECTION, SOLUTION EPIDURAL; INFILTRATION; INTRACAUDAL; PERINEURAL AS NEEDED
Status: DISCONTINUED | OUTPATIENT
Start: 2024-09-26 | End: 2024-09-26

## 2024-09-26 RX ADMIN — PROPOFOL 30 MG: 10 INJECTION, EMULSION INTRAVENOUS at 10:27

## 2024-09-26 RX ADMIN — SODIUM CHLORIDE: 9 INJECTION, SOLUTION INTRAVENOUS at 10:06

## 2024-09-26 RX ADMIN — PROPOFOL 120 MCG/KG/MIN: 10 INJECTION, EMULSION INTRAVENOUS at 10:39

## 2024-09-26 RX ADMIN — PROPOFOL 30 MG: 10 INJECTION, EMULSION INTRAVENOUS at 10:33

## 2024-09-26 RX ADMIN — LIDOCAINE HYDROCHLORIDE 100 MG: 20 INJECTION, SOLUTION EPIDURAL; INFILTRATION; INTRACAUDAL; PERINEURAL at 10:20

## 2024-09-26 RX ADMIN — FENTANYL CITRATE 50 MCG: 50 INJECTION INTRAMUSCULAR; INTRAVENOUS at 10:37

## 2024-09-26 RX ADMIN — PROPOFOL 30 MG: 10 INJECTION, EMULSION INTRAVENOUS at 10:36

## 2024-09-26 RX ADMIN — FENTANYL CITRATE 50 MCG: 50 INJECTION INTRAMUSCULAR; INTRAVENOUS at 10:17

## 2024-09-26 RX ADMIN — PROPOFOL 30 MG: 10 INJECTION, EMULSION INTRAVENOUS at 10:30

## 2024-09-26 RX ADMIN — PROPOFOL 100 MG: 10 INJECTION, EMULSION INTRAVENOUS at 10:20

## 2024-09-26 RX ADMIN — PROPOFOL 30 MG: 10 INJECTION, EMULSION INTRAVENOUS at 10:23

## 2024-09-26 NOTE — ANESTHESIA POSTPROCEDURE EVALUATION
Post-Op Assessment Note    CV Status:  Stable  Pain Score: 0    Pain management: adequate       Mental Status:  Awake and alert   Hydration Status:  Stable   PONV Controlled:  None   Airway Patency:  Patent and adequate     Post Op Vitals Reviewed: Yes    No anethesia notable event occurred.    Staff: CRNA, Anesthesiologist               BP   92/56   Temp      Pulse  79   Resp   18   SpO2   97

## 2024-09-26 NOTE — ANESTHESIA PREPROCEDURE EVALUATION
Procedure:  COLONOSCOPY  EGD    Relevant Problems   ANESTHESIA   (+) PONV (postoperative nausea and vomiting)      CARDIO   (+) Chronic migraine without aura, intractable, with status migrainosus   (+) Chronic migraine without aura, with status migrainosus   (+) Hypertension, essential   (+) Intractable chronic migraine without aura      ENDO   (+) Type 2 diabetes mellitus, without long-term current use of insulin (HCC)      GI/HEPATIC   (+) GERD (gastroesophageal reflux disease)      HEMATOLOGY   (+) Iron deficiency anemia      MUSCULOSKELETAL   (+) Arthritis of left acromioclavicular joint   (+) Osteoarthritis      NEURO/PSYCH   (+) Chronic migraine without aura, intractable, with status migrainosus   (+) Chronic migraine without aura, with status migrainosus   (+) Complex partial seizures with consciousness impaired (HCC)   (+) Intractable chronic migraine without aura   (+) Intractable headache caused by drug   (+) Medication overuse headache   (+) Partial symptomatic epilepsy with complex partial seizures, intractable, without status epilepticus (HCC)   (+) Subjective visual disturbance      PULMONARY   (+) Asthma      Neurology/Sleep   (+) Bell palsy   (+) Chest pressure      Blood   (+) Thrombophilia (HCC)      Surgery/Wound/Pain   (+) History of repair of hiatal hernia      Orthopedic/Musculoskeletal   (+) Cervicocranial syndrome   (+) Left arm pain   (+) Radiculopathy, cervical region   (+) Right knee pain      FEN/Gastrointestinal   (+) Richardson's esophagus without dysplasia   (+) IBS (irritable bowel syndrome)      Other   (+) Dizziness and giddiness   (+) Hypokalemia      Lab Results   Component Value Date     08/02/2015    SODIUM 140 05/11/2024    K 4.0 05/11/2024     05/11/2024    CO2 24 05/11/2024    ANIONGAP 6 08/02/2015    AGAP 9 05/11/2024    BUN 14 05/11/2024    CREATININE 0.86 05/11/2024    GLUC 131 01/06/2024    GLUF 112 (H) 05/11/2024    CALCIUM 8.9 05/11/2024    AST 13 05/11/2024     ALT 13 05/11/2024    ALKPHOS 62 05/11/2024    PROT 7.9 08/02/2015    TP 7.2 05/11/2024    BILITOT 0.28 08/02/2015    TBILI 0.22 05/11/2024    EGFR 72 05/11/2024     Lab Results   Component Value Date    WBC 9.65 08/12/2024    HGB 11.6 08/12/2024    HCT 39.5 08/12/2024    MCV 82 08/12/2024     08/12/2024         Physical Exam    Airway    Mallampati score: II  TM Distance: <3 FB  Neck ROM: full     Dental       Cardiovascular      Pulmonary      Other Findings  post-pubertal.      Anesthesia Plan  ASA Score- 3     Anesthesia Type- IV sedation with anesthesia with ASA Monitors.         Additional Monitors:     Airway Plan:            Plan Factors-Exercise tolerance (METS): >4 METS.    Chart reviewed. EKG reviewed. Imaging results reviewed. Existing labs reviewed. Patient summary reviewed.                  Induction- intravenous.    Postoperative Plan-         Informed Consent- Anesthetic plan and risks discussed with patient.  I personally reviewed this patient with the CRNA. Discussed and agreed on the Anesthesia Plan with the CRNA..

## 2024-09-27 ENCOUNTER — HOSPITAL ENCOUNTER (OUTPATIENT)
Dept: INFUSION CENTER | Facility: HOSPITAL | Age: 64
End: 2024-09-27
Attending: INTERNAL MEDICINE
Payer: COMMERCIAL

## 2024-09-27 VITALS
SYSTOLIC BLOOD PRESSURE: 124 MMHG | DIASTOLIC BLOOD PRESSURE: 75 MMHG | TEMPERATURE: 97.3 F | HEART RATE: 88 BPM | RESPIRATION RATE: 16 BRPM

## 2024-09-27 DIAGNOSIS — D50.8 OTHER IRON DEFICIENCY ANEMIA: Primary | ICD-10-CM

## 2024-09-27 DIAGNOSIS — G43.701 CHRONIC MIGRAINE WITHOUT AURA, WITH STATUS MIGRAINOSUS: ICD-10-CM

## 2024-09-27 PROCEDURE — 96365 THER/PROPH/DIAG IV INF INIT: CPT

## 2024-09-27 RX ORDER — SODIUM CHLORIDE 9 MG/ML
20 INJECTION, SOLUTION INTRAVENOUS ONCE
OUTPATIENT
Start: 2024-10-04

## 2024-09-27 RX ORDER — SODIUM CHLORIDE 9 MG/ML
20 INJECTION, SOLUTION INTRAVENOUS ONCE
Status: COMPLETED | OUTPATIENT
Start: 2024-09-27 | End: 2024-09-27

## 2024-09-27 RX ADMIN — IRON SUCROSE 200 MG: 20 INJECTION, SOLUTION INTRAVENOUS at 13:20

## 2024-09-27 RX ADMIN — SODIUM CHLORIDE 20 ML/HR: 0.9 INJECTION, SOLUTION INTRAVENOUS at 13:20

## 2024-09-27 NOTE — PROGRESS NOTES
Clotilde Berg  tolerated treatment well with no complications.      Clotilde Berg is aware of future appt on 10/4/24 at 2:30 pm.     AVS printed and given to Clotilde Berg:  No (Declined by Clotilde Berg)

## 2024-09-27 NOTE — TELEPHONE ENCOUNTER
Reason for call:   [x] Refill   [] Prior Auth  [] Other:     Office:   [] PCP/Provider -   [x] Specialty/Provider - Neurology    Medication: ketorolac (TORADOL) 10 mg tablet     Dose/Frequency: Take 1 tablet (10 mg total) by mouth every 6 (six) hours as needed    Quantity: 20 tablet    Pharmacy: [facility] - [place], [place] - [address] [phone]    Does the patient have enough for 3 days?   [x] Yes   [] No - Send as HP to POD

## 2024-09-30 PROCEDURE — 88305 TISSUE EXAM BY PATHOLOGIST: CPT | Performed by: PATHOLOGY

## 2024-09-30 RX ORDER — KETOROLAC TROMETHAMINE 10 MG/1
10 TABLET, FILM COATED ORAL EVERY 6 HOURS PRN
Qty: 20 TABLET | Refills: 0 | Status: SHIPPED | OUTPATIENT
Start: 2024-09-30

## 2024-10-04 ENCOUNTER — HOSPITAL ENCOUNTER (OUTPATIENT)
Dept: INFUSION CENTER | Facility: HOSPITAL | Age: 64
End: 2024-10-04
Attending: INTERNAL MEDICINE
Payer: COMMERCIAL

## 2024-10-04 VITALS
DIASTOLIC BLOOD PRESSURE: 74 MMHG | HEART RATE: 82 BPM | SYSTOLIC BLOOD PRESSURE: 125 MMHG | TEMPERATURE: 97.4 F | RESPIRATION RATE: 16 BRPM

## 2024-10-04 DIAGNOSIS — D50.8 OTHER IRON DEFICIENCY ANEMIA: Primary | ICD-10-CM

## 2024-10-04 PROCEDURE — 96365 THER/PROPH/DIAG IV INF INIT: CPT

## 2024-10-04 RX ORDER — SODIUM CHLORIDE 9 MG/ML
20 INJECTION, SOLUTION INTRAVENOUS ONCE
Status: COMPLETED | OUTPATIENT
Start: 2024-10-04 | End: 2024-10-04

## 2024-10-04 RX ORDER — SODIUM CHLORIDE 9 MG/ML
20 INJECTION, SOLUTION INTRAVENOUS ONCE
Status: CANCELLED | OUTPATIENT
Start: 2024-10-11

## 2024-10-04 RX ADMIN — SODIUM CHLORIDE 20 ML/HR: 0.9 INJECTION, SOLUTION INTRAVENOUS at 14:22

## 2024-10-04 RX ADMIN — IRON SUCROSE 200 MG: 20 INJECTION, SOLUTION INTRAVENOUS at 14:22

## 2024-10-11 ENCOUNTER — HOSPITAL ENCOUNTER (OUTPATIENT)
Dept: INFUSION CENTER | Facility: HOSPITAL | Age: 64
End: 2024-10-11
Attending: INTERNAL MEDICINE
Payer: COMMERCIAL

## 2024-10-11 VITALS
TEMPERATURE: 97.2 F | HEART RATE: 98 BPM | SYSTOLIC BLOOD PRESSURE: 154 MMHG | RESPIRATION RATE: 16 BRPM | DIASTOLIC BLOOD PRESSURE: 72 MMHG

## 2024-10-11 DIAGNOSIS — D50.8 OTHER IRON DEFICIENCY ANEMIA: Primary | ICD-10-CM

## 2024-10-11 PROCEDURE — 96365 THER/PROPH/DIAG IV INF INIT: CPT

## 2024-10-11 RX ORDER — SODIUM CHLORIDE 9 MG/ML
20 INJECTION, SOLUTION INTRAVENOUS ONCE
Status: COMPLETED | OUTPATIENT
Start: 2024-10-11 | End: 2024-10-11

## 2024-10-11 RX ORDER — SODIUM CHLORIDE 9 MG/ML
20 INJECTION, SOLUTION INTRAVENOUS ONCE
Status: CANCELLED | OUTPATIENT
Start: 2024-10-18

## 2024-10-11 RX ADMIN — IRON SUCROSE 200 MG: 20 INJECTION, SOLUTION INTRAVENOUS at 14:10

## 2024-10-11 RX ADMIN — SODIUM CHLORIDE 20 ML/HR: 0.9 INJECTION, SOLUTION INTRAVENOUS at 14:10

## 2024-10-16 DIAGNOSIS — G43.711 INTRACTABLE CHRONIC MIGRAINE WITHOUT AURA AND WITH STATUS MIGRAINOSUS: ICD-10-CM

## 2024-10-16 RX ORDER — TOPIRAMATE 100 MG/1
100 TABLET, FILM COATED ORAL 2 TIMES DAILY
Qty: 60 TABLET | Refills: 3 | Status: SHIPPED | OUTPATIENT
Start: 2024-10-16

## 2024-10-18 ENCOUNTER — HOSPITAL ENCOUNTER (OUTPATIENT)
Dept: INFUSION CENTER | Facility: HOSPITAL | Age: 64
End: 2024-10-18
Attending: INTERNAL MEDICINE
Payer: COMMERCIAL

## 2024-10-18 VITALS
RESPIRATION RATE: 16 BRPM | TEMPERATURE: 96.8 F | SYSTOLIC BLOOD PRESSURE: 146 MMHG | HEART RATE: 92 BPM | DIASTOLIC BLOOD PRESSURE: 72 MMHG

## 2024-10-18 DIAGNOSIS — D50.8 OTHER IRON DEFICIENCY ANEMIA: Primary | ICD-10-CM

## 2024-10-18 PROCEDURE — 96365 THER/PROPH/DIAG IV INF INIT: CPT

## 2024-10-18 RX ORDER — SODIUM CHLORIDE 9 MG/ML
20 INJECTION, SOLUTION INTRAVENOUS ONCE
Status: COMPLETED | OUTPATIENT
Start: 2024-10-18 | End: 2024-10-18

## 2024-10-18 RX ORDER — SODIUM CHLORIDE 9 MG/ML
20 INJECTION, SOLUTION INTRAVENOUS ONCE
Status: CANCELLED | OUTPATIENT
Start: 2024-10-18

## 2024-10-18 RX ADMIN — SODIUM CHLORIDE 20 ML/HR: 0.9 INJECTION, SOLUTION INTRAVENOUS at 14:13

## 2024-10-18 RX ADMIN — IRON SUCROSE 200 MG: 20 INJECTION, SOLUTION INTRAVENOUS at 14:13

## 2024-10-18 NOTE — PROGRESS NOTES
Clotilde Berg  tolerated treatment well with no complications.      Clotilde Berg is aware that he has no further appts at this time.  .     AVS printed and given to Clotilde Berg:    No (Declined by Clotilde Berg)

## 2024-11-04 ENCOUNTER — HOSPITAL ENCOUNTER (OUTPATIENT)
Dept: RADIOLOGY | Facility: HOSPITAL | Age: 64
Discharge: HOME | End: 2024-11-04
Payer: COMMERCIAL

## 2024-11-04 ENCOUNTER — TRANSCRIBE ORDERS (OUTPATIENT)
Dept: ADMINISTRATIVE | Facility: HOSPITAL | Age: 64
End: 2024-11-04

## 2024-11-04 DIAGNOSIS — J18.9 UNRESOLVED PNEUMONIA: Primary | ICD-10-CM

## 2024-11-04 DIAGNOSIS — J18.9 UNRESOLVED PNEUMONIA: ICD-10-CM

## 2024-11-04 PROCEDURE — 71046 X-RAY EXAM CHEST 2 VIEWS: CPT

## 2024-11-14 DIAGNOSIS — G43.701 CHRONIC MIGRAINE WITHOUT AURA, WITH STATUS MIGRAINOSUS: ICD-10-CM

## 2024-11-14 RX ORDER — KETOROLAC TROMETHAMINE 10 MG/1
10 TABLET, FILM COATED ORAL EVERY 6 HOURS PRN
Qty: 20 TABLET | Refills: 5 | Status: SHIPPED | OUTPATIENT
Start: 2024-11-14

## 2024-11-14 NOTE — TELEPHONE ENCOUNTER
Reason for call:   [x] Refill   [] Prior Auth  [] Other:     Office:   [] PCP/Provider -   [x] Specialty/Provider - Neuro [facility]/ Jack Murcia PA-C    Medication: ketorolac (TORADOL) 10 mg tablet     Dose/Frequency:  Take 1 tablet (10 mg total) by mouth every 6 (six) hours as needed for mild pain (break migraine if needed)    Quantity: 20    Pharmacy:  [facility] [place] - [place], [place] - [address] [id number]     Does the patient have enough for 3 days?   [x] Yes   [] No - Send as HP to POD

## 2024-11-24 ENCOUNTER — APPOINTMENT (OUTPATIENT)
Dept: LAB | Facility: CLINIC | Age: 64
End: 2024-11-24
Payer: COMMERCIAL

## 2024-11-24 DIAGNOSIS — D50.8 OTHER IRON DEFICIENCY ANEMIA: ICD-10-CM

## 2024-11-24 DIAGNOSIS — J18.9 UNRESOLVED PNEUMONIA: ICD-10-CM

## 2024-11-24 DIAGNOSIS — E61.1 IRON DEFICIENCY: ICD-10-CM

## 2024-11-24 LAB
ALBUMIN SERPL BCG-MCNC: 4.3 G/DL (ref 3.5–5)
ALP SERPL-CCNC: 64 U/L (ref 34–104)
ALT SERPL W P-5'-P-CCNC: 17 U/L (ref 7–52)
ANION GAP SERPL CALCULATED.3IONS-SCNC: 10 MMOL/L (ref 4–13)
AST SERPL W P-5'-P-CCNC: 16 U/L (ref 13–39)
BASOPHILS # BLD AUTO: 0.02 THOUSANDS/ΜL (ref 0–0.1)
BASOPHILS NFR BLD AUTO: 0 % (ref 0–1)
BILIRUB SERPL-MCNC: 0.27 MG/DL (ref 0.2–1)
BUN SERPL-MCNC: 15 MG/DL (ref 5–25)
CALCIUM SERPL-MCNC: 9.3 MG/DL (ref 8.4–10.2)
CHLORIDE SERPL-SCNC: 105 MMOL/L (ref 96–108)
CO2 SERPL-SCNC: 26 MMOL/L (ref 21–32)
CREAT SERPL-MCNC: 0.85 MG/DL (ref 0.6–1.3)
EOSINOPHIL # BLD AUTO: 0.32 THOUSAND/ΜL (ref 0–0.61)
EOSINOPHIL NFR BLD AUTO: 5 % (ref 0–6)
ERYTHROCYTE [DISTWIDTH] IN BLOOD BY AUTOMATED COUNT: 14.5 % (ref 11.6–15.1)
EST. AVERAGE GLUCOSE BLD GHB EST-MCNC: 117 MG/DL
FERRITIN SERPL-MCNC: 116 NG/ML (ref 11–307)
GFR SERPL CREATININE-BSD FRML MDRD: 73 ML/MIN/1.73SQ M
GLUCOSE P FAST SERPL-MCNC: 95 MG/DL (ref 65–99)
HBA1C MFR BLD: 5.7 %
HCT VFR BLD AUTO: 38.6 % (ref 34.8–46.1)
HGB BLD-MCNC: 12.9 G/DL (ref 11.5–15.4)
IMM GRANULOCYTES # BLD AUTO: 0.02 THOUSAND/UL (ref 0–0.2)
IMM GRANULOCYTES NFR BLD AUTO: 0 % (ref 0–2)
LYMPHOCYTES # BLD AUTO: 1.39 THOUSANDS/ΜL (ref 0.6–4.47)
LYMPHOCYTES NFR BLD AUTO: 23 % (ref 14–44)
MCH RBC QN AUTO: 29.8 PG (ref 26.8–34.3)
MCHC RBC AUTO-ENTMCNC: 33.4 G/DL (ref 31.4–37.4)
MCV RBC AUTO: 89 FL (ref 82–98)
MONOCYTES # BLD AUTO: 0.41 THOUSAND/ΜL (ref 0.17–1.22)
MONOCYTES NFR BLD AUTO: 7 % (ref 4–12)
NEUTROPHILS # BLD AUTO: 3.91 THOUSANDS/ΜL (ref 1.85–7.62)
NEUTS SEG NFR BLD AUTO: 65 % (ref 43–75)
NRBC BLD AUTO-RTO: 0 /100 WBCS
PLATELET # BLD AUTO: 195 THOUSANDS/UL (ref 149–390)
PMV BLD AUTO: 10.3 FL (ref 8.9–12.7)
POTASSIUM SERPL-SCNC: 3.7 MMOL/L (ref 3.5–5.3)
PROT SERPL-MCNC: 7.3 G/DL (ref 6.4–8.4)
RBC # BLD AUTO: 4.33 MILLION/UL (ref 3.81–5.12)
SODIUM SERPL-SCNC: 141 MMOL/L (ref 135–147)
WBC # BLD AUTO: 6.07 THOUSAND/UL (ref 4.31–10.16)

## 2024-11-24 PROCEDURE — 36415 COLL VENOUS BLD VENIPUNCTURE: CPT

## 2024-11-24 PROCEDURE — 85025 COMPLETE CBC W/AUTO DIFF WBC: CPT

## 2024-11-24 PROCEDURE — 82728 ASSAY OF FERRITIN: CPT

## 2024-11-24 PROCEDURE — 80053 COMPREHEN METABOLIC PANEL: CPT

## 2024-12-13 ENCOUNTER — OFFICE VISIT (OUTPATIENT)
Dept: HEMATOLOGY ONCOLOGY | Facility: CLINIC | Age: 64
End: 2024-12-13
Payer: COMMERCIAL

## 2024-12-13 VITALS
HEIGHT: 62 IN | BODY MASS INDEX: 30.36 KG/M2 | RESPIRATION RATE: 17 BRPM | HEART RATE: 90 BPM | DIASTOLIC BLOOD PRESSURE: 88 MMHG | WEIGHT: 165 LBS | SYSTOLIC BLOOD PRESSURE: 142 MMHG | OXYGEN SATURATION: 96 % | TEMPERATURE: 97.6 F

## 2024-12-13 DIAGNOSIS — E61.1 IRON DEFICIENCY: Primary | ICD-10-CM

## 2024-12-13 PROCEDURE — 99213 OFFICE O/P EST LOW 20 MIN: CPT | Performed by: PHYSICIAN ASSISTANT

## 2024-12-13 NOTE — PROGRESS NOTES
"Name: Clotilde Berg      : 1960      MRN: [id number]  Encounter Provider: Adriane Wolff PA-C  Encounter Date: 2024   Encounter department: [facility] [place]  :  Assessment & Plan  Iron deficiency  Iron deficiency anemia, had iron infusions, responded well  She had GI workup which was unrevealing for active bleeding source but did show a small ulcer, also continued Richardson's esophagus  Possible source could be malabsorption from chronic PPI use, on Nexium 40 mg twice daily  She should continue routine monitoring of CBC and ferritin with PCP  Follow-up hematology as needed           History of Present Illness   Chief Complaint   Patient presents with    Follow-up     Pertinent Medical History   Follow up for iron def anemia     At consult ferritin remains low, less than 10     She has already seen GI and EGD/colonoscopy have been ordered and scheduled.    Had loading dose venofer, responded     Review of Systems   Constitutional:  Positive for fatigue (brannon snto sleep well, noticed no differnce with infusions). Negative for activity change, appetite change, chills, fever and unexpected weight change.   Respiratory:  Negative for cough and shortness of breath.    Cardiovascular:  Negative for chest pain, palpitations and leg swelling.   Gastrointestinal:  Negative for abdominal pain, constipation, diarrhea, nausea and vomiting.   Genitourinary:  Negative for difficulty urinating, dysuria and hematuria.   Musculoskeletal:  Negative for arthralgias.   Skin: Negative.    Neurological:  Negative for dizziness, weakness, light-headedness, numbness and headaches.   Hematological: Negative.    Psychiatric/Behavioral: Negative.             Objective   /88 (BP Location: Left arm, Patient Position: Sitting, Cuff Size: Standard)   Pulse 90   Temp 97.6 °F (36.4 °C) (Temporal)   Resp 17   Ht 5' 2\" (1.575 m)   Wt 74.8 kg (165 lb)   SpO2 96%   BMI 30.18 " kg/m²     Physical Exam  Vitals reviewed.   Constitutional:       General: She is not in acute distress.     Appearance: She is well-developed. She is not ill-appearing.   HENT:      Head: Normocephalic and atraumatic.   Eyes:      General: No scleral icterus.     Conjunctiva/sclera: Conjunctivae normal.   Cardiovascular:      Rate and Rhythm: Normal rate and regular rhythm.      Heart sounds: Normal heart sounds. No murmur heard.  Pulmonary:      Effort: Pulmonary effort is normal. No respiratory distress.      Breath sounds: Normal breath sounds.   Abdominal:      Palpations: Abdomen is soft.      Tenderness: There is no abdominal tenderness.   Musculoskeletal:         General: No tenderness. Normal range of motion.      Cervical back: Normal range of motion and neck supple.      Right lower leg: No edema.      Left lower leg: No edema.   Lymphadenopathy:      Cervical: No cervical adenopathy.   Skin:     General: Skin is warm and dry.   Neurological:      Mental Status: She is alert and oriented to person, place, and time.      Cranial Nerves: No cranial nerve deficit.   Psychiatric:         Mood and Affect: Mood normal.         Behavior: Behavior normal.         Labs: I have reviewed the following labs:  Results for orders placed or performed in visit on 11/24/24   CBC and differential   Result Value Ref Range    WBC 6.07 4.31 - 10.16 Thousand/uL    RBC 4.33 3.81 - 5.12 Million/uL    Hemoglobin 12.9 11.5 - 15.4 g/dL    Hematocrit 38.6 34.8 - 46.1 %    MCV 89 82 - 98 fL    MCH 29.8 26.8 - 34.3 pg    MCHC 33.4 31.4 - 37.4 g/dL    RDW 14.5 11.6 - 15.1 %    MPV 10.3 8.9 - 12.7 fL    Platelets 195 149 - 390 Thousands/uL    nRBC 0 /100 WBCs    Segmented % 65 43 - 75 %    Immature Grans % 0 0 - 2 %    Lymphocytes % 23 14 - 44 %    Monocytes % 7 4 - 12 %    Eosinophils Relative 5 0 - 6 %    Basophils Relative 0 0 - 1 %    Absolute Neutrophils 3.91 1.85 - 7.62 Thousands/µL    Absolute Immature Grans 0.02 0.00 - 0.20  Thousand/uL    Absolute Lymphocytes 1.39 0.60 - 4.47 Thousands/µL    Absolute Monocytes 0.41 0.17 - 1.22 Thousand/µL    Eosinophils Absolute 0.32 0.00 - 0.61 Thousand/µL    Basophils Absolute 0.02 0.00 - 0.10 Thousands/µL   Hemoglobin A1C   Result Value Ref Range    Hemoglobin A1C 5.7 (H) Normal 4.0-5.6%; PreDiabetic 5.7-6.4%; Diabetic >=6.5%; Glycemic control for adults with diabetes <7.0% %     mg/dl   Comprehensive metabolic panel   Result Value Ref Range    Sodium 141 135 - 147 mmol/L    Potassium 3.7 3.5 - 5.3 mmol/L    Chloride 105 96 - 108 mmol/L    CO2 26 21 - 32 mmol/L    ANION GAP 10 4 - 13 mmol/L    BUN 15 5 - 25 mg/dL    Creatinine 0.85 0.60 - 1.30 mg/dL    Glucose, Fasting 95 65 - 99 mg/dL    Calcium 9.3 8.4 - 10.2 mg/dL    AST 16 13 - 39 U/L    ALT 17 7 - 52 U/L    Alkaline Phosphatase 64 34 - 104 U/L    Total Protein 7.3 6.4 - 8.4 g/dL    Albumin 4.3 3.5 - 5.0 g/dL    Total Bilirubin 0.27 0.20 - 1.00 mg/dL    eGFR 73 ml/min/1.73sq m   Result Value Ref Range    Ferritin 116 11 - 307 ng/mL

## 2024-12-18 ENCOUNTER — OFFICE VISIT (OUTPATIENT)
Dept: GASTROENTEROLOGY | Facility: CLINIC | Age: 64
End: 2024-12-18
Payer: COMMERCIAL

## 2024-12-18 VITALS
HEIGHT: 62 IN | WEIGHT: 162.6 LBS | BODY MASS INDEX: 29.92 KG/M2 | DIASTOLIC BLOOD PRESSURE: 76 MMHG | TEMPERATURE: 98.1 F | SYSTOLIC BLOOD PRESSURE: 118 MMHG

## 2024-12-18 DIAGNOSIS — K21.9 GASTROESOPHAGEAL REFLUX DISEASE WITHOUT ESOPHAGITIS: ICD-10-CM

## 2024-12-18 DIAGNOSIS — K22.719 BARRETT'S ESOPHAGUS WITH DYSPLASIA: Primary | ICD-10-CM

## 2024-12-18 PROCEDURE — 99214 OFFICE O/P EST MOD 30 MIN: CPT | Performed by: INTERNAL MEDICINE

## 2024-12-18 NOTE — PROGRESS NOTES
Name: Clotilde Berg      : 1960      MRN: [id number]  Encounter Provider: Mariam Bustos MD  Encounter Date: 2024   Encounter department: [facility] [place]  :  Assessment & Plan  Richardson's esophagus with dysplasia  Patient presenting for a follow-up visit. Patient underwent a Linx procedure in 2024 to help with her reflux. She states that her reflux is improved however she continues to feel dysphagia  as well as belching while eating. Patient states that she had symptoms of hiccups as well as almost vomiting when eating. Patient's current symptoms are most likely due to underlying Linx procedure. It was explained to the patient that modifying her diet would help her symptoms like adhering to a low FODMAP diet as well as avoiding any carbonated beverages.    -Low FODMAP diet, to help with symptoms.    -Avoiding carbonated beverages  -Advised that if symptoms fail to improve can consider repeat manometry study, patient at this time would like to hold off.       Gastroesophageal reflux disease without esophagitis  See above           History of Present Illness     Clotilde Berg is a 64 y.o. female who presents for a follow up visit. PMHx notable for Richardson's esophagus, partial resection  secondary to diverticulitis, GERD s/p Linx , hypertension, hyperlipidemia, type 2 diabetes, and seizure disorder.  Patient states she continues to have a feeling of food getting stuck when eating as well as hiccuping with feeling of trapped air in her esophagus.  Patient underwent a Linx procedure in 2024 states her reflux is improved since the procedure however dysphagia symptoms during eating is bothersome.  Patient otherwise denies any chest pain, shortness of breath, or nausea.    Patient states she is not maintaining a special diet and tends to eat everything as well as consume carbonated beverages.  Patient has multiple bowel movements a day which  "tend to be mushy in nature, she denies any blood in her stool.             Objective   /76 (BP Location: Left arm, Patient Position: Sitting, Cuff Size: Adult)   Temp 98.1 °F (36.7 °C) (Tympanic)   Ht 5' 2\" (1.575 m)   Wt 73.8 kg (162 lb 9.6 oz)   BMI 29.74 kg/m²      Physical Exam  Pulmonary:      Effort: Pulmonary effort is normal.   Abdominal:      General: Abdomen is flat.      Palpations: Abdomen is soft.   Skin:     General: Skin is warm.   Neurological:      Mental Status: She is alert.   Psychiatric:         Mood and Affect: Mood normal.           "

## 2025-02-05 ENCOUNTER — TELEPHONE (OUTPATIENT)
Dept: NEUROLOGY | Facility: CLINIC | Age: 65
End: 2025-02-05

## 2025-02-05 NOTE — TELEPHONE ENCOUNTER
Spoke to pt to make appt for 2/6/25 appt to a virtual due to the expected weather forecast for tomorrow. Pt agreed to this change.

## 2025-02-06 ENCOUNTER — TELEMEDICINE (OUTPATIENT)
Dept: NEUROLOGY | Facility: CLINIC | Age: 65
End: 2025-02-06
Payer: COMMERCIAL

## 2025-02-06 DIAGNOSIS — G40.219 PARTIAL SYMPTOMATIC EPILEPSY WITH COMPLEX PARTIAL SEIZURES, INTRACTABLE, WITHOUT STATUS EPILEPTICUS (HCC): ICD-10-CM

## 2025-02-06 DIAGNOSIS — G43.009 MIGRAINE WITHOUT AURA AND WITHOUT STATUS MIGRAINOSUS, NOT INTRACTABLE: Primary | ICD-10-CM

## 2025-02-06 PROCEDURE — 99213 OFFICE O/P EST LOW 20 MIN: CPT

## 2025-02-06 RX ORDER — CYPROHEPTADINE HYDROCHLORIDE 4 MG/1
4 TABLET ORAL
Qty: 30 TABLET | Refills: 3 | Status: SHIPPED | OUTPATIENT
Start: 2025-02-06

## 2025-02-06 RX ORDER — FERROUS GLUCONATE 324(38)MG
65 TABLET ORAL
COMMUNITY

## 2025-02-06 NOTE — PROGRESS NOTES
Virtual Regular Visit  Name: Clotilde Berg      : 1960      MRN: [id number]  Encounter Provider: Jack Murcia PA-C  Encounter Date: 2025   Encounter department: [facility]      Verification of patient location:  Patient is located at Home in the following state in which I hold an active license [place] :  Assessment & Plan  Migraine without aura and without status migrainosus, not intractable  I had the pleasure of seeing Ijmmie today via virtual video visit at [facility] in [place].  She is presenting for a telemedicine follow-up appointment in regard to her migraine headaches.  The patient states since her last appointment she has noticed increased severity and frequency of her headaches.  She notes that her migraines are occurring about 2-3 times out of the month but they can sometimes last for multiple days.  Approximately having about 9-10 headache days in total throughout the month.  The patient states that Nurtec is not very effective at aborting the headaches.  She actually noted that Nurtec seem to worsen the headaches.  She notes that Toradol and promethazine seem to be working well for abortive therapy.  Since the patient had tried multiple different triptans in the past and had tried Ubrelvy along with Nurtec, recommended that she just continue with Toradol and promethazine for abortive therapy but continue to use it very sparingly.  For preventative therapy, we had decreased the patient's Topamax down to about 100 mg twice daily at the last appointment.  Unsure if this had been contributing to the worsening of the patient's headaches.  Offered her to go back up on the Topamax or potentially consider a CGRP injectable medication.  Patient did discuss that with the weather changes her headaches have been worse so offered cyproheptadine as well.  Patient opted to try cyproheptadine 4 mg daily at bedtime.  Just advised the patient I  would be discontinuing/holding her Atarax and she should continue to hold this while taking cyproheptadine.  The patient had no other issues or concerns.  Stated that I would have the patient return in approximately 3 months time for follow-up.      Headache Calendar  Please maintain a headache calendar  Consider using phone applications such as Migraine Buddy or Migraine Diary    Headache/migraine treatment:     Rescue medications (for immediate treatment of a headache):   It is ok to take ibuprofen, acetaminophen or naproxen (Advil, Tylenol,  Aleve, Excedrin) if they help your headaches you should limit these to No more than 3 times a week to avoid medication overuse/rebound headaches.     For your more moderate to severe migraines take this medication early     - Continue Toradol 10 mg and promethazine 25 mg as needed for abortive therapy of her migraine headaches.  Patient can still continue to take tizanidine 2 mg daily at bedtime as a muscle relaxant as well to if this seems to help with the patient's neck tension and also aid in headaches.    - Patient had noted that Nurtec was not very effective for her in regards to abortive therapy of the headaches.  She notes that the medication actually made the headaches worse so would like her to discontinue Nurtec at this time.      Prescription preventive medications for headaches/migraines   - Continue with verapamil 120 mg once daily for migraine prevention.    - Continue Topamax 100 mg twice daily for migraine prevention    - Start cyproheptadine 4 mg once daily at bedtime for migraine prevention.  For the time being, would like for the patient to discontinue/hold Atarax 25 mg 3 times daily as needed for itching.  Advised the patient that cyproheptadine may help with her symptoms of itching as well.  If the patient finds medication not very effective then we can always consider switching back to hydroxyzine in the future if need be.    *Typically these types of  medications take time until you see the benefit, although some may see improvement in days, often it may take weeks, especially if the medication is being titrated up to a beneficial level. Please contact us if there are any concerns or questions regarding the medication.     Over the counter preventive supplements for headaches/migraines (if you try, try for 3 months straight)  (to take every day to help prevent headaches - not to take at the time of headache):  There are combo pills online of these - none of which regulated by FDA and double check dosing - take appropriate dose only once a day- prevent a migraine, migravent, mind ease, migrelief   [] Magnesium 400mg daily (If any diarrhea or upset stomach, decrease dose  as tolerated)  [] Riboflavin (Vitamin B2) 400mg daily (may make your urine bright/neon yellow)    Lifestyle Recommendations:  [x] SLEEP - Maintain a regular sleep schedule: Adults need at least 7-8 hours of uninterrupted a night. Maintain good sleep hygiene:  Going to bed and waking up at consistent times, avoiding excessive daytime naps, avoiding caffeinated beverages in the evening, avoid excessive stimulation in the evening and generally using bed primarily for sleeping.  One hour before bedtime would recommend turning lights down lower, decreasing your activity (may read quietly, listen to music at a low volume). When you get into bed, should eliminate all technology (no texting, emailing, playing with your phone, iPad or tablet in bed).  [x] HYDRATION - Maintain good hydration.  Drink  2L of fluid a day (4 typical small water bottles)  [x] DIET - Maintain good nutrition. In particular don't skip meals and try and eat healthy balanced meals regularly.  [x] TRIGGERS - Look for other triggers and avoid them: Limit caffeine to 1-2 cups a day or less. Avoid dietary triggers that you have noticed bring on your headaches (this could include aged cheese, peanuts, MSG, aspartame and nitrates).  [x]  EXERCISE - physical exercise as we all know is good for you in many ways, and not only is good for your heart, but also is beneficial for your mental health, cognitive health and  chronic pain/headaches. I would encourage at the least 5 days of physical exercise weekly for at least 30 minutes.     Education and Follow-up  [x] Please call with any questions or concerns. Of course if any new concerning symptoms go to the emergency department.  [x] Follow up in 3 months with Charles MODI    Orders:    cyproheptadine (PERIACTIN) 4 mg tablet; Take 1 tablet (4 mg total) by mouth daily at bedtime    Partial symptomatic epilepsy with complex partial seizures, intractable, without status epilepticus (HCC)  - No new seizure-like activity reported at this time  - Continue with Topamax 100 mg twice daily for seizure prophylaxis and migraine prevention           Encounter provider Jack Murcia PA-C    The patient was identified by name and date of birth. Clotilde Berg was informed that this is a telemedicine visit and that the visit is being conducted through the Epic Embedded platform. She agrees to proceed..  My office door was closed. No one else was in the room.  She acknowledged consent and understanding of privacy and security of the video platform. The patient has agreed to participate and understands they can discontinue the visit at any time.    Patient is aware this is a billable service.     History of Present Illness     HPI      For Review:    The patient was last seen in the office on 08/06/2024 by myself.  At the time the last visit the patient had stated she was doing fairly well with her migraines since last appointment.  Is noted that she was having difficulty with getting significant relief of her migraines with Nurtec.  She had been taking Toradol 10 mg and promethazine 25 mg as needed for abortive therapy.  The patient stated that she had been dealing with low iron and low hemoglobin.  Advised  patient to be cautious of using Toradol along with low iron levels specially there is any concern for GI bleeding.  Advised patient to ultimately try Nurtec first before reverting to Toradol or promethazine.  Patient was still taking verapamil 120 mg daily for migraine prevention.  She was interested in coming off of the idea of the high dose of Topamax.  Patient was taking approximately 200 mg twice daily of Topamax for migraine prevention and seizure prevention.  Since the concern of having seizures for the patient was low we recommended titrating down off of the Topamax.  Wanted to ultimately have the patient taper down to Topamax 100 mg twice daily for migraine prevention and we could continue to titrate down on the medication further if need be.      Current medical illnesses: Hiatal hernia, GERD, IBS, asthma, hypertension, migraines, partial symptomatic epilepsy, radiculopathy, osteoarthritis         What medications do you take or have you taken for your headaches?      Current Preventive:   Topamax 100 mg twice daily, verapamil 120 mg daily     Current Abortive:   Nurtec (does not work, making headaches worse), Toradol, promethazine     Prior Preventive:   Pamelor, tizanidine, gabapentin, hydroxyzine      Prior Abortive:   Imitrex, Ubrelvy, Zomig, Rizatriptan, indomethacin, Eletriptan      Interval updates as of 2/6/2025:    Headaches have seemed to increase in frequency at this time.  Currently having more bloody noses she notes.  However, her bloody noses do not seem to correlate with the headaches she states. No reason for them happening just has been occurring more often.  She notes currently having about 2-3 different migraines a month, sometimes lasting multiple days at a time.  Totaling about 9 or 10 headache days out of the month.  Not really noticing any difference when decreasing Topamax dosage in regards to side effects.  Has noticed more headaches since decreasing the Topamax. Still trying to use  Toradol and promethazine sparibngly which does seem to help for the headaches. Nurtec does not help for headaches actually making the headaches worse.      Review of Systems   Constitutional:  Negative for appetite change, fatigue and fever.   HENT: Negative.  Negative for hearing loss, tinnitus, trouble swallowing and voice change.    Eyes: Negative.  Negative for photophobia, pain and visual disturbance.   Respiratory: Negative.  Negative for shortness of breath.    Cardiovascular: Negative.  Negative for palpitations.   Gastrointestinal:  Positive for nausea. Negative for vomiting.   Endocrine: Negative.  Negative for cold intolerance.   Genitourinary: Negative.  Negative for dysuria, frequency and urgency.   Musculoskeletal:  Negative for back pain, gait problem, myalgias, neck pain and neck stiffness.   Skin: Negative.  Negative for rash.   Allergic/Immunologic: Negative.    Neurological:  Positive for dizziness and headaches (worse 2-3/30 HA Toradol & tyelonol doesnt take it away (sharp pain whole head)). Negative for tremors, seizures, syncope, facial asymmetry, speech difficulty, weakness, light-headedness and numbness.   Hematological: Negative.  Does not bruise/bleed easily.        Bloody noses are more frequent   Psychiatric/Behavioral: Negative.  Negative for confusion, hallucinations and sleep disturbance.    All other systems reviewed and are negative.      Objective   There were no vitals taken for this visit.    Physical Exam    Physical Exam:                                                                 Vitals:            Constitutional:    There were no vitals taken for this visit.  BP Readings from Last 3 Encounters:   12/18/24 118/76   12/13/24 142/88   10/18/24 146/72     Pulse Readings from Last 3 Encounters:   12/13/24 90   10/18/24 92   10/11/24 98         Well developed, well nourished, well groomed. No dysmorphic features.       Psychiatric:  Normal behavior and appropriate affect         Neurological Examination:     Mental status/cognitive function:   Orientated to time, place and person. Recent and remote memory intact. Attention span and concentration as well as fund of knowledge are appropriate for age. Normal language and spontaneous speech.    Cranial Nerves:  II-visual fields full.   III, IV, VI-Pupils were equal, round, and reactive to light and accomodation. Extraocular movements were full and conjugate without nystagmus. Conjugate gaze, normal smooth pursuits, normal saccades   VII-facial expression symmetric, intact forehead wrinkle, strong eye closure, symmetric smile    VIII-hearing grossly intact bilaterally   IX, X-palate elevation symmetric, no dysarthria.     Visit Time  Total Visit Duration: 15 minutes

## 2025-02-06 NOTE — ASSESSMENT & PLAN NOTE
I had the pleasure of seeing Jimmie today via virtual video visit at [facility] in [place].  She is presenting for a telemedicine follow-up appointment in regard to her migraine headaches.  The patient states since her last appointment she has noticed increased severity and frequency of her headaches.  She notes that her migraines are occurring about 2-3 times out of the month but they can sometimes last for multiple days.  Approximately having about 9-10 headache days in total throughout the month.  The patient states that Nurtec is not very effective at aborting the headaches.  She actually noted that Nurtec seem to worsen the headaches.  She notes that Toradol and promethazine seem to be working well for abortive therapy.  Since the patient had tried multiple different triptans in the past and had tried Ubrelvy along with Nurtec, recommended that she just continue with Toradol and promethazine for abortive therapy but continue to use it very sparingly.  For preventative therapy, we had decreased the patient's Topamax down to about 100 mg twice daily at the last appointment.  Unsure if this had been contributing to the worsening of the patient's headaches.  Offered her to go back up on the Topamax or potentially consider a CGRP injectable medication.  Patient did discuss that with the weather changes her headaches have been worse so offered cyproheptadine as well.  Patient opted to try cyproheptadine 4 mg daily at bedtime.  Just advised the patient I would be discontinuing/holding her Atarax and she should continue to hold this while taking cyproheptadine.  The patient had no other issues or concerns.  Stated that I would have the patient return in approximately 3 months time for follow-up.      Headache Calendar  Please maintain a headache calendar  Consider using phone applications such as Migraine Demond or Migraine Diary    Headache/migraine treatment:     Rescue medications (for immediate  treatment of a headache):   It is ok to take ibuprofen, acetaminophen or naproxen (Advil, Tylenol,  Aleve, Excedrin) if they help your headaches you should limit these to No more than 3 times a week to avoid medication overuse/rebound headaches.     For your more moderate to severe migraines take this medication early     - Continue Toradol 10 mg and promethazine 25 mg as needed for abortive therapy of her migraine headaches.  Patient can still continue to take tizanidine 2 mg daily at bedtime as a muscle relaxant as well to if this seems to help with the patient's neck tension and also aid in headaches.    - Patient had noted that Nurtec was not very effective for her in regards to abortive therapy of the headaches.  She notes that the medication actually made the headaches worse so would like her to discontinue Nurtec at this time.      Prescription preventive medications for headaches/migraines   - Continue with verapamil 120 mg once daily for migraine prevention.    - Continue Topamax 100 mg twice daily for migraine prevention    - Start cyproheptadine 4 mg once daily at bedtime for migraine prevention.  For the time being, would like for the patient to discontinue/hold Atarax 25 mg 3 times daily as needed for itching.  Advised the patient that cyproheptadine may help with her symptoms of itching as well.  If the patient finds medication not very effective then we can always consider switching back to hydroxyzine in the future if need be.    *Typically these types of medications take time until you see the benefit, although some may see improvement in days, often it may take weeks, especially if the medication is being titrated up to a beneficial level. Please contact us if there are any concerns or questions regarding the medication.     Over the counter preventive supplements for headaches/migraines (if you try, try for 3 months straight)  (to take every day to help prevent headaches - not to take at the time  of headache):  There are combo pills online of these - none of which regulated by FDA and double check dosing - take appropriate dose only once a day- prevent a migraine, migravent, mind ease, migrelief   [] Magnesium 400mg daily (If any diarrhea or upset stomach, decrease dose  as tolerated)  [] Riboflavin (Vitamin B2) 400mg daily (may make your urine bright/neon yellow)    Lifestyle Recommendations:  [x] SLEEP - Maintain a regular sleep schedule: Adults need at least 7-8 hours of uninterrupted a night. Maintain good sleep hygiene:  Going to bed and waking up at consistent times, avoiding excessive daytime naps, avoiding caffeinated beverages in the evening, avoid excessive stimulation in the evening and generally using bed primarily for sleeping.  One hour before bedtime would recommend turning lights down lower, decreasing your activity (may read quietly, listen to music at a low volume). When you get into bed, should eliminate all technology (no texting, emailing, playing with your phone, iPad or tablet in bed).  [x] HYDRATION - Maintain good hydration.  Drink  2L of fluid a day (4 typical small water bottles)  [x] DIET - Maintain good nutrition. In particular don't skip meals and try and eat healthy balanced meals regularly.  [x] TRIGGERS - Look for other triggers and avoid them: Limit caffeine to 1-2 cups a day or less. Avoid dietary triggers that you have noticed bring on your headaches (this could include aged cheese, peanuts, MSG, aspartame and nitrates).  [x] EXERCISE - physical exercise as we all know is good for you in many ways, and not only is good for your heart, but also is beneficial for your mental health, cognitive health and  chronic pain/headaches. I would encourage at the least 5 days of physical exercise weekly for at least 30 minutes.     Education and Follow-up  [x] Please call with any questions or concerns. Of course if any new concerning symptoms go to the emergency department.  [x]  Follow up in 3 months with Charles MODI    Orders:    cyproheptadine (PERIACTIN) 4 mg tablet; Take 1 tablet (4 mg total) by mouth daily at bedtime

## 2025-02-06 NOTE — ASSESSMENT & PLAN NOTE
- No new seizure-like activity reported at this time  - Continue with Topamax 100 mg twice daily for seizure prophylaxis and migraine prevention

## 2025-02-06 NOTE — PATIENT INSTRUCTIONS
Headache Calendar  Please maintain a headache calendar  Consider using phone applications such as Migraine Buddy or Migraine Diary    Headache/migraine treatment:     Rescue medications (for immediate treatment of a headache):   It is ok to take ibuprofen, acetaminophen or naproxen (Advil, Tylenol,  Aleve, Excedrin) if they help your headaches you should limit these to No more than 3 times a week to avoid medication overuse/rebound headaches.     For your more moderate to severe migraines take this medication early     - Continue Toradol 10 mg and promethazine 25 mg as needed for abortive therapy of her migraine headaches.  Patient can still continue to take tizanidine 2 mg daily at bedtime as a muscle relaxant as well to if this seems to help with the patient's neck tension and also aid in headaches.    - Patient had noted that Nurtec was not very effective for her in regards to abortive therapy of the headaches.  She notes that the medication actually made the headaches worse so would like her to discontinue Nurtec at this time.      Prescription preventive medications for headaches/migraines   - Continue with verapamil 120 mg once daily for migraine prevention.    - Continue Topamax 100 mg twice daily for migraine prevention    - Start cyproheptadine 4 mg once daily at bedtime for migraine prevention.  For the time being, would like for the patient to discontinue/hold Atarax 25 mg 3 times daily as needed for itching.  Advised the patient that cyproheptadine may help with her symptoms of itching as well.  If the patient finds medication not very effective then we can always consider switching back to hydroxyzine in the future if need be.    *Typically these types of medications take time until you see the benefit, although some may see improvement in days, often it may take weeks, especially if the medication is being titrated up to a beneficial level. Please contact us if there are any concerns or questions  regarding the medication.     Over the counter preventive supplements for headaches/migraines (if you try, try for 3 months straight)  (to take every day to help prevent headaches - not to take at the time of headache):  There are combo pills online of these - none of which regulated by FDA and double check dosing - take appropriate dose only once a day- prevent a migraine, migravent, mind ease, migrelief   [] Magnesium 400mg daily (If any diarrhea or upset stomach, decrease dose  as tolerated)  [] Riboflavin (Vitamin B2) 400mg daily (may make your urine bright/neon yellow)    Lifestyle Recommendations:  [x] SLEEP - Maintain a regular sleep schedule: Adults need at least 7-8 hours of uninterrupted a night. Maintain good sleep hygiene:  Going to bed and waking up at consistent times, avoiding excessive daytime naps, avoiding caffeinated beverages in the evening, avoid excessive stimulation in the evening and generally using bed primarily for sleeping.  One hour before bedtime would recommend turning lights down lower, decreasing your activity (may read quietly, listen to music at a low volume). When you get into bed, should eliminate all technology (no texting, emailing, playing with your phone, iPad or tablet in bed).  [x] HYDRATION - Maintain good hydration.  Drink  2L of fluid a day (4 typical small water bottles)  [x] DIET - Maintain good nutrition. In particular don't skip meals and try and eat healthy balanced meals regularly.  [x] TRIGGERS - Look for other triggers and avoid them: Limit caffeine to 1-2 cups a day or less. Avoid dietary triggers that you have noticed bring on your headaches (this could include aged cheese, peanuts, MSG, aspartame and nitrates).  [x] EXERCISE - physical exercise as we all know is good for you in many ways, and not only is good for your heart, but also is beneficial for your mental health, cognitive health and  chronic pain/headaches. I would encourage at the least 5 days of  physical exercise weekly for at least 30 minutes.     Education and Follow-up  [x] Please call with any questions or concerns. Of course if any new concerning symptoms go to the emergency department.  [x] Follow up in 3 months with Charles MODI

## 2025-03-14 ENCOUNTER — DOCUMENTATION (OUTPATIENT)
Age: 65
End: 2025-03-14

## 2025-03-14 ENCOUNTER — NURSE TRIAGE (OUTPATIENT)
Age: 65
End: 2025-03-14

## 2025-03-14 DIAGNOSIS — G43.009 MIGRAINE WITHOUT AURA AND WITHOUT STATUS MIGRAINOSUS, NOT INTRACTABLE: Primary | ICD-10-CM

## 2025-03-14 RX ORDER — DIVALPROEX SODIUM 500 MG/1
500 TABLET, DELAYED RELEASE ORAL
Qty: 5 TABLET | Refills: 0 | Status: SHIPPED | OUTPATIENT
Start: 2025-03-14 | End: 2025-03-28

## 2025-03-14 NOTE — TELEPHONE ENCOUNTER
"FOLLOW UP: Jack Murcia PA-C Please advise, thank you!    REASON FOR CONVERSATION: Sinus Migraine    SYMPTOMS: Sinus Migraine.    OTHER: Patient also states she might have a slight fever, she feels warm. Patient has yellow mucous and a slight cough. Patient states she coughs mostly at night when she lays down. Patient referred to her PCP to rule out infection and she verbally agreed. Patient declined being transferred at this time to PCP. Patient confirmed we may call back at [phone] and we may leave a voicemail.     [facility]  [facility] - [place], [place] - [address]  [address], [facility] [place] [address]  Phone: [phone]  Fax: [phone]         DISPOSITION: Refer to PCP, Discuss with Provider and Call Back Patient (overriding Callback by PCP Within 1 Hour)    Reason for Disposition   SEVERE headache and not relieved by pain meds    Answer Assessment - Initial Assessment Questions  .MIGRAINEHA   When did migraine start? Last week and Tuesday it got worse.     Location/Description:  Patient said her whole head is throbbing, like her \"head is in a vice.\" Patient said it is a sinus migraine and nothing is working for the pain. Patient said the pain is rated a ten out of ten pain. Patient said it is similar to other migraines she has had in the past.       Associated symptoms: Blurred vision in both eyes. Patient has mild nausea.     Precipitating factors: Maybe the weather.     Alleviating factors: nothing helps    What medications have you tried for this migraine headache?    ketorolac (TORADOL) 10 mg tablet- not helping  rimegepant sulfate (NURTEC) 75 mg TBDP -not helping.   Tylenol allergy sinus also tried, not helping.   Tuesday she took phenegran with toradol- did not help.      Current migraine medications are confirmed as:     cyproheptadine (PERIACTIN) 4 mg tablet at bedtime  ketorolac (TORADOL) 10 mg tablet- not helping  topiramate (Topamax) " 100 mg tablet  BID  rimegepant sulfate (NURTEC) 75 mg TBDP -not helping.     Medications tried in the past? Patient has tried steroids and it lower the pain but does not help.    Protocols used: Headache-Adult-OH

## 2025-03-14 NOTE — TELEPHONE ENCOUNTER
Jack Murcia PA-C to Me     3/14/25 12:08 PM  Ok I can offer her steroids again possibly. But yes she also sounds like she is sick and has a viral infection which may be causing the migraines. If her headache continues to worsen or change in anyway I would suggest to go too the ED. As long as it feels like her normal migraines she usually has then she could try the steroids or any other bridging meds to see if it would help. Please ask the patient about ever using Depakote or Zyprexa for bridging. Thank you!    -Charles  -----------------------------------------    Outbound call made to patient and she was provided the above advisement. Patient verbalized understanding. Patient was on depakote many years ago but they felt it was like working it was changed to topamax. Patient does not think zyprexa has been tried. Patient said it came down a little but her pain is still an 8. Tylenol sinus and toradol since we last spoke (but not together.) Patient said her temp is only 97.1 on the no touch forehead monitor. Natalian did not contact her PCP.    Patient would like the on call provider to be contacted if her provider is not available.     Jack BROTHERS: Please advise, thank you!

## 2025-03-14 NOTE — TELEPHONE ENCOUNTER
Jack BROTHERS: responded 3/14/2025  via Epic secure chat:         Outbound call made to Patient and she was made aware script was sent to [facility]. Patient had an additional question and it was sent via epic secure chat:       Patient was provided PA's response and verbalized understanding. Jamie denied having further questions.

## 2025-03-24 DIAGNOSIS — G43.711 INTRACTABLE CHRONIC MIGRAINE WITHOUT AURA AND WITH STATUS MIGRAINOSUS: ICD-10-CM

## 2025-03-25 RX ORDER — RIMEGEPANT SULFATE 75 MG/75MG
TABLET, ORALLY DISINTEGRATING ORAL
Qty: 16 TABLET | Refills: 3 | Status: SHIPPED | OUTPATIENT
Start: 2025-03-25 | End: 2025-03-27 | Stop reason: SINTOL

## 2025-03-27 ENCOUNTER — HOSPITAL ENCOUNTER (INPATIENT)
Facility: HOSPITAL | Age: 65
LOS: 1 days | Discharge: HOME | End: 2025-03-28
Attending: EMERGENCY MEDICINE | Admitting: PSYCHIATRY & NEUROLOGY
Payer: COMMERCIAL

## 2025-03-27 ENCOUNTER — APPOINTMENT (EMERGENCY)
Dept: RADIOLOGY | Facility: HOSPITAL | Age: 65
End: 2025-03-27
Payer: COMMERCIAL

## 2025-03-27 DIAGNOSIS — G43.711 INTRACTABLE CHRONIC MIGRAINE WITHOUT AURA AND WITH STATUS MIGRAINOSUS: ICD-10-CM

## 2025-03-27 DIAGNOSIS — G43.909 MIGRAINE HEADACHE: Primary | ICD-10-CM

## 2025-03-27 LAB
ALBUMIN SERPL BCG-MCNC: 4.5 G/DL (ref 3.5–5)
ALP SERPL-CCNC: 69 U/L (ref 34–104)
ALT SERPL W P-5'-P-CCNC: 21 U/L (ref 7–52)
ANION GAP SERPL CALCULATED.3IONS-SCNC: 8 MMOL/L (ref 4–13)
AST SERPL W P-5'-P-CCNC: 17 U/L (ref 13–39)
ATRIAL RATE: 100 BPM
BASOPHILS # BLD AUTO: 0.02 THOUSANDS/ÂΜL (ref 0–0.1)
BASOPHILS NFR BLD AUTO: 0 % (ref 0–1)
BILIRUB SERPL-MCNC: 0.26 MG/DL (ref 0.2–1)
BUN SERPL-MCNC: 18 MG/DL (ref 5–25)
CALCIUM SERPL-MCNC: 9.4 MG/DL (ref 8.4–10.2)
CARDIAC TROPONIN I PNL SERPL HS: <2 NG/L (ref ?–50)
CHLORIDE SERPL-SCNC: 102 MMOL/L (ref 96–108)
CO2 SERPL-SCNC: 26 MMOL/L (ref 21–32)
CREAT SERPL-MCNC: 0.96 MG/DL (ref 0.6–1.3)
EOSINOPHIL # BLD AUTO: 0.25 THOUSAND/ÂΜL (ref 0–0.61)
EOSINOPHIL NFR BLD AUTO: 4 % (ref 0–6)
ERYTHROCYTE [DISTWIDTH] IN BLOOD BY AUTOMATED COUNT: 12.4 % (ref 11.6–15.1)
EST. AVERAGE GLUCOSE BLD GHB EST-MCNC: 117 MG/DL
GFR SERPL CREATININE-BSD FRML MDRD: 63 ML/MIN/1.73SQ M
GLUCOSE SERPL-MCNC: 101 MG/DL (ref 65–140)
GLUCOSE SERPL-MCNC: 134 MG/DL (ref 65–140)
HBA1C MFR BLD: 5.7 %
HCT VFR BLD AUTO: 43.3 % (ref 34.8–46.1)
HGB BLD-MCNC: 14.1 G/DL (ref 11.5–15.4)
IMM GRANULOCYTES # BLD AUTO: 0.02 THOUSAND/UL (ref 0–0.2)
IMM GRANULOCYTES NFR BLD AUTO: 0 % (ref 0–2)
LYMPHOCYTES # BLD AUTO: 1.46 THOUSANDS/ÂΜL (ref 0.6–4.47)
LYMPHOCYTES NFR BLD AUTO: 20 % (ref 14–44)
MCH RBC QN AUTO: 29.1 PG (ref 26.8–34.3)
MCHC RBC AUTO-ENTMCNC: 32.6 G/DL (ref 31.4–37.4)
MCV RBC AUTO: 90 FL (ref 82–98)
MONOCYTES # BLD AUTO: 0.46 THOUSAND/ÂΜL (ref 0.17–1.22)
MONOCYTES NFR BLD AUTO: 6 % (ref 4–12)
NEUTROPHILS # BLD AUTO: 5 THOUSANDS/ÂΜL (ref 1.85–7.62)
NEUTS SEG NFR BLD AUTO: 70 % (ref 43–75)
NRBC BLD AUTO-RTO: 0 /100 WBCS
P AXIS: 66 DEGREES
PLATELET # BLD AUTO: 198 THOUSANDS/UL (ref 149–390)
PMV BLD AUTO: 10.6 FL (ref 8.9–12.7)
POTASSIUM SERPL-SCNC: 4.7 MMOL/L (ref 3.5–5.3)
PR INTERVAL: 132 MS
PROT SERPL-MCNC: 8 G/DL (ref 6.4–8.4)
QRS AXIS: -6 DEGREES
QRSD INTERVAL: 78 MS
QT INTERVAL: 346 MS
QTC INTERVAL: 447 MS
RBC # BLD AUTO: 4.84 MILLION/UL (ref 3.81–5.12)
SODIUM SERPL-SCNC: 136 MMOL/L (ref 135–147)
T WAVE AXIS: 65 DEGREES
VENTRICULAR RATE: 100 BPM
WBC # BLD AUTO: 7.21 THOUSAND/UL (ref 4.31–10.16)

## 2025-03-27 PROCEDURE — 96375 TX/PRO/DX INJ NEW DRUG ADDON: CPT

## 2025-03-27 PROCEDURE — 99285 EMERGENCY DEPT VISIT HI MDM: CPT

## 2025-03-27 PROCEDURE — 36415 COLL VENOUS BLD VENIPUNCTURE: CPT | Performed by: PHYSICIAN ASSISTANT

## 2025-03-27 PROCEDURE — 80053 COMPREHEN METABOLIC PANEL: CPT | Performed by: PHYSICIAN ASSISTANT

## 2025-03-27 PROCEDURE — 84484 ASSAY OF TROPONIN QUANT: CPT | Performed by: PHYSICIAN ASSISTANT

## 2025-03-27 PROCEDURE — 85025 COMPLETE CBC W/AUTO DIFF WBC: CPT | Performed by: PHYSICIAN ASSISTANT

## 2025-03-27 PROCEDURE — 82948 REAGENT STRIP/BLOOD GLUCOSE: CPT

## 2025-03-27 PROCEDURE — 96365 THER/PROPH/DIAG IV INF INIT: CPT

## 2025-03-27 PROCEDURE — 99223 1ST HOSP IP/OBS HIGH 75: CPT | Performed by: PSYCHIATRY & NEUROLOGY

## 2025-03-27 PROCEDURE — 70450 CT HEAD/BRAIN W/O DYE: CPT

## 2025-03-27 PROCEDURE — 99285 EMERGENCY DEPT VISIT HI MDM: CPT | Performed by: PHYSICIAN ASSISTANT

## 2025-03-27 PROCEDURE — 93005 ELECTROCARDIOGRAM TRACING: CPT

## 2025-03-27 PROCEDURE — 96372 THER/PROPH/DIAG INJ SC/IM: CPT

## 2025-03-27 PROCEDURE — 83036 HEMOGLOBIN GLYCOSYLATED A1C: CPT

## 2025-03-27 PROCEDURE — 93010 ELECTROCARDIOGRAM REPORT: CPT | Performed by: INTERNAL MEDICINE

## 2025-03-27 PROCEDURE — 96361 HYDRATE IV INFUSION ADD-ON: CPT

## 2025-03-27 RX ORDER — ASCORBIC ACID 500 MG
500 TABLET ORAL 2 TIMES DAILY
Status: DISCONTINUED | OUTPATIENT
Start: 2025-03-27 | End: 2025-03-28 | Stop reason: HOSPADM

## 2025-03-27 RX ORDER — WATER 10 ML/10ML
INJECTION INTRAMUSCULAR; INTRAVENOUS; SUBCUTANEOUS
Status: COMPLETED
Start: 2025-03-27 | End: 2025-03-27

## 2025-03-27 RX ORDER — FERROUS GLUCONATE 324(38)MG
324 TABLET ORAL EVERY OTHER DAY
Status: DISCONTINUED | OUTPATIENT
Start: 2025-03-28 | End: 2025-03-28 | Stop reason: HOSPADM

## 2025-03-27 RX ORDER — DEXAMETHASONE 4 MG/1
4 TABLET ORAL DAILY
Status: DISCONTINUED | OUTPATIENT
Start: 2025-03-28 | End: 2025-03-27

## 2025-03-27 RX ORDER — ECHINACEA PURPUREA EXTRACT 125 MG
2 TABLET ORAL 2 TIMES DAILY
Status: DISCONTINUED | OUTPATIENT
Start: 2025-03-27 | End: 2025-03-28 | Stop reason: HOSPADM

## 2025-03-27 RX ORDER — ACETAMINOPHEN 325 MG/1
650 TABLET ORAL EVERY 6 HOURS PRN
Status: DISCONTINUED | OUTPATIENT
Start: 2025-03-27 | End: 2025-03-28 | Stop reason: HOSPADM

## 2025-03-27 RX ORDER — MULTIVITAMIN WITH IRON
1 TABLET ORAL DAILY
Status: DISCONTINUED | OUTPATIENT
Start: 2025-03-27 | End: 2025-03-27 | Stop reason: RX

## 2025-03-27 RX ORDER — VERAPAMIL HYDROCHLORIDE 120 MG/1
120 TABLET, FILM COATED, EXTENDED RELEASE ORAL DAILY
Status: DISCONTINUED | OUTPATIENT
Start: 2025-03-27 | End: 2025-03-28 | Stop reason: HOSPADM

## 2025-03-27 RX ORDER — ONDANSETRON 2 MG/ML
4 INJECTION INTRAMUSCULAR; INTRAVENOUS ONCE
Status: COMPLETED | OUTPATIENT
Start: 2025-03-27 | End: 2025-03-27

## 2025-03-27 RX ORDER — PRAVASTATIN SODIUM 80 MG/1
80 TABLET ORAL
Status: DISCONTINUED | OUTPATIENT
Start: 2025-03-27 | End: 2025-03-28 | Stop reason: HOSPADM

## 2025-03-27 RX ORDER — DICYCLOMINE HCL 20 MG
20 TABLET ORAL 4 TIMES DAILY PRN
Status: DISCONTINUED | OUTPATIENT
Start: 2025-03-27 | End: 2025-03-28 | Stop reason: HOSPADM

## 2025-03-27 RX ORDER — DEXAMETHASONE SODIUM PHOSPHATE 4 MG/ML
2 INJECTION, SOLUTION INTRA-ARTICULAR; INTRALESIONAL; INTRAMUSCULAR; INTRAVENOUS; SOFT TISSUE DAILY
Status: DISCONTINUED | OUTPATIENT
Start: 2025-04-01 | End: 2025-03-28 | Stop reason: HOSPADM

## 2025-03-27 RX ORDER — OLANZAPINE 10 MG/2ML
10 INJECTION, POWDER, FOR SOLUTION INTRAMUSCULAR ONCE
Status: COMPLETED | OUTPATIENT
Start: 2025-03-27 | End: 2025-03-27

## 2025-03-27 RX ORDER — MAGNESIUM SULFATE HEPTAHYDRATE 40 MG/ML
2 INJECTION, SOLUTION INTRAVENOUS ONCE
Status: COMPLETED | OUTPATIENT
Start: 2025-03-27 | End: 2025-03-27

## 2025-03-27 RX ORDER — TOPIRAMATE 100 MG/1
100 TABLET, FILM COATED ORAL 2 TIMES DAILY
Status: DISCONTINUED | OUTPATIENT
Start: 2025-03-27 | End: 2025-03-28 | Stop reason: HOSPADM

## 2025-03-27 RX ORDER — PANTOPRAZOLE SODIUM 40 MG/1
40 TABLET, DELAYED RELEASE ORAL
Status: DISCONTINUED | OUTPATIENT
Start: 2025-03-28 | End: 2025-03-28 | Stop reason: HOSPADM

## 2025-03-27 RX ORDER — ALBUTEROL SULFATE 90 UG/1
2 INHALANT RESPIRATORY (INHALATION) EVERY 6 HOURS PRN
Status: DISCONTINUED | OUTPATIENT
Start: 2025-03-27 | End: 2025-03-28 | Stop reason: HOSPADM

## 2025-03-27 RX ORDER — DEXAMETHASONE SODIUM PHOSPHATE 10 MG/ML
8 INJECTION, SOLUTION INTRAMUSCULAR; INTRAVENOUS DAILY
Status: DISCONTINUED | OUTPATIENT
Start: 2025-03-28 | End: 2025-03-28 | Stop reason: HOSPADM

## 2025-03-27 RX ORDER — INSULIN LISPRO 100 [IU]/ML
1-5 INJECTION, SOLUTION INTRAVENOUS; SUBCUTANEOUS
Status: DISCONTINUED | OUTPATIENT
Start: 2025-03-27 | End: 2025-03-28 | Stop reason: HOSPADM

## 2025-03-27 RX ORDER — ENOXAPARIN SODIUM 100 MG/ML
40 INJECTION SUBCUTANEOUS DAILY
Status: DISCONTINUED | OUTPATIENT
Start: 2025-03-27 | End: 2025-03-28 | Stop reason: HOSPADM

## 2025-03-27 RX ORDER — METOCLOPRAMIDE HYDROCHLORIDE 5 MG/ML
10 INJECTION INTRAMUSCULAR; INTRAVENOUS EVERY 8 HOURS SCHEDULED
Status: DISCONTINUED | OUTPATIENT
Start: 2025-03-27 | End: 2025-03-28 | Stop reason: HOSPADM

## 2025-03-27 RX ORDER — DEXAMETHASONE SODIUM PHOSPHATE 4 MG/ML
4 INJECTION, SOLUTION INTRA-ARTICULAR; INTRALESIONAL; INTRAMUSCULAR; INTRAVENOUS; SOFT TISSUE DAILY
Status: DISCONTINUED | OUTPATIENT
Start: 2025-03-30 | End: 2025-03-28 | Stop reason: HOSPADM

## 2025-03-27 RX ORDER — SUMATRIPTAN 6 MG/.5ML
6 INJECTION, SOLUTION SUBCUTANEOUS ONCE
Status: COMPLETED | OUTPATIENT
Start: 2025-03-27 | End: 2025-03-27

## 2025-03-27 RX ORDER — KETOROLAC TROMETHAMINE 30 MG/ML
30 INJECTION, SOLUTION INTRAMUSCULAR; INTRAVENOUS ONCE
Status: COMPLETED | OUTPATIENT
Start: 2025-03-27 | End: 2025-03-27

## 2025-03-27 RX ORDER — DIPHENHYDRAMINE HYDROCHLORIDE 50 MG/ML
25 INJECTION, SOLUTION INTRAMUSCULAR; INTRAVENOUS ONCE
Status: COMPLETED | OUTPATIENT
Start: 2025-03-27 | End: 2025-03-27

## 2025-03-27 RX ORDER — DEXAMETHASONE SODIUM PHOSPHATE 10 MG/ML
8 INJECTION, SOLUTION INTRAMUSCULAR; INTRAVENOUS DAILY
Status: DISCONTINUED | OUTPATIENT
Start: 2025-03-28 | End: 2025-03-27

## 2025-03-27 RX ORDER — COLESTIPOL HYDROCHLORIDE 1 G/1
1 TABLET ORAL 2 TIMES DAILY PRN
Status: DISCONTINUED | OUTPATIENT
Start: 2025-03-27 | End: 2025-03-27 | Stop reason: RX

## 2025-03-27 RX ORDER — MAGNESIUM SULFATE HEPTAHYDRATE 40 MG/ML
2 INJECTION, SOLUTION INTRAVENOUS EVERY 24 HOURS
Status: DISCONTINUED | OUTPATIENT
Start: 2025-03-28 | End: 2025-03-28 | Stop reason: HOSPADM

## 2025-03-27 RX ORDER — DEXAMETHASONE 2 MG/1
2 TABLET ORAL DAILY
Status: DISCONTINUED | OUTPATIENT
Start: 2025-03-28 | End: 2025-03-27

## 2025-03-27 RX ORDER — DEXAMETHASONE SODIUM PHOSPHATE 10 MG/ML
10 INJECTION, SOLUTION INTRAMUSCULAR; INTRAVENOUS ONCE
Status: COMPLETED | OUTPATIENT
Start: 2025-03-27 | End: 2025-03-27

## 2025-03-27 RX ADMIN — MAGNESIUM SULFATE HEPTAHYDRATE 2 G: 40 INJECTION, SOLUTION INTRAVENOUS at 12:25

## 2025-03-27 RX ADMIN — PRAVASTATIN SODIUM 80 MG: 80 TABLET ORAL at 17:48

## 2025-03-27 RX ADMIN — KETOROLAC TROMETHAMINE 30 MG: 30 INJECTION, SOLUTION INTRAMUSCULAR; INTRAVENOUS at 12:26

## 2025-03-27 RX ADMIN — SODIUM CHLORIDE 1000 ML: 0.9 INJECTION, SOLUTION INTRAVENOUS at 12:15

## 2025-03-27 RX ADMIN — AMOXICILLIN AND CLAVULANATE POTASSIUM 1 TABLET: 875; 125 TABLET, COATED ORAL at 21:22

## 2025-03-27 RX ADMIN — OXYCODONE HYDROCHLORIDE AND ACETAMINOPHEN 500 MG: 500 TABLET ORAL at 17:46

## 2025-03-27 RX ADMIN — WATER 10 ML: 1 INJECTION INTRAMUSCULAR; INTRAVENOUS; SUBCUTANEOUS at 18:24

## 2025-03-27 RX ADMIN — ENOXAPARIN SODIUM 40 MG: 40 INJECTION SUBCUTANEOUS at 17:50

## 2025-03-27 RX ADMIN — ONDANSETRON 4 MG: 2 INJECTION, SOLUTION INTRAMUSCULAR; INTRAVENOUS at 13:32

## 2025-03-27 RX ADMIN — SALINE NASAL SPRAY 2 SPRAY: 1.5 SOLUTION NASAL at 21:23

## 2025-03-27 RX ADMIN — DIPHENHYDRAMINE HYDROCHLORIDE 25 MG: 50 INJECTION, SOLUTION INTRAMUSCULAR; INTRAVENOUS at 12:25

## 2025-03-27 RX ADMIN — TIZANIDINE 4 MG: 4 TABLET ORAL at 17:46

## 2025-03-27 RX ADMIN — DEXAMETHASONE SODIUM PHOSPHATE 10 MG: 10 INJECTION, SOLUTION INTRAMUSCULAR; INTRAVENOUS at 13:32

## 2025-03-27 RX ADMIN — SUMATRIPTAN 6 MG: 6 INJECTION, SOLUTION SUBCUTANEOUS at 12:25

## 2025-03-27 RX ADMIN — ALBUTEROL SULFATE 2 PUFF: 90 AEROSOL, METERED RESPIRATORY (INHALATION) at 21:23

## 2025-03-27 RX ADMIN — OLANZAPINE 10 MG: 10 INJECTION, POWDER, FOR SOLUTION INTRAMUSCULAR at 18:24

## 2025-03-27 RX ADMIN — METOCLOPRAMIDE 10 MG: 5 INJECTION, SOLUTION INTRAMUSCULAR; INTRAVENOUS at 17:50

## 2025-03-27 RX ADMIN — METOCLOPRAMIDE 10 MG: 5 INJECTION, SOLUTION INTRAMUSCULAR; INTRAVENOUS at 21:22

## 2025-03-27 RX ADMIN — TOPIRAMATE 100 MG: 100 TABLET, FILM COATED ORAL at 17:46

## 2025-03-27 NOTE — ED PROVIDER NOTES
Time reflects when diagnosis was documented in both MDM as applicable and the Disposition within this note       Time User Action Codes Description Comment    3/27/2025  3:49 PM Ghada Mallory Add [G43.909] Migraine headache           ED Disposition       ED Disposition   Admit    Condition   Stable    Date/Time   Thu Mar 27, 2025  3:49 PM    Comment   Case was discussed with Neuro and the patient's admission status was agreed to be Admission Status: observation status to the service of Dr. Farias .               Assessment & Plan       Medical Decision Making  Patient presents for evaluation of migraine headache.  Patient states she has been having a migraine headache since 3/9.  She reports that it is worse than her normal migraine headaches.  She is reporting numbness throughout her entire face, mouth and head.  Denies any recent head injury.    Differential includes but is not limited to tension headache versus migraine headache versus sinus headache versus brain hemorrhage versus mass versus cluster headache versus trigeminal neuralgia.    Overall, patient's history given is consistent with her migraine headaches, although she does report that it is worse and more extensive.  Due to the worsening nature as well as how long it has been going on, the decision was made to do further workup today.  Neuroexam is within normal limits.    Labs, imaging and meds were ordered and reviewed.  Labs did not show any significant findings.  CT of head was within normal limits.    The patient is reporting lightheadedness, and EKG and troponin were both done today.  EKG did not show any acute ischemic changes, and troponin was normal.    Patient got very minimal relief of symptoms with magnesium, Toradol, Benadryl, sumatriptan, Zofran and dexamethasone.    Case was discussed with neurology who agreed to come down and evaluate the patient.  They ultimately admitted the patient for further management to their  service.    Amount and/or Complexity of Data Reviewed  Labs: ordered.  Radiology: ordered.  Discussion of management or test interpretation with external provider(s): Dr. Richardson, neurology    Risk  Prescription drug management.  Decision regarding hospitalization.        ED Course as of 03/27/25 1619   Thu Mar 27, 2025   1310 On reevaluation, the patient states that her pain has come down from a 10 out of 10 to an 8 out of 10, however she reports still being in significant pain.  She is also now requesting something for nausea.   1455 Patient is resting comfortably at this time, although has ongoing headache.  Neurology is evaluating patient.       Medications   metoclopramide (REGLAN) injection 10 mg (has no administration in time range)   magnesium sulfate 2 g/50 mL IVPB (premix) 2 g (has no administration in time range)   OLANZapine (ZyPREXA) IM injection 10 mg (has no administration in time range)   acetaminophen (TYLENOL) tablet 650 mg (has no administration in time range)   albuterol (PROVENTIL HFA,VENTOLIN HFA) inhaler 2 puff (has no administration in time range)   ascorbic acid (VITAMIN C) tablet 500 mg (has no administration in time range)   fluticasone (ARNUITY ELLIPTA) 100 MCG/ACT inhaler 1 puff (has no administration in time range)   Cholecalciferol (VITAMIN D3) tablet 2,000 Units (has no administration in time range)   dicyclomine (BENTYL) tablet 20 mg (has no administration in time range)   Empagliflozin (JARDIANCE) tablet 20 mg (has no administration in time range)   pantoprazole (PROTONIX) EC tablet 40 mg (has no administration in time range)   ferrous gluconate (FERGON) tablet 324 mg (has no administration in time range)   multivitamin-minerals (CENTRUM) tablet 1 tablet (has no administration in time range)   pravastatin (PRAVACHOL) tablet 80 mg (has no administration in time range)   sodium chloride (OCEAN) 0.65 % nasal spray 2 spray (has no administration in time range)   topiramate (TOPAMAX) tablet  100 mg (has no administration in time range)   verapamil (CALAN-SR) CR tablet 120 mg (has no administration in time range)   enoxaparin (LOVENOX) subcutaneous injection 40 mg (has no administration in time range)   dexamethasone (PF) (DECADRON) injection 8 mg (has no administration in time range)   dexamethasone (DECADRON) injection 4 mg (has no administration in time range)   dexamethasone (DECADRON) injection 2 mg (has no administration in time range)   amoxicillin-clavulanate (AUGMENTIN) 875-125 mg per tablet 1 tablet (has no administration in time range)   insulin lispro (HumALOG/ADMELOG) 100 units/mL subcutaneous injection 1-5 Units (has no administration in time range)   ketorolac (TORADOL) injection 30 mg (30 mg Intravenous Given 3/27/25 1226)   diphenhydrAMINE (BENADRYL) injection 25 mg (25 mg Intravenous Given 3/27/25 1225)   SUMAtriptan (IMITREX) subcutaneous injection 6 mg (6 mg Subcutaneous Given 3/27/25 1225)   magnesium sulfate 2 g/50 mL IVPB (premix) 2 g (0 g Intravenous Stopped 3/27/25 1332)   sodium chloride 0.9 % bolus 1,000 mL (0 mL Intravenous Stopped 3/27/25 1424)   ondansetron (ZOFRAN) injection 4 mg (4 mg Intravenous Given 3/27/25 1332)   dexamethasone (PF) (DECADRON) injection 10 mg (10 mg Intravenous Given 3/27/25 1332)       ED Risk Strat Scores                                                History of Present Illness       Chief Complaint   Patient presents with    Headache     Migrane headache since 3/9, noise sensitivity, dizziness, entire face is numb , blurred vision  intermittently since 3/9 tried taking depakote to break migrane withour relief, no relief with meds at home, toradol, allergy meds, no numbness or weakness in arms or legs, smile is symmetrical       Past Medical History:   Diagnosis Date    Asthma     Bell palsy     Diabetes mellitus (HCC)     type 2    Diverticulitis 11/25/2022    EEG abnormality without seizure     Gastroparesis     GERD (gastroesophageal reflux  disease)     Headache, migraine     Hiatal hernia     Hyperlipidemia     IBS (irritable bowel syndrome)     Migraines     PONV (postoperative nausea and vomiting)     Sarcoidosis     Sleep apnea     no cpap      Past Surgical History:   Procedure Laterality Date    CHOLECYSTECTOMY      PLACEMENT ESOPHAGEAL SPHINCTER AUGMENTATION DEVICE W/ ROBOTICS N/A 1/5/2024    Procedure: linx placement;  Surgeon: Mark Hernandez MD;  Location: [facility];  Service: Thoracic    IN COLONOSCOPY FLX DX W/COLLJ SPEC WHEN PFRMD N/A 3/8/2017    Procedure: EGD AND COLONOSCOPY;  Surgeon: Rad Romero MD;  Location: [facility];  Service: Gastroenterology    IN ESOPHAGOGASTRODUODENOSCOPY TRANSORAL DIAGNOSTIC N/A 1/5/2024    Procedure: ESOPHAGOGASTRODUODENOSCOPY (EGD);  Surgeon: Mark Hernandez MD;  Location: [facility];  Service: Thoracic    IN LAPS RPR PARAESPHGL HRNA INCL FUNDPLSTY W/O MESH N/A 1/5/2024    Procedure: robotic assisted paraesophageal hernia repair;  Surgeon: Mark Hernandez MD;  Location: [facility];  Service: Thoracic    REPLACEMENT TOTAL KNEE Right     SIGMOIDECTOMY        Family History   Problem Relation Age of Onset    Diabetes Mother     Heart disease Mother     Cancer Father     Liver cancer Father     Brain cancer Father     Arthritis Sister     Migraines Brother     Seizures Maternal Aunt     Migraines Maternal Uncle       Social History     Tobacco Use    Smoking status: Never    Smokeless tobacco: Never   Vaping Use    Vaping status: Never Used   Substance Use Topics    Alcohol use: Yes     Comment: rare- 1 drink/year    Drug use: No      E-Cigarette/Vaping    E-Cigarette Use Never User       E-Cigarette/Vaping Substances    Nicotine No     THC No     CBD No     Flavoring No       I have reviewed and agree with the history as documented.     The patient is a 64-year-old female with history of migraines, epilepsy, diabetes and IBS who presents to the emergency department for evaluation of migraine.  " The patient states she has had a migraine since 4/9.  She states she has tried all of her home medications, and she has continued to have a migraine on a daily basis.  She states it ranges anywhere between an 8-10 out of 10 on the pain scale.  She states that she did call her neurologist who started her on a 5-day course of Depakote on 4/16, however she states she got no relief with that medication.  She reports over the last couple of days she has been experiencing increased lightheadedness as well as numbness to her entire face and head.  She called her neurologist again, and they ultimately recommended that she come to the emergency department for further evaluation.  She states she has had similar symptoms in the past with migraines, however she has never had symptoms this severe.  She states that this morning she took a dose of Tylenol sinus medication as well as a medication for dizziness as well results in MyChart with no relief.  She denies any associated light sensitivity, nausea or vomiting.  She reports that it feels like her head is in a \"vice \".  The patient states she is able to walk, however it feels like sometimes she is tilting or that she might pass out.  She denies any extremity weakness or numbness.      History provided by:  Patient   used: No    Headache  Associated symptoms: dizziness and numbness    Associated symptoms: no abdominal pain, no back pain, no cough, no diarrhea, no ear pain, no fever, no nausea, no neck pain, no neck stiffness, no sore throat and no vomiting        Review of Systems   Constitutional:  Negative for chills and fever.   HENT:  Negative for ear pain and sore throat.    Eyes:  Negative for redness and visual disturbance.   Respiratory:  Negative for cough and shortness of breath.    Cardiovascular:  Negative for chest pain.   Gastrointestinal:  Negative for abdominal pain, diarrhea, nausea and vomiting.   Genitourinary:  Negative for dysuria " and hematuria.   Musculoskeletal:  Negative for back pain, neck pain and neck stiffness.   Skin:  Negative for color change and rash.   Neurological:  Positive for dizziness, light-headedness, numbness and headaches.   All other systems reviewed and are negative.          Objective       ED Triage Vitals [03/27/25 1137]   Temperature Pulse Blood Pressure Respirations SpO2 Patient Position - Orthostatic VS   97.7 °F (36.5 °C) 103 167/90 20 100 % Sitting      Temp Source Heart Rate Source BP Location FiO2 (%) Pain Score    Temporal Monitor Left arm -- 10 - Worst Possible Pain      Vitals      Date and Time Temp Pulse SpO2 Resp BP Pain Score FACES Pain Rating User   03/27/25 1330 -- -- -- -- -- 8 -- LILLIE   03/27/25 1200 -- -- -- -- -- 10 - Worst Possible Pain -- LILLIE   03/27/25 1137 97.7 °F (36.5 °C) 103 100 % 20 167/90 10 - Worst Possible Pain -- CEK            Physical Exam  Vitals and nursing note reviewed.   Constitutional:       General: She is not in acute distress.     Appearance: She is well-developed. She is not ill-appearing or toxic-appearing.   HENT:      Head: Normocephalic and atraumatic.      Mouth/Throat:      Pharynx: Uvula midline.   Eyes:      General: Lids are normal.      Extraocular Movements: Extraocular movements intact.      Right eye: Normal extraocular motion.      Left eye: Normal extraocular motion.      Conjunctiva/sclera: Conjunctivae normal.   Cardiovascular:      Rate and Rhythm: Normal rate and regular rhythm.      Heart sounds: Normal heart sounds.   Pulmonary:      Effort: Pulmonary effort is normal.      Breath sounds: Normal breath sounds.   Abdominal:      General: There is no distension.      Palpations: Abdomen is soft.      Tenderness: There is no abdominal tenderness.   Musculoskeletal:      Cervical back: Normal range of motion and neck supple.   Skin:     General: Skin is warm and dry.   Neurological:      Mental Status: She is alert and oriented to person, place, and time.       Cranial Nerves: Cranial nerves 2-12 are intact.      Sensory: Sensation is intact.      Motor: Motor function is intact.      Coordination: Coordination is intact.      Gait: Gait is intact.         Results Reviewed       Procedure Component Value Units Date/Time    Hemoglobin A1c w/EAG Estimation (Prechecked if no A1C within 90 days) [[id number]]  (Abnormal) Collected: 03/27/25 1215    Lab Status: Final result Specimen: Blood from Arm, Left Updated: 03/27/25 1559     Hemoglobin A1C 5.7 %       mg/dl     HS Troponin 0hr (reflex protocol) [[id number]]  (Normal) Collected: 03/27/25 1215    Lab Status: Final result Specimen: Blood from Arm, Left Updated: 03/27/25 1305     hs TnI 0hr <2 ng/L     Comprehensive metabolic panel [[id number]] Collected: 03/27/25 1215    Lab Status: Final result Specimen: Blood from Arm, Left Updated: 03/27/25 1244     Sodium 136 mmol/L      Potassium 4.7 mmol/L      Chloride 102 mmol/L      CO2 26 mmol/L      ANION GAP 8 mmol/L      BUN 18 mg/dL      Creatinine 0.96 mg/dL      Glucose 101 mg/dL      Calcium 9.4 mg/dL      AST 17 U/L      ALT 21 U/L      Alkaline Phosphatase 69 U/L      Total Protein 8.0 g/dL      Albumin 4.5 g/dL      Total Bilirubin 0.26 mg/dL      eGFR 63 ml/min/1.73sq m     Narrative:      [employer] guidelines for Chronic Kidney Disease (CKD):     Stage 1 with normal or high GFR (GFR > 90 mL/min/1.73 square meters)    Stage 2 Mild CKD (GFR = 60-89 mL/min/1.73 square meters)    Stage 3A Moderate CKD (GFR = 45-59 mL/min/1.73 square meters)    Stage 3B Moderate CKD (GFR = 30-44 mL/min/1.73 square meters)    Stage 4 Severe CKD (GFR = 15-29 mL/min/1.73 square meters)    Stage 5 End Stage CKD (GFR <15 mL/min/1.73 square meters)  Note: GFR calculation is accurate only with a steady state creatinine    CBC and differential [[id number]] Collected: 03/27/25 1215    Lab Status: Final result Specimen: Blood from Arm, Left Updated: 03/27/25 1225     WBC  7.21 Thousand/uL      RBC 4.84 Million/uL      Hemoglobin 14.1 g/dL      Hematocrit 43.3 %      MCV 90 fL      MCH 29.1 pg      MCHC 32.6 g/dL      RDW 12.4 %      MPV 10.6 fL      Platelets 198 Thousands/uL      nRBC 0 /100 WBCs      Segmented % 70 %      Immature Grans % 0 %      Lymphocytes % 20 %      Monocytes % 6 %      Eosinophils Relative 4 %      Basophils Relative 0 %      Absolute Neutrophils 5.00 Thousands/µL      Absolute Immature Grans 0.02 Thousand/uL      Absolute Lymphocytes 1.46 Thousands/µL      Absolute Monocytes 0.46 Thousand/µL      Eosinophils Absolute 0.25 Thousand/µL      Basophils Absolute 0.02 Thousands/µL             CT head wo contrast   Final Interpretation by Arnel Bustos MD ([id number])   No CT etiology for headaches identified.                  Workstation performed: [id number]             ECG 12 Lead Documentation Only    Date/Time: 3/27/2025 4:12 PM    Performed by: Ghada Mallory PA-C  Authorized by: Ghada Mallory PA-C    Comments:      Sinus rhythm at 100 with occasional PVCs.  No acute ST-T changes.      ED Medication and Procedure Management   Prior to Admission Medications   Prescriptions Last Dose Informant Patient Reported? Taking?   Asmanex, 30 Metered Doses, 220 MCG/ACT inhaler 3/26/2025 Self Yes Yes   B Complex-C (B-COMPLEX WITH VITAMIN C) tablet 3/27/2025 Self Yes Yes   Sig: Take 1 tablet by mouth daily   Cholecalciferol (VITAMIN D3) 2000 units TABS 3/27/2025 Self Yes Yes   Sig: Take 1 tablet by mouth daily   Empagliflozin 25 MG TABS 3/27/2025 Self Yes Yes   Sig: Jardiance 25 mg tablet   Magnesium 400 MG CAPS  Self Yes No   Sig: Take by mouth in the morning   SALINE NASAL SPRAY NA  Self Yes No   Si sprays into each nostril 2 (two) times a day   acetaminophen (TYLENOL) 500 mg tablet More than a month Self Yes No   Sig: Take 500 mg by mouth every 6 (six) hours as needed for mild pain   albuterol (PROVENTIL HFA,VENTOLIN HFA) 90 mcg/act inhaler 3/26/2025  Self Yes Yes   Sig: Inhale 2 puffs every 6 (six) hours as needed for wheezing   ascorbic acid (VITAMIN C) 500 mg tablet 3/27/2025 Self Yes Yes   Sig: Take 500 mg by mouth daily   colestipol (COLESTID) 1 g tablet Past Week Self No Yes   Sig: Take 1 tablet (1 g total) by mouth 2 (two) times a day With meals as needed for diarrhea   cyproheptadine (PERIACTIN) 4 mg tablet Not Taking  No No   Sig: Take 1 tablet (4 mg total) by mouth daily at bedtime   Patient not taking: Reported on 3/27/2025   dicyclomine (BENTYL) 20 mg tablet 3/26/2025 Self Yes Yes   Sig: Take 20 mg by mouth as needed     divalproex sodium (Depakote) 500 mg DR tablet   No No   Sig: Take 1 tablet (500 mg total) by mouth daily at bedtime for 5 days   esomeprazole (NexIUM) 40 MG capsule 3/27/2025 Self No Yes   Sig: Take 1 capsule (40 mg total) by mouth 2 (two) times a day before meals   ferrous gluconate (FERGON) 324 mg tablet 3/27/2025  Yes Yes   Sig: Take 65 mg by mouth daily with breakfast   hyoscyamine (LEVSIN/SL) 0.125 mg SL tablet  Self Yes Yes   Sig: Take 0.125 mg by mouth every 6 (six) hours Place 1 tablet under the tongue every 6 (six) hours as needed   ibuprofen (MOTRIN) 800 mg tablet  Self No No   Sig: Take 1 tablet (800 mg total) by mouth every 8 (eight) hours as needed for mild pain   ketorolac (TORADOL) 10 mg tablet  Self No No   Sig: Take 1 tablet (10 mg total) by mouth every 6 (six) hours as needed for mild pain (break migraine if needed)   meclizine (ANTIVERT) 25 mg tablet  Self Yes No   Sig: as needed   Patient not taking: Reported on 2025   metFORMIN (GLUCOPHAGE) 500 mg tablet  Self Yes No   Si mg 2 (two) times a day with meals   multivitamin-iron-minerals-folic acid (CENTRUM) chewable tablet  Self Yes No   Sig: Chew 1 tablet daily   ondansetron (ZOFRAN) 4 mg tablet  Self No No   Sig: Take 1 tablet (4 mg total) by mouth every 8 (eight) hours as needed for nausea or vomiting   Patient not taking: Reported on 2025    ondansetron (ZOFRAN-ODT) 4 mg disintegrating tablet  Self No No   Sig: Take 1 tablet (4 mg total) by mouth every 6 (six) hours as needed for nausea or vomiting   polyethylene glycol (GOLYTELY) 4000 mL solution   No No   Sig: Take 4,000 mL by mouth once for 1 dose   Patient not taking: Reported on 2025   pravastatin (PRAVACHOL) 80 mg tablet  Self Yes No   Si mg daily   promethazine (PHENERGAN) 25 mg tablet  Self Yes No   Sig: Take 25 mg by mouth every 6 (six) hours as needed for nausea or vomiting   tiZANidine (ZANAFLEX) 2 mg tablet  Self No No   Sig: Take 1 tablet (2 mg total) by mouth daily at bedtime   topiramate (Topamax) 100 mg tablet  Self No No   Sig: Take 1 tablet (100 mg total) by mouth 2 (two) times a day   verapamil (CALAN-SR) 120 mg CR tablet  Self Yes No   Sig: Take 120 mg by mouth in the morning      Facility-Administered Medications: None     Current Discharge Medication List        CONTINUE these medications which have NOT CHANGED    Details   acetaminophen (TYLENOL) 500 mg tablet Take 500 mg by mouth every 6 (six) hours as needed for mild pain      albuterol (PROVENTIL HFA,VENTOLIN HFA) 90 mcg/act inhaler Inhale 2 puffs every 6 (six) hours as needed for wheezing      ascorbic acid (VITAMIN C) 500 mg tablet Take 500 mg by mouth daily      Asmanex, 30 Metered Doses, 220 MCG/ACT inhaler       B Complex-C (B-COMPLEX WITH VITAMIN C) tablet Take 1 tablet by mouth daily      Cholecalciferol (VITAMIN D3) 2000 units TABS Take 1 tablet by mouth daily      colestipol (COLESTID) 1 g tablet Take 1 tablet (1 g total) by mouth 2 (two) times a day With meals as needed for diarrhea  Qty: 60 tablet, Refills: 2    Associated Diagnoses: Diarrhea, unspecified type      cyproheptadine (PERIACTIN) 4 mg tablet Take 1 tablet (4 mg total) by mouth daily at bedtime  Qty: 30 tablet, Refills: 3    Associated Diagnoses: Migraine without aura and without status migrainosus, not intractable      dicyclomine (BENTYL) 20  mg tablet Take 20 mg by mouth as needed        divalproex sodium (Depakote) 500 mg DR tablet Take 1 tablet (500 mg total) by mouth daily at bedtime for 5 days  Qty: 5 tablet, Refills: 0    Associated Diagnoses: Migraine without aura and without status migrainosus, not intractable      Empagliflozin 25 MG TABS Jardiance 25 mg tablet      esomeprazole (NexIUM) 40 MG capsule Take 1 capsule (40 mg total) by mouth 2 (two) times a day before meals  Qty: 60 capsule, Refills: 3    Associated Diagnoses: Large hiatal hernia; Matt ulcer, unspecified ulcer chronicity      ferrous gluconate (FERGON) 324 mg tablet Take 65 mg by mouth daily with breakfast      hyoscyamine (LEVSIN/SL) 0.125 mg SL tablet Take 0.125 mg by mouth every 6 (six) hours Place 1 tablet under the tongue every 6 (six) hours as needed      ibuprofen (MOTRIN) 800 mg tablet Take 1 tablet (800 mg total) by mouth every 8 (eight) hours as needed for mild pain  Qty: 60 tablet, Refills: 0    Associated Diagnoses: Hiatal hernia      ketorolac (TORADOL) 10 mg tablet Take 1 tablet (10 mg total) by mouth every 6 (six) hours as needed for mild pain (break migraine if needed)  Qty: 20 tablet, Refills: 5    Associated Diagnoses: Chronic migraine without aura, with status migrainosus      Magnesium 400 MG CAPS Take by mouth in the morning      meclizine (ANTIVERT) 25 mg tablet as needed      metFORMIN (GLUCOPHAGE) 500 mg tablet 500 mg 2 (two) times a day with meals      multivitamin-iron-minerals-folic acid (CENTRUM) chewable tablet Chew 1 tablet daily      ondansetron (ZOFRAN) 4 mg tablet Take 1 tablet (4 mg total) by mouth every 8 (eight) hours as needed for nausea or vomiting  Qty: 30 tablet, Refills: 1    Associated Diagnoses: Hiatal hernia      ondansetron (ZOFRAN-ODT) 4 mg disintegrating tablet Take 1 tablet (4 mg total) by mouth every 6 (six) hours as needed for nausea or vomiting  Qty: 20 tablet, Refills: 0    Associated Diagnoses: Nausea      polyethylene glycol  (GOLYTELY) 4000 mL solution Take 4,000 mL by mouth once for 1 dose  Qty: 4000 mL, Refills: 0    Associated Diagnoses: Iron deficiency      pravastatin (PRAVACHOL) 80 mg tablet 80 mg daily      promethazine (PHENERGAN) 25 mg tablet Take 25 mg by mouth every 6 (six) hours as needed for nausea or vomiting      SALINE NASAL SPRAY NA 2 sprays into each nostril 2 (two) times a day      tiZANidine (ZANAFLEX) 2 mg tablet Take 1 tablet (2 mg total) by mouth daily at bedtime  Qty: 30 tablet, Refills: 3    Associated Diagnoses: Chronic migraine without aura, with status migrainosus      topiramate (Topamax) 100 mg tablet Take 1 tablet (100 mg total) by mouth 2 (two) times a day  Qty: 60 tablet, Refills: 3    Comments: DO NOT FILL UNTIL TAPER COMPLETE  Associated Diagnoses: Intractable chronic migraine without aura and with status migrainosus      verapamil (CALAN-SR) 120 mg CR tablet Take 120 mg by mouth in the morning           No discharge procedures on file.  ED SEPSIS DOCUMENTATION   Time reflects when diagnosis was documented in both MDM as applicable and the Disposition within this note       Time User Action Codes Description Comment    3/27/2025  3:49 PM Ghada Mallory Add [G43.909] Migraine headache                  Ghada Mallory PA-C  03/27/25 8001

## 2025-03-27 NOTE — H&P
H&P - Neurology   Name: Clotilde Berg 64 y.o. female I MRN: [id number]  Unit/Bed#: [facility] 725-01 I Date of Admission: 3/27/2025   Date of Service: 3/27/2025 I Hospital Day: 0     Assessment & Plan  Chronic migraine without aura, intractable, with status migrainosus  Neurology admitted patient into the hospital for management of constant migraine ongoing for past 2 to 3 weeks generally at a level of 8-10 and refractory to all medication attempts in the outpatient setting, including trialing Depakote, cyproheptadine, and significant use of NSAID medications beyond due to the persistent nature of this current migraine.  Patient describes this migraine as nearly the same as all others in her history of migraines for decades, only reported difference is that there is some bilateral facial numbness/abnormal sensation generally to cheeks but also to lips and forehead, and that it feels as though there is a significant sinus component due to yellow nasal discharge and significant tenderness to the cheeks upon palpation.  Patient is infrequently hospitalized related to migraines, presented to the ER seeking help following the onset of the numbness today.    Impression:  Patient currently has intractable migraine warranting hospital admission for acute care, other findings on history and physical appear to indicate is sinus infection/related headache contributory to severity and persistent nature of this migraine, consistent overuse of NSAID medications may also be contributing to its refractory nature.    Plan:  Augmentin 875 mg every 12 hours for 5 days  Steroid taper for 1 week beginning at 10 mg of Decadron daily  Will avoid Benadryl due to lack of utility/side effects  Will attempt to keep away from NSAID use  Magnesium 2 g daily for 3 days  Trial Reglan for nausea purposes  Will consider Compazine as backup for nausea  Olanzapine 10 mg 1 time trial at bedtime 03/27  Fluids administered by ER we will hold for  now  Will avoid use of triptans due to history of ineffectiveness  Will consider 03/28 use of lasmiditan  Continue topiramate 100 mg every 12 hours  Continue verapamil 120 mg daily  Tylenol and tizanidine as needed  Regular diet ordered  Subcu Lovenox and SCDs  Type 2 diabetes mellitus, without long-term current use of insulin ([facility])  Lab Results   Component Value Date    HGBA1C 5.7 (H) 03/27/2025       Recent Labs     03/27/25  1612   POCGLU 134   Sliding scale, ok to continue Jardiance I/p, metformin o/p.    Blood Sugar Average: Last 72 hrs:  (P) 134    GERD (gastroesophageal reflux disease)  Continue PPI I/p  IBS (irritable bowel syndrome)  Antidiarrheal meds will be ordered as needed, bentyl for cramping  Iron deficiency anemia  Iron supplement [facility]    Recommendations for outpatient neurological follow up have yet to be determined.    History of Present Illness   Clotilde Berg is a 64 y.o. right handed female who presents with migraine ongoing for the past approximately 18 days, reports onset 03/09 with typical headache consistent with past few decades of migraines, main difference being severity 1 from 3 out of 10 to 8 out of 10 only a couple days later and since 03/11 has varied between 8 and 10 out of 10 in intensity constantly despite a variety of medication attempts to abort migraine.  Patient reports typical migraines have varied in frequency and severity over the decades that she has had them occurring, generally bilateral feel like pressure symmetrically associated with phonophobia greater than photophobia and nausea generally without vomiting and sometimes with dizzy versus lightheaded spells although no syncope or falls.  Recently they seem to be occupying few days per week while medication adjustments are being made to better manage them in the outpatient setting, current outpatient regimen includes topiramate and verapamil as preventatives and Phenergan and Toradol as abortives, patient had been  only using these few times a per week leading up to this current ongoing migraine, began using much more frequently after the first couple days due to its refractory nature, reports using NSAID medications perhaps 3 times daily over the past couple weeks.  Patient also complaining of sinus pressure to cheeks and forehead that are beyond typical migraine for her, reports some yellow discharge from nose consistent with likely sinusitis versus seasonal allergies although reports that Diana is the month for greatest allergies in the past.  Patient reports infrequent hospitalizations for migraine purposes, it appears most recent 1 may have been years ago unclear, what medication finally helps patient the most but due to significant medication changes over the past year or 2 seems as though preventative and abortive ideal management is still a work in progress with significant efforts from neurology in the outpatient setting as seen in many recent neuro notes.    Review of Systems   HENT:  Positive for congestion, sinus pressure and sinus pain.    Eyes:  Positive for photophobia and visual disturbance. Negative for pain, discharge, redness and itching.   Neurological:  Positive for dizziness, light-headedness, numbness and headaches. Negative for tremors, seizures, syncope, facial asymmetry, speech difficulty and weakness.        Medical History Review: I have reviewed the patient's PMH, PSH, Social History, Family History, Meds, and Allergies   Historical Information   Past Medical History:   Diagnosis Date    Asthma     Bell palsy     Diabetes mellitus (HCC)     type 2    Diverticulitis 11/25/2022    EEG abnormality without seizure     Gastroparesis     GERD (gastroesophageal reflux disease)     Headache, migraine     Hiatal hernia     Hyperlipidemia     IBS (irritable bowel syndrome)     Migraines     PONV (postoperative nausea and vomiting)     Sarcoidosis     Sleep apnea     no cpap     Past Surgical History:    Procedure Laterality Date    CHOLECYSTECTOMY      PLACEMENT ESOPHAGEAL SPHINCTER AUGMENTATION DEVICE W/ ROBOTICS N/A 1/5/2024    Procedure: linx placement;  Surgeon: Mark Hernandez MD;  Location: [facility];  Service: Thoracic    MN COLONOSCOPY FLX DX W/COLLJ SPEC WHEN PFRMD N/A 3/8/2017    Procedure: EGD AND COLONOSCOPY;  Surgeon: Rad Romero MD;  Location: [facility];  Service: Gastroenterology    MN ESOPHAGOGASTRODUODENOSCOPY TRANSORAL DIAGNOSTIC N/A 1/5/2024    Procedure: ESOPHAGOGASTRODUODENOSCOPY (EGD);  Surgeon: Mark Hernandez MD;  Location: [facility];  Service: Thoracic    MN LAPS RPR PARAESPHGL HRNA INCL FUNDPLSTY W/O MESH N/A 1/5/2024    Procedure: robotic assisted paraesophageal hernia repair;  Surgeon: Mark Hernandez MD;  Location: [facility];  Service: Thoracic    REPLACEMENT TOTAL KNEE Right     SIGMOIDECTOMY       Social History     Tobacco Use    Smoking status: Never    Smokeless tobacco: Never   Vaping Use    Vaping status: Never Used   Substance and Sexual Activity    Alcohol use: Yes     Comment: rare- 1 drink/year    Drug use: No    Sexual activity: Not on file     E-Cigarette/Vaping    E-Cigarette Use Never User      E-Cigarette/Vaping Substances    Nicotine No     THC No     CBD No     Flavoring No      Family History   Problem Relation Age of Onset    Diabetes Mother     Heart disease Mother     Cancer Father     Liver cancer Father     Brain cancer Father     Arthritis Sister     Migraines Brother     Seizures Maternal Aunt     Migraines Maternal Uncle      Social History     Tobacco Use    Smoking status: Never    Smokeless tobacco: Never   Vaping Use    Vaping status: Never Used   Substance and Sexual Activity    Alcohol use: Yes     Comment: rare- 1 drink/year    Drug use: No    Sexual activity: Not on file       Current Facility-Administered Medications:     acetaminophen (TYLENOL) tablet 650 mg, Q6H PRN    albuterol (PROVENTIL HFA,VENTOLIN HFA) inhaler 2 puff, Q6H  PRN    amoxicillin-clavulanate (AUGMENTIN) 875-125 mg per tablet 1 tablet, Q12H VINCE    ascorbic acid (VITAMIN C) tablet 500 mg, BID    Cholecalciferol (VITAMIN D3) tablet 2,000 Units, Daily    [START ON 4/1/2025] dexamethasone (DECADRON) injection 2 mg, Daily    [START ON 3/30/2025] dexamethasone (DECADRON) injection 4 mg, Daily    [START ON 3/28/2025] dexamethasone (PF) (DECADRON) injection 8 mg, Daily    dicyclomine (BENTYL) tablet 20 mg, 4x Daily PRN    Empagliflozin (JARDIANCE) tablet 20 mg, Daily    enoxaparin (LOVENOX) subcutaneous injection 40 mg, Daily    [START ON 3/28/2025] ferrous gluconate (FERGON) tablet 324 mg, Every Other Day    fluticasone (ARNUITY ELLIPTA) 100 MCG/ACT inhaler 1 puff, Daily    insulin lispro (HumALOG/ADMELOG) 100 units/mL subcutaneous injection 1-5 Units, TID AC **AND** Fingerstick Glucose (POCT), TID AC    [START ON 3/28/2025] magnesium sulfate 2 g/50 mL IVPB (premix) 2 g, Q24H    metoclopramide (REGLAN) injection 10 mg, Q8H VINCE    multivitamin-minerals (CENTRUM) tablet 1 tablet, Daily    OLANZapine (ZyPREXA) IM injection 10 mg, Once    [START ON 3/28/2025] pantoprazole (PROTONIX) EC tablet 40 mg, Early Morning    pravastatin (PRAVACHOL) tablet 80 mg, Daily With Dinner    sodium chloride (OCEAN) 0.65 % nasal spray 2 spray, BID    topiramate (TOPAMAX) tablet 100 mg, BID    verapamil (CALAN-SR) CR tablet 120 mg, Daily  Prior to Admission Medications   Prescriptions Last Dose Informant Patient Reported? Taking?   Asmanex, 30 Metered Doses, 220 MCG/ACT inhaler 3/26/2025 Self Yes Yes   B Complex-C (B-COMPLEX WITH VITAMIN C) tablet 3/27/2025 Self Yes Yes   Sig: Take 1 tablet by mouth daily   Cholecalciferol (VITAMIN D3) 2000 units TABS 3/27/2025 Self Yes Yes   Sig: Take 1 tablet by mouth daily   Empagliflozin 25 MG TABS 3/27/2025 Self Yes Yes   Sig: Jardiance 25 mg tablet   Magnesium 400 MG CAPS 3/27/2025 Self Yes Yes   Sig: Take by mouth in the morning   SALINE NASAL SPRAY NA 3/27/2025  Self Yes Yes   Si sprays into each nostril 2 (two) times a day   acetaminophen (TYLENOL) 500 mg tablet More than a month Self Yes No   Sig: Take 500 mg by mouth every 6 (six) hours as needed for mild pain   albuterol (PROVENTIL HFA,VENTOLIN HFA) 90 mcg/act inhaler 3/26/2025 Self Yes Yes   Sig: Inhale 2 puffs every 6 (six) hours as needed for wheezing   ascorbic acid (VITAMIN C) 500 mg tablet 3/27/2025 Self Yes Yes   Sig: Take 500 mg by mouth daily   colestipol (COLESTID) 1 g tablet Past Week Self No Yes   Sig: Take 1 tablet (1 g total) by mouth 2 (two) times a day With meals as needed for diarrhea   cyproheptadine (PERIACTIN) 4 mg tablet Not Taking  No No   Sig: Take 1 tablet (4 mg total) by mouth daily at bedtime   Patient not taking: Reported on 3/27/2025   dicyclomine (BENTYL) 20 mg tablet 3/26/2025 Self Yes Yes   Sig: Take 20 mg by mouth as needed     divalproex sodium (Depakote) 500 mg DR tablet   No No   Sig: Take 1 tablet (500 mg total) by mouth daily at bedtime for 5 days   esomeprazole (NexIUM) 40 MG capsule 3/27/2025 Self No Yes   Sig: Take 1 capsule (40 mg total) by mouth 2 (two) times a day before meals   ferrous gluconate (FERGON) 324 mg tablet 3/27/2025  Yes Yes   Sig: Take 65 mg by mouth daily with breakfast   hyoscyamine (LEVSIN/SL) 0.125 mg SL tablet Not Taking Self Yes No   Sig: Take 0.125 mg by mouth every 6 (six) hours Place 1 tablet under the tongue every 6 (six) hours as needed   Patient not taking: Reported on 3/27/2025   ibuprofen (MOTRIN) 800 mg tablet Past Week Self No Yes   Sig: Take 1 tablet (800 mg total) by mouth every 8 (eight) hours as needed for mild pain   ketorolac (TORADOL) 10 mg tablet 3/26/2025 Self No Yes   Sig: Take 1 tablet (10 mg total) by mouth every 6 (six) hours as needed for mild pain (break migraine if needed)   meclizine (ANTIVERT) 25 mg tablet  Self Yes No   Sig: as needed   Patient not taking: Reported on 2025   metFORMIN (GLUCOPHAGE) 500 mg tablet 3/27/2025  Self Yes Yes   Si mg 2 (two) times a day with meals   multivitamin-iron-minerals-folic acid (CENTRUM) chewable tablet 3/27/2025 Self Yes Yes   Sig: Chew 1 tablet daily   ondansetron (ZOFRAN) 4 mg tablet  Self No No   Sig: Take 1 tablet (4 mg total) by mouth every 8 (eight) hours as needed for nausea or vomiting   Patient not taking: Reported on 2025   ondansetron (ZOFRAN-ODT) 4 mg disintegrating tablet Past Month Self No Yes   Sig: Take 1 tablet (4 mg total) by mouth every 6 (six) hours as needed for nausea or vomiting   polyethylene glycol (GOLYTELY) 4000 mL solution   No No   Sig: Take 4,000 mL by mouth once for 1 dose   Patient not taking: Reported on 2025   pravastatin (PRAVACHOL) 80 mg tablet 3/26/2025 Self Yes Yes   Si mg daily   promethazine (PHENERGAN) 25 mg tablet 3/26/2025 Self Yes Yes   Sig: Take 25 mg by mouth every 6 (six) hours as needed for nausea or vomiting   tiZANidine (ZANAFLEX) 2 mg tablet Not Taking Self No No   Sig: Take 1 tablet (2 mg total) by mouth daily at bedtime   Patient not taking: Reported on 3/27/2025   topiramate (Topamax) 100 mg tablet 3/27/2025 Self No Yes   Sig: Take 1 tablet (100 mg total) by mouth 2 (two) times a day   verapamil (CALAN-SR) 120 mg CR tablet 3/27/2025 Self Yes Yes   Sig: Take 120 mg by mouth in the morning      Facility-Administered Medications: None     Coconut flavor - food allergy, Fish allergy - food allergy, Nuts - food allergy, Other, and Talwin [pentazocine]    Objective :  Temp:  [97.7 °F (36.5 °C)] 97.7 °F (36.5 °C)  HR:  [103] 103  BP: (167)/(90) 167/90  Resp:  [20] 20  SpO2:  [100 %] 100 %  O2 Device: None (Room air)    Physical Exam  Vitals and nursing note reviewed.   Constitutional:       General: She is awake. She is not in acute distress.     Appearance: She is not ill-appearing, toxic-appearing or diaphoretic.   HENT:      Head: Normocephalic and atraumatic.      Right Ear: External ear normal.      Left Ear: External ear  normal.      Nose: Nose normal.      Mouth/Throat:      Pharynx: Oropharynx is clear.   Eyes:      General: Lids are normal. No scleral icterus.        Right eye: No discharge.         Left eye: No discharge.      Extraocular Movements: Extraocular movements intact.      Conjunctiva/sclera: Conjunctivae normal.      Pupils: Pupils are equal, round, and reactive to light.   Neurological:      Mental Status: She is alert.      Cranial Nerves: No cranial nerve deficit or dysarthria.      Coordination: Coordination is intact. Coordination normal.      Gait: Gait normal.      Deep Tendon Reflexes: Reflexes normal.     Neurological Exam  Mental Status  Awake and alert. Oriented to person, place, time and situation. no dysarthria present. Language is fluent with no aphasia.    Cranial Nerves  CN I: Sense of smell is normal.  CN II: Visual acuity is normal. Visual fields full to confrontation.  CN III, IV, VI: Extraocular movements intact bilaterally. Normal lids and orbits bilaterally. Pupils equal round and reactive to light bilaterally.  CN V: Facial sensation is normal.  CN VII: Full and symmetric facial movement.  CN VIII: Hearing is normal.  CN IX, X: Palate elevates symmetrically. Normal gag reflex.  CN XI: Shoulder shrug strength is normal.  CN XII: Tongue midline without atrophy or fasciculations.    Motor  Normal muscle bulk throughout. No fasciculations present. Normal muscle tone. No abnormal involuntary movements. Strength is 5/5 in all four extremities except as noted.    Sensory  Light touch is normal in upper and lower extremities.     Reflexes  Deep tendon reflexes are 2+ and symmetric except as noted.    Coordination    Finger-to-nose, rapid alternating movements and heel-to-shin normal bilaterally without dysmetria.    Gait   Normal gait.Casual gait is normal including stance, stride, and arm swing.        Lab Results: I have reviewed the following results:CBC:   Results from last 7 days   Lab Units  03/27/25  1215   WBC Thousand/uL 7.21   RBC Million/uL 4.84   HEMOGLOBIN g/dL 14.1   HEMATOCRIT % 43.3   MCV fL 90   PLATELETS Thousands/uL 198   , BMP/CMP:   Results from last 7 days   Lab Units 03/27/25  1215   SODIUM mmol/L 136   POTASSIUM mmol/L 4.7   CHLORIDE mmol/L 102   CO2 mmol/L 26   BUN mg/dL 18   CREATININE mg/dL 0.96   CALCIUM mg/dL 9.4   AST U/L 17   ALT U/L 21   ALK PHOS U/L 69   EGFR ml/min/1.73sq m 63     Recent Labs     03/27/25  1215   WBC 7.21   HGB 14.1   HCT 43.3      SODIUM 136   K 4.7      CO2 26   BUN 18   CREATININE 0.96   GLUC 101     Imaging Results Review: I personally reviewed the following image studies in PACS and associated radiology reports: CT head. My interpretation of the radiology images/reports is: NAIA.  Other Study Results Review: EKG was reviewed.     VTE Prophylaxis: VTE covered by:  enoxaparin, Subcutaneous        Administrative Statements   I have spent a total time of 60 minutes in caring for this patient on the day of the visit/encounter including Diagnostic results, Prognosis, Risks and benefits of tx options, Instructions for management, Patient and family education, Importance of tx compliance, Risk factor reductions, Impressions, Counseling / Coordination of care, Documenting in the medical record, Reviewing/placing orders in the medical record (including tests, medications, and/or procedures), Obtaining or reviewing history  , and Communicating with other healthcare professionals .

## 2025-03-27 NOTE — ASSESSMENT & PLAN NOTE
Lab Results   Component Value Date    HGBA1C 5.7 (H) 03/27/2025       Recent Labs     03/27/25  1612   POCGLU 134   Sliding scale, ok to continue Jardiance I/p, metformin o/p.    Blood Sugar Average: Last 72 hrs:  (P) 134

## 2025-03-27 NOTE — LETTER
[facility] [address]  [address] [address]  Dept: [phone]    March 28, 2025     Patient: Clotilde Berg   YOB: 1960   Date of Visit: 3/27/2025       To Whom it May Concern:    Clotilde Berg is under my professional care. She was seen in the hospital from 3/27/2025 to 03/28/25. She may return to work on 4/7/2025 without limitations.    If you have any questions or concerns, please don't hesitate to call.         Sincerely,          Brandon Ernst, DO

## 2025-03-27 NOTE — PLAN OF CARE
Problem: PAIN - ADULT  Goal: Verbalizes/displays adequate comfort level or baseline comfort level  Description: Interventions:- Encourage patient to monitor pain and request assistance- Assess pain using appropriate pain scale- Administer analgesics based on type and severity of pain and evaluate response- Implement non-pharmacological measures as appropriate and evaluate response- Consider cultural and social influences on pain and pain management- Notify physician/advanced practitioner if interventions unsuccessful or patient reports new pain  Outcome: Progressing     Problem: SAFETY ADULT  Goal: Maintain or return to baseline ADL function  Description: INTERVENTIONS:-  Assess patient's ability to carry out ADLs; assess patient's baseline for ADL function and identify physical deficits which impact ability to perform ADLs (bathing, care of mouth/teeth, toileting, grooming, dressing, etc.)- Assess/evaluate cause of self-care deficits - Assess range of motion- Assess patient's mobility; develop plan if impaired- Assess patient's need for assistive devices and provide as appropriate- Encourage maximum independence but intervene and supervise when necessary- Involve family in performance of ADLs- Assess for home care needs following discharge - Consider OT consult to assist with ADL evaluation and planning for discharge- Provide patient education as appropriate  Outcome: Progressing     Problem: DISCHARGE PLANNING  Goal: Discharge to home or other facility with appropriate resources  Description: INTERVENTIONS:- Identify barriers to discharge w/patient and caregiver- Arrange for needed discharge resources and transportation as appropriate- Identify discharge learning needs (meds, wound care, etc.)- Arrange for interpretive services to assist at discharge as needed- Refer to Case Management Department for coordinating discharge planning if the patient needs post-hospital services based on physician/advanced  practitioner order or complex needs related to functional status, cognitive ability, or social support system  Outcome: Progressing

## 2025-03-27 NOTE — ASSESSMENT & PLAN NOTE
Neurology admitted patient into the hospital for management of constant migraine ongoing for past 2 to 3 weeks generally at a level of 8-10 and refractory to all medication attempts in the outpatient setting, including trialing Depakote, cyproheptadine, and significant use of NSAID medications beyond due to the persistent nature of this current migraine.  Patient describes this migraine as nearly the same as all others in her history of migraines for decades, only reported difference is that there is some bilateral facial numbness/abnormal sensation generally to cheeks but also to lips and forehead, and that it feels as though there is a significant sinus component due to yellow nasal discharge and significant tenderness to the cheeks upon palpation.  Patient is infrequently hospitalized related to migraines, presented to the ER seeking help following the onset of the numbness today.    Impression:  Patient currently has intractable migraine warranting hospital admission for acute care, other findings on history and physical appear to indicate is sinus infection/related headache contributory to severity and persistent nature of this migraine, consistent overuse of NSAID medications may also be contributing to its refractory nature.    Plan:  Augmentin 875 mg every 12 hours for 5 days  Steroid taper for 1 week beginning at 10 mg of Decadron daily  Will avoid Benadryl due to lack of utility/side effects  Will attempt to keep away from NSAID use  Magnesium 2 g daily for 3 days  Trial Reglan for nausea purposes  Will consider Compazine as backup for nausea  Olanzapine 10 mg 1 time trial at bedtime 03/27  Fluids administered by ER we will hold for now  Will avoid use of triptans due to history of ineffectiveness  Will consider 03/28 use of lasmiditan  Continue topiramate 100 mg every 12 hours  Continue verapamil 120 mg daily  Tylenol and tizanidine as needed  Regular diet ordered  Subcu Lovenox and SCDs

## 2025-03-28 ENCOUNTER — TELEPHONE (OUTPATIENT)
Dept: NEUROLOGY | Facility: CLINIC | Age: 65
End: 2025-03-28

## 2025-03-28 VITALS
BODY MASS INDEX: 29.63 KG/M2 | DIASTOLIC BLOOD PRESSURE: 58 MMHG | WEIGHT: 162 LBS | TEMPERATURE: 98.1 F | SYSTOLIC BLOOD PRESSURE: 99 MMHG | HEART RATE: 75 BPM | RESPIRATION RATE: 14 BRPM | OXYGEN SATURATION: 98 %

## 2025-03-28 LAB
GLUCOSE SERPL-MCNC: 116 MG/DL (ref 65–140)
GLUCOSE SERPL-MCNC: 130 MG/DL (ref 65–140)

## 2025-03-28 PROCEDURE — 99238 HOSP IP/OBS DSCHRG MGMT 30/<: CPT | Performed by: PSYCHIATRY & NEUROLOGY

## 2025-03-28 PROCEDURE — 82948 REAGENT STRIP/BLOOD GLUCOSE: CPT

## 2025-03-28 RX ORDER — DEXAMETHASONE 2 MG/1
TABLET ORAL
Qty: 10 TABLET | Refills: 0 | Status: SHIPPED | OUTPATIENT
Start: 2025-03-29 | End: 2025-04-03

## 2025-03-28 RX ADMIN — ENOXAPARIN SODIUM 40 MG: 40 INJECTION SUBCUTANEOUS at 09:54

## 2025-03-28 RX ADMIN — EMPAGLIFLOZIN 20 MG: 10 TABLET, FILM COATED ORAL at 09:56

## 2025-03-28 RX ADMIN — TIZANIDINE 4 MG: 4 TABLET ORAL at 05:56

## 2025-03-28 RX ADMIN — Medication 1 TABLET: at 09:54

## 2025-03-28 RX ADMIN — MAGNESIUM SULFATE HEPTAHYDRATE 2 G: 40 INJECTION, SOLUTION INTRAVENOUS at 05:58

## 2025-03-28 RX ADMIN — PANTOPRAZOLE SODIUM 40 MG: 40 TABLET, DELAYED RELEASE ORAL at 05:56

## 2025-03-28 RX ADMIN — LASMIDITAN 100 MG: 50 TABLET ORAL at 11:33

## 2025-03-28 RX ADMIN — SALINE NASAL SPRAY 2 SPRAY: 1.5 SOLUTION NASAL at 09:56

## 2025-03-28 RX ADMIN — OXYCODONE HYDROCHLORIDE AND ACETAMINOPHEN 500 MG: 500 TABLET ORAL at 09:54

## 2025-03-28 RX ADMIN — DEXAMETHASONE SODIUM PHOSPHATE 8 MG: 10 INJECTION, SOLUTION INTRAMUSCULAR; INTRAVENOUS at 09:54

## 2025-03-28 RX ADMIN — METOCLOPRAMIDE 10 MG: 5 INJECTION, SOLUTION INTRAMUSCULAR; INTRAVENOUS at 06:07

## 2025-03-28 RX ADMIN — Medication 2000 UNITS: at 09:54

## 2025-03-28 RX ADMIN — FLUTICASONE FUROATE 1 PUFF: 100 POWDER RESPIRATORY (INHALATION) at 09:55

## 2025-03-28 RX ADMIN — FERROUS GLUCONATE 324 MG: 324 TABLET ORAL at 09:56

## 2025-03-28 RX ADMIN — AMOXICILLIN AND CLAVULANATE POTASSIUM 1 TABLET: 875; 125 TABLET, COATED ORAL at 11:01

## 2025-03-28 RX ADMIN — TOPIRAMATE 100 MG: 100 TABLET, FILM COATED ORAL at 09:54

## 2025-03-28 NOTE — RESTORATIVE TECHNICIAN NOTE
Restorative Technician Note      Patient Name: Clotilde Berg     Note Type: Mobility  Patient Position Upon Consult: Supine  Activity Performed: Ambulated; Dangled; Stood  Assistive Device: Other (Comment) (none)  Education Provided: Yes  Patient Position at End of Consult: Supine; All needs within reach; Bed/Chair alarm activated    Chuck ANDRES, Restorative Technician,

## 2025-03-28 NOTE — ASSESSMENT & PLAN NOTE
Lab Results   Component Value Date    HGBA1C 5.7 (H) 03/27/2025       Recent Labs     03/27/25  1612 03/28/25  0616   POCGLU 134 116   Sliding scale, ok to continue Jardiance I/p, metformin o/p.    Blood Sugar Average: Last 72 hrs:  (P) 125

## 2025-03-28 NOTE — TELEPHONE ENCOUNTER
"PER LOV 2/6/25:  \"- Patient had noted that Nurtec was not very effective for her in regards to abortive therapy of the headaches. She notes that the medication actually made the headaches worse so would like her to discontinue Nurtec at this time. \"    Nurtec PA not needed at this time.   "

## 2025-03-28 NOTE — ASSESSMENT & PLAN NOTE
Lab Results   Component Value Date    HGBA1C 5.7 (H) 03/27/2025       Recent Labs     03/27/25  1612 03/28/25  0616 03/28/25  1114   POCGLU 134 116 130     Sliding scale, ok to continue Jardiance I/p, metformin o/p.    Blood Sugar Average: Last 72 hrs:  (P) [id number]

## 2025-03-28 NOTE — PROGRESS NOTES
Pastoral Care Progress Note               03/28/25 1500   Clinical Encounter Type   Visited With Patient  (Father Navid)   Worship Encounters   Worship Needs Prayer  (Father Navid offered the patient a blessing)   Sacramental Encounters   Sacrament of Sick-Anointing Patient declined anointing

## 2025-03-28 NOTE — DISCHARGE SUMMARY
Discharge Summary - Neurology   Name: Clotilde Berg 64 y.o. female I MRN: [id number]  Unit/Bed#: [facility] 725-01 I Date of Admission: 3/27/2025   Date of Service: 3/28/2025 I Hospital Day: 1        NEUROLOGY RESIDENCY - DISCHARGE SUMMARY     Name: Clotilde Berg   Age & Sex: 64 y.o. female   MRN: [id number]  Unit/Bed#: [facility] 725-01   Encounter: [id number]    Discharging Resident Physician: Brandon Ernst DO  Attending: Enrico Farias DO  PCP: Carlo Perez MD  Admission Date: 3/27/2025  Discharge Date: 03/28/25    Clotilde Berg will need follow up in at the next regular appointment with headache [place] 05/13 .  She will not require outpatient neurological testing.       ASSESSMENT & PLAN     * Chronic migraine without aura, intractable, with status migrainosus  Assessment & Plan  Neurology admitted patient into the hospital for management of constant migraine ongoing for past 2 to 3 weeks generally at a level of 8-10 and refractory to all medication attempts in the outpatient setting, including trialing Depakote, cyproheptadine, and significant use of NSAID medications beyond due to the persistent nature of this current migraine.  Patient describes this migraine as nearly the same as all others in her history of migraines for decades, only reported difference is that there is some bilateral facial numbness/abnormal sensation generally to cheeks but also to lips and forehead, and that it feels as though there is a significant sinus component due to yellow nasal discharge and significant tenderness to the cheeks upon palpation.  Patient is infrequently hospitalized related to migraines, presented to the ER seeking help following the onset of the numbness today.    Impression:  Patient currently has intractable migraine warranting hospital admission for acute care, other findings on history and physical appear to indicate is sinus infection/related headache contributory to severity and persistent nature  of this migraine, consistent overuse of NSAID medications may also be contributing to its refractory nature.    Plan:  Augmentin 875 mg every 12 hours for 5 days  Steroid taper for 1 week beginning at 10 mg of Decadron daily  Will avoid Benadryl due to lack of utility/side effects  Will attempt to keep away from NSAID use  Magnesium 2 g daily for 3 days  Reglan for nausea purposes  Will avoid use of triptans due to ineffectiveness  03/28 trial of lasmiditan  Continue topiramate 100 mg every 12 hours  Continue verapamil 120 mg daily  Tylenol and tizanidine as needed    Iron deficiency anemia  Assessment & Plan  Iron supplement MWF    IBS (irritable bowel syndrome)  Assessment & Plan  Antidiarrheal meds will be ordered as needed, bentyl for cramping    GERD (gastroesophageal reflux disease)  Assessment & Plan  Continue PPI I/p    Type 2 diabetes mellitus, without long-term current use of insulin ([facility])  Assessment & Plan  Lab Results   Component Value Date    HGBA1C 5.7 (H) 03/27/2025       Recent Labs     03/27/25  1612 03/28/25  0616 03/28/25  1114   POCGLU 134 116 130     Sliding scale, ok to continue Jardiance I/p, metformin o/p.    Blood Sugar Average: Last 72 hrs:  (P) [id number]             Disposition:     Home    Reason for Admission: Migraine    Consultations During Hospital Stay:  IP CONSULT TO PASTORAL CARE    Procedures Performed:   None    Significant Findings / Test Results:   Labs: Hemoglobin A1c 5.7 (3/27/2025), LDL 94 (5/13/2023)    CT head wo contrast  Result Date: 3/27/2025  Impression: No CT etiology for headaches identified. Workstation performed: [id number]       Incidental Findings:   None    Test Results Pending at Discharge (will require follow up):   None     Outpatient Tests Requested:  None    Complications:  None    Hospital Course:     Clotilde Berg is a 64 y.o. female patient who originally presented to the hospital on 3/27/2025 due to constant migraine over the past 2 to  3 weeks mostly typical for migraines with past few decades with only differences including lasting much longer than typical maximum length of 3 to 4 days as well as bilateral facial numbness/abnormal sensation and significant pressure to cheeks and forehead particularly related to and concerning for acute sinusitis which is likely contributory to migraine as had been having yellow phlegm for multiple weeks all subsequent to dental procedure a few days before.  See H&P from day prior for rest of presenting information, patient was begun on Augmentin and Decadron taper and overnight had significant reduction to headache for first in weeks.  Additional Reyvow was given as trial for potential outpatient abortive in the future.  Topamax and verapamil daily preventatives which patient receives at home were continued throughout the hospital stay.  Other relevant medications to  note include magnesium IV, tizanidine, Reglan, one-time dose of Zyprexa for improvement to sleep which was noted by patient following morning, initial triptans no effect, and no NSAID due to significant use multiple times daily over the past couple weeks in attempts to break the migraine unsuccessfully.  Medically stable for discharge and no longer requiring hospital level of care but should follow-up with neurologist outpatient to discuss further management of her headache at the latest at her already established appointment in May as well as with PCP within the next week for continuity of care, should also complete the Augmentin course over the next few days and complete the Decadron taper within the week.    Condition at Discharge: stable     Discharge Day Visit / Exam:     Subjective: Patient reports improvement of migraine to 4-5/10, down from 8-10/10 for the past 2 straight weeks constantly, desires returning home with medications that will ideally prevent significant worsening/continue to improve headache, NAD, NAEO.    Vitals: Blood  Pressure: 99/58 (03/28/25 1000)  Pulse: 75 (03/28/25 1000)  Temperature: 98.1 °F (36.7 °C) (03/28/25 0718)  Temp Source: Temporal (03/27/25 1137)  Respirations: 14 (03/28/25 0718)  Weight - Scale: 73.5 kg (162 lb) (03/27/25 1137)  SpO2: 98 % (03/28/25 1000)    Exam:     Physical Exam  Vitals and nursing note reviewed.   Constitutional:       General: She is awake. She is not in acute distress.     Appearance: She is not ill-appearing, toxic-appearing or diaphoretic.   HENT:      Head: Normocephalic and atraumatic.      Right Ear: External ear normal.      Left Ear: External ear normal.      Nose: Nose normal.      Mouth/Throat:      Pharynx: Oropharynx is clear.   Eyes:      General: Lids are normal. No scleral icterus.        Right eye: No discharge.         Left eye: No discharge.      Extraocular Movements: Extraocular movements intact.      Conjunctiva/sclera: Conjunctivae normal.      Pupils: Pupils are equal, round, and reactive to light.   Neurological:      Cranial Nerves: Cranial nerves 2-12 are intact.      Coordination: Finger-Nose-Finger Test and Heel to Shin Test normal.      Gait: Gait is intact. Tandem walk normal.   Psychiatric:         Speech: Speech normal.         Neurologic Exam     Mental Status   Follows 3 step commands.   Attention: normal. Concentration: normal.   Speech: speech is normal   Level of consciousness: alert  Knowledge: good.   Normal comprehension.     Cranial Nerves   Cranial nerves II through XII intact.     CN III, IV, VI   Pupils are equal, round, and reactive to light.    Motor Exam   Muscle bulk: normal  Overall muscle tone: normal    Strength   Strength 5/5 except as noted.     Sensory Exam   Light touch normal.   Pinprick normal.     Gait, Coordination, and Reflexes     Gait  Gait: normal    Coordination   Finger to nose coordination: normal  Heel to shin coordination: normal  Tandem walking coordination: normal    Tremor   Resting tremor: absent  Intention tremor:  absent  Action tremor: absent    Reflexes   Reflexes 2+ except as noted.         Discharge instructions/Information to patient and family:   See after visit summary for information provided to patient and family.      Provisions for Follow-Up Care:  See after visit summary for information related to follow-up care and any pertinent home health orders.      Planned Readmission: No    Discharge Statement:  I spent 50 minutes discharging the patient. This time was spent on the day of discharge. I had direct contact with the patient on the day of discharge. Greater than 50% of the total time was spent examining patient, answering all patient questions, arranging and discussing plan of care with patient as well as directly providing post-discharge instructions.  Additional time then spent on discharge activities.      Discharge Medications:  See after visit summary for reconciled discharge medications provided to patient and family.      ** Please Note: This note has been constructed using a voice recognition system **      ======    I have discussed the patient's history, physical exam findings, assessment, and plan in detail with attending, Dr. Farias    Thank you for allowing me to participate in the care of your patient, Clotilde MIGUEL Berg.    Brandon Ernst DO  [facility], PGY-2

## 2025-03-28 NOTE — UTILIZATION REVIEW
Initial Clinical Review    Admission: Date/Time/Statement:   Admission Orders (From admission, onward)       Ordered        03/27/25 1517  Inpatient Admission  Once                          Orders Placed This Encounter   Procedures    Inpatient Admission     Standing Status:   Standing     Number of Occurrences:   1     Level of Care:   Med Surg [16]     Estimated length of stay:   More than 2 Midnights     Certification:   I certify that inpatient services are medically necessary for this patient for a duration of greater than two midnights. See H&P and MD Progress Notes for additional information about the patient's course of treatment.     ED Arrival Information       Expected   -    Arrival   3/27/2025 11:32    Acuity   Urgent              Means of arrival   Walk-In    Escorted by   Self    Service   Neurology    Admission type   Emergency              Arrival complaint   Numbness in face, arm or leg             Chief Complaint   Patient presents with    Headache     Migrane headache since 3/9, noise sensitivity, dizziness, entire face is numb , blurred vision  intermittently since 3/9 tried taking depakote to break migrane withour relief, no relief with meds at home, toradol, allergy meds, no numbness or weakness in arms or legs, smile is symmetrical       Initial Presentation:  64 yof to ER from home for evaluation of migraine since 3/9, despite all home meds ranging 8-10. Started on Depakote per neurologist on 3/16 without relief, then directed to ER by neurologist. Hx migraines, epilepsy, diabetes and IBS. Presents tachycardic, hypertensive, dizziness, lightheadedness, numbness, headaches. Admission CT head neg.   Admitted to inpatient status for chronic migraine without aura, intractable, with status migrainosus. Plan: IV steroids, IV Mg, IV Reglan, abt for sinusitis,  accuchecks, pain mgt.       Date: 3/28/25   Day 2:   Patient was begun on Augmentin and Decadron taper and overnight had significant  reduction to headache for first in weeks.  Additional Reyvow was given as trial for potential outpatient abortive in the future.  Topamax and verapamil daily preventatives which patient receives at home were continued throughout the hospital stay.   Subjective: Patient reports improvement of migraine to 4-5/10, down from 8-10/10 for the past 2 straight weeks constantly, desires returning home with medications that will ideally prevent significant worsening/continue to improve headache, NAD, NAEO.     ED Treatment-Medication Administration from 03/27/2025 1132 to 03/27/2025 1603         Date/Time Order Dose Route Action     03/27/2025 1226 ketorolac (TORADOL) injection 30 mg 30 mg Intravenous Given     03/27/2025 1225 diphenhydrAMINE (BENADRYL) injection 25 mg 25 mg Intravenous Given     03/27/2025 1225 SUMAtriptan (IMITREX) subcutaneous injection 6 mg 6 mg Subcutaneous Given     03/27/2025 1225 magnesium sulfate 2 g/50 mL IVPB (premix) 2 g 2 g Intravenous New Bag     03/27/2025 1215 sodium chloride 0.9 % bolus 1,000 mL 1,000 mL Intravenous New Bag     03/27/2025 1332 ondansetron (ZOFRAN) injection 4 mg 4 mg Intravenous Given     03/27/2025 1332 dexamethasone (PF) (DECADRON) injection 10 mg 10 mg Intravenous Given            Scheduled Medications:  Medications 03/19 03/20 03/21 03/22 03/23 03/24 03/25 03/26 03/27 03/28   amoxicillin-clavulanate (AUGMENTIN) 875-125 mg per tablet 1 tablet  Dose: 1 tablet  Freq: Every 12 hours scheduled Route: PO  Start: 03/27/25 2100 End: 04/01/25 2059   Order specific questions:               2122 1101 2100        ascorbic acid (VITAMIN C) tablet 500 mg  Dose: 500 mg  Freq: 2 times daily Route: PO  Start: 03/27/25 1800            1746      0954     1800        B-complex with vitamin C tablet 1 tablet  Dose: 1 tablet  Freq: Daily Route: PO  Start: 03/27/25 1530 End: 03/27/25 1528            1528-D/C'd       Cholecalciferol (VITAMIN D3) tablet 2,000 Units  Dose: 2,000  Units  Freq: Daily Route: PO  Start: 03/27/25 1530   Admin Instructions:               (1621) [C]      0954        dexamethasone (DECADRON) injection 2 mg  Dose: 2 mg  Freq: Daily Route: IV  Start: 04/01/25 0900 End: 04/03/25 0859                dexamethasone (DECADRON) injection 4 mg  Dose: 4 mg  Freq: Daily Route: IV  Start: 03/30/25 0900 End: 04/01/25 0859                 dexamethasone (PF) (DECADRON) injection 8 mg  Dose: 8 mg  Freq: Daily Route: IV  Start: 03/28/25 0900 End: 03/27/25 1544            1544-D/C'd       Or   dexamethasone (DECADRON) tablet 4 mg  Dose: 4 mg  Freq: Daily Route: PO  Start: 03/28/25 0900 End: 03/27/25 1544   Admin Instructions:               1544-D/C'd       Or   dexamethasone (DECADRON) tablet 2 mg  Dose: 2 mg  Freq: Daily Route: PO  Start: 03/28/25 0900 End: 03/27/25 1544   Admin Instructions:               1544-D/C'd       dexamethasone (PF) (DECADRON) injection 10 mg  Dose: 10 mg  Freq: Once Route: IV  Start: 03/27/25 1330 End: 03/27/25 1332            1332         dexamethasone (PF) (DECADRON) injection 8 mg  Dose: 8 mg  Freq: Daily Route: IV  Start: 03/28/25 0900 End: 03/30/25 0859             0954        diphenhydrAMINE (BENADRYL) injection 25 mg  Dose: 25 mg  Freq: Once Route: IV  Start: 03/27/25 1215 End: 03/27/25 1225   Admin Instructions:               1225         Empagliflozin (JARDIANCE) tablet 20 mg  Dose: 20 mg  Freq: Daily Route: PO  Start: 03/27/25 1700   Order specific questions:               (1620) [C]      0956        enoxaparin (LOVENOX) subcutaneous injection 40 mg  Dose: 40 mg  Freq: Daily Route: SC  Start: 03/27/25 1530   Admin Instructions:               175      0954        ferrous gluconate (FERGON) tablet 324 mg  Dose: 324 mg  Freq: Every other day Route: PO  Start: 03/28/25 0900   Admin Instructions:                0956        fluticasone (ARNUITY ELLIPTA) 100 MCG/ACT inhaler 1 puff  Dose: 1 puff  Freq: Daily Route: IN  Start: 03/27/25 1700   Admin  Instructions:               [phone]         insulin lispro (HumALOG/ADMELOG) 100 units/mL subcutaneous injection 1-5 Units  Dose: 1-5 Units  Freq: 3 times daily before meals Route: SC  Start: 03/27/25 1600   Admin Instructions:               (6208) (4804) (7312) 2605        ketorolac (TORADOL) injection 30 mg  Dose: 30 mg  Freq: Once Route: IV  Start: 03/27/25 1215 End: 03/27/25 1226   Admin Instructions:               1226         lasmiditan succinate (REYVOW) tablet 100 mg  Dose: 100 mg  Freq: Once Route: PO  Start: 03/28/25 1015 End: 03/28/25 1133   Admin Instructions:                1133        magnesium sulfate 2 g/50 mL IVPB (premix) 2 g  Dose: 2 g  Freq: Every 24 hours Route: IV  Last Dose: 2 g (03/28/25 0558)  Start: 03/28/25 0600 End: 03/31/25 0559   Admin Instructions:                0558        magnesium sulfate 2 g/50 mL IVPB (premix) 2 g  Dose: 2 g  Freq: Once Route: IV  Last Dose: Stopped (03/27/25 1332)  Start: 03/27/25 1215 End: 03/27/25 1332   Admin Instructions:               1225     1332         metoclopramide (REGLAN) injection 10 mg  Dose: 10 mg  Freq: Every 8 hours scheduled Route: IV  Start: 03/27/25 1530 End: 03/30/25 1359            1750     2122      0607     1400     2200        multivitamin-minerals (CENTRUM) tablet 1 tablet  Dose: 1 tablet  Freq: Daily Route: PO  Start: 03/27/25 1530   Admin Instructions:               (2321) [C]      0837        OLANZapine (ZyPREXA) IM injection 10 mg  Dose: 10 mg  Freq: Once Route: IM  Start: 03/27/25 1900 End: 03/27/25 1824   Admin Instructions:               1827         ondansetron (ZOFRAN) injection 4 mg  Dose: 4 mg  Freq: Once Route: IV  Start: 03/27/25 1330 End: 03/27/25 1332   Admin Instructions:               1330         pantoprazole (PROTONIX) EC tablet 40 mg  Dose: 40 mg  Freq: Daily (early morning) Route: PO  Start: 03/28/25 0600   Admin Instructions:                0556        pravastatin (PRAVACHOL) tablet 80  mg  Dose: 80 mg  Freq: Daily with dinner Route: PO  Start: 03/27/25 1630            1748      1630        sodium chloride (OCEAN) 0.65 % nasal spray 2 spray  Dose: 2 spray  Freq: 2 times daily Route: EACH NARE  Start: 03/27/25 1800            2123      0956     1800        sodium chloride 0.9 % bolus 1,000 mL  Dose: 1,000 mL  Freq: Once Route: IV  Last Dose: Stopped (03/27/25 1424)  Start: 03/27/25 1215 End: 03/27/25 1424            1215     1424         sterile water injection **ADS Override Pull**  Start: 03/27/25 1744 End: 03/27/25 1824   Admin Instructions:               1824 [C]         SUMAtriptan (IMITREX) subcutaneous injection 6 mg  Dose: 6 mg  Freq: Once Route: SC  Indications of Use: MIGRAINE  Start: 03/27/25 1215 End: 03/27/25 1225   Admin Instructions:               1225         topiramate (TOPAMAX) tablet 100 mg  Dose: 100 mg  Freq: 2 times daily Route: PO  Start: 03/27/25 1800   Admin Instructions:               1746      0954     1800        verapamil (CALAN-SR) CR tablet 120 mg  Dose: 120 mg  Freq: Daily Route: PO  Start: 03/27/25 1700   Admin Instructions:      Order specific questions:               (5822) [C]      (6585)                    Continuous Meds Sorted by Name  for Jimmie Berg as of 03/19/25 through 3/28/25  Legend:       Medications 03/19 03/20 03/21 03/22 03/23 03/24 03/25 03/26 03/27 03/28               PRN Meds Sorted by Name  for Jimmie Berg as of 03/19/25 through 3/28/25  Legend:       Medications 03/19 03/20 03/21 03/22 03/23 03/24 03/25 03/26 03/27 03/28   acetaminophen (TYLENOL) tablet 650 mg  Dose: 650 mg  Freq: Every 6 hours PRN Route: PO  PRN Reasons: mild pain,moderate pain,headaches  Start: 03/27/25 1518                albuterol (PROVENTIL HFA,VENTOLIN HFA) inhaler 2 puff  Dose: 2 puff  Freq: Every 6 hours PRN Route: IN  PRN Reason: wheezing  Start: 03/27/25 1518   Admin Instructions:               2123         colestipol (COLESTID) tablet 1 g  Dose: 1 g  Freq:  2 times daily PRN Route: PO  PRN Comment: diarrhea  Start: 03/27/25 1518 End: 03/27/25 1605   Admin Instructions:               1605-D/C'd       dicyclomine (BENTYL) tablet 20 mg  Dose: 20 mg  Freq: 4 times daily PRN Route: PO  PRN Reason: cramping  Start: 03/27/25 1518                sterile water injection **ADS Override Pull**  Start: 03/27/25 1744 End: 03/27/25 1824   Admin Instructions:               1824 [C]         tiZANidine (ZANAFLEX) tablet 4 mg  Dose: 4 mg  Freq: Every 8 hours PRN Route: PO  PRN Reason: muscle spasms  PRN Comment: Migraine  Start: 03/27/25 1722   Admin Instructions:               1746      0556                      ED Triage Vitals [03/27/25 1137]   Temperature Pulse Respirations Blood Pressure SpO2 Pain Score   97.7 °F (36.5 °C) 103 20 167/90 100 % 10 - Worst Possible Pain     Weight (last 2 days)       Date/Time Weight    03/27/25 1137 73.5 (162)            Vital Signs (last 3 days)       Date/Time Temp Pulse Resp BP MAP (mmHg) SpO2 O2 Device Patient Position - Orthostatic VS [place] Coma Scale Score Pain    03/28/25 10:00:14 -- 75 -- 99/58 72 98 % -- -- -- --    03/28/25 07:18:14 98.1 °F (36.7 °C) 67 14 102/80 87 97 % -- -- -- --    03/28/25 0712 -- -- -- -- -- -- -- -- -- No Pain    03/28/25 0500 -- -- -- -- -- 95 % None (Room air) -- -- --    03/27/25 2020 -- -- -- -- -- -- None (Room air) -- 15 5    03/27/25 1900 -- -- -- -- -- 95 % None (Room air) -- 15 No Pain    03/27/25 1800 -- -- -- -- -- -- None (Room air) -- 15 8    03/27/25 17:57:53 98.1 °F (36.7 °C) 85 18 139/61 87 97 % -- -- -- --    03/27/25 1330 -- -- -- -- -- -- -- -- -- 8    03/27/25 1200 -- -- -- -- -- -- -- -- 15 10 - Worst Possible Pain    03/27/25 1137 97.7 °F (36.5 °C) 103 20 167/90 -- 100 % None (Room air) Sitting -- 10 - Worst Possible Pain              Pertinent Labs/Diagnostic Test Results:   Radiology:  CT head wo contrast   Final Interpretation by Arnel Bustos MD (03/27 0636)   No CT etiology for headaches  "identified.                  Workstation performed: [id number]           Cardiology:  ECG 12 lead   Final Result by Santi Sanchez MD (03/27 0791)   Sinus rhythm with occasional Premature ventricular complexes   Low voltage QRS   Possible Anterolateral infarct , age undetermined   Abnormal ECG   When compared with ECG of 04-Aug-2022 13:59,   Premature ventricular complexes are now Present   Confirmed by Santi Sanchez ([id number]) on 3/27/2025 4:38:58 PM        GI:  No orders to display           Results from last 7 days   Lab Units 03/27/25  1215   WBC Thousand/uL 7.21   HEMOGLOBIN g/dL 14.1   HEMATOCRIT % 43.3   PLATELETS Thousands/uL 198   TOTAL NEUT ABS Thousands/µL 5.00         Results from last 7 days   Lab Units 03/27/25  1215   SODIUM mmol/L 136   POTASSIUM mmol/L 4.7   CHLORIDE mmol/L 102   CO2 mmol/L 26   ANION GAP mmol/L 8   BUN mg/dL 18   CREATININE mg/dL 0.96   EGFR ml/min/1.73sq m 63   CALCIUM mg/dL 9.4     Results from last 7 days   Lab Units 03/27/25  1215   AST U/L 17   ALT U/L 21   ALK PHOS U/L 69   TOTAL PROTEIN g/dL 8.0   ALBUMIN g/dL 4.5   TOTAL BILIRUBIN mg/dL 0.26     Results from last 7 days   Lab Units 03/28/25  1114 03/28/25  0616 03/27/25  1612   POC GLUCOSE mg/dl 130 116 134     Results from last 7 days   Lab Units 03/27/25  1215   GLUCOSE RANDOM mg/dL 101         Results from last 7 days   Lab Units 03/27/25  1215   HEMOGLOBIN A1C % 5.7*   EAG mg/dl 117     No results found for: \"BETA-HYDROXYBUTYRATE\"                   Results from last 7 days   Lab Units 03/27/25  1215   HS TNI 0HR ng/L <2                                                                                                                       Past Medical History:   Diagnosis Date    Asthma     Bell palsy     Diabetes mellitus (HCC)     type 2    Diverticulitis 11/25/2022    EEG abnormality without seizure     Gastroparesis     GERD (gastroesophageal reflux disease)     Headache, migraine     Hiatal hernia     " Hyperlipidemia     IBS (irritable bowel syndrome)     Migraines     PONV (postoperative nausea and vomiting)     Sarcoidosis     Sleep apnea     no cpap     Present on Admission:   Chronic migraine without aura, intractable, with status migrainosus   Iron deficiency anemia   IBS (irritable bowel syndrome)   GERD (gastroesophageal reflux disease)   Type 2 diabetes mellitus, without long-term current use of insulin ([facility])      Admitting Diagnosis: Migraine headache [G43.909]  Age/Sex: 64 y.o. female    Network Utilization Review Department  ATTENTION: Please call with any questions or concerns to [phone] and carefully listen to the prompts so that you are directed to the right person. All voicemails are confidential.   For Discharge needs, contact Care Management DC Support Team at [phone] opt. 2  Send all requests for admission clinical reviews, approved or denied determinations and any other requests to dedicated fax number below belonging to the campus where the patient is receiving treatment. List of dedicated fax numbers for the Facilities:  FACILITY NAME UR FAX NUMBER   ADMISSION DENIALS (Administrative/Medical Necessity) [phone]   DISCHARGE SUPPORT TEAM (NETWORK) [phone]   PARENT CHILD HEALTH (Maternity/NICU/Pediatrics) [phone]   [facility] [phone]   [facility] [phone]   [facility] [phone]   [facility] [phone]   [facility] [phone]   [facility] [phone]   [facility] [phone]   [facility] [phone]   [facility] [phone]   [facility] [phone]   [facility] [phone]   [facility] [phone]

## 2025-03-28 NOTE — TELEPHONE ENCOUNTER
Reason for call:   [x] Prior Auth  [] Other:     Caller:  [] Patient  [x] Pharmacy  Name:   Address:   Callback Number:       Ordering Provider:   [] PCP/Provider -   [x] Speciality/Provider - [facility] [place]     Has the patient tried other medications and failed? If failed, which medications did they fail?    [] No   [] Yes -     Is the patient's insurance updated in EPIC?   [] Yes   [] No     Is a copy of the patient's insurance scanned in EPIC?   [] Yes   [] No

## 2025-03-28 NOTE — ASSESSMENT & PLAN NOTE
Neurology admitted patient into the hospital for management of constant migraine ongoing for past 2 to 3 weeks generally at a level of 8-10 and refractory to all medication attempts in the outpatient setting, including trialing Depakote, cyproheptadine, and significant use of NSAID medications beyond due to the persistent nature of this current migraine.  Patient describes this migraine as nearly the same as all others in her history of migraines for decades, only reported difference is that there is some bilateral facial numbness/abnormal sensation generally to cheeks but also to lips and forehead, and that it feels as though there is a significant sinus component due to yellow nasal discharge and significant tenderness to the cheeks upon palpation.  Patient is infrequently hospitalized related to migraines, presented to the ER seeking help following the onset of the numbness today.    Impression:  Patient currently has intractable migraine warranting hospital admission for acute care, other findings on history and physical appear to indicate is sinus infection/related headache contributory to severity and persistent nature of this migraine, consistent overuse of NSAID medications may also be contributing to its refractory nature.    Plan:  Augmentin 875 mg every 12 hours for 5 days  Steroid taper for 1 week beginning at 10 mg of Decadron daily  Will avoid Benadryl due to lack of utility/side effects  Will attempt to keep away from NSAID use  Magnesium 2 g daily for 3 days  Reglan for nausea purposes  Will avoid use of triptans due to ineffectiveness  03/28 trial of lasmiditan  Continue topiramate 100 mg every 12 hours  Continue verapamil 120 mg daily  Tylenol and tizanidine as needed

## 2025-03-28 NOTE — PLAN OF CARE
Problem: PAIN - ADULT  Goal: Verbalizes/displays adequate comfort level or baseline comfort level  Description: Interventions:- Encourage patient to monitor pain and request assistance- Assess pain using appropriate pain scale- Administer analgesics based on type and severity of pain and evaluate response- Implement non-pharmacological measures as appropriate and evaluate response- Consider cultural and social influences on pain and pain management- Notify physician/advanced practitioner if interventions unsuccessful or patient reports new pain  Outcome: Progressing     Problem: SAFETY ADULT  Goal: Maintain or return to baseline ADL function  Description: INTERVENTIONS:-  Assess patient's ability to carry out ADLs; assess patient's baseline for ADL function and identify physical deficits which impact ability to perform ADLs (bathing, care of mouth/teeth, toileting, grooming, dressing, etc.)- Assess/evaluate cause of self-care deficits - Assess range of motion- Assess patient's mobility; develop plan if impaired- Assess patient's need for assistive devices and provide as appropriate- Encourage maximum independence but intervene and supervise when necessary- Involve family in performance of ADLs- Assess for home care needs following discharge - Consider OT consult to assist with ADL evaluation and planning for discharge- Provide patient education as appropriate  Outcome: Progressing

## 2025-03-31 NOTE — UTILIZATION REVIEW
NOTIFICATION OF ADMISSION DISCHARGE   This is a Notification of Discharge from [facility]. Please be advised that this patient has been discharge from our facility. Below you will find the admission and discharge date and time including the patient’s disposition.   UTILIZATION REVIEW CONTACT:  Utilization Review Assistants  Network Utilization Review Department  Phone: [phone] x carefully listen to the prompts. All voicemails are confidential.  Email: [email or web address]     ADMISSION INFORMATION  PRESENTATION DATE: 3/27/2025 11:44 AM  OBERVATION ADMISSION DATE: N/A  INPATIENT ADMISSION DATE: 3/27/25  3:18 PM   DISCHARGE DATE: 3/28/2025  2:04 PM   DISPOSITION:Home/Self Care    Network Utilization Review Department  ATTENTION: Please call with any questions or concerns to [phone] and carefully listen to the prompts so that you are directed to the right person. All voicemails are confidential.   For Discharge needs, contact Care Management DC Support Team at [phone] opt. 2  Send all requests for admission clinical reviews, approved or denied determinations and any other requests to dedicated fax number below belonging to the campus where the patient is receiving treatment. List of dedicated fax numbers for the Facilities:  FACILITY NAME UR FAX NUMBER   ADMISSION DENIALS (Administrative/Medical Necessity) [phone]   DISCHARGE SUPPORT TEAM ([facility]) [phone]   PARENT CHILD HEALTH (Maternity/NICU/Pediatrics) [phone]   [facility] [phone]   [facility] [phone]   [facility] [phone]   [facility] [phone]   [facility] [phone]   [facility] [phone]   [facility] [phone]   [facility] [phone]   [facility]  [facility] [phone]   [facility] [phone]   [facility] [phone]   [facility] [phone]

## 2025-04-14 ENCOUNTER — PATIENT MESSAGE (OUTPATIENT)
Dept: NEUROLOGY | Facility: CLINIC | Age: 65
End: 2025-04-14

## 2025-04-17 NOTE — PATIENT COMMUNICATION
Per chart review, ketorolac dc'd on 3/28 [facility] after hospital stay migraine headache  documentation includes: Will attempt to keep away from NSAID use; consistent overuse of NSAID medications may also be contributing to its refractory nature.      F/u scheduled 5/13.  Please provide recommendation, thank you.

## 2025-04-21 DIAGNOSIS — G43.909 MIGRAINE HEADACHE: Primary | ICD-10-CM

## 2025-04-21 RX ORDER — KETOROLAC TROMETHAMINE 10 MG/1
10 TABLET, FILM COATED ORAL EVERY 6 HOURS PRN
Qty: 30 TABLET | Refills: 1 | Status: SHIPPED | OUTPATIENT
Start: 2025-04-21

## 2025-04-21 NOTE — TELEPHONE ENCOUNTER
FOLLOW UP: Jack Murcia PA-C Please advise, Patient was disappointed she did not get a response back last week. Thank you!    Patient confirmed pharmacy as:   [facility] - [place], [place] - [address]  [address], [place] [address]  Phone: [phone]  Fax: [phone]       REASON FOR CONVERSATION: Migraine medication    SYMPTOMS: Migraine for a week that does not go away.     OTHER: Patient would like to be called with updates to [phone] and we may leave a voicemail.    DISPOSITION: Discuss with Provider and Call Back Patient (overriding See Within 3 Days in Office)    Reason for Disposition   MILD - MODERATE headache present > 3 days (72 hours)    Answer Assessment - Initial Assessment Questions  .MIGRAINEHA   When did migraine start? A week ago, started last Monday 04/14/2025.    Location/Description: Pressure sinus migraine. Patient rates her migraine a 3, last week was worse.      Associated symptoms: Patient denies. Pressure under her eyes, denies visual disturbances.     Precipitating factors: Weather, not having medication     Alleviating factors: Patient said she had been taking Advil and tylenol sinus, off and on and not together. Patient used to take Toradol but it was discontinued. Patient said the advil and tylenol sinus help but do not take it away completley.     What medications have you tried for this migraine headache? Advil and tylenol sinus    Current migraine medications are confirmed as: topiramate (Topamax) 100 mg tablet twice a day and     Medications tried in the past? Patient used to have Toradol but Dr. Ernst discontinued it to decrease NSAID use.    Protocols used: Headache-Adult-OH

## 2025-05-13 ENCOUNTER — OFFICE VISIT (OUTPATIENT)
Dept: NEUROLOGY | Facility: CLINIC | Age: 65
End: 2025-05-13
Payer: COMMERCIAL

## 2025-06-17 ENCOUNTER — NURSE TRIAGE (OUTPATIENT)
Age: 65
End: 2025-06-17

## 2025-06-17 DIAGNOSIS — G43.711 INTRACTABLE CHRONIC MIGRAINE WITHOUT AURA AND WITH STATUS MIGRAINOSUS: Primary | ICD-10-CM

## 2025-06-17 RX ORDER — OLANZAPINE 10 MG/1
10 TABLET, FILM COATED ORAL
Qty: 5 TABLET | Refills: 0 | Status: SHIPPED | OUTPATIENT
Start: 2025-06-17 | End: 2025-06-22

## 2025-06-17 NOTE — TELEPHONE ENCOUNTER
Jack Murcia PA-C       6/17/25 10:43 AM  Tejasy Neida,     Ask if she would want to try Zyprexa. That seems to be one of the only bridges she has not tried already. Thanks!     -Charles    __________________    I also received Secure chat msg update from Charles MODI  to call pt per above.    I called pt and she informed she has not tried Zyprexa. She is interested to try it to see if it helps break migraine cycle. Requesting provider to send to pharmacy.    I also sent secure chat msg to provider per above.

## 2025-06-17 NOTE — PROGRESS NOTES
- Start zyprexa 10 mg, take 1 tablet by mouth once daily at bedtime for migraine bridging therapy     Jack Murcia PA-C  06/17/2025

## 2025-06-17 NOTE — TELEPHONE ENCOUNTER
"REASON FOR CONVERSATION: Headache    SYMPTOMS: Migraine and Nausea since Sun night    OTHER HEALTH INFORMATION: Diabetic    PROTOCOL DISPOSITION: Home Care    CARE ADVICE PROVIDED: Pt aware to call office or go to ED if symptoms worsen    PRACTICE FOLLOW-UP: Routed to provider to advise, as pt requesting another med be sent to her [facility] today, as none of her current meds are working to break migraine cycle    Pt advised once med is sent, pharmacy will update her when its ready for .    I also sent provider a secure chat msg as pt requesting a med be sent ASAP, to try to get relief          Reason for Disposition   Similar to previously diagnosed migraine headaches    Answer Assessment - Initial Assessment Questions  1. LOCATION: \"Where does it hurt?\"       Under eyes and entire Head  2. ONSET: \"When did the headache start?\" (e.g., minutes, hours, days)       Sunday 7 pm  3. PATTERN: \"Does the pain come and go, or has it been constant since it started?\"      Constant   4. SEVERITY: \"How bad is the pain?\" and \"What does it keep you from doing?\"  (e.g., Scale 1-10; mild, moderate, or severe)      10/10. Denies worst migraine of her life, just annoying as its been constant  5. RECURRENT SYMPTOM: \"Have you ever had headaches before?\" If Yes, ask: \"When was the last time?\" and \"What happened that time?\"       Yes, last migraine last Tuesday but it was mild and this one has been worse.  6. CAUSE: \"What do you think is causing the headache?\"      Possibly weather, Started sinus migraine, but now she can't break migraine   7. MIGRAINE: \"Have you been diagnosed with migraine headaches?\" If Yes, ask: \"Is this headache similar?\"       Yes  similar  8. HEAD INJURY: \"Has there been any recent injury to the head?\"       no  9. OTHER SYMPTOMS: \"Do you have any other symptoms?\" (e.g., fever, stiff neck, eye pain, sore throat, cold symptoms)      Nausea  taken phenergan with Toradol, not effective    Pt currently " taking: None of the meds are helping  Topamax 100 mg BID  Toradol 10 mg every 6 hours as needed (took this am)  Verapamil 120 mg daily in Am  Phenergan 25 mg tab every 6 hours as needed (took this am)    Pt prefers not to take steroids as she is diabetic and it makes her too wired and energized, unless its the only option, then she is willing to take it to break the migraine cycle    Pt tried Depakote and Toradol inj in past, not effective    Protocols used: Headache-Adult-OH

## 2025-06-19 NOTE — TELEPHONE ENCOUNTER
I called patient whom informed she still has the HA. However, her  just picked up the Zyprexa today, she will start it tonight.    She is aware to call our office or go to ED  if symptoms worsen or do not improve.

## 2025-07-01 DIAGNOSIS — G43.909 MIGRAINE HEADACHE: ICD-10-CM

## 2025-07-02 RX ORDER — KETOROLAC TROMETHAMINE 10 MG/1
10 TABLET, FILM COATED ORAL EVERY 6 HOURS PRN
Qty: 30 TABLET | Refills: 0 | Status: SHIPPED | OUTPATIENT
Start: 2025-07-02

## 2025-07-08 ENCOUNTER — NURSE TRIAGE (OUTPATIENT)
Age: 65
End: 2025-07-08

## 2025-07-08 DIAGNOSIS — G43.711 INTRACTABLE CHRONIC MIGRAINE WITHOUT AURA AND WITH STATUS MIGRAINOSUS: ICD-10-CM

## 2025-07-08 DIAGNOSIS — G43.019 INTRACTABLE MIGRAINE WITHOUT AURA AND WITHOUT STATUS MIGRAINOSUS: Primary | ICD-10-CM

## 2025-07-08 DIAGNOSIS — K21.9 GASTROESOPHAGEAL REFLUX DISEASE WITHOUT ESOPHAGITIS: Primary | ICD-10-CM

## 2025-07-08 DIAGNOSIS — R13.10 DYSPHAGIA, UNSPECIFIED TYPE: ICD-10-CM

## 2025-07-08 DIAGNOSIS — Z87.19 HISTORY OF REPAIR OF HIATAL HERNIA: ICD-10-CM

## 2025-07-08 DIAGNOSIS — Z98.890 HISTORY OF REPAIR OF HIATAL HERNIA: ICD-10-CM

## 2025-07-08 RX ORDER — OLANZAPINE 10 MG/1
10 TABLET, FILM COATED ORAL
Qty: 5 TABLET | Refills: 0 | Status: SHIPPED | OUTPATIENT
Start: 2025-07-08 | End: 2025-07-13

## 2025-07-08 NOTE — TELEPHONE ENCOUNTER
"REASON FOR CONVERSATION: Difficulty Swallowing    SYMPTOMS: DIFFICULTY SWALLOWING SOLIDS, COUGHING, CHOKING,  LLQ PAIN, NAUSEA    OTHER HEALTH INFORMATION: SYMPTOMS OF DYSPHAGIA HAVE WORSENED OVER THE PAST 2 WEEKS. LLQ PAIN PRESENT FOR MONTHS, INTERMITTENT 8/10 AT TIMES WITH CHILLS. PT DENIES FEVER, NO CHANGE IN BOWEL PATTERN.    PROTOCOL DISPOSITION: See Within 3 Days in Office    CARE ADVICE PROVIDED: TAKE SMALL BITES, MOISTEN FOOD, CHEW FOOD WELL, ALTERNATING WITH SIPS OF LIQUIDS, SIT UPRIGHT TO EAT. FOR LLQ PAIN, CAN TRIAL CLEAR LIQUIDS FOR 24-48 HOURS. ER PRECAUTIONS REVIEWED.     PRACTICE FOLLOW-UP: SOONEST URGENT VISIT SCHEDULED FOR 8/5/25.                 Reason for Disposition   Patient wants to be seen    Answer Assessment - Initial Assessment Questions  1. DESCRIPTION: \"Tell me more about this problem.\" \"Are you  having trouble swallowing liquids, solids, or both?\" \"Any trouble with swallowing saliva (spit)?\"      SOLIDS  2. SEVERITY: \"How bad is the swallowing difficulty?\"  (Scale 1-10; or mild, moderate, severe)      MODERATE TO SEVERE  3. ONSET: \"When did the swallowing problems begin?\"       ONGOING- WORSE OVER THE PAST 2 WEEKS  4. CAUSE: \"What do you think is causing the problem?\"  (e.g., dry mouth, food or pill stuck in throat, mouth pain, sore throat, progression of disease process such as dementia or Parkinson's disease).       UNSURE  5. CHRONIC or RECURRENT: \"Is this a new problem for you?\"  If No, ask: \"How long have you had this problem?\" (e.g., days, weeks, months)       MONTHS  6. OTHER SYMPTOMS: \"Do you have any other symptoms?\" (e.g., chest pain, difficulty breathing, mouth sores, sore throat, swollen tongue, chest pain)      NAUSEA, COUGHING, CHOKING    Protocols used: Swallowing Difficulty-Adult-OH    " no

## 2025-07-08 NOTE — TELEPHONE ENCOUNTER
Jack Murcia PA-C to Neurology Neurovascular Team 4 (Selected Message)        7/8/25  3:12 PM  I mean I do not want her to feel that she has to take steroids and feel wired when she takes it.  If she is really struggling then we may have to figure out a different preventative medication regimen for her. Honestly a follow-up appointment may be best for her.  She is really struggling with having to receive a bridging medication every few weeks for migraines at this time.  We may have to figure out a different method for her.  See if the patient would be willing to come in within the next week or 2 for further consultation.  If she has difficult time breaking this migraine I would suggest going to the ED.  If she is willing to trial the steroids and we certainly try it if she would like.  We could also run another round of Zyprexa as well.     Jovani

## 2025-07-08 NOTE — TELEPHONE ENCOUNTER
pt called and states that she has had a migraine for little over a week, since 6/30  pain did decrease for a few days but then came back on 7/6 and got worse yesterday,   nausea is awful    REASON FOR CONVERSATION: Migraine    SYMPTOMS: migraine, dizziness and nausea    OTHER HEALTH INFORMATION: NA    PROTOCOL DISPOSITION: Discuss with Provider and Call Back Patient (overriding Callback by PCP Within 1 Hour)    CARE ADVICE PROVIDED: ER if pain becomes too severe or worst headache of her life    PRACTICE FOLLOW-UP: please advise    [phone] to leave detailed message    Reason for Disposition   SEVERE headache (e.g., excruciating) and has had severe headaches before    Answer Assessment - Initial Assessment Questions  When did migraine start? 6/30    Location/Description: whole head and underneath the eyes    Pain scale: 10-not the worst headache of her life.  this is her typical migraine    Associated symptoms:nausea, dizziness    What medications have you tried for this migraine headache?  Phenergan, toradol and meclizine,  Tylenol allergy and sinus-she does not take this with the toradol.  She will alternate    Current migraine medications are confirmed as:  Verapamil 120mg daily  Topamax 100mg bid     Medications tried in the past?   Steroids used as a last resort as she gets wired and can't sleep  depakote ineffective as a preventative in the past  toradol inj not effective in the past  zyprexa did help but it took a few days to work when it was prescribed recently    She is willing to try steroids if that is what is recommended.    Protocols used: Headache-Adult-OH

## 2025-07-08 NOTE — TELEPHONE ENCOUNTER
Called pt. Reviewed the provider's recommendations. Pt did schedule an OV for 8/6/25 to discuss migraine preventative medication. That was the soonest available office visit as pt needs an appointment after 1 pm. Pt said that she would be willing to try either steroids if a sleeping pill is ordered to take along with it, or Zyprexa. Made pt aware that the provider usually does not prescribe sleeping medication with the steroids. Pt verbalized understanding and then stated that she would be willing to try Zyprexa.

## 2025-07-08 NOTE — TELEPHONE ENCOUNTER
Called pt to go over recommendations, pt states still having the nausea and does agree to have the barium swallow again.

## 2025-07-09 NOTE — TELEPHONE ENCOUNTER
Jack Murcia PA-C to Me         7/8/25  4:06 PM  Orders for Zyprexa sent to [facility] pharmacy.  Thank you!  __________________________________    Called pt and made her aware. Pt was appreciative.

## 2025-07-14 ENCOUNTER — HOSPITAL ENCOUNTER (OUTPATIENT)
Dept: RADIOLOGY | Facility: HOSPITAL | Age: 65
Discharge: HOME | End: 2025-07-14
Attending: PHYSICIAN ASSISTANT
Payer: COMMERCIAL

## 2025-07-14 DIAGNOSIS — Z98.890 HISTORY OF REPAIR OF HIATAL HERNIA: ICD-10-CM

## 2025-07-14 DIAGNOSIS — R13.10 DYSPHAGIA, UNSPECIFIED TYPE: ICD-10-CM

## 2025-07-14 DIAGNOSIS — Z87.19 HISTORY OF REPAIR OF HIATAL HERNIA: ICD-10-CM

## 2025-07-14 DIAGNOSIS — K21.9 GASTROESOPHAGEAL REFLUX DISEASE WITHOUT ESOPHAGITIS: ICD-10-CM

## 2025-07-14 PROCEDURE — 74230 X-RAY XM SWLNG FUNCJ C+: CPT

## 2025-07-14 PROCEDURE — 92611 MOTION FLUOROSCOPY/SWALLOW: CPT

## 2025-07-14 NOTE — PROCEDURES
Video Swallow Study      Patient Name: Clotilde Berg  Today's Date: 7/14/2025        Past Medical History  Past Medical History[1]     Past Surgical History  Past Surgical History[2]      General Information:         Oral Stage:                Pharyngeal Stage:                     Esophageal Stage:          Assessment Summary:       Diagnosis/Prognosis:            Recommendations:                            [1]   Past Medical History:  Diagnosis Date    Asthma     Bell palsy     Diabetes mellitus (HCC)     type 2    Diverticulitis 11/25/2022    EEG abnormality without seizure     Gastroparesis     GERD (gastroesophageal reflux disease)     Headache, migraine     Hiatal hernia     Hyperlipidemia     IBS (irritable bowel syndrome)     Migraines     PONV (postoperative nausea and vomiting)     Sarcoidosis     Sleep apnea     no cpap   [2]   Past Surgical History:  Procedure Laterality Date    CHOLECYSTECTOMY      PLACEMENT ESOPHAGEAL SPHINCTER AUGMENTATION DEVICE W/ ROBOTICS N/A 1/5/2024    Procedure: linx placement;  Surgeon: Mark Hernandez MD;  [facility];  Service: Thoracic    ME COLONOSCOPY FLX DX W/COLLJ SPEC WHEN PFRMD N/A 3/8/2017    Procedure: EGD AND COLONOSCOPY;  Surgeon: Rad Romero MD;  [facility]: [facility];  Service: Gastroenterology    ME ESOPHAGOGASTRODUODENOSCOPY TRANSORAL DIAGNOSTIC N/A 1/5/2024    Procedure: ESOPHAGOGASTRODUODENOSCOPY (EGD);  Surgeon: Mark Hernandez MD;  [facility];  Service: Thoracic    ME LAPS RPR PARAESPHGL HRNA INCL FUNDPLSTY W/O MESH N/A 1/5/2024    Procedure: robotic assisted paraesophageal hernia repair;  Surgeon: Mark Hernandez MD;  [facility];  Service: Thoracic    REPLACEMENT TOTAL KNEE Right     SIGMOIDECTOMY

## 2025-07-14 NOTE — PROCEDURES
Video Swallow Study      Patient Name: Clotilde Berg  Today's Date: 7/14/2025        Past Medical History  Past Medical History[1]     Past Surgical History  Past Surgical History[2]    Modified (Video) Barium Swallow Study    Summary:  Pt presents with oropharyngeal swallow function that is WNL. No penetration or aspiration observed. There does appear to be a CP bar, and bony outgrowths from spine at C5-6; it's possible pt's difficulty with solids may be due to these. A brief scan of the esophagus revealed stasis of the barium in the distal esophagus; this may also result in feeling of globus sensation. Recommend follow up with GI (pt has an appointment coming up).     Recommendations:  Diet: Regular, consider choosing moist foods, add sauce, gravy, condiments  Liquids: Thin  Meds: Whole with water  Strategies: Alternate bites and sips, chew food well  Frequent/thorough oral care  Upright position  F/u ST tx: no  Aspiration Precautions  Reflux Precautions  Consider consult with: GI  Results reviewed with: pt  If a dedicated assessment of the esophagus is desired: consider repeating EGD or esophagram    Images are on PACS for review. Due to YARELI malfunction, the last several images did not move over to PACS. This includes pudding, cookie, pill, esophageal scan, and AP view.        Per gross esophageal screen:  Stasis in distal esophagus; sip of liquid was effective at moving the barium into the stomach.       Penetration/Aspiration Scale:  Puree:1  Solids: 1  Thin:1  Nectar:1  Honey:1          8-Point Penetration-Aspiration Scale   1 Material does not enter the airway   2 Material enters the airway, remains above the vocal folds, and is ejected  from the  airway    3 Material enters the airway, remains above the vocal folds, and is not ejected from the airway   4 Material enters the airway, contacts the vocal folds, and is ejected from the airway   5 Material enters the  airway, contacts the vocal folds, and is not ejected from the airway    6 Material enters the airway, passes below the vocal folds and is ejected into the larynx or out of the airway    7 Material enters the airway, passes below the vocal folds, and is not ejected from the trachea despite effort    8 Material enters the airway, passes below the vocal folds, and no effort is made to eject          Goals:  Pt will tolerate least restrictive diet w/out s/s aspiration or oral/pharyngeal difficulties.      Patient's goal: Pt is hoping to find out why she is having difficulty swallowing solids.     H&P/pertinent provider notes as per their charting: (PMH noted above)  Refer to other provider reports for complete details.       Pt is a 64 y/o female seen today for OP MBS. Pt reports difficulty swallowing solids. This can happen with any solid food, but especially meats. She cuts the meat into small pieces, but still has globus sensation intermittently, along with hiccups and coughing.  There is occasional regurgitation of food. She has difficulty swallowing tiny pills (globus sensation). Pt does have GERD; she takes medication, and underwent the LINX procedure for GERD.  She denies any recent chest infections or pneumonia      Special Studies:  CT head 3/27/25-  No CT etiology for headaches identified.     CXR 11/4/24-  No acute cardiopulmonary disease.     Esophagram 1/6/24-  Unremarkable esophagram. No evidence of leak.    EGD 6/28/24;  IMPRESSION:  Marte's esophagus observed. C0M2 Marte's top of gastric fold 39, multiple islands of marte's, largest measured about 1 cm. Top being 32 cm. Biopsy's obtained at 39 cm, 33 cm, and 31 cm.  Moderate erythematous mucosa in the body of the stomach; performed cold forceps biopsy to rule out H. pylori  Single ulcer in the prepyloric region with clean base (Alton III)  The duodenal bulb appeared normal.  Erythematous mucosa with erosion in the 2nd part of the duodenum;  performed cold forceps biopsy        RECOMMENDATION:  Await pathology results   Proceed with colonoscopy      Previous MBS:  No        Does the pt have pain? no  If yes, was nursing made aware/was it addressed?      Food allergies: Tree nuts, coconut, fish   Current diet: Regular and thin liquids   Premorbid diet: Regular and thin liquids   Dentition: Natural, some missing   O2 requirement: Room air   Oral mech: Grossly intact   Vocal quality/speech: WNL   Cognitive status: Able to follow commands and converse     Precautions: reflux    Consistencies administered: Puree, pudding, nutrigrain bar, hard cookie, bread, mixed consistency, barium pill w/ thin/puree, thin, nectar, honey thick.      Pt was viewed seated laterally at 90 degrees and in AP.      Oral stage: WNL  Lip closure: adequate  Mastication: prompt  Bolus formation: cohesive  Bolus control: adequate  Bolus transfer: prompt  Oral residue: no      Pharyngeal stage: WNL  Swallow initiation: prompt  Velopharyngeal closure: adequate  Laryngeal elevation: reduced but adequate  Anterior hyoid excursion: reduced but adequate  Epiglottic inversion: full motion  Laryngeal vestibular closure: full closure  Pharyngeal stripping wave/constriction: adequate  UES/PES opening: ?somewhat reduced  Tongue base retraction: adequate  Vallecular retention: no  Pyriform retention: no  PPW coating: no  Osteophytes: ?C5-6  CP prominence: yes  Retropulsion from prominence: no  Transient penetration: no  Epiglottic undercoat: no  Penetration: no  Aspiration: no  Strategies: N/A  Response to aspiration: N/A    Screening of Esophageal stage:  Stasis in distal esophagus; sip of liquid was effective at moving the barium into the stomach.                                      [1]   Past Medical History:  Diagnosis Date    Asthma     Bell palsy     Diabetes mellitus (HCC)     type 2    Diverticulitis 11/25/2022    EEG abnormality without seizure     Gastroparesis     GERD  (gastroesophageal reflux disease)     Headache, migraine     Hiatal hernia     Hyperlipidemia     IBS (irritable bowel syndrome)     Migraines     PONV (postoperative nausea and vomiting)     Sarcoidosis     Sleep apnea     no cpap   [2]   Past Surgical History:  Procedure Laterality Date    CHOLECYSTECTOMY      PLACEMENT ESOPHAGEAL SPHINCTER AUGMENTATION DEVICE W/ ROBOTICS N/A 1/5/2024    Procedure: linx placement;  Surgeon: Makr Hernandez MD;  [facility];  Service: Thoracic    FL COLONOSCOPY FLX DX W/COLLJ SPEC WHEN PFRMD N/A 3/8/2017    Procedure: EGD AND COLONOSCOPY;  Surgeon: Rad Romero MD;  [facility];  Service: Gastroenterology    FL ESOPHAGOGASTRODUODENOSCOPY TRANSORAL DIAGNOSTIC N/A 1/5/2024    Procedure: ESOPHAGOGASTRODUODENOSCOPY (EGD);  Surgeon: Mark Hernandez MD;  [facility];  Service: Thoracic    FL LAPS RPR PARAESPHGL HRNA INCL FUNDPLSTY W/O MESH N/A 1/5/2024    Procedure: robotic assisted paraesophageal hernia repair;  Surgeon: Mark Hernandez MD;  [facility];  Service: Thoracic    REPLACEMENT TOTAL KNEE Right     SIGMOIDECTOMY

## 2025-07-18 ENCOUNTER — TELEPHONE (OUTPATIENT)
Age: 65
End: 2025-07-18

## 2025-07-18 RX ORDER — DEXAMETHASONE 4 MG/1
TABLET ORAL
Qty: 5 TABLET | Refills: 0 | Status: SHIPPED | OUTPATIENT
Start: 2025-07-18

## 2025-07-18 NOTE — TELEPHONE ENCOUNTER
Pt called in stating she finished the 5 days of medication and is still not getting full relief from the headaches. Pt wants to know what else she can do.

## 2025-07-18 NOTE — TELEPHONE ENCOUNTER
Patient stated she goes to bed with a headache and wakes up with a pounding and nothing is helping. Pain report a sinus migraine with a pain level of an 8. Denies N/V.    Patient is willing to try a steroid. Note: Pt has DM-type 2.  Patient is willing to try whatever will work.   Found an earlier appointment with [facility] for 7/24/25.     Miguel Ángel,  Please advise.    Thank you!

## 2025-07-18 NOTE — TELEPHONE ENCOUNTER
Hgba1c is well controlled.   Sent decadron taper.  Common adverse effects:insomnia, jitteriness/anxiety, increased blood sugars, urination and hunger.  Agree with keeping sooner follow up to address regimen as it does not appear to be working well.

## 2025-07-18 NOTE — TELEPHONE ENCOUNTER
Medication: torodal     Dose/Frequency: Take 1 tablet (10 mg total) by mouth every 6 (six) hours as needed for moderate pain (migraine relief)     Quantity: 30 tablets     Pharmacy:   [facility] - [place], [place] - [address] [address] [address]  [address], [place] [address]  Phone: [phone]  Fax: [phone]  ZULEIKA #: --       Office:   [] PCP/Provider -   [x] Speciality/Provider - Charles Murcia     Does the patient have enough for 3 days?   [x] Yes   [] No - Send as HP to POD

## 2025-07-22 ENCOUNTER — OFFICE VISIT (OUTPATIENT)
Dept: NEUROLOGY | Facility: CLINIC | Age: 65
End: 2025-07-22
Payer: COMMERCIAL

## 2025-07-24 ENCOUNTER — TELEPHONE (OUTPATIENT)
Age: 65
End: 2025-07-24

## 2025-07-24 DIAGNOSIS — G43.909 MIGRAINE HEADACHE: ICD-10-CM

## 2025-07-24 RX ORDER — KETOROLAC TROMETHAMINE 10 MG/1
10 TABLET, FILM COATED ORAL EVERY 6 HOURS PRN
Qty: 30 TABLET | Refills: 0 | Status: SHIPPED | OUTPATIENT
Start: 2025-07-24

## 2025-07-24 NOTE — TELEPHONE ENCOUNTER
Incoming call from patient. Patient asked if [facility] would be agreeable to extend her FMLA to at least the end August 30, 2025. Dr. Perez wrote the original FMLA. Advised patient Dr. Perez would be the provider to approve the FMLA extension. Patient voiced clear understanding.      Patient also asked if [facility] wanted to order another CT head wo contrast due to her prolonged migraine.    CB# [phone]    [facility] please advise.    Thank you!

## 2025-07-24 NOTE — TELEPHONE ENCOUNTER
Jack Murcia PA-C to Me (Selected Message)        7/24/25 11:25 AM  If she is able to provide us with the appropriate paper work for extension would be happy to do so. I do not believe that she needs another CT head at this moment in time.      Jovani

## 2025-07-30 NOTE — TELEPHONE ENCOUNTER
PA requested for Atogepant (Qulipta) 60 MG TABS scanned into media. Office visit dated 7/22/25 is unsigned. Please have provider complete and sign encounter. Once completed PA can be submitted for Qulipta 60 mg

## 2025-08-05 ENCOUNTER — OFFICE VISIT (OUTPATIENT)
Dept: GASTROENTEROLOGY | Facility: CLINIC | Age: 65
End: 2025-08-05
Payer: COMMERCIAL

## 2025-08-05 VITALS
WEIGHT: 167 LBS | HEIGHT: 62 IN | TEMPERATURE: 99.2 F | BODY MASS INDEX: 30.73 KG/M2 | DIASTOLIC BLOOD PRESSURE: 70 MMHG | SYSTOLIC BLOOD PRESSURE: 122 MMHG

## 2025-08-05 DIAGNOSIS — R13.13 DYSPHAGIA, CRICOPHARYNGEAL: ICD-10-CM

## 2025-08-05 DIAGNOSIS — R13.10 DYSPHAGIA, UNSPECIFIED TYPE: Primary | ICD-10-CM

## 2025-08-05 DIAGNOSIS — K22.70 BARRETT'S ESOPHAGUS WITHOUT DYSPLASIA: ICD-10-CM

## 2025-08-05 DIAGNOSIS — R19.7 DIARRHEA, UNSPECIFIED TYPE: ICD-10-CM

## 2025-08-05 DIAGNOSIS — R10.32 LLQ PAIN: ICD-10-CM

## 2025-08-05 PROCEDURE — 99214 OFFICE O/P EST MOD 30 MIN: CPT | Performed by: PHYSICIAN ASSISTANT

## 2025-08-05 RX ORDER — ESOMEPRAZOLE MAGNESIUM 40 MG/1
40 CAPSULE, DELAYED RELEASE ORAL
COMMUNITY

## 2025-08-05 RX ORDER — SODIUM CHLORIDE, SODIUM LACTATE, POTASSIUM CHLORIDE, CALCIUM CHLORIDE 600; 310; 30; 20 MG/100ML; MG/100ML; MG/100ML; MG/100ML
125 INJECTION, SOLUTION INTRAVENOUS CONTINUOUS
OUTPATIENT
Start: 2025-08-05

## 2025-08-06 ENCOUNTER — HOSPITAL ENCOUNTER (OUTPATIENT)
Dept: RADIOLOGY | Facility: HOSPITAL | Age: 65
Discharge: HOME | End: 2025-08-06
Attending: PHYSICIAN ASSISTANT
Payer: COMMERCIAL

## 2025-08-06 ENCOUNTER — APPOINTMENT (OUTPATIENT)
Dept: LAB | Facility: CLINIC | Age: 65
End: 2025-08-06
Attending: PHYSICIAN ASSISTANT
Payer: COMMERCIAL

## 2025-08-06 DIAGNOSIS — R10.32 LLQ PAIN: ICD-10-CM

## 2025-08-06 PROCEDURE — 74177 CT ABD & PELVIS W/CONTRAST: CPT

## 2025-08-06 RX ADMIN — IOHEXOL 85 ML: 350 INJECTION, SOLUTION INTRAVENOUS at 14:40

## 2025-08-13 DIAGNOSIS — G43.909 MIGRAINE HEADACHE: ICD-10-CM

## 2025-08-13 RX ORDER — KETOROLAC TROMETHAMINE 10 MG/1
10 TABLET, FILM COATED ORAL EVERY 6 HOURS PRN
Qty: 30 TABLET | Refills: 0 | Status: CANCELLED | OUTPATIENT
Start: 2025-08-13

## 2025-08-18 RX ORDER — KETOROLAC TROMETHAMINE 10 MG/1
10 TABLET, FILM COATED ORAL EVERY 6 HOURS PRN
Qty: 30 TABLET | Refills: 0 | Status: SHIPPED | OUTPATIENT
Start: 2025-08-18

## (undated) DEVICE — STERILE EMESIS BASIN                 070: Brand: CARDINAL HEALTH

## (undated) DEVICE — ADHESIVE SKIN HIGH VISCOSITY EXOFIN 1ML

## (undated) DEVICE — NEEDLE HYPO 22G X 1-1/2 IN

## (undated) DEVICE — LAPAROSCOPIC TROCAR SLEEVE/SINGLE USE: Brand: KII® OPTICAL ACCESS SYSTEM

## (undated) DEVICE — INSUFFLATION NEEDLE TO ESTABLISH PNEUMOPERITONEUM.: Brand: INSUFFLATION NEEDLE

## (undated) DEVICE — UTILITY MARKER,BLACK WITH LABELS: Brand: DEVON

## (undated) DEVICE — GAUZE SPONGES,16 PLY: Brand: CURITY

## (undated) DEVICE — KIT, BETHLEHEM THORACIC ROBOT: Brand: CARDINAL HEALTH

## (undated) DEVICE — VESSEL SEALER EXTEND: Brand: ENDOWRIST

## (undated) DEVICE — INTENDED FOR TISSUE SEPARATION, AND OTHER PROCEDURES THAT REQUIRE A SHARP SURGICAL BLADE TO PUNCTURE OR CUT.: Brand: BARD-PARKER SAFETY BLADES SIZE 15, STERILE

## (undated) DEVICE — TUBING SUCTION 5MM X 12 FT

## (undated) DEVICE — HEAVY DUTY TABLE COVER: Brand: CONVERTORS

## (undated) DEVICE — SUT VICRYL 0 REEL 54 IN J287G

## (undated) DEVICE — SUT MONOCRYL 4-0 PS-2 18 IN Y496G

## (undated) DEVICE — Device: Brand: OMNICLOSE TROCAR SITE CLOSURE DEVICE

## (undated) DEVICE — TRAY FOLEY 16FR URIMETER SILICONE SURESTEP

## (undated) DEVICE — SUT ETHIBOND 0 SH 30 IN X834H

## (undated) DEVICE — COLUMN DRAPE

## (undated) DEVICE — Device: Brand: DEFENDO AIR/WATER/SUCTION AND BIOPSY VALVE

## (undated) DEVICE — KIT ENDO BUTTON

## (undated) DEVICE — CANNULA SEAL

## (undated) DEVICE — ARM DRAPE

## (undated) DEVICE — GAUZE ROLL KITTNER

## (undated) DEVICE — PENROSE DRAIN, 18 X 3 8: Brand: CARDINAL HEALTH

## (undated) DEVICE — STRL PENROSE DRAIN 18" X 3/4": Brand: CARDINAL HEALTH

## (undated) DEVICE — FIRST STEP BEDSIDE KIT - STAND-UP POUCH, ENDOSCOPIC CLEANING PAD - 1 POUCH: Brand: FIRST STEP BEDSIDE KIT - STAND-UP POUCH, ENDOSCOPIC CLEANING PAD

## (undated) DEVICE — SIZING TOOL LINX REFLUX SYS

## (undated) DEVICE — AIR AND WATER TUBING/CAP SET FOR OLYMPUS® SCOPES: Brand: ERBE

## (undated) DEVICE — SUT ETHIBOND 2-0 SH/SH 36 IN X523H

## (undated) DEVICE — SINGLE-USE BIOPSY FORCEPS: Brand: RADIAL JAW 4

## (undated) DEVICE — GLOVE INDICATOR PI UNDERGLOVE SZ 8 BLUE

## (undated) DEVICE — TROCAR: Brand: KII FIOS FIRST ENTRY

## (undated) DEVICE — VISUALIZATION SYSTEM: Brand: CLEARIFY

## (undated) DEVICE — METZENBAUM ADTEC SINGLE USE DISSECTING SCISSORS, SHAFT ONLY, MONOPOLAR, CURVED TO LEFT, WORKING LENGTH: 12 1/4", (310 MM), DIAM. 5 MM, INSULATED, DOUBLE ACTION, STERILE, DISPOSABLE, PACKAGE OF 10 PIECES: Brand: AESCULAP

## (undated) DEVICE — 60 ML SYRINGE,REGULAR TIP: Brand: MONOJECT

## (undated) DEVICE — SUT VICRYL 3-0 SH 27 IN J416H

## (undated) DEVICE — GLOVE SRG BIOGEL ECLIPSE 7.5